# Patient Record
Sex: FEMALE | Race: WHITE | Employment: OTHER | URBAN - METROPOLITAN AREA
[De-identification: names, ages, dates, MRNs, and addresses within clinical notes are randomized per-mention and may not be internally consistent; named-entity substitution may affect disease eponyms.]

---

## 2018-04-17 ENCOUNTER — OFFICE VISIT (OUTPATIENT)
Dept: FAMILY MEDICINE CLINIC | Age: 83
End: 2018-04-17
Payer: MEDICARE

## 2018-04-17 VITALS
WEIGHT: 181 LBS | BODY MASS INDEX: 30.16 KG/M2 | HEART RATE: 89 BPM | DIASTOLIC BLOOD PRESSURE: 60 MMHG | OXYGEN SATURATION: 97 % | RESPIRATION RATE: 20 BRPM | SYSTOLIC BLOOD PRESSURE: 130 MMHG | HEIGHT: 65 IN | TEMPERATURE: 97.5 F

## 2018-04-17 DIAGNOSIS — R05.3 PERSISTENT COUGH: ICD-10-CM

## 2018-04-17 DIAGNOSIS — J44.9 CHRONIC OBSTRUCTIVE PULMONARY DISEASE, UNSPECIFIED COPD TYPE (HCC): ICD-10-CM

## 2018-04-17 DIAGNOSIS — J06.9 VIRAL URI: Primary | ICD-10-CM

## 2018-04-17 PROCEDURE — 99214 OFFICE O/P EST MOD 30 MIN: CPT | Performed by: NURSE PRACTITIONER

## 2018-04-17 PROCEDURE — 94640 AIRWAY INHALATION TREATMENT: CPT | Performed by: NURSE PRACTITIONER

## 2018-04-17 RX ADMIN — Medication 0.5 MG: at 10:21

## 2018-04-17 ASSESSMENT — ENCOUNTER SYMPTOMS
VOICE CHANGE: 0
FACIAL SWELLING: 0
STRIDOR: 0
TROUBLE SWALLOWING: 0
VOMITING: 0
SHORTNESS OF BREATH: 1
CHEST TIGHTNESS: 0
SORE THROAT: 1
COLOR CHANGE: 0
ABDOMINAL PAIN: 0
DIARRHEA: 0
CHOKING: 0
SINUS PRESSURE: 0
CONSTIPATION: 0
WHEEZING: 1
RHINORRHEA: 0
SINUS PAIN: 0
APNEA: 0
COUGH: 1
NAUSEA: 0

## 2018-04-26 ENCOUNTER — OFFICE VISIT (OUTPATIENT)
Dept: FAMILY MEDICINE CLINIC | Age: 83
End: 2018-04-26
Payer: MEDICARE

## 2018-04-26 VITALS
BODY MASS INDEX: 29.16 KG/M2 | TEMPERATURE: 98.5 F | HEIGHT: 65 IN | RESPIRATION RATE: 16 BRPM | HEART RATE: 75 BPM | SYSTOLIC BLOOD PRESSURE: 126 MMHG | WEIGHT: 175 LBS | OXYGEN SATURATION: 98 % | DIASTOLIC BLOOD PRESSURE: 64 MMHG

## 2018-04-26 DIAGNOSIS — J44.9 CHRONIC OBSTRUCTIVE PULMONARY DISEASE, UNSPECIFIED COPD TYPE (HCC): ICD-10-CM

## 2018-04-26 DIAGNOSIS — J06.9 VIRAL URI: ICD-10-CM

## 2018-04-26 DIAGNOSIS — I10 ESSENTIAL HYPERTENSION: ICD-10-CM

## 2018-04-26 DIAGNOSIS — R05.3 PERSISTENT COUGH: ICD-10-CM

## 2018-04-26 DIAGNOSIS — E11.9 TYPE 2 DIABETES MELLITUS WITHOUT COMPLICATION, WITHOUT LONG-TERM CURRENT USE OF INSULIN (HCC): Primary | ICD-10-CM

## 2018-04-26 DIAGNOSIS — E78.2 MIXED HYPERLIPIDEMIA: ICD-10-CM

## 2018-04-26 LAB
CREATININE URINE POCT: 50
HBA1C MFR BLD: 6.1 %
MICROALBUMIN/CREAT 24H UR: 30 MG/G{CREAT}
MICROALBUMIN/CREAT UR-RTO: NORMAL

## 2018-04-26 PROCEDURE — 1090F PRES/ABSN URINE INCON ASSESS: CPT | Performed by: FAMILY MEDICINE

## 2018-04-26 PROCEDURE — 99214 OFFICE O/P EST MOD 30 MIN: CPT | Performed by: FAMILY MEDICINE

## 2018-04-26 PROCEDURE — 83036 HEMOGLOBIN GLYCOSYLATED A1C: CPT | Performed by: FAMILY MEDICINE

## 2018-04-26 PROCEDURE — G8926 SPIRO NO PERF OR DOC: HCPCS | Performed by: FAMILY MEDICINE

## 2018-04-26 PROCEDURE — 3023F SPIROM DOC REV: CPT | Performed by: FAMILY MEDICINE

## 2018-04-26 PROCEDURE — 1036F TOBACCO NON-USER: CPT | Performed by: FAMILY MEDICINE

## 2018-04-26 PROCEDURE — 4040F PNEUMOC VAC/ADMIN/RCVD: CPT | Performed by: FAMILY MEDICINE

## 2018-04-26 PROCEDURE — G8417 CALC BMI ABV UP PARAM F/U: HCPCS | Performed by: FAMILY MEDICINE

## 2018-04-26 PROCEDURE — 82044 UR ALBUMIN SEMIQUANTITATIVE: CPT | Performed by: FAMILY MEDICINE

## 2018-04-26 PROCEDURE — 1123F ACP DISCUSS/DSCN MKR DOCD: CPT | Performed by: FAMILY MEDICINE

## 2018-04-26 PROCEDURE — G8427 DOCREV CUR MEDS BY ELIG CLIN: HCPCS | Performed by: FAMILY MEDICINE

## 2018-04-26 PROCEDURE — G8399 PT W/DXA RESULTS DOCUMENT: HCPCS | Performed by: FAMILY MEDICINE

## 2018-04-26 RX ORDER — HYDROCHLOROTHIAZIDE 25 MG/1
25 TABLET ORAL DAILY
Qty: 90 TABLET | Refills: 1 | Status: SHIPPED | OUTPATIENT
Start: 2018-04-26 | End: 2018-06-14 | Stop reason: SDUPTHER

## 2018-04-26 RX ORDER — ROPINIROLE 0.5 MG/1
0.5 TABLET, FILM COATED ORAL
Qty: 90 TABLET | Refills: 1 | Status: SHIPPED | OUTPATIENT
Start: 2018-04-26 | End: 2018-06-14 | Stop reason: SDUPTHER

## 2018-04-26 RX ORDER — ATORVASTATIN CALCIUM 20 MG/1
20 TABLET, FILM COATED ORAL DAILY
Qty: 90 TABLET | Refills: 1 | Status: SHIPPED | OUTPATIENT
Start: 2018-04-26 | End: 2018-06-14 | Stop reason: SDUPTHER

## 2018-04-26 RX ORDER — GLUCOSAMINE HCL/CHONDROITIN SU 500-400 MG
CAPSULE ORAL
Qty: 100 STRIP | Refills: 5 | Status: SHIPPED | OUTPATIENT
Start: 2018-04-26 | End: 2019-05-20 | Stop reason: SDUPTHER

## 2018-04-26 RX ORDER — LOSARTAN POTASSIUM 50 MG/1
50 TABLET ORAL DAILY
Qty: 90 TABLET | Refills: 1 | Status: SHIPPED | OUTPATIENT
Start: 2018-04-26 | End: 2018-06-14 | Stop reason: SDUPTHER

## 2018-04-26 RX ORDER — LANCETS 30 GAUGE
1 EACH MISCELLANEOUS DAILY
Qty: 100 EACH | Refills: 5 | Status: SHIPPED | OUTPATIENT
Start: 2018-04-26 | End: 2018-10-09 | Stop reason: SDUPTHER

## 2018-04-26 RX ORDER — AMLODIPINE BESYLATE 5 MG/1
5 TABLET ORAL DAILY
Qty: 90 TABLET | Refills: 1 | Status: SHIPPED | OUTPATIENT
Start: 2018-04-26 | End: 2018-06-14 | Stop reason: SDUPTHER

## 2018-06-14 RX ORDER — ROPINIROLE 0.5 MG/1
0.5 TABLET, FILM COATED ORAL
Qty: 90 TABLET | Refills: 1 | Status: SHIPPED | OUTPATIENT
Start: 2018-06-14 | End: 2018-10-09 | Stop reason: SDUPTHER

## 2018-06-14 RX ORDER — AMLODIPINE BESYLATE 5 MG/1
5 TABLET ORAL DAILY
Qty: 90 TABLET | Refills: 1 | Status: SHIPPED | OUTPATIENT
Start: 2018-06-14 | End: 2018-10-09 | Stop reason: SDUPTHER

## 2018-06-14 RX ORDER — HYDROCHLOROTHIAZIDE 25 MG/1
25 TABLET ORAL DAILY
Qty: 90 TABLET | Refills: 1 | Status: SHIPPED | OUTPATIENT
Start: 2018-06-14 | End: 2018-10-09 | Stop reason: SDUPTHER

## 2018-06-14 RX ORDER — LOSARTAN POTASSIUM 50 MG/1
50 TABLET ORAL DAILY
Qty: 90 TABLET | Refills: 1 | Status: SHIPPED | OUTPATIENT
Start: 2018-06-14 | End: 2018-10-09 | Stop reason: SDUPTHER

## 2018-06-14 RX ORDER — ATORVASTATIN CALCIUM 20 MG/1
20 TABLET, FILM COATED ORAL DAILY
Qty: 90 TABLET | Refills: 1 | Status: SHIPPED | OUTPATIENT
Start: 2018-06-14 | End: 2018-10-09 | Stop reason: SDUPTHER

## 2018-06-20 DIAGNOSIS — J44.9 CHRONIC OBSTRUCTIVE PULMONARY DISEASE, UNSPECIFIED COPD TYPE (HCC): ICD-10-CM

## 2018-06-21 RX ORDER — IPRATROPIUM/ALBUTEROL SULFATE 20-100 MCG
MIST INHALER (GRAM) INHALATION
Qty: 4 INHALER | Refills: 1 | Status: SHIPPED | OUTPATIENT
Start: 2018-06-21 | End: 2019-01-04 | Stop reason: SDUPTHER

## 2018-07-23 DIAGNOSIS — I10 ESSENTIAL HYPERTENSION: ICD-10-CM

## 2018-07-23 DIAGNOSIS — E78.2 MIXED HYPERLIPIDEMIA: ICD-10-CM

## 2018-07-23 DIAGNOSIS — J44.9 CHRONIC OBSTRUCTIVE PULMONARY DISEASE, UNSPECIFIED COPD TYPE (HCC): ICD-10-CM

## 2018-07-23 DIAGNOSIS — E11.9 TYPE 2 DIABETES MELLITUS WITHOUT COMPLICATION, WITHOUT LONG-TERM CURRENT USE OF INSULIN (HCC): Primary | ICD-10-CM

## 2018-07-24 ENCOUNTER — HOSPITAL ENCOUNTER (OUTPATIENT)
Age: 83
Discharge: HOME | End: 2018-07-26
Payer: MEDICARE

## 2018-07-24 ENCOUNTER — NURSE ONLY (OUTPATIENT)
Dept: FAMILY MEDICINE CLINIC | Age: 83
End: 2018-07-24
Payer: MEDICARE

## 2018-07-24 DIAGNOSIS — E78.2 MIXED HYPERLIPIDEMIA: ICD-10-CM

## 2018-07-24 DIAGNOSIS — I10 ESSENTIAL HYPERTENSION: ICD-10-CM

## 2018-07-24 DIAGNOSIS — E11.9 TYPE 2 DIABETES MELLITUS WITHOUT COMPLICATION, WITHOUT LONG-TERM CURRENT USE OF INSULIN (HCC): ICD-10-CM

## 2018-07-24 DIAGNOSIS — J44.9 CHRONIC OBSTRUCTIVE PULMONARY DISEASE, UNSPECIFIED COPD TYPE (HCC): ICD-10-CM

## 2018-07-24 LAB
ALBUMIN SERPL-MCNC: 4 G/DL (ref 3.5–5.2)
ALP BLD-CCNC: 71 U/L (ref 35–104)
ALT SERPL-CCNC: 17 U/L (ref 0–32)
ANION GAP SERPL CALCULATED.3IONS-SCNC: 15 MMOL/L (ref 7–16)
AST SERPL-CCNC: 21 U/L (ref 0–31)
BASOPHILS ABSOLUTE: 0.02 E9/L (ref 0–0.2)
BASOPHILS RELATIVE PERCENT: 0.4 % (ref 0–2)
BILIRUB SERPL-MCNC: 0.5 MG/DL (ref 0–1.2)
BUN BLDV-MCNC: 19 MG/DL (ref 8–23)
CALCIUM SERPL-MCNC: 9.6 MG/DL (ref 8.6–10.2)
CHLORIDE BLD-SCNC: 95 MMOL/L (ref 98–107)
CHOLESTEROL, TOTAL: 164 MG/DL (ref 0–199)
CO2: 27 MMOL/L (ref 22–29)
CREAT SERPL-MCNC: 0.6 MG/DL (ref 0.5–1)
EOSINOPHILS ABSOLUTE: 0.37 E9/L (ref 0.05–0.5)
EOSINOPHILS RELATIVE PERCENT: 7.2 % (ref 0–6)
GFR AFRICAN AMERICAN: >60
GFR NON-AFRICAN AMERICAN: >60 ML/MIN/1.73
GLUCOSE BLD-MCNC: 121 MG/DL (ref 74–109)
HBA1C MFR BLD: 6.3 % (ref 4–5.6)
HCT VFR BLD CALC: 35.9 % (ref 34–48)
HDLC SERPL-MCNC: 36 MG/DL
HEMOGLOBIN: 11.8 G/DL (ref 11.5–15.5)
IMMATURE GRANULOCYTES #: 0.01 E9/L
IMMATURE GRANULOCYTES %: 0.2 % (ref 0–5)
LDL CHOLESTEROL CALCULATED: 67 MG/DL (ref 0–99)
LYMPHOCYTES ABSOLUTE: 2.12 E9/L (ref 1.5–4)
LYMPHOCYTES RELATIVE PERCENT: 41.3 % (ref 20–42)
MCH RBC QN AUTO: 31 PG (ref 26–35)
MCHC RBC AUTO-ENTMCNC: 32.9 % (ref 32–34.5)
MCV RBC AUTO: 94.2 FL (ref 80–99.9)
MONOCYTES ABSOLUTE: 0.4 E9/L (ref 0.1–0.95)
MONOCYTES RELATIVE PERCENT: 7.8 % (ref 2–12)
NEUTROPHILS ABSOLUTE: 2.21 E9/L (ref 1.8–7.3)
NEUTROPHILS RELATIVE PERCENT: 43.1 % (ref 43–80)
PDW BLD-RTO: 13.1 FL (ref 11.5–15)
PLATELET # BLD: 183 E9/L (ref 130–450)
PMV BLD AUTO: 10.7 FL (ref 7–12)
POTASSIUM SERPL-SCNC: 3.8 MMOL/L (ref 3.5–5)
RBC # BLD: 3.81 E12/L (ref 3.5–5.5)
SODIUM BLD-SCNC: 137 MMOL/L (ref 132–146)
TOTAL PROTEIN: 6.9 G/DL (ref 6.4–8.3)
TRIGL SERPL-MCNC: 306 MG/DL (ref 0–149)
VLDLC SERPL CALC-MCNC: 61 MG/DL
WBC # BLD: 5.1 E9/L (ref 4.5–11.5)

## 2018-07-24 PROCEDURE — 80053 COMPREHEN METABOLIC PANEL: CPT

## 2018-07-24 PROCEDURE — 80061 LIPID PANEL: CPT

## 2018-07-24 PROCEDURE — 36415 COLL VENOUS BLD VENIPUNCTURE: CPT | Performed by: FAMILY MEDICINE

## 2018-07-24 PROCEDURE — 83036 HEMOGLOBIN GLYCOSYLATED A1C: CPT

## 2018-07-24 PROCEDURE — 85025 COMPLETE CBC W/AUTO DIFF WBC: CPT

## 2018-07-26 ENCOUNTER — OFFICE VISIT (OUTPATIENT)
Dept: FAMILY MEDICINE CLINIC | Age: 83
End: 2018-07-26
Payer: MEDICARE

## 2018-07-26 VITALS
RESPIRATION RATE: 20 BRPM | SYSTOLIC BLOOD PRESSURE: 134 MMHG | DIASTOLIC BLOOD PRESSURE: 64 MMHG | OXYGEN SATURATION: 93 % | WEIGHT: 177 LBS | HEART RATE: 80 BPM | TEMPERATURE: 97.8 F | HEIGHT: 65 IN | BODY MASS INDEX: 29.49 KG/M2

## 2018-07-26 DIAGNOSIS — J44.9 CHRONIC OBSTRUCTIVE PULMONARY DISEASE, UNSPECIFIED COPD TYPE (HCC): ICD-10-CM

## 2018-07-26 DIAGNOSIS — E78.2 MIXED HYPERLIPIDEMIA: ICD-10-CM

## 2018-07-26 DIAGNOSIS — E11.9 TYPE 2 DIABETES MELLITUS WITHOUT COMPLICATION, WITHOUT LONG-TERM CURRENT USE OF INSULIN (HCC): Primary | ICD-10-CM

## 2018-07-26 DIAGNOSIS — I10 ESSENTIAL HYPERTENSION: ICD-10-CM

## 2018-07-26 PROCEDURE — 99213 OFFICE O/P EST LOW 20 MIN: CPT | Performed by: FAMILY MEDICINE

## 2018-07-26 PROCEDURE — 1090F PRES/ABSN URINE INCON ASSESS: CPT | Performed by: FAMILY MEDICINE

## 2018-07-26 PROCEDURE — 3023F SPIROM DOC REV: CPT | Performed by: FAMILY MEDICINE

## 2018-07-26 PROCEDURE — G8926 SPIRO NO PERF OR DOC: HCPCS | Performed by: FAMILY MEDICINE

## 2018-07-26 PROCEDURE — 1101F PT FALLS ASSESS-DOCD LE1/YR: CPT | Performed by: FAMILY MEDICINE

## 2018-07-26 PROCEDURE — G8427 DOCREV CUR MEDS BY ELIG CLIN: HCPCS | Performed by: FAMILY MEDICINE

## 2018-07-26 PROCEDURE — 1123F ACP DISCUSS/DSCN MKR DOCD: CPT | Performed by: FAMILY MEDICINE

## 2018-07-26 PROCEDURE — G8399 PT W/DXA RESULTS DOCUMENT: HCPCS | Performed by: FAMILY MEDICINE

## 2018-07-26 PROCEDURE — 4040F PNEUMOC VAC/ADMIN/RCVD: CPT | Performed by: FAMILY MEDICINE

## 2018-07-26 PROCEDURE — G8417 CALC BMI ABV UP PARAM F/U: HCPCS | Performed by: FAMILY MEDICINE

## 2018-07-26 PROCEDURE — 1036F TOBACCO NON-USER: CPT | Performed by: FAMILY MEDICINE

## 2018-07-26 NOTE — PROGRESS NOTES
OFFICE PROGRESS NOTE      SUBJECTIVE:        Patient ID:   Betsy Clark is a 81 y.o. female who presents for   Chief Complaint   Patient presents with    3 Month Follow-Up    Diabetes     a1c - 6.3 07/24/18    Results     lab results    Other     AWV due 08/25/18           HPI:     FEELS GOOD. WATCHING DIET GOOD. WALKING SOME FOR EXERCISE. TAKING MEDICATIONS REGULARLY. Prior to Admission medications    Medication Sig Start Date End Date Taking? Authorizing Provider   COMBIVENT RESPIMAT  MCG/ACT AERS inhaler INHALE 1 PUFF BY MOUTH EVERY 6 HOURS 6/21/18  Yes Valerie aHyward MD   rOPINIRole (REQUIP) 0.5 MG tablet Take 1 tablet by mouth Daily with supper 6/14/18  Yes Valerie Hayward MD   metFORMIN (GLUCOPHAGE) 500 MG tablet Take 1 tablet by mouth 2 times daily (with meals) 1 qam, 1qpm 6/14/18  Yes Valerie Hayward MD   amLODIPine (NORVASC) 5 MG tablet Take 1 tablet by mouth daily 6/14/18  Yes Valerie Hayward MD   atorvastatin (LIPITOR) 20 MG tablet Take 1 tablet by mouth daily 6/14/18  Yes Valerie Hayward MD   hydrochlorothiazide (HYDRODIURIL) 25 MG tablet Take 1 tablet by mouth daily 6/14/18  Yes Valerie Hayward MD   losartan (COZAAR) 50 MG tablet Take 1 tablet by mouth daily 6/14/18  Yes Valerie Hayward MD   Glucose Blood (BLOOD GLUCOSE TEST STRIPS) STRP Patient is to test 2 times a day. . DX: E11.9 please fill with insurance approved 4/26/18  Yes Valerie Hayward MD   Lancets MISC 1 each by Does not apply route daily 4/26/18  Yes Valerie Hayward MD   naproxen (NAPROSYN) 500 MG tablet Take 1 tablet by mouth 2 times daily 10/25/17  Yes Valerie Hayward MD   glucose blood VI test strips (ASCENSIA AUTODISC VI;ONE TOUCH ULTRA TEST VI) strip 1 each by Does not apply route daily ONCE DAILY AS NEEDED.  DX E11.9 NPI #[id number] 10/25/17  Yes Valerie Hayward MD   Blood Glucose Monitoring Suppl DELTA 1 Units by Does not apply route 2 times daily Please give whatever insurance covers 8/8/17  Yes Earle Simons MD   magnesium (MAGNESIUM-OXIDE) 250 MG TABS tablet Take 250 mg by mouth daily   Yes Historical Provider, MD   Multiple Vitamin (ONE-A-DAY ESSENTIAL PO) Take 1 tablet by mouth. Yes Historical Provider, MD   Calcium Carbonate-Vitamin D (CALCIUM + D) 600-200 MG-UNIT TABS Take 1 tablet by mouth daily. Yes Historical Provider, MD   Misc Natural Products (OSTEO BI-FLEX ADV JOINT SHIELD PO) Take 2 tablets by mouth daily. Yes Historical Provider, MD   timolol (BETIMOL) 0.5 % ophthalmic solution Place 1 drop into both eyes daily. Yes Historical Provider, MD   Bimatoprost (LUMIGAN) 0.01 % SOLN Apply 1 drop to eye nightly. Both eyes   Yes Historical Provider, MD   aspirin 81 MG EC tablet Take 81 mg by mouth daily.      Yes Historical Provider, MD     Social History     Social History    Marital status:      Spouse name: N/A    Number of children: N/A    Years of education: N/A     Social History Main Topics    Smoking status: Former Smoker     Packs/day: 1.00     Years: 9.00     Quit date: 9/18/2000    Smokeless tobacco: Never Used    Alcohol use No    Drug use: No    Sexual activity: No     Other Topics Concern    Not on file     Social History Narrative    No narrative on file       I have reviewed Mira's allergies, medications, problem list, medical, social and family history and have updated as needed in the electronic medical record  Review Of Systems:    Skin: no abnormal pigmentation, rash, scaling, itching, masses, hair or nail changes  Eyes: no blurring, diplopia, or eye pain  Ears/Nose/Throat: no hearing loss, tinnitus, vertigo, nosebleed, nasal congestion, rhinorrhea, sore throat  Respiratory: no cough, pleuritic chest pain, dyspnea, or wheezing  Cardiovascular: no chest pain, angina, dyspnea on exertion, orthopnea, PND, palpitations, or claudication  Gastrointestinal: no nausea, vomiting, heartburn, diarrhea, constipation, bloating,  abdominal pain, or blood per rectum. Appetite is good  Genitourinary: no urinary urgency, frequency, dysuria, nocturia, hesitancy, or incontinence  Musculoskeletal: joint pains off and on. Morning stiffness. Ambulating well  Neurologic: no paralysis, paresis, paresthesia, seizures, tremors, or headaches  Hematologic/Lymphatic/Immunologic: no anemia, abnormal bleeding/bruising, fever, chills, night sweats, swollen glands, or unexplained weight loss  Endocrine: no heat or cold intolerance and no polyphagia, polydipsia, or polyuria        OBJECTIVE:     VS:  Wt Readings from Last 3 Encounters:   07/26/18 177 lb (80.3 kg)   04/26/18 175 lb (79.4 kg)   04/17/18 181 lb (82.1 kg)     Temp Readings from Last 3 Encounters:   07/26/18 97.8 °F (36.6 °C) (Temporal)   04/26/18 98.5 °F (36.9 °C) (Temporal)   04/17/18 97.5 °F (36.4 °C) (Temporal)     BP Readings from Last 3 Encounters:   07/26/18 134/64   04/26/18 126/64   04/17/18 130/60        General appearance: Alert, Awake, Oriented times 3, no distress  Skin: Warm and dry  Head: Normocephalic. No masses, lesions or tenderness noted  Eyes: Conjunctivae appear normal. PERLE  Ears: External ears normal  Nose/Sinuses: Nares normal. Septum midline. Mucosa normal. No drainage  Oropharynx: Oropharynx clear with no exudate seen  Neck: Neck supple. No jugular venous distension, lymphadenopathy or thyromegaly Trachea midline  Lungs: Lungs clear to auscultation bilaterally. No ronchi, crackles or wheezes  Heart: S1 S2  Regular rate and rhythm. No rub, murmur or gallop  Abdomen: Abdomen soft, non-tender. BS normal. No masses, organomegaly  Extremities: Arthritic changes are noted. Movements are limited. Pedal pulses are normal.  Musculoskeletal: Muscular strength appears intact.  No joint effusion, tenderness, swelling or warmth  Neuro:  No focal motor or sensory deficits        ASSESSMENT     Patient Active Problem List    Diagnosis Date Noted    Type 2 diabetes mellitus without complication, without long-term current use of insulin ([facility])      Priority: High     Class: Chronic    Mixed hyperlipidemia      Priority: High     Class: Chronic    Essential hypertension      Priority: High     Class: Chronic    RLS (restless legs syndrome) 06/23/2015     Priority: Medium     Class: Chronic    Osteoarthritis      Priority: Medium    Chronic obstructive pulmonary disease ([facility] 02/09/2016        Diagnosis:     ICD-10-CM ICD-9-CM    1. Type 2 diabetes mellitus without complication, without long-term current use of insulin (HCC) E11.9 250.00    2. Mixed hyperlipidemia E78.2 272.2    3. Essential hypertension I10 401.9    4. Chronic obstructive pulmonary disease, unspecified COPD type ([facility] J44.9 496        PLAN:      LABORATORY RESULTS 7/24/2018 REVIEWED AND DISCUSSED. Patient Instructions   LOW SALT,LOW CARB. AND LOW FAT DIET. CONTINUE CURRENT MEDICATIONS TAKING REGULARLY. REGULAR WALKING ADVISED. NEXT APPOINTMENT IN 2 MONTHS. Return in about 2 months (around 9/26/2018). I have reviewed my findings and recommendations with Diogo Mott.     Electronically signed by Mer Boucher MD on 7/26/18 at 10:56 AM

## 2018-10-09 ENCOUNTER — OFFICE VISIT (OUTPATIENT)
Dept: FAMILY MEDICINE CLINIC | Age: 83
End: 2018-10-09
Payer: MEDICARE

## 2018-10-09 VITALS
OXYGEN SATURATION: 96 % | HEART RATE: 71 BPM | SYSTOLIC BLOOD PRESSURE: 128 MMHG | DIASTOLIC BLOOD PRESSURE: 62 MMHG | WEIGHT: 178.12 LBS | HEIGHT: 65 IN | RESPIRATION RATE: 16 BRPM | TEMPERATURE: 97.4 F | BODY MASS INDEX: 29.67 KG/M2

## 2018-10-09 DIAGNOSIS — E11.9 TYPE 2 DIABETES MELLITUS WITHOUT COMPLICATION, WITHOUT LONG-TERM CURRENT USE OF INSULIN (HCC): Primary | ICD-10-CM

## 2018-10-09 DIAGNOSIS — I10 ESSENTIAL HYPERTENSION: ICD-10-CM

## 2018-10-09 DIAGNOSIS — E78.2 MIXED HYPERLIPIDEMIA: ICD-10-CM

## 2018-10-09 DIAGNOSIS — J21.9 ACUTE BRONCHIOLITIS DUE TO UNSPECIFIED ORGANISM: ICD-10-CM

## 2018-10-09 DIAGNOSIS — J44.9 CHRONIC OBSTRUCTIVE PULMONARY DISEASE, UNSPECIFIED COPD TYPE (HCC): ICD-10-CM

## 2018-10-09 LAB — HBA1C MFR BLD: 6.1 %

## 2018-10-09 PROCEDURE — G8427 DOCREV CUR MEDS BY ELIG CLIN: HCPCS | Performed by: FAMILY MEDICINE

## 2018-10-09 PROCEDURE — 83036 HEMOGLOBIN GLYCOSYLATED A1C: CPT | Performed by: FAMILY MEDICINE

## 2018-10-09 PROCEDURE — 1036F TOBACCO NON-USER: CPT | Performed by: FAMILY MEDICINE

## 2018-10-09 PROCEDURE — 99214 OFFICE O/P EST MOD 30 MIN: CPT | Performed by: FAMILY MEDICINE

## 2018-10-09 PROCEDURE — 1123F ACP DISCUSS/DSCN MKR DOCD: CPT | Performed by: FAMILY MEDICINE

## 2018-10-09 PROCEDURE — 3023F SPIROM DOC REV: CPT | Performed by: FAMILY MEDICINE

## 2018-10-09 PROCEDURE — G8510 SCR DEP NEG, NO PLAN REQD: HCPCS | Performed by: FAMILY MEDICINE

## 2018-10-09 PROCEDURE — G8482 FLU IMMUNIZE ORDER/ADMIN: HCPCS | Performed by: FAMILY MEDICINE

## 2018-10-09 PROCEDURE — G8926 SPIRO NO PERF OR DOC: HCPCS | Performed by: FAMILY MEDICINE

## 2018-10-09 PROCEDURE — G8399 PT W/DXA RESULTS DOCUMENT: HCPCS | Performed by: FAMILY MEDICINE

## 2018-10-09 PROCEDURE — 1101F PT FALLS ASSESS-DOCD LE1/YR: CPT | Performed by: FAMILY MEDICINE

## 2018-10-09 PROCEDURE — 1090F PRES/ABSN URINE INCON ASSESS: CPT | Performed by: FAMILY MEDICINE

## 2018-10-09 PROCEDURE — G8417 CALC BMI ABV UP PARAM F/U: HCPCS | Performed by: FAMILY MEDICINE

## 2018-10-09 PROCEDURE — 4040F PNEUMOC VAC/ADMIN/RCVD: CPT | Performed by: FAMILY MEDICINE

## 2018-10-09 PROCEDURE — G0008 ADMIN INFLUENZA VIRUS VAC: HCPCS | Performed by: FAMILY MEDICINE

## 2018-10-09 PROCEDURE — 90662 IIV NO PRSV INCREASED AG IM: CPT | Performed by: FAMILY MEDICINE

## 2018-10-09 RX ORDER — LOSARTAN POTASSIUM 50 MG/1
50 TABLET ORAL DAILY
Qty: 90 TABLET | Refills: 1 | Status: SHIPPED | OUTPATIENT
Start: 2018-10-09 | End: 2019-01-16 | Stop reason: SDUPTHER

## 2018-10-09 RX ORDER — ROPINIROLE 0.5 MG/1
0.5 TABLET, FILM COATED ORAL
Qty: 90 TABLET | Refills: 1 | Status: SHIPPED | OUTPATIENT
Start: 2018-10-09 | End: 2019-01-16 | Stop reason: SDUPTHER

## 2018-10-09 RX ORDER — HYDROCHLOROTHIAZIDE 25 MG/1
25 TABLET ORAL DAILY
Qty: 90 TABLET | Refills: 1 | Status: SHIPPED | OUTPATIENT
Start: 2018-10-09 | End: 2019-01-16 | Stop reason: SDUPTHER

## 2018-10-09 RX ORDER — LANCETS 30 GAUGE
1 EACH MISCELLANEOUS DAILY
Qty: 100 EACH | Refills: 5 | Status: SHIPPED | OUTPATIENT
Start: 2018-10-09 | End: 2019-11-13 | Stop reason: DRUGHIGH

## 2018-10-09 RX ORDER — AMLODIPINE BESYLATE 5 MG/1
5 TABLET ORAL DAILY
Qty: 90 TABLET | Refills: 1 | Status: SHIPPED | OUTPATIENT
Start: 2018-10-09 | End: 2019-01-16 | Stop reason: SDUPTHER

## 2018-10-09 RX ORDER — ATORVASTATIN CALCIUM 20 MG/1
20 TABLET, FILM COATED ORAL DAILY
Qty: 90 TABLET | Refills: 1 | Status: SHIPPED | OUTPATIENT
Start: 2018-10-09 | End: 2019-01-16 | Stop reason: SDUPTHER

## 2018-10-09 RX ORDER — DOXYCYCLINE HYCLATE 100 MG
100 TABLET ORAL 2 TIMES DAILY
Qty: 20 TABLET | Refills: 0 | Status: SHIPPED | OUTPATIENT
Start: 2018-10-09 | End: 2018-10-19

## 2018-10-09 RX ORDER — NAPROXEN 500 MG/1
500 TABLET ORAL 2 TIMES DAILY
Qty: 20 TABLET | Refills: 0 | Status: SHIPPED | OUTPATIENT
Start: 2018-10-09 | End: 2019-01-16 | Stop reason: SDUPTHER

## 2018-10-09 ASSESSMENT — PATIENT HEALTH QUESTIONNAIRE - PHQ9
SUM OF ALL RESPONSES TO PHQ QUESTIONS 1-9: 0
1. LITTLE INTEREST OR PLEASURE IN DOING THINGS: 0
SUM OF ALL RESPONSES TO PHQ QUESTIONS 1-9: 0
SUM OF ALL RESPONSES TO PHQ9 QUESTIONS 1 & 2: 0
2. FEELING DOWN, DEPRESSED OR HOPELESS: 0

## 2018-10-09 NOTE — PATIENT INSTRUCTIONS
REST  FORCE FLUID ORALLY. TYLENOL AS NEEDED. TAKE DOXYCYCLINE 100 MG. 2 TIMES A DAY. LOW SALT,LOW CARB. AND LOW FAT DIET. CONTINUE CURRENT MEDICATIONS TAKING REGULARLY. REGULAR WALKING ADVISED. NEXT APPOINTMENT IN 3 MONTHS.

## 2018-10-11 NOTE — PROGRESS NOTES
DISCUSSED  AND ADVISED.
limited. Pedal pulses are normal.  Musculoskeletal: Muscular strength appears intact. No joint effusion, tenderness, swelling or warmth  Neuro:  No focal motor or sensory deficits        ASSESSMENT     Patient Active Problem List    Diagnosis Date Noted    Type 2 diabetes mellitus without complication, without long-term current use of insulin ([facility])      Priority: High     Class: Chronic    Mixed hyperlipidemia      Priority: High     Class: Chronic    Essential hypertension      Priority: High     Class: Chronic    RLS (restless legs syndrome) 06/23/2015     Priority: Medium     Class: Chronic    Osteoarthritis      Priority: Medium    Chronic obstructive pulmonary disease ([facility] 02/09/2016        Diagnosis:     ICD-10-CM ICD-9-CM    1. Type 2 diabetes mellitus without complication, without long-term current use of insulin ([facility]) E11.9 250.00 POCT glycosylated hemoglobin (Hb A1C)    CONTROLLED   2. Chronic obstructive pulmonary disease, unspecified COPD type ([facility] J44.9 496     STABLE   3. Mixed hyperlipidemia E78.2 272.2     CONTROLLED   4. Essential hypertension I10 401.9     CONTROLLED   5. Acute bronchiolitis due to unspecified organism J21.9 466.19     ACTIVE. PLAN:           Patient Instructions   REST  FORCE FLUID ORALLY. TYLENOL AS NEEDED. TAKE DOXYCYCLINE 100 MG. 2 TIMES A DAY. LOW SALT,LOW CARB. AND LOW FAT DIET. CONTINUE CURRENT MEDICATIONS TAKING REGULARLY. REGULAR WALKING ADVISED. NEXT APPOINTMENT IN 3 MONTHS. Return in about 3 months (around 1/9/2019). I have reviewed my findings and recommendations with Nanette Linton.     Electronically signed by Vinnie Rojas MD on 10/9/18 at 11:03 AM

## 2019-01-04 DIAGNOSIS — J44.9 CHRONIC OBSTRUCTIVE PULMONARY DISEASE, UNSPECIFIED COPD TYPE (HCC): ICD-10-CM

## 2019-01-16 ENCOUNTER — OFFICE VISIT (OUTPATIENT)
Dept: FAMILY MEDICINE CLINIC | Age: 84
End: 2019-01-16
Payer: MEDICARE

## 2019-01-16 VITALS
HEART RATE: 75 BPM | OXYGEN SATURATION: 98 % | SYSTOLIC BLOOD PRESSURE: 128 MMHG | WEIGHT: 181.12 LBS | DIASTOLIC BLOOD PRESSURE: 64 MMHG | HEIGHT: 65 IN | RESPIRATION RATE: 16 BRPM | TEMPERATURE: 98 F | BODY MASS INDEX: 30.17 KG/M2

## 2019-01-16 DIAGNOSIS — J44.9 CHRONIC OBSTRUCTIVE PULMONARY DISEASE, UNSPECIFIED COPD TYPE (HCC): ICD-10-CM

## 2019-01-16 DIAGNOSIS — I10 ESSENTIAL HYPERTENSION: ICD-10-CM

## 2019-01-16 DIAGNOSIS — E78.2 MIXED HYPERLIPIDEMIA: ICD-10-CM

## 2019-01-16 DIAGNOSIS — E11.9 TYPE 2 DIABETES MELLITUS WITHOUT COMPLICATION, WITHOUT LONG-TERM CURRENT USE OF INSULIN (HCC): Primary | ICD-10-CM

## 2019-01-16 LAB — HBA1C MFR BLD: 6.4 %

## 2019-01-16 PROCEDURE — 4040F PNEUMOC VAC/ADMIN/RCVD: CPT | Performed by: FAMILY MEDICINE

## 2019-01-16 PROCEDURE — G8399 PT W/DXA RESULTS DOCUMENT: HCPCS | Performed by: FAMILY MEDICINE

## 2019-01-16 PROCEDURE — 1101F PT FALLS ASSESS-DOCD LE1/YR: CPT | Performed by: FAMILY MEDICINE

## 2019-01-16 PROCEDURE — 99213 OFFICE O/P EST LOW 20 MIN: CPT | Performed by: FAMILY MEDICINE

## 2019-01-16 PROCEDURE — 1036F TOBACCO NON-USER: CPT | Performed by: FAMILY MEDICINE

## 2019-01-16 PROCEDURE — G8427 DOCREV CUR MEDS BY ELIG CLIN: HCPCS | Performed by: FAMILY MEDICINE

## 2019-01-16 PROCEDURE — 1090F PRES/ABSN URINE INCON ASSESS: CPT | Performed by: FAMILY MEDICINE

## 2019-01-16 PROCEDURE — G8482 FLU IMMUNIZE ORDER/ADMIN: HCPCS | Performed by: FAMILY MEDICINE

## 2019-01-16 PROCEDURE — G8926 SPIRO NO PERF OR DOC: HCPCS | Performed by: FAMILY MEDICINE

## 2019-01-16 PROCEDURE — 83036 HEMOGLOBIN GLYCOSYLATED A1C: CPT | Performed by: FAMILY MEDICINE

## 2019-01-16 PROCEDURE — G8417 CALC BMI ABV UP PARAM F/U: HCPCS | Performed by: FAMILY MEDICINE

## 2019-01-16 PROCEDURE — 1123F ACP DISCUSS/DSCN MKR DOCD: CPT | Performed by: FAMILY MEDICINE

## 2019-01-16 PROCEDURE — 3023F SPIROM DOC REV: CPT | Performed by: FAMILY MEDICINE

## 2019-01-16 RX ORDER — ROPINIROLE 0.5 MG/1
0.5 TABLET, FILM COATED ORAL
Qty: 90 TABLET | Refills: 1 | Status: SHIPPED | OUTPATIENT
Start: 2019-01-16 | End: 2019-02-22

## 2019-01-16 RX ORDER — NAPROXEN 500 MG/1
500 TABLET ORAL 2 TIMES DAILY
Qty: 20 TABLET | Refills: 0 | Status: SHIPPED | OUTPATIENT
Start: 2019-01-16 | End: 2019-02-22 | Stop reason: ALTCHOICE

## 2019-01-16 RX ORDER — LOSARTAN POTASSIUM 50 MG/1
50 TABLET ORAL DAILY
Qty: 90 TABLET | Refills: 1 | Status: SHIPPED | OUTPATIENT
Start: 2019-01-16 | End: 2019-04-17 | Stop reason: SDUPTHER

## 2019-01-16 RX ORDER — AMLODIPINE BESYLATE 5 MG/1
5 TABLET ORAL DAILY
Qty: 90 TABLET | Refills: 1 | Status: SHIPPED | OUTPATIENT
Start: 2019-01-16 | End: 2019-04-17 | Stop reason: SDUPTHER

## 2019-01-16 RX ORDER — ATORVASTATIN CALCIUM 20 MG/1
20 TABLET, FILM COATED ORAL DAILY
Qty: 90 TABLET | Refills: 1 | Status: SHIPPED | OUTPATIENT
Start: 2019-01-16 | End: 2019-04-17 | Stop reason: SDUPTHER

## 2019-01-16 RX ORDER — HYDROCHLOROTHIAZIDE 25 MG/1
25 TABLET ORAL DAILY
Qty: 90 TABLET | Refills: 1 | Status: SHIPPED | OUTPATIENT
Start: 2019-01-16 | End: 2019-04-17 | Stop reason: SDUPTHER

## 2019-01-21 DIAGNOSIS — E78.2 MIXED HYPERLIPIDEMIA: ICD-10-CM

## 2019-01-21 DIAGNOSIS — E11.9 TYPE 2 DIABETES MELLITUS WITHOUT COMPLICATION, WITHOUT LONG-TERM CURRENT USE OF INSULIN (HCC): Primary | ICD-10-CM

## 2019-01-21 DIAGNOSIS — I10 ESSENTIAL HYPERTENSION: ICD-10-CM

## 2019-01-22 ENCOUNTER — HOSPITAL ENCOUNTER (OUTPATIENT)
Age: 84
Discharge: HOME | End: 2019-01-24
Payer: MEDICARE

## 2019-01-22 ENCOUNTER — NURSE ONLY (OUTPATIENT)
Dept: FAMILY MEDICINE CLINIC | Age: 84
End: 2019-01-22
Payer: MEDICARE

## 2019-01-22 DIAGNOSIS — E11.9 TYPE 2 DIABETES MELLITUS WITHOUT COMPLICATION, WITHOUT LONG-TERM CURRENT USE OF INSULIN (HCC): ICD-10-CM

## 2019-01-22 DIAGNOSIS — I10 ESSENTIAL HYPERTENSION: ICD-10-CM

## 2019-01-22 DIAGNOSIS — E78.2 MIXED HYPERLIPIDEMIA: ICD-10-CM

## 2019-01-22 DIAGNOSIS — J44.9 CHRONIC OBSTRUCTIVE PULMONARY DISEASE, UNSPECIFIED COPD TYPE (HCC): ICD-10-CM

## 2019-01-22 LAB
ALBUMIN SERPL-MCNC: 4.2 G/DL (ref 3.5–5.2)
ALP BLD-CCNC: 74 U/L (ref 35–104)
ALT SERPL-CCNC: 14 U/L (ref 0–32)
ANION GAP SERPL CALCULATED.3IONS-SCNC: 14 MMOL/L (ref 7–16)
AST SERPL-CCNC: 21 U/L (ref 0–31)
BASOPHILS ABSOLUTE: 0.02 E9/L (ref 0–0.2)
BASOPHILS RELATIVE PERCENT: 0.3 % (ref 0–2)
BILIRUB SERPL-MCNC: 0.4 MG/DL (ref 0–1.2)
BUN BLDV-MCNC: 18 MG/DL (ref 8–23)
CALCIUM SERPL-MCNC: 9.2 MG/DL (ref 8.6–10.2)
CHLORIDE BLD-SCNC: 97 MMOL/L (ref 98–107)
CHOLESTEROL, TOTAL: 156 MG/DL (ref 0–199)
CO2: 28 MMOL/L (ref 22–29)
CREAT SERPL-MCNC: 0.6 MG/DL (ref 0.5–1)
EOSINOPHILS ABSOLUTE: 0.39 E9/L (ref 0.05–0.5)
EOSINOPHILS RELATIVE PERCENT: 6.1 % (ref 0–6)
GFR AFRICAN AMERICAN: >60
GFR NON-AFRICAN AMERICAN: >60 ML/MIN/1.73
GLUCOSE BLD-MCNC: 110 MG/DL (ref 74–99)
HCT VFR BLD CALC: 36.7 % (ref 34–48)
HDLC SERPL-MCNC: 36 MG/DL
HEMOGLOBIN: 11.7 G/DL (ref 11.5–15.5)
IMMATURE GRANULOCYTES #: 0.01 E9/L
IMMATURE GRANULOCYTES %: 0.2 % (ref 0–5)
LDL CHOLESTEROL CALCULATED: 64 MG/DL (ref 0–99)
LYMPHOCYTES ABSOLUTE: 2.24 E9/L (ref 1.5–4)
LYMPHOCYTES RELATIVE PERCENT: 35 % (ref 20–42)
MCH RBC QN AUTO: 30.5 PG (ref 26–35)
MCHC RBC AUTO-ENTMCNC: 31.9 % (ref 32–34.5)
MCV RBC AUTO: 95.6 FL (ref 80–99.9)
MONOCYTES ABSOLUTE: 0.51 E9/L (ref 0.1–0.95)
MONOCYTES RELATIVE PERCENT: 8 % (ref 2–12)
NEUTROPHILS ABSOLUTE: 3.23 E9/L (ref 1.8–7.3)
NEUTROPHILS RELATIVE PERCENT: 50.4 % (ref 43–80)
PDW BLD-RTO: 12.8 FL (ref 11.5–15)
PLATELET # BLD: 189 E9/L (ref 130–450)
PMV BLD AUTO: 10.5 FL (ref 7–12)
POTASSIUM SERPL-SCNC: 4.4 MMOL/L (ref 3.5–5)
RBC # BLD: 3.84 E12/L (ref 3.5–5.5)
SODIUM BLD-SCNC: 139 MMOL/L (ref 132–146)
TOTAL PROTEIN: 6.7 G/DL (ref 6.4–8.3)
TRIGL SERPL-MCNC: 281 MG/DL (ref 0–149)
VLDLC SERPL CALC-MCNC: 56 MG/DL
WBC # BLD: 6.4 E9/L (ref 4.5–11.5)

## 2019-01-22 PROCEDURE — 80061 LIPID PANEL: CPT

## 2019-01-22 PROCEDURE — 36415 COLL VENOUS BLD VENIPUNCTURE: CPT | Performed by: FAMILY MEDICINE

## 2019-01-22 PROCEDURE — 80053 COMPREHEN METABOLIC PANEL: CPT

## 2019-01-22 PROCEDURE — 85025 COMPLETE CBC W/AUTO DIFF WBC: CPT

## 2019-02-08 ENCOUNTER — OFFICE VISIT (OUTPATIENT)
Dept: FAMILY MEDICINE CLINIC | Age: 84
End: 2019-02-08
Payer: MEDICARE

## 2019-02-08 VITALS
BODY MASS INDEX: 29.66 KG/M2 | TEMPERATURE: 97 F | WEIGHT: 178 LBS | OXYGEN SATURATION: 98 % | RESPIRATION RATE: 16 BRPM | HEIGHT: 65 IN

## 2019-02-08 DIAGNOSIS — Z13.31 STANDARDIZED ADULT DEPRESSION SCREENING TOOL COMPLETED: ICD-10-CM

## 2019-02-08 DIAGNOSIS — E11.9 TYPE 2 DIABETES MELLITUS WITHOUT COMPLICATION, WITHOUT LONG-TERM CURRENT USE OF INSULIN (HCC): ICD-10-CM

## 2019-02-08 DIAGNOSIS — L03.031 CELLULITIS OF GREAT TOE OF RIGHT FOOT: ICD-10-CM

## 2019-02-08 DIAGNOSIS — J44.9 CHRONIC OBSTRUCTIVE PULMONARY DISEASE, UNSPECIFIED COPD TYPE (HCC): Primary | ICD-10-CM

## 2019-02-08 DIAGNOSIS — J44.9 CHRONIC OBSTRUCTIVE PULMONARY DISEASE, UNSPECIFIED COPD TYPE (HCC): ICD-10-CM

## 2019-02-08 PROCEDURE — G8482 FLU IMMUNIZE ORDER/ADMIN: HCPCS | Performed by: FAMILY MEDICINE

## 2019-02-08 PROCEDURE — G8926 SPIRO NO PERF OR DOC: HCPCS | Performed by: FAMILY MEDICINE

## 2019-02-08 PROCEDURE — 3023F SPIROM DOC REV: CPT | Performed by: FAMILY MEDICINE

## 2019-02-08 PROCEDURE — G0444 DEPRESSION SCREEN ANNUAL: HCPCS | Performed by: FAMILY MEDICINE

## 2019-02-08 PROCEDURE — G8399 PT W/DXA RESULTS DOCUMENT: HCPCS | Performed by: FAMILY MEDICINE

## 2019-02-08 PROCEDURE — G8427 DOCREV CUR MEDS BY ELIG CLIN: HCPCS | Performed by: FAMILY MEDICINE

## 2019-02-08 PROCEDURE — 99214 OFFICE O/P EST MOD 30 MIN: CPT | Performed by: FAMILY MEDICINE

## 2019-02-08 PROCEDURE — 1036F TOBACCO NON-USER: CPT | Performed by: FAMILY MEDICINE

## 2019-02-08 PROCEDURE — 1123F ACP DISCUSS/DSCN MKR DOCD: CPT | Performed by: FAMILY MEDICINE

## 2019-02-08 PROCEDURE — 1101F PT FALLS ASSESS-DOCD LE1/YR: CPT | Performed by: FAMILY MEDICINE

## 2019-02-08 PROCEDURE — 1090F PRES/ABSN URINE INCON ASSESS: CPT | Performed by: FAMILY MEDICINE

## 2019-02-08 PROCEDURE — G8417 CALC BMI ABV UP PARAM F/U: HCPCS | Performed by: FAMILY MEDICINE

## 2019-02-08 PROCEDURE — 4040F PNEUMOC VAC/ADMIN/RCVD: CPT | Performed by: FAMILY MEDICINE

## 2019-02-08 RX ORDER — ALBUTEROL SULFATE 90 UG/1
1 AEROSOL, METERED RESPIRATORY (INHALATION) EVERY 4 HOURS PRN
Qty: 1 INHALER | Refills: 3 | Status: SHIPPED | OUTPATIENT
Start: 2019-02-08 | End: 2019-02-08 | Stop reason: ALTCHOICE

## 2019-02-08 RX ORDER — DOXYCYCLINE HYCLATE 100 MG
100 TABLET ORAL 2 TIMES DAILY
Qty: 20 TABLET | Refills: 0 | Status: SHIPPED | OUTPATIENT
Start: 2019-02-08 | End: 2019-02-18

## 2019-02-08 ASSESSMENT — PATIENT HEALTH QUESTIONNAIRE - PHQ9
SUM OF ALL RESPONSES TO PHQ QUESTIONS 1-9: 0
1. LITTLE INTEREST OR PLEASURE IN DOING THINGS: 0
SUM OF ALL RESPONSES TO PHQ QUESTIONS 1-9: 0
2. FEELING DOWN, DEPRESSED OR HOPELESS: 0
SUM OF ALL RESPONSES TO PHQ9 QUESTIONS 1 & 2: 0

## 2019-02-22 ENCOUNTER — OFFICE VISIT (OUTPATIENT)
Dept: FAMILY MEDICINE CLINIC | Age: 84
End: 2019-02-22
Payer: MEDICARE

## 2019-02-22 VITALS
BODY MASS INDEX: 29.94 KG/M2 | HEIGHT: 65 IN | SYSTOLIC BLOOD PRESSURE: 122 MMHG | OXYGEN SATURATION: 96 % | DIASTOLIC BLOOD PRESSURE: 58 MMHG | TEMPERATURE: 97.8 F | RESPIRATION RATE: 19 BRPM | WEIGHT: 179.69 LBS | HEART RATE: 72 BPM

## 2019-02-22 DIAGNOSIS — J44.9 CHRONIC OBSTRUCTIVE PULMONARY DISEASE, UNSPECIFIED COPD TYPE (HCC): ICD-10-CM

## 2019-02-22 DIAGNOSIS — E78.2 MIXED HYPERLIPIDEMIA: ICD-10-CM

## 2019-02-22 DIAGNOSIS — G25.81 RLS (RESTLESS LEGS SYNDROME): ICD-10-CM

## 2019-02-22 DIAGNOSIS — E11.9 TYPE 2 DIABETES MELLITUS WITHOUT COMPLICATION, WITHOUT LONG-TERM CURRENT USE OF INSULIN (HCC): Primary | ICD-10-CM

## 2019-02-22 DIAGNOSIS — L03.031 CELLULITIS OF GREAT TOE OF RIGHT FOOT: ICD-10-CM

## 2019-02-22 PROCEDURE — 1123F ACP DISCUSS/DSCN MKR DOCD: CPT | Performed by: FAMILY MEDICINE

## 2019-02-22 PROCEDURE — 4040F PNEUMOC VAC/ADMIN/RCVD: CPT | Performed by: FAMILY MEDICINE

## 2019-02-22 PROCEDURE — 3023F SPIROM DOC REV: CPT | Performed by: FAMILY MEDICINE

## 2019-02-22 PROCEDURE — G8427 DOCREV CUR MEDS BY ELIG CLIN: HCPCS | Performed by: FAMILY MEDICINE

## 2019-02-22 PROCEDURE — G8417 CALC BMI ABV UP PARAM F/U: HCPCS | Performed by: FAMILY MEDICINE

## 2019-02-22 PROCEDURE — 1090F PRES/ABSN URINE INCON ASSESS: CPT | Performed by: FAMILY MEDICINE

## 2019-02-22 PROCEDURE — G8926 SPIRO NO PERF OR DOC: HCPCS | Performed by: FAMILY MEDICINE

## 2019-02-22 PROCEDURE — 1036F TOBACCO NON-USER: CPT | Performed by: FAMILY MEDICINE

## 2019-02-22 PROCEDURE — 99214 OFFICE O/P EST MOD 30 MIN: CPT | Performed by: FAMILY MEDICINE

## 2019-02-22 PROCEDURE — G8399 PT W/DXA RESULTS DOCUMENT: HCPCS | Performed by: FAMILY MEDICINE

## 2019-02-22 PROCEDURE — 1101F PT FALLS ASSESS-DOCD LE1/YR: CPT | Performed by: FAMILY MEDICINE

## 2019-02-22 PROCEDURE — G8482 FLU IMMUNIZE ORDER/ADMIN: HCPCS | Performed by: FAMILY MEDICINE

## 2019-02-22 RX ORDER — GABAPENTIN 300 MG/1
300 CAPSULE ORAL NIGHTLY
Qty: 90 CAPSULE | Refills: 1 | Status: SHIPPED | OUTPATIENT
Start: 2019-02-22 | End: 2019-03-26

## 2019-03-26 ENCOUNTER — OFFICE VISIT (OUTPATIENT)
Dept: FAMILY MEDICINE CLINIC | Age: 84
End: 2019-03-26
Payer: MEDICARE

## 2019-03-26 VITALS
WEIGHT: 180 LBS | BODY MASS INDEX: 29.99 KG/M2 | SYSTOLIC BLOOD PRESSURE: 130 MMHG | TEMPERATURE: 98.7 F | DIASTOLIC BLOOD PRESSURE: 78 MMHG | RESPIRATION RATE: 16 BRPM | OXYGEN SATURATION: 98 % | HEART RATE: 68 BPM | HEIGHT: 65 IN

## 2019-03-26 DIAGNOSIS — R20.0 NUMBNESS OF RIGHT FOOT: ICD-10-CM

## 2019-03-26 DIAGNOSIS — J44.9 CHRONIC OBSTRUCTIVE PULMONARY DISEASE, UNSPECIFIED COPD TYPE (HCC): ICD-10-CM

## 2019-03-26 DIAGNOSIS — J06.9 VIRAL URI: Primary | ICD-10-CM

## 2019-03-26 PROCEDURE — G8417 CALC BMI ABV UP PARAM F/U: HCPCS | Performed by: FAMILY MEDICINE

## 2019-03-26 PROCEDURE — 4040F PNEUMOC VAC/ADMIN/RCVD: CPT | Performed by: FAMILY MEDICINE

## 2019-03-26 PROCEDURE — G8427 DOCREV CUR MEDS BY ELIG CLIN: HCPCS | Performed by: FAMILY MEDICINE

## 2019-03-26 PROCEDURE — 1123F ACP DISCUSS/DSCN MKR DOCD: CPT | Performed by: FAMILY MEDICINE

## 2019-03-26 PROCEDURE — G8482 FLU IMMUNIZE ORDER/ADMIN: HCPCS | Performed by: FAMILY MEDICINE

## 2019-03-26 PROCEDURE — 99213 OFFICE O/P EST LOW 20 MIN: CPT | Performed by: FAMILY MEDICINE

## 2019-03-26 PROCEDURE — 3023F SPIROM DOC REV: CPT | Performed by: FAMILY MEDICINE

## 2019-03-26 PROCEDURE — G8399 PT W/DXA RESULTS DOCUMENT: HCPCS | Performed by: FAMILY MEDICINE

## 2019-03-26 PROCEDURE — G8926 SPIRO NO PERF OR DOC: HCPCS | Performed by: FAMILY MEDICINE

## 2019-03-26 PROCEDURE — 1036F TOBACCO NON-USER: CPT | Performed by: FAMILY MEDICINE

## 2019-03-26 PROCEDURE — 1090F PRES/ABSN URINE INCON ASSESS: CPT | Performed by: FAMILY MEDICINE

## 2019-04-09 ENCOUNTER — TELEPHONE (OUTPATIENT)
Dept: FAMILY MEDICINE CLINIC | Age: 84
End: 2019-04-09

## 2019-04-17 ENCOUNTER — OFFICE VISIT (OUTPATIENT)
Dept: FAMILY MEDICINE CLINIC | Age: 84
End: 2019-04-17
Payer: MEDICARE

## 2019-04-17 VITALS
WEIGHT: 179 LBS | HEART RATE: 80 BPM | RESPIRATION RATE: 16 BRPM | SYSTOLIC BLOOD PRESSURE: 128 MMHG | BODY MASS INDEX: 29.82 KG/M2 | DIASTOLIC BLOOD PRESSURE: 64 MMHG | TEMPERATURE: 97.8 F | HEIGHT: 65 IN | OXYGEN SATURATION: 98 %

## 2019-04-17 DIAGNOSIS — I10 ESSENTIAL HYPERTENSION: ICD-10-CM

## 2019-04-17 DIAGNOSIS — E78.2 MIXED HYPERLIPIDEMIA: ICD-10-CM

## 2019-04-17 DIAGNOSIS — J44.9 CHRONIC OBSTRUCTIVE PULMONARY DISEASE, UNSPECIFIED COPD TYPE (HCC): ICD-10-CM

## 2019-04-17 DIAGNOSIS — E11.9 TYPE 2 DIABETES MELLITUS WITHOUT COMPLICATION, WITHOUT LONG-TERM CURRENT USE OF INSULIN (HCC): Primary | ICD-10-CM

## 2019-04-17 DIAGNOSIS — E08.42 DIABETIC POLYNEUROPATHY ASSOCIATED WITH DIABETES MELLITUS DUE TO UNDERLYING CONDITION (HCC): ICD-10-CM

## 2019-04-17 LAB — HBA1C MFR BLD: 6.3 %

## 2019-04-17 PROCEDURE — G8399 PT W/DXA RESULTS DOCUMENT: HCPCS | Performed by: FAMILY MEDICINE

## 2019-04-17 PROCEDURE — 1090F PRES/ABSN URINE INCON ASSESS: CPT | Performed by: FAMILY MEDICINE

## 2019-04-17 PROCEDURE — 83036 HEMOGLOBIN GLYCOSYLATED A1C: CPT | Performed by: FAMILY MEDICINE

## 2019-04-17 PROCEDURE — G8427 DOCREV CUR MEDS BY ELIG CLIN: HCPCS | Performed by: FAMILY MEDICINE

## 2019-04-17 PROCEDURE — 3023F SPIROM DOC REV: CPT | Performed by: FAMILY MEDICINE

## 2019-04-17 PROCEDURE — 1123F ACP DISCUSS/DSCN MKR DOCD: CPT | Performed by: FAMILY MEDICINE

## 2019-04-17 PROCEDURE — 99214 OFFICE O/P EST MOD 30 MIN: CPT | Performed by: FAMILY MEDICINE

## 2019-04-17 PROCEDURE — G8417 CALC BMI ABV UP PARAM F/U: HCPCS | Performed by: FAMILY MEDICINE

## 2019-04-17 PROCEDURE — G8926 SPIRO NO PERF OR DOC: HCPCS | Performed by: FAMILY MEDICINE

## 2019-04-17 PROCEDURE — 1036F TOBACCO NON-USER: CPT | Performed by: FAMILY MEDICINE

## 2019-04-17 PROCEDURE — 4040F PNEUMOC VAC/ADMIN/RCVD: CPT | Performed by: FAMILY MEDICINE

## 2019-04-17 RX ORDER — HYDROCHLOROTHIAZIDE 25 MG/1
25 TABLET ORAL DAILY
Qty: 90 TABLET | Refills: 1 | Status: SHIPPED | OUTPATIENT
Start: 2019-04-17 | End: 2019-08-14 | Stop reason: SDUPTHER

## 2019-04-17 RX ORDER — ATORVASTATIN CALCIUM 20 MG/1
20 TABLET, FILM COATED ORAL DAILY
Qty: 90 TABLET | Refills: 1 | Status: SHIPPED | OUTPATIENT
Start: 2019-04-17 | End: 2019-08-14 | Stop reason: SDUPTHER

## 2019-04-17 RX ORDER — LOSARTAN POTASSIUM 50 MG/1
50 TABLET ORAL DAILY
Qty: 90 TABLET | Refills: 1 | Status: SHIPPED | OUTPATIENT
Start: 2019-04-17 | End: 2019-08-14 | Stop reason: SDUPTHER

## 2019-04-17 RX ORDER — PREGABALIN 25 MG/1
25 CAPSULE ORAL 2 TIMES DAILY
Qty: 60 CAPSULE | Refills: 2 | Status: SHIPPED | OUTPATIENT
Start: 2019-04-17 | End: 2019-08-05 | Stop reason: SDUPTHER

## 2019-04-17 RX ORDER — AMLODIPINE BESYLATE 5 MG/1
5 TABLET ORAL DAILY
Qty: 90 TABLET | Refills: 1 | Status: SHIPPED | OUTPATIENT
Start: 2019-04-17 | End: 2019-08-14 | Stop reason: SDUPTHER

## 2019-04-17 NOTE — PATIENT INSTRUCTIONS
LOW SALT,LOW CARB. AND LOW FAT DIET. CONTINUE CURRENT MEDICATIONS TAKING REGULARLY. TAKE LYRICA 25 MG. 2 TIMES A DAY FOR LEG PAINS. REGULAR WALKING ADVISED. NEXT APPOINTMENT IN 1 MONTH.

## 2019-04-17 NOTE — PROGRESS NOTES
OFFICE PROGRESS NOTE      SUBJECTIVE:        Patient ID:   Colby Dorado is a 81 y.o. female who presents for   Chief Complaint   Patient presents with    3 Month Follow-Up    Diabetes           HPI:     FEELS GOOD. LEG PAINS PERSIST. WAS UNABLE TO TOLERATE GABAPENTIN. WOULD LIKE TO TRY  LYRICA  FOR RELIEF. WATCHING DIET GOOD. NO  EXERCISE. TAKING MEDICATIONS REGULARLY. Prior to Admission medications    Medication Sig Start Date End Date Taking? Authorizing Provider   amLODIPine (NORVASC) 5 MG tablet Take 1 tablet by mouth daily 4/17/19  Yes Jeffrey Sepulveda MD   atorvastatin (LIPITOR) 20 MG tablet Take 1 tablet by mouth daily 4/17/19  Yes Jeffrey Sepulveda MD   hydrochlorothiazide (HYDRODIURIL) 25 MG tablet Take 1 tablet by mouth daily 4/17/19  Yes Jeffrey Sepulveda MD   losartan (COZAAR) 50 MG tablet Take 1 tablet by mouth daily 4/17/19  Yes Jeffrey Sepulveda MD   metFORMIN (GLUCOPHAGE) 500 MG tablet Take 1 tablet by mouth 2 times daily (with meals) 1 qam, 1qpm 4/17/19  Yes Jeffrey Sepulveda MD   albuterol-ipratropium (COMBIVENT RESPIMAT)  MCG/ACT AERS inhaler Inhale 1 puff into the lungs every 4 hours as needed for Wheezing 4/17/19  Yes Jeffrey Sepulveda MD   pregabalin (LYRICA) 25 MG capsule Take 1 capsule by mouth 2 times daily for 90 days. 4/17/19 7/16/19 Yes Jeffrey Sepulveda MD   Lancets MISC 1 each by Does not apply route daily 10/9/18  Yes Jeffrey Sepulveda MD   Glucose Blood (BLOOD GLUCOSE TEST STRIPS) STRP Patient is to test 2 times a day. . DX: E11.9 please fill with insurance approved 4/26/18  Yes Jeffrey Sepulveda MD   glucose blood VI test strips (ASCENSIA AUTODISC VI;ONE TOUCH ULTRA TEST VI) strip 1 each by Does not apply route daily ONCE DAILY AS NEEDED.  DX E11.9 NPI #[id number] 10/25/17  Yes Jeffrey Sepulveda MD   Blood Glucose Monitoring Suppl DELTA 1 Units by Does not apply route 2 times daily Please give whatever insurance covers 8/8/17  Yes Jeffrey Sepulveda MD appear normal. PERLE  Ears: External ears normal  Nose/Sinuses: Nares normal. Septum midline. Mucosa normal. No drainage  Oropharynx: Oropharynx clear with no exudate seen  Neck: Neck supple. No jugular venous distension, lymphadenopathy or thyromegaly Trachea midline  Lungs: Lungs clear to auscultation bilaterally. No ronchi, crackles or wheezes  Heart: S1 S2  Regular rate and rhythm. No rub, murmur or gallop  Abdomen: Abdomen soft, non-tender. BS normal. No masses, organomegaly  Extremities: Arthritic changes are noted. Movements are limited. Pedal pulses are normal.  Musculoskeletal: Muscular strength appears intact. No joint effusion, tenderness, swelling or warmth  Neuro:  No focal motor or sensory deficits        ASSESSMENT     Patient Active Problem List    Diagnosis Date Noted    Type 2 diabetes mellitus without complication, without long-term current use of insulin ([facility])      Priority: High     Class: Chronic    Mixed hyperlipidemia      Priority: High     Class: Chronic    Essential hypertension      Priority: High     Class: Chronic    RLS (restless legs syndrome) 06/23/2015     Priority: Medium     Class: Chronic    Osteoarthritis      Priority: Medium    Diabetic polyneuropathy associated with diabetes mellitus due to underlying condition ([facility] 04/17/2019    Chronic obstructive pulmonary disease ([facility] 02/09/2016        Diagnosis:     ICD-10-CM    1. Type 2 diabetes mellitus without complication, without long-term current use of insulin ([facility]) E11.9 POCT glycosylated hemoglobin (Hb A1C)    CONTROLLED   2. Mixed hyperlipidemia E78.2     CONTROLLED   3. Essential hypertension I10     CONTROLLED   4. Diabetic polyneuropathy associated with diabetes mellitus due to underlying condition ([facility]) E08.42 pregabalin (LYRICA) 25 MG capsule    PROGRESSIVE    5. Chronic obstructive pulmonary disease, unspecified COPD type ([facility] J44.9     STABLE. PLAN:           Patient Instructions   LOW SALT,LOW CARB. AND LOW FAT DIET. CONTINUE CURRENT MEDICATIONS TAKING REGULARLY. TAKE LYRICA 25 MG. 2 TIMES A DAY FOR LEG PAINS. REGULAR WALKING ADVISED. NEXT APPOINTMENT IN 1 MONTH. Return in about 1 month (around 5/17/2019). I have reviewed my findings and recommendations with Etta Philip.     Electronically signed by Regino Veronica MD on 4/17/19 at 10:28 AM

## 2019-06-25 ENCOUNTER — HOSPITAL ENCOUNTER (EMERGENCY)
Age: 84
Discharge: HOME | End: 2019-06-25
Attending: EMERGENCY MEDICINE
Payer: MEDICARE

## 2019-06-25 VITALS
TEMPERATURE: 97.5 F | DIASTOLIC BLOOD PRESSURE: 56 MMHG | OXYGEN SATURATION: 95 % | RESPIRATION RATE: 18 BRPM | HEIGHT: 65 IN | WEIGHT: 180 LBS | SYSTOLIC BLOOD PRESSURE: 122 MMHG | HEART RATE: 100 BPM | BODY MASS INDEX: 29.99 KG/M2

## 2019-06-25 DIAGNOSIS — N39.0 URINARY TRACT INFECTION WITHOUT HEMATURIA, SITE UNSPECIFIED: Primary | ICD-10-CM

## 2019-06-25 LAB
BACTERIA: ABNORMAL /HPF
BILIRUBIN URINE: NEGATIVE
BLOOD, URINE: ABNORMAL
CLARITY: ABNORMAL
COLOR: YELLOW
EPITHELIAL CELLS, UA: ABNORMAL /HPF
GLUCOSE URINE: NEGATIVE MG/DL
KETONES, URINE: NEGATIVE MG/DL
LEUKOCYTE ESTERASE, URINE: ABNORMAL
NITRITE, URINE: NEGATIVE
PH UA: 6.5 (ref 5–9)
PROTEIN UA: 100 MG/DL
RBC UA: ABNORMAL /HPF (ref 0–2)
SPECIFIC GRAVITY UA: 1.01 (ref 1–1.03)
UROBILINOGEN, URINE: 0.2 E.U./DL
WBC UA: >20 /HPF (ref 0–5)

## 2019-06-25 PROCEDURE — 99283 EMERGENCY DEPT VISIT LOW MDM: CPT

## 2019-06-25 PROCEDURE — 87088 URINE BACTERIA CULTURE: CPT

## 2019-06-25 PROCEDURE — G0382 LEV 3 HOSP TYPE B ED VISIT: HCPCS

## 2019-06-25 PROCEDURE — 87186 SC STD MICRODIL/AGAR DIL: CPT

## 2019-06-25 PROCEDURE — 81001 URINALYSIS AUTO W/SCOPE: CPT

## 2019-06-25 RX ORDER — CEFDINIR 300 MG/1
300 CAPSULE ORAL 2 TIMES DAILY
Qty: 20 CAPSULE | Refills: 0 | Status: SHIPPED | OUTPATIENT
Start: 2019-06-25 | End: 2019-07-05

## 2019-06-25 NOTE — ED PROVIDER NOTES
HPI:  19, Time: 11:39 AM         Camila Barakat is a 81 y.o. female presenting to the ED for dysuria, beginning 2 days ago. The complaint has been intermittent, mild in severity, and worsened by nothing. No abdominal pain no flank pain. No fever no chills no nausea no vomiting. There is been frequency urgency and dysuria. No hematuria. No vaginal bleeding no vaginal discharge    ROS:   Pertinent positives and negatives are stated within HPI, all other systems reviewed and are negative.  --------------------------------------------- PAST HISTORY ---------------------------------------------  Past Medical History:  has a past medical history of COPD (chronic obstructive pulmonary disease) ([facility] Hyperlipidemia, Hypertension, Osteoarthritis, and Type II or unspecified type diabetes mellitus without mention of complication, not stated as uncontrolled. Past Surgical History:  has a past surgical history that includes Appendectomy (); tumor removal (); Colonoscopy (); and  section ( ,). Social History:  reports that she quit smoking about 18 years ago. She has a 9.00 pack-year smoking history. She has never used smokeless tobacco. She reports that she does not drink alcohol or use drugs. Family History: family history includes Diabetes in her mother; Heart Disease in her father and mother; High Blood Pressure in her father and mother; Stroke in her mother. The patients home medications have been reviewed.     Allergies: Pcn [penicillins] and Sulfa antibiotics    ---------------------------------------------------PHYSICAL EXAM--------------------------------------     Constitutional/General: Alert and oriented x3, well appearing, non toxic in NAD  Head: Normocephalic and atraumatic  Eyes: PERRL, EOMI  Mouth: Oropharynx clear, handling secretions, no trismus  Neck: Supple, full ROM, non tender to palpation in the midline, no stridor, no crepitus, no meningeal signs  Pulmonary: Lungs clear to auscultation bilaterally, no wheezes, rales, or rhonchi. Not in respiratory distress  Cardiovascular:  Regular rate. Regular rhythm. No murmurs, gallops, or rubs. 2+ distal pulses  Chest: no chest wall tenderness  Abdomen: Soft. Non tender. Non distended. +BS. No rebound, guarding, or rigidity. No pulsatile masses appreciated. Musculoskeletal: Moves all extremities x 4. Warm and well perfused, no clubbing, cyanosis, or edema. Capillary refill <3 seconds  Skin: warm and dry. No rashes. Neurologic: GCS 15, CN 2-12 grossly intact, no focal deficits, symmetric strength 5/5 in the upper and lower extremities bilaterally  Psych: Normal Affect    -------------------------------------------------- RESULTS -------------------------------------------------  I have personally reviewed all laboratory and imaging results for this patient. Results are listed below. LABS:  Results for orders placed or performed during the hospital encounter of 06/25/19   Urinalysis with Microscopic   Result Value Ref Range    Color, UA Yellow Straw/Yellow    Clarity, UA CLOUDY (A) Clear    Glucose, Ur Negative Negative mg/dL    Bilirubin Urine Negative Negative    Ketones, Urine Negative Negative mg/dL    Specific Gravity, UA 1.010 1.005 - 1.030    Blood, Urine MODERATE (A) Negative    pH, UA 6.5 5.0 - 9.0    Protein,  (A) Negative mg/dL    Urobilinogen, Urine 0.2 <2.0 E.U./dL    Nitrite, Urine Negative Negative    Leukocyte Esterase, Urine LARGE (A) Negative       RADIOLOGY:  Interpreted by Radiologist.  No orders to display         ------------------------- NURSING NOTES AND VITALS REVIEWED ---------------------------   The nursing notes within the ED encounter and vital signs as below have been reviewed by myself.   BP (!) 122/56   Pulse 100   Temp 97.5 °F (36.4 °C) (Oral)   Resp 18   Ht 5' 5\" (1.651 m)   Wt 180 lb (81.6 kg)   LMP  (LMP Unknown)   SpO2 95%   BMI 29.95 kg/m²   Oxygen Saturation Interpretation: Normal    The patients available past medical records and past encounters were reviewed. ------------------------------ ED COURSE/MEDICAL DECISION MAKING----------------------  Medications - No data to display          Medical Decision Making:    Was placed on Omnicef with outpatient follow-up in 48 hours the urine was sent for culture she was advised that should she develop any worsening symptoms to return to the emergency room immediately      This patient's ED course included: a personal history and physicial eaxmination    This patient has remained hemodynamically stable during their ED course. Counseling: The emergency provider has spoken with the patient and discussed todays results, in addition to providing specific details for the plan of care and counseling regarding the diagnosis and prognosis. Questions are answered at this time and they are agreeable with the plan.       --------------------------------- IMPRESSION AND DISPOSITION ---------------------------------    IMPRESSION  1. Urinary tract infection without hematuria, site unspecified        DISPOSITION  Disposition: Discharge to home  Patient condition is good        NOTE: This report was transcribed using voice recognition software.  Every effort was made to ensure accuracy; however, inadvertent computerized transcription errors may be present          Jonatan Prescott MD  06/25/19 [id number]

## 2019-06-27 LAB
ORGANISM: ABNORMAL
URINE CULTURE, ROUTINE: ABNORMAL
URINE CULTURE, ROUTINE: ABNORMAL

## 2019-07-25 ENCOUNTER — APPOINTMENT (OUTPATIENT)
Dept: GENERAL RADIOLOGY | Age: 84
End: 2019-07-25
Payer: MEDICARE

## 2019-07-25 ENCOUNTER — HOSPITAL ENCOUNTER (EMERGENCY)
Age: 84
Discharge: HOME | End: 2019-07-25
Attending: EMERGENCY MEDICINE
Payer: MEDICARE

## 2019-07-25 VITALS
HEART RATE: 85 BPM | SYSTOLIC BLOOD PRESSURE: 140 MMHG | TEMPERATURE: 97.6 F | WEIGHT: 180 LBS | BODY MASS INDEX: 29.95 KG/M2 | RESPIRATION RATE: 20 BRPM | OXYGEN SATURATION: 96 % | DIASTOLIC BLOOD PRESSURE: 68 MMHG

## 2019-07-25 DIAGNOSIS — J42 CHRONIC BRONCHITIS, UNSPECIFIED CHRONIC BRONCHITIS TYPE (HCC): Primary | ICD-10-CM

## 2019-07-25 PROCEDURE — G0382 LEV 3 HOSP TYPE B ED VISIT: HCPCS

## 2019-07-25 PROCEDURE — 71046 X-RAY EXAM CHEST 2 VIEWS: CPT

## 2019-07-25 RX ORDER — DOXYCYCLINE 100 MG/1
100 CAPSULE ORAL 2 TIMES DAILY
Qty: 20 CAPSULE | Refills: 0 | Status: SHIPPED | OUTPATIENT
Start: 2019-07-25 | End: 2019-11-13 | Stop reason: SDUPTHER

## 2019-07-25 RX ORDER — BROMPHENIRAMINE MALEATE, PSEUDOEPHEDRINE HYDROCHLORIDE, AND DEXTROMETHORPHAN HYDROBROMIDE 2; 30; 10 MG/5ML; MG/5ML; MG/5ML
5 SYRUP ORAL 4 TIMES DAILY PRN
Qty: 120 ML | Refills: 0 | Status: SHIPPED | OUTPATIENT
Start: 2019-07-25 | End: 2019-11-13

## 2019-07-25 ASSESSMENT — ENCOUNTER SYMPTOMS
VOMITING: 0
ABDOMINAL DISTENTION: 0
EYE REDNESS: 0
NAUSEA: 0
DIARRHEA: 0
BACK PAIN: 0
SORE THROAT: 0
EYE PAIN: 0
SHORTNESS OF BREATH: 0
SINUS PRESSURE: 0
WHEEZING: 0
COUGH: 1
EYE DISCHARGE: 0

## 2019-07-25 NOTE — ED PROVIDER NOTES
The history is provided by the patient. URI   Presenting symptoms: congestion and cough    Presenting symptoms: no ear pain, no fever and no sore throat    Severity:  Moderate  Onset quality:  Gradual  Duration:  4 months  Chronicity:  Recurrent  Associated symptoms: no arthralgias, no headaches and no wheezing        Review of Systems   Constitutional: Negative for chills and fever. HENT: Positive for congestion. Negative for ear pain, sinus pressure and sore throat. Eyes: Negative for pain, discharge and redness. Respiratory: Positive for cough. Negative for shortness of breath and wheezing. Cardiovascular: Negative for chest pain. Gastrointestinal: Negative for abdominal distention, diarrhea, nausea and vomiting. Genitourinary: Negative for dysuria and frequency. Musculoskeletal: Negative for arthralgias and back pain. Skin: Negative for rash and wound. Neurological: Negative for weakness and headaches. Hematological: Negative for adenopathy. All other systems reviewed and are negative. Physical Exam   Constitutional: She is oriented to person, place, and time. She appears well-developed and well-nourished. HENT:   Head: Normocephalic and atraumatic. Right Ear: Tympanic membrane normal.   Left Ear: Tympanic membrane normal.   Nose: Mucosal edema present. Eyes: Pupils are equal, round, and reactive to light. Neck: Normal range of motion. Neck supple. Cardiovascular: Normal rate, regular rhythm and normal heart sounds. No murmur heard. Pulmonary/Chest: Effort normal and breath sounds normal. No respiratory distress. She has no wheezes. She has no rales. Abdominal: Soft. Bowel sounds are normal. There is no tenderness. There is no rebound and no guarding. Musculoskeletal: She exhibits no edema. Neurological: She is alert and oriented to person, place, and time. No cranial nerve deficit. Coordination normal.   Skin: Skin is warm and dry.    Nursing note and vitals whatever insurance covers    CALCIUM CARBONATE-VITAMIN D (CALCIUM + D) 600-200 MG-UNIT TABS    Take 1 tablet by mouth daily. GLUCOSE BLOOD VI TEST STRIPS (ASCENSIA AUTODISC VI;ONE TOUCH ULTRA TEST VI) STRIP    1 each by Does not apply route daily ONCE DAILY AS NEEDED. DX E11.9 NPI #[id number]    HYDROCHLOROTHIAZIDE (HYDRODIURIL) 25 MG TABLET    Take 1 tablet by mouth daily    LANCETS MISC    1 each by Does not apply route daily    LOSARTAN (COZAAR) 50 MG TABLET    Take 1 tablet by mouth daily    MAGNESIUM (MAGNESIUM-OXIDE) 250 MG TABS TABLET    Take 250 mg by mouth daily    METFORMIN (GLUCOPHAGE) 500 MG TABLET    Take 1 tablet by mouth 2 times daily (with meals) 1 qam, 1qpm    MISC NATURAL PRODUCTS (OSTEO BI-FLEX ADV JOINT SHIELD PO)    Take 2 tablets by mouth daily. MULTIPLE VITAMIN (ONE-A-DAY ESSENTIAL PO)    Take 1 tablet by mouth. ONE TOUCH ULTRA TEST STRIP    USE 1 STRIP TO CHECK GLUCOSE TWICE DAILY    PREGABALIN (LYRICA) 25 MG CAPSULE    Take 1 capsule by mouth 2 times daily for 90 days. TIMOLOL (BETIMOL) 0.5 % OPHTHALMIC SOLUTION    Place 1 drop into both eyes daily. Modified Medications    No medications on file   Discontinued Medications    BIMATOPROST (LUMIGAN) 0.01 % SOLN    Apply 1 drop to eye nightly. Both eyes         Diagnosis:  1. Chronic bronchitis, unspecified chronic bronchitis type ([facility]        Disposition:  Patient's disposition: Discharge to home  Patient's condition is stable.               Ilan Kamara MD  07/25/19 1659

## 2019-08-05 DIAGNOSIS — E08.42 DIABETIC POLYNEUROPATHY ASSOCIATED WITH DIABETES MELLITUS DUE TO UNDERLYING CONDITION (HCC): ICD-10-CM

## 2019-08-05 RX ORDER — PREGABALIN 25 MG/1
25 CAPSULE ORAL 2 TIMES DAILY
Qty: 60 CAPSULE | Refills: 2 | Status: SHIPPED | OUTPATIENT
Start: 2019-08-05 | End: 2019-11-13 | Stop reason: SDUPTHER

## 2019-08-14 ENCOUNTER — OFFICE VISIT (OUTPATIENT)
Dept: FAMILY MEDICINE CLINIC | Age: 84
End: 2019-08-14
Payer: MEDICARE

## 2019-08-14 VITALS
TEMPERATURE: 97.5 F | SYSTOLIC BLOOD PRESSURE: 124 MMHG | HEIGHT: 65 IN | RESPIRATION RATE: 20 BRPM | WEIGHT: 178.2 LBS | DIASTOLIC BLOOD PRESSURE: 58 MMHG | BODY MASS INDEX: 29.69 KG/M2 | OXYGEN SATURATION: 96 % | HEART RATE: 87 BPM

## 2019-08-14 DIAGNOSIS — E78.2 MIXED HYPERLIPIDEMIA: Primary | ICD-10-CM

## 2019-08-14 DIAGNOSIS — J44.9 CHRONIC OBSTRUCTIVE PULMONARY DISEASE, UNSPECIFIED COPD TYPE (HCC): ICD-10-CM

## 2019-08-14 DIAGNOSIS — I10 ESSENTIAL HYPERTENSION: ICD-10-CM

## 2019-08-14 DIAGNOSIS — I51.7 CARDIOMEGALY: ICD-10-CM

## 2019-08-14 DIAGNOSIS — E11.9 TYPE 2 DIABETES MELLITUS WITHOUT COMPLICATION, WITHOUT LONG-TERM CURRENT USE OF INSULIN (HCC): ICD-10-CM

## 2019-08-14 PROCEDURE — 3023F SPIROM DOC REV: CPT | Performed by: FAMILY MEDICINE

## 2019-08-14 PROCEDURE — G8926 SPIRO NO PERF OR DOC: HCPCS | Performed by: FAMILY MEDICINE

## 2019-08-14 PROCEDURE — G8427 DOCREV CUR MEDS BY ELIG CLIN: HCPCS | Performed by: FAMILY MEDICINE

## 2019-08-14 PROCEDURE — 99214 OFFICE O/P EST MOD 30 MIN: CPT | Performed by: FAMILY MEDICINE

## 2019-08-14 PROCEDURE — G8417 CALC BMI ABV UP PARAM F/U: HCPCS | Performed by: FAMILY MEDICINE

## 2019-08-14 PROCEDURE — 83036 HEMOGLOBIN GLYCOSYLATED A1C: CPT | Performed by: FAMILY MEDICINE

## 2019-08-14 PROCEDURE — 4040F PNEUMOC VAC/ADMIN/RCVD: CPT | Performed by: FAMILY MEDICINE

## 2019-08-14 PROCEDURE — G8399 PT W/DXA RESULTS DOCUMENT: HCPCS | Performed by: FAMILY MEDICINE

## 2019-08-14 PROCEDURE — 1036F TOBACCO NON-USER: CPT | Performed by: FAMILY MEDICINE

## 2019-08-14 PROCEDURE — 1123F ACP DISCUSS/DSCN MKR DOCD: CPT | Performed by: FAMILY MEDICINE

## 2019-08-14 PROCEDURE — 1090F PRES/ABSN URINE INCON ASSESS: CPT | Performed by: FAMILY MEDICINE

## 2019-08-14 RX ORDER — TIMOLOL MALEATE 5 MG/ML
SOLUTION/ DROPS OPHTHALMIC
Refills: 3 | COMMUNITY
Start: 2019-07-30

## 2019-08-14 RX ORDER — ATORVASTATIN CALCIUM 20 MG/1
20 TABLET, FILM COATED ORAL DAILY
Qty: 90 TABLET | Refills: 1 | Status: SHIPPED | OUTPATIENT
Start: 2019-08-14 | End: 2019-11-13 | Stop reason: SDUPTHER

## 2019-08-14 RX ORDER — LATANOPROST 50 UG/ML
SOLUTION/ DROPS OPHTHALMIC
Refills: 3 | COMMUNITY
Start: 2019-07-30

## 2019-08-14 RX ORDER — HYDROCHLOROTHIAZIDE 25 MG/1
25 TABLET ORAL DAILY
Qty: 90 TABLET | Refills: 1 | Status: SHIPPED | OUTPATIENT
Start: 2019-08-14 | End: 2019-11-13 | Stop reason: SDUPTHER

## 2019-08-14 RX ORDER — BRIMONIDINE TARTRATE 2 MG/ML
SOLUTION/ DROPS OPHTHALMIC
Refills: 3 | COMMUNITY
Start: 2019-07-30

## 2019-08-14 RX ORDER — LOSARTAN POTASSIUM 50 MG/1
50 TABLET ORAL DAILY
Qty: 90 TABLET | Refills: 1 | Status: SHIPPED | OUTPATIENT
Start: 2019-08-14 | End: 2019-11-13 | Stop reason: SDUPTHER

## 2019-08-14 RX ORDER — AMLODIPINE BESYLATE 5 MG/1
5 TABLET ORAL DAILY
Qty: 90 TABLET | Refills: 1 | Status: SHIPPED | OUTPATIENT
Start: 2019-08-14 | End: 2019-11-13 | Stop reason: SDUPTHER

## 2019-11-05 ENCOUNTER — HOSPITAL ENCOUNTER (OUTPATIENT)
Age: 84
Discharge: HOME | End: 2019-11-05
Payer: MEDICARE

## 2019-11-05 DIAGNOSIS — E78.2 MIXED HYPERLIPIDEMIA: ICD-10-CM

## 2019-11-05 LAB
ALBUMIN SERPL-MCNC: 4.3 G/DL (ref 3.5–5.2)
ALP BLD-CCNC: 77 U/L (ref 35–104)
ALT SERPL-CCNC: 14 U/L (ref 0–32)
ANION GAP SERPL CALCULATED.3IONS-SCNC: 13 MMOL/L (ref 7–16)
AST SERPL-CCNC: 20 U/L (ref 0–31)
BILIRUB SERPL-MCNC: 0.6 MG/DL (ref 0–1.2)
BUN BLDV-MCNC: 15 MG/DL (ref 8–23)
CALCIUM SERPL-MCNC: 9.9 MG/DL (ref 8.6–10.2)
CHLORIDE BLD-SCNC: 97 MMOL/L (ref 98–107)
CHOLESTEROL, TOTAL: 164 MG/DL (ref 0–199)
CO2: 29 MMOL/L (ref 22–29)
CREAT SERPL-MCNC: 0.7 MG/DL (ref 0.5–1)
GFR AFRICAN AMERICAN: >60
GFR NON-AFRICAN AMERICAN: >60 ML/MIN/1.73
GLUCOSE BLD-MCNC: 110 MG/DL (ref 74–99)
HDLC SERPL-MCNC: 40 MG/DL
LDL CHOLESTEROL CALCULATED: 83 MG/DL (ref 0–99)
POTASSIUM SERPL-SCNC: 4 MMOL/L (ref 3.5–5)
SODIUM BLD-SCNC: 139 MMOL/L (ref 132–146)
TOTAL PROTEIN: 7 G/DL (ref 6.4–8.3)
TRIGL SERPL-MCNC: 204 MG/DL (ref 0–149)
VLDLC SERPL CALC-MCNC: 41 MG/DL

## 2019-11-05 PROCEDURE — 80053 COMPREHEN METABOLIC PANEL: CPT

## 2019-11-05 PROCEDURE — 36415 COLL VENOUS BLD VENIPUNCTURE: CPT

## 2019-11-05 PROCEDURE — 80061 LIPID PANEL: CPT

## 2019-11-13 ENCOUNTER — OFFICE VISIT (OUTPATIENT)
Dept: FAMILY MEDICINE CLINIC | Age: 84
End: 2019-11-13
Payer: MEDICARE

## 2019-11-13 VITALS
BODY MASS INDEX: 28.99 KG/M2 | HEIGHT: 65 IN | DIASTOLIC BLOOD PRESSURE: 60 MMHG | WEIGHT: 174 LBS | HEART RATE: 73 BPM | SYSTOLIC BLOOD PRESSURE: 136 MMHG | OXYGEN SATURATION: 95 %

## 2019-11-13 DIAGNOSIS — J44.9 CHRONIC OBSTRUCTIVE PULMONARY DISEASE, UNSPECIFIED COPD TYPE (HCC): ICD-10-CM

## 2019-11-13 DIAGNOSIS — Z23 NEED FOR INFLUENZA VACCINATION: ICD-10-CM

## 2019-11-13 DIAGNOSIS — E11.9 TYPE 2 DIABETES MELLITUS WITHOUT COMPLICATION, WITHOUT LONG-TERM CURRENT USE OF INSULIN (HCC): Primary | ICD-10-CM

## 2019-11-13 DIAGNOSIS — E08.42 DIABETIC POLYNEUROPATHY ASSOCIATED WITH DIABETES MELLITUS DUE TO UNDERLYING CONDITION (HCC): ICD-10-CM

## 2019-11-13 DIAGNOSIS — E11.9 TYPE 2 DIABETES MELLITUS WITHOUT COMPLICATION, WITHOUT LONG-TERM CURRENT USE OF INSULIN (HCC): ICD-10-CM

## 2019-11-13 DIAGNOSIS — E78.2 MIXED HYPERLIPIDEMIA: ICD-10-CM

## 2019-11-13 DIAGNOSIS — I10 ESSENTIAL HYPERTENSION: ICD-10-CM

## 2019-11-13 LAB — HBA1C MFR BLD: 5.9 %

## 2019-11-13 PROCEDURE — G8926 SPIRO NO PERF OR DOC: HCPCS | Performed by: FAMILY MEDICINE

## 2019-11-13 PROCEDURE — 1036F TOBACCO NON-USER: CPT | Performed by: FAMILY MEDICINE

## 2019-11-13 PROCEDURE — 90653 IIV ADJUVANT VACCINE IM: CPT | Performed by: FAMILY MEDICINE

## 2019-11-13 PROCEDURE — 1123F ACP DISCUSS/DSCN MKR DOCD: CPT | Performed by: FAMILY MEDICINE

## 2019-11-13 PROCEDURE — 4040F PNEUMOC VAC/ADMIN/RCVD: CPT | Performed by: FAMILY MEDICINE

## 2019-11-13 PROCEDURE — G8417 CALC BMI ABV UP PARAM F/U: HCPCS | Performed by: FAMILY MEDICINE

## 2019-11-13 PROCEDURE — 1090F PRES/ABSN URINE INCON ASSESS: CPT | Performed by: FAMILY MEDICINE

## 2019-11-13 PROCEDURE — G8427 DOCREV CUR MEDS BY ELIG CLIN: HCPCS | Performed by: FAMILY MEDICINE

## 2019-11-13 PROCEDURE — 3023F SPIROM DOC REV: CPT | Performed by: FAMILY MEDICINE

## 2019-11-13 PROCEDURE — 83036 HEMOGLOBIN GLYCOSYLATED A1C: CPT | Performed by: FAMILY MEDICINE

## 2019-11-13 PROCEDURE — G8482 FLU IMMUNIZE ORDER/ADMIN: HCPCS | Performed by: FAMILY MEDICINE

## 2019-11-13 PROCEDURE — 99213 OFFICE O/P EST LOW 20 MIN: CPT | Performed by: FAMILY MEDICINE

## 2019-11-13 PROCEDURE — G8399 PT W/DXA RESULTS DOCUMENT: HCPCS | Performed by: FAMILY MEDICINE

## 2019-11-13 PROCEDURE — G0008 ADMIN INFLUENZA VIRUS VAC: HCPCS | Performed by: FAMILY MEDICINE

## 2019-11-13 RX ORDER — LANCETS 30 GAUGE
1 EACH MISCELLANEOUS 2 TIMES DAILY
Qty: 100 EACH | Refills: 5 | Status: SHIPPED | OUTPATIENT
Start: 2019-11-13

## 2019-11-13 RX ORDER — DOXYCYCLINE 100 MG/1
100 CAPSULE ORAL 2 TIMES DAILY
Qty: 20 CAPSULE | Refills: 0 | Status: SHIPPED | OUTPATIENT
Start: 2019-11-13 | End: 2020-01-14 | Stop reason: SDUPTHER

## 2019-11-13 RX ORDER — ATORVASTATIN CALCIUM 20 MG/1
20 TABLET, FILM COATED ORAL DAILY
Qty: 90 TABLET | Refills: 1 | Status: SHIPPED | OUTPATIENT
Start: 2019-11-13 | End: 2020-01-14 | Stop reason: SDUPTHER

## 2019-11-13 RX ORDER — HYDROCHLOROTHIAZIDE 25 MG/1
25 TABLET ORAL DAILY
Qty: 90 TABLET | Refills: 1 | Status: SHIPPED | OUTPATIENT
Start: 2019-11-13 | End: 2020-01-14 | Stop reason: SDUPTHER

## 2019-11-13 RX ORDER — AMLODIPINE BESYLATE 5 MG/1
5 TABLET ORAL DAILY
Qty: 90 TABLET | Refills: 1 | Status: SHIPPED | OUTPATIENT
Start: 2019-11-13 | End: 2020-01-14 | Stop reason: SDUPTHER

## 2019-11-13 RX ORDER — LOSARTAN POTASSIUM 50 MG/1
50 TABLET ORAL DAILY
Qty: 90 TABLET | Refills: 1 | Status: SHIPPED | OUTPATIENT
Start: 2019-11-13 | End: 2020-01-14 | Stop reason: SDUPTHER

## 2019-11-13 RX ORDER — PREGABALIN 25 MG/1
25 CAPSULE ORAL 2 TIMES DAILY
Qty: 60 CAPSULE | Refills: 2 | Status: SHIPPED | OUTPATIENT
Start: 2019-11-13 | End: 2020-01-14 | Stop reason: SDUPTHER

## 2020-01-14 ENCOUNTER — OFFICE VISIT (OUTPATIENT)
Dept: FAMILY MEDICINE CLINIC | Age: 85
End: 2020-01-14
Payer: MEDICARE

## 2020-01-14 VITALS
SYSTOLIC BLOOD PRESSURE: 108 MMHG | OXYGEN SATURATION: 93 % | DIASTOLIC BLOOD PRESSURE: 54 MMHG | HEART RATE: 68 BPM | HEIGHT: 65 IN | WEIGHT: 171.7 LBS | BODY MASS INDEX: 28.61 KG/M2

## 2020-01-14 PROCEDURE — 99214 OFFICE O/P EST MOD 30 MIN: CPT | Performed by: FAMILY MEDICINE

## 2020-01-14 PROCEDURE — G8427 DOCREV CUR MEDS BY ELIG CLIN: HCPCS | Performed by: FAMILY MEDICINE

## 2020-01-14 PROCEDURE — G8417 CALC BMI ABV UP PARAM F/U: HCPCS | Performed by: FAMILY MEDICINE

## 2020-01-14 PROCEDURE — 1090F PRES/ABSN URINE INCON ASSESS: CPT | Performed by: FAMILY MEDICINE

## 2020-01-14 PROCEDURE — 4040F PNEUMOC VAC/ADMIN/RCVD: CPT | Performed by: FAMILY MEDICINE

## 2020-01-14 PROCEDURE — 1123F ACP DISCUSS/DSCN MKR DOCD: CPT | Performed by: FAMILY MEDICINE

## 2020-01-14 PROCEDURE — G8926 SPIRO NO PERF OR DOC: HCPCS | Performed by: FAMILY MEDICINE

## 2020-01-14 PROCEDURE — 1036F TOBACCO NON-USER: CPT | Performed by: FAMILY MEDICINE

## 2020-01-14 PROCEDURE — 3023F SPIROM DOC REV: CPT | Performed by: FAMILY MEDICINE

## 2020-01-14 PROCEDURE — G8399 PT W/DXA RESULTS DOCUMENT: HCPCS | Performed by: FAMILY MEDICINE

## 2020-01-14 PROCEDURE — G8482 FLU IMMUNIZE ORDER/ADMIN: HCPCS | Performed by: FAMILY MEDICINE

## 2020-01-14 RX ORDER — LOSARTAN POTASSIUM 50 MG/1
50 TABLET ORAL DAILY
Qty: 90 TABLET | Refills: 1 | Status: SHIPPED
Start: 2020-01-14 | End: 2020-06-17 | Stop reason: SDUPTHER

## 2020-01-14 RX ORDER — AMLODIPINE BESYLATE 5 MG/1
5 TABLET ORAL DAILY
Qty: 90 TABLET | Refills: 1 | Status: SHIPPED
Start: 2020-01-14 | End: 2020-06-08 | Stop reason: SDUPTHER

## 2020-01-14 RX ORDER — HYDROCHLOROTHIAZIDE 25 MG/1
25 TABLET ORAL DAILY
Qty: 90 TABLET | Refills: 1 | Status: SHIPPED
Start: 2020-01-14 | End: 2020-06-17 | Stop reason: SDUPTHER

## 2020-01-14 RX ORDER — ATORVASTATIN CALCIUM 20 MG/1
20 TABLET, FILM COATED ORAL DAILY
Qty: 90 TABLET | Refills: 1 | Status: SHIPPED
Start: 2020-01-14 | End: 2020-06-17 | Stop reason: SDUPTHER

## 2020-01-14 RX ORDER — DOXYCYCLINE 100 MG/1
100 CAPSULE ORAL 2 TIMES DAILY
Qty: 20 CAPSULE | Refills: 0 | Status: SHIPPED
Start: 2020-01-14 | End: 2021-02-25

## 2020-01-14 RX ORDER — PREGABALIN 25 MG/1
25 CAPSULE ORAL 2 TIMES DAILY
Qty: 120 CAPSULE | Refills: 0 | Status: SHIPPED
Start: 2020-01-14 | End: 2020-05-01 | Stop reason: SDUPTHER

## 2020-01-14 ASSESSMENT — PATIENT HEALTH QUESTIONNAIRE - PHQ9
SUM OF ALL RESPONSES TO PHQ QUESTIONS 1-9: 0
2. FEELING DOWN, DEPRESSED OR HOPELESS: 0
SUM OF ALL RESPONSES TO PHQ9 QUESTIONS 1 & 2: 0
1. LITTLE INTEREST OR PLEASURE IN DOING THINGS: 0
SUM OF ALL RESPONSES TO PHQ QUESTIONS 1-9: 0

## 2020-01-14 NOTE — PROGRESS NOTES
OFFICE PROGRESS NOTE      SUBJECTIVE:        Patient ID:   Robin Camarena is a 81 y.o. female who presents for   Chief Complaint   Patient presents with    Hyperlipidemia     2 month follow up     Diabetes     check up     Cough      from COPD           HPI:     FEELS GOOD. COUGHING AT TIMES BUT INHALERS HELP WITH CONTROL. NO CHEST PAIN OR SHORTNESS OF BREATH.  WATCHING DIET GOOD. WALKING SOME FOR EXERCISE. TAKING MEDICATIONS REGULARLY. Prior to Admission medications    Medication Sig Start Date End Date Taking? Authorizing Provider   atorvastatin (LIPITOR) 20 MG tablet Take 1 tablet by mouth daily 1/14/20  Yes Chepe Irvin MD   amLODIPine (NORVASC) 5 MG tablet Take 1 tablet by mouth daily 1/14/20  Yes Chepe Irvin MD   doxycycline monohydrate (MONODOX) 100 MG capsule Take 1 capsule by mouth 2 times daily 1/14/20  Yes Chepe Irvin MD   hydrochlorothiazide (HYDRODIURIL) 25 MG tablet Take 1 tablet by mouth daily 1/14/20  Yes Chepe Irvin MD   losartan (COZAAR) 50 MG tablet Take 1 tablet by mouth daily 1/14/20  Yes Chepe Irvin MD   pregabalin (LYRICA) 25 MG capsule Take 1 capsule by mouth 2 times daily for 60 days.  1/14/20 3/14/20 Yes Chepe Irvin MD   ciclopirox (PENLAC) 8 % solution APPLY ONE APPLICATION TOPICALLY TO AFFECTED AREA DAILY AT BEDTIME 9/16/19  Yes Historical Provider, MD   blood glucose test strips (ONE TOUCH ULTRA TEST) strip USE 1 STRIP TO CHECK GLUCOSE TWICE DAILY 11/13/19  Yes Chepe Irvin MD   albuterol-ipratropium (COMBIVENT RESPIMAT)  MCG/ACT AERS inhaler Inhale 1 puff into the lungs every 4 hours as needed for Wheezing 11/13/19  Yes Chepe Irvin MD   metFORMIN (GLUCOPHAGE) 500 MG tablet Take 1 tablet by mouth 2 times daily (with meals) 1 qam, 1qpm 11/13/19  Yes Chepe Irvin MD   blood glucose test strips (ASCENSIA AUTODISC VI;ONE TOUCH ULTRA TEST VI) strip 1 each by Does not apply route 2 times daily ONCE DAILY AS NEEDED. DX E11.9 NPI #[id number] 11/13/19  Yes Conrado Morales MD   Lancets MISC 1 each by Does not apply route 2 times daily 11/13/19  Yes Conrado Morales MD   timolol (TIMOPTIC) 0.5 % ophthalmic solution INSTILL 1 DROP INTO EACH EYE TWICE DAILY 7/30/19  Yes Historical Provider, MD   latanoprost (XALATAN) 0.005 % ophthalmic solution INSTILL 1 DROP INTO EACH EYE ONCE DAILY AT BEDTIME 7/30/19  Yes Historical Provider, MD   brimonidine (ALPHAGAN) 0.2 % ophthalmic solution INSTILL 1 DROP INTO LEFT EYE TWICE DAILY 7/30/19  Yes Historical Provider, MD   Blood Glucose Monitoring Suppl DELTA 1 Units by Does not apply route 2 times daily Please give whatever insurance covers 8/8/17  Yes Conrado Morales MD   magnesium (MAGNESIUM-OXIDE) 250 MG TABS tablet Take 250 mg by mouth daily   Yes Historical Provider, MD   Multiple Vitamin (ONE-A-DAY ESSENTIAL PO) Take 1 tablet by mouth. Yes Historical Provider, MD   Calcium Carbonate-Vitamin D (CALCIUM + D) 600-200 MG-UNIT TABS Take 1 tablet by mouth daily. Yes Historical Provider, MD   Misc Natural Products (OSTEO BI-FLEX ADV JOINT SHIELD PO) Take 2 tablets by mouth daily. Yes Historical Provider, MD   timolol (BETIMOL) 0.5 % ophthalmic solution Place 1 drop into both eyes daily. Yes Historical Provider, MD   aspirin 81 MG EC tablet Take 81 mg by mouth daily.      Yes Historical Provider, MD     Social History     Socioeconomic History    Marital status:      Spouse name: None    Number of children: None    Years of education: None    Highest education level: None   Occupational History    None   Social Needs    Financial resource strain: None    Food insecurity:     Worry: None     Inability: None    Transportation needs:     Medical: None     Non-medical: None   Tobacco Use    Smoking status: Former Smoker     Packs/day: 1.00     Years: 9.00     Pack years: 9.00     Last attempt to quit: 2000     Years since quittin.3    Smokeless tobacco: Never Used   Substance and Sexual Activity    Alcohol use: No     Alcohol/week: 0.0 standard drinks    Drug use: No    Sexual activity: Never     Partners: Male   Lifestyle    Physical activity:     Days per week: None     Minutes per session: None    Stress: None   Relationships    Social connections:     Talks on phone: None     Gets together: None     Attends Denominational service: None     Active member of club or organization: None     Attends meetings of clubs or organizations: None     Relationship status: None    Intimate partner violence:     Fear of current or ex partner: None     Emotionally abused: None     Physically abused: None     Forced sexual activity: None   Other Topics Concern    None   Social History Narrative    None       I have reviewed Mira's allergies, medications, problem list, medical, social and family history and have updated as needed in the electronic medical record  Review Of Systems:    Skin: no abnormal pigmentation, rash, scaling, itching, masses, hair or nail changes  Eyes: no blurring, diplopia, or eye pain  Ears/Nose/Throat: no hearing loss, tinnitus, vertigo, nosebleed, nasal congestion, rhinorrhea, sore throat  Respiratory: no cough, pleuritic chest pain, dyspnea, or wheezing  Cardiovascular: no chest pain, angina, dyspnea on exertion, orthopnea, PND, palpitations, or claudication  Gastrointestinal: no nausea, vomiting, heartburn, diarrhea, constipation, bloating,  abdominal pain, or blood per rectum. Appetite is good  Genitourinary: no urinary urgency, frequency, dysuria, nocturia, hesitancy, or incontinence  Musculoskeletal: joint pains off and on. Morning stiffness.  Ambulating well  Neurologic: no paralysis, paresis, paresthesia, seizures, tremors, or headaches  Hematologic/Lymphatic/Immunologic: no anemia, Chronic    RLS (restless legs syndrome) 06/23/2015     Priority: Medium     Class: Chronic    Osteoarthritis      Priority: Medium    Diabetic polyneuropathy associated with diabetes mellitus due to underlying condition ([facility] 04/17/2019    Chronic obstructive pulmonary disease ([facility] 02/09/2016        Diagnosis:     ICD-10-CM    1. Type 2 diabetes mellitus without complication, without long-term current use of insulin ([facility]) E11.9     CONTROLLED   2. Diabetic polyneuropathy associated with diabetes mellitus due to underlying condition ([facility]) E08.42 pregabalin (LYRICA) 25 MG capsule    PROGRESSIVE    3. Mixed hyperlipidemia E78.2 Comprehensive Metabolic Panel     Lipid Panel    FAIR CONTROL   4. Essential hypertension I10     LOW- STABLE   5. Chronic obstructive pulmonary disease, unspecified COPD type ([facility] J44.9     CONTROLLED       PLAN:           Patient Instructions   LOW SALT,LOW CARB. AND LOW FAT DIET. CONTINUE CURRENT MEDICATIONS TAKING REGULARLY. REGULAR WALKING ADVISED. FASTING FOR LAB WORK PRIOR TO NEXT VISIT. NEXT APPOINTMENT IN 3 MONTHS. Return in about 3 months (around 4/14/2020) for FOLLOW UP VISIT. I have reviewed my findings and recommendations with Rock Ashraf.     Electronically signed by Lamberto Gonzalez MD on 1/14/20 at 9:39 AM

## 2020-05-01 RX ORDER — PREGABALIN 25 MG/1
25 CAPSULE ORAL 2 TIMES DAILY
Qty: 120 CAPSULE | Refills: 0 | Status: SHIPPED
Start: 2020-05-01 | End: 2020-06-17 | Stop reason: SDUPTHER

## 2020-06-08 RX ORDER — AMLODIPINE BESYLATE 5 MG/1
5 TABLET ORAL DAILY
Qty: 90 TABLET | Refills: 1 | Status: SHIPPED
Start: 2020-06-08 | End: 2020-06-17 | Stop reason: SDUPTHER

## 2020-06-10 ENCOUNTER — HOSPITAL ENCOUNTER (OUTPATIENT)
Age: 85
Discharge: HOME | End: 2020-06-12
Payer: MEDICARE

## 2020-06-10 LAB
ALBUMIN SERPL-MCNC: 4.3 G/DL (ref 3.5–5.2)
ALP BLD-CCNC: 81 U/L (ref 35–104)
ALT SERPL-CCNC: 14 U/L (ref 0–32)
ANION GAP SERPL CALCULATED.3IONS-SCNC: 14 MMOL/L (ref 7–16)
AST SERPL-CCNC: 21 U/L (ref 0–31)
BILIRUB SERPL-MCNC: 0.6 MG/DL (ref 0–1.2)
BUN BLDV-MCNC: 20 MG/DL (ref 8–23)
CALCIUM SERPL-MCNC: 9.9 MG/DL (ref 8.6–10.2)
CHLORIDE BLD-SCNC: 94 MMOL/L (ref 98–107)
CHOLESTEROL, TOTAL: 151 MG/DL (ref 0–199)
CO2: 27 MMOL/L (ref 22–29)
CREAT SERPL-MCNC: 0.7 MG/DL (ref 0.5–1)
GFR AFRICAN AMERICAN: >60
GFR NON-AFRICAN AMERICAN: >60 ML/MIN/1.73
GLUCOSE BLD-MCNC: 120 MG/DL (ref 74–99)
HDLC SERPL-MCNC: 45 MG/DL
LDL CHOLESTEROL CALCULATED: 76 MG/DL (ref 0–99)
POTASSIUM SERPL-SCNC: 4.2 MMOL/L (ref 3.5–5)
SODIUM BLD-SCNC: 135 MMOL/L (ref 132–146)
TOTAL PROTEIN: 7.1 G/DL (ref 6.4–8.3)
TRIGL SERPL-MCNC: 152 MG/DL (ref 0–149)
VLDLC SERPL CALC-MCNC: 30 MG/DL

## 2020-06-10 PROCEDURE — 80061 LIPID PANEL: CPT

## 2020-06-10 PROCEDURE — 36415 COLL VENOUS BLD VENIPUNCTURE: CPT

## 2020-06-10 PROCEDURE — 80053 COMPREHEN METABOLIC PANEL: CPT

## 2020-06-17 ENCOUNTER — OFFICE VISIT (OUTPATIENT)
Dept: FAMILY MEDICINE CLINIC | Age: 85
End: 2020-06-17
Payer: MEDICARE

## 2020-06-17 VITALS
DIASTOLIC BLOOD PRESSURE: 60 MMHG | TEMPERATURE: 97.8 F | HEIGHT: 65 IN | SYSTOLIC BLOOD PRESSURE: 129 MMHG | RESPIRATION RATE: 18 BRPM | HEART RATE: 75 BPM | BODY MASS INDEX: 28.32 KG/M2 | WEIGHT: 170 LBS | OXYGEN SATURATION: 99 %

## 2020-06-17 LAB — HBA1C MFR BLD: 6.1 %

## 2020-06-17 PROCEDURE — 4040F PNEUMOC VAC/ADMIN/RCVD: CPT | Performed by: FAMILY MEDICINE

## 2020-06-17 PROCEDURE — 1123F ACP DISCUSS/DSCN MKR DOCD: CPT | Performed by: FAMILY MEDICINE

## 2020-06-17 PROCEDURE — G8427 DOCREV CUR MEDS BY ELIG CLIN: HCPCS | Performed by: FAMILY MEDICINE

## 2020-06-17 PROCEDURE — G8399 PT W/DXA RESULTS DOCUMENT: HCPCS | Performed by: FAMILY MEDICINE

## 2020-06-17 PROCEDURE — 3023F SPIROM DOC REV: CPT | Performed by: FAMILY MEDICINE

## 2020-06-17 PROCEDURE — G8926 SPIRO NO PERF OR DOC: HCPCS | Performed by: FAMILY MEDICINE

## 2020-06-17 PROCEDURE — 99213 OFFICE O/P EST LOW 20 MIN: CPT | Performed by: FAMILY MEDICINE

## 2020-06-17 PROCEDURE — G8417 CALC BMI ABV UP PARAM F/U: HCPCS | Performed by: FAMILY MEDICINE

## 2020-06-17 PROCEDURE — 1036F TOBACCO NON-USER: CPT | Performed by: FAMILY MEDICINE

## 2020-06-17 PROCEDURE — 83036 HEMOGLOBIN GLYCOSYLATED A1C: CPT | Performed by: FAMILY MEDICINE

## 2020-06-17 PROCEDURE — 1090F PRES/ABSN URINE INCON ASSESS: CPT | Performed by: FAMILY MEDICINE

## 2020-06-17 RX ORDER — PREGABALIN 25 MG/1
25 CAPSULE ORAL 2 TIMES DAILY
Qty: 120 CAPSULE | Refills: 0 | Status: SHIPPED
Start: 2020-06-17 | End: 2020-09-21 | Stop reason: SDUPTHER

## 2020-06-17 RX ORDER — AMLODIPINE BESYLATE 5 MG/1
5 TABLET ORAL DAILY
Qty: 90 TABLET | Refills: 1 | Status: SHIPPED
Start: 2020-06-17 | End: 2020-09-21 | Stop reason: SDUPTHER

## 2020-06-17 RX ORDER — HYDROCHLOROTHIAZIDE 25 MG/1
25 TABLET ORAL DAILY
Qty: 90 TABLET | Refills: 1 | Status: SHIPPED
Start: 2020-06-17 | End: 2020-06-17 | Stop reason: SINTOL

## 2020-06-17 RX ORDER — ATORVASTATIN CALCIUM 20 MG/1
20 TABLET, FILM COATED ORAL DAILY
Qty: 90 TABLET | Refills: 1 | Status: SHIPPED
Start: 2020-06-17 | End: 2020-09-21 | Stop reason: SDUPTHER

## 2020-06-17 RX ORDER — LOSARTAN POTASSIUM 50 MG/1
50 TABLET ORAL DAILY
Qty: 90 TABLET | Refills: 1 | Status: SHIPPED
Start: 2020-06-17 | End: 2020-09-21 | Stop reason: SDUPTHER

## 2020-06-17 NOTE — PROGRESS NOTES
OFFICE PROGRESS NOTE      SUBJECTIVE:        Patient ID:   Precious Portillo is a 81 y.o. female who presents for   Chief Complaint   Patient presents with    Diabetes           HPI:     FEELS GOOD. OCCASIONAL EPISODES OF DIZZINESS. WATCHING DIET GOOD. WALKING SOME IN HOUSE FOR EXERCISE. TAKING MEDICATIONS REGULARLY. Prior to Admission medications    Medication Sig Start Date End Date Taking? Authorizing Provider   amLODIPine (NORVASC) 5 MG tablet Take 1 tablet by mouth daily 6/17/20  Yes Mariaa Rodgers MD   atorvastatin (LIPITOR) 20 MG tablet Take 1 tablet by mouth daily 6/17/20  Yes Mariaa Rodgers MD   blood glucose test strips (ASCENSIA AUTODISC VI;ONE TOUCH ULTRA TEST VI) strip 1 each by Does not apply route 2 times daily ONCE DAILY AS NEEDED. DX E11.9 NPI #[id number] 6/17/20  Yes Mariaa Rodgers MD   losartan (COZAAR) 50 MG tablet Take 1 tablet by mouth daily 6/17/20  Yes Mariaa Rodgers MD   metFORMIN (GLUCOPHAGE) 500 MG tablet Take 1 tablet by mouth 2 times daily (with meals) 1 qam, 1qpm 6/17/20  Yes Mariaa Rodgers MD   pregabalin (LYRICA) 25 MG capsule Take 1 capsule by mouth 2 times daily for 60 days.  6/17/20 8/16/20 Yes Mariaa Rodgers MD   albuterol-ipratropium (COMBIVENT RESPIMAT)  MCG/ACT AERS inhaler Inhale 1 puff into the lungs every 4 hours as needed for Wheezing 5/1/20  Yes Mariaa Rodgers MD   doxycycline monohydrate (MONODOX) 100 MG capsule Take 1 capsule by mouth 2 times daily 1/14/20  Yes Mariaa Rodgers MD   ciclopirox (PENLAC) 8 % solution APPLY ONE APPLICATION TOPICALLY TO AFFECTED AREA DAILY AT BEDTIME 9/16/19  Yes Historical Provider, MD   blood glucose test strips (ONE TOUCH ULTRA TEST) strip USE 1 STRIP TO CHECK GLUCOSE TWICE DAILY 11/13/19  Yes Mariaa Rodgers MD   Lancets MISC 1 each by Does not apply route 2 times daily 11/13/19  Yes Mariaa Rodgers MD   timolol (TIMOPTIC) 0.5 % ophthalmic solution INSTILL 1 DROP INTO EACH EYE TWICE

## 2020-07-10 ENCOUNTER — TELEPHONE (OUTPATIENT)
Dept: FAMILY MEDICINE CLINIC | Age: 85
End: 2020-07-10

## 2020-09-21 ENCOUNTER — OFFICE VISIT (OUTPATIENT)
Dept: FAMILY MEDICINE CLINIC | Age: 85
End: 2020-09-21
Payer: MEDICARE

## 2020-09-21 VITALS
BODY MASS INDEX: 29.16 KG/M2 | HEIGHT: 65 IN | TEMPERATURE: 97.6 F | HEART RATE: 83 BPM | WEIGHT: 175 LBS | DIASTOLIC BLOOD PRESSURE: 68 MMHG | RESPIRATION RATE: 18 BRPM | OXYGEN SATURATION: 92 % | SYSTOLIC BLOOD PRESSURE: 124 MMHG

## 2020-09-21 LAB — HBA1C MFR BLD: 6.2 %

## 2020-09-21 PROCEDURE — 1123F ACP DISCUSS/DSCN MKR DOCD: CPT | Performed by: FAMILY MEDICINE

## 2020-09-21 PROCEDURE — 83036 HEMOGLOBIN GLYCOSYLATED A1C: CPT | Performed by: FAMILY MEDICINE

## 2020-09-21 PROCEDURE — 4040F PNEUMOC VAC/ADMIN/RCVD: CPT | Performed by: FAMILY MEDICINE

## 2020-09-21 PROCEDURE — G0439 PPPS, SUBSEQ VISIT: HCPCS | Performed by: FAMILY MEDICINE

## 2020-09-21 RX ORDER — PREGABALIN 25 MG/1
25 CAPSULE ORAL 2 TIMES DAILY
Qty: 120 CAPSULE | Refills: 0 | Status: SHIPPED
Start: 2020-09-21 | End: 2020-12-07 | Stop reason: SDUPTHER

## 2020-09-21 RX ORDER — AMLODIPINE BESYLATE 5 MG/1
5 TABLET ORAL DAILY
Qty: 90 TABLET | Refills: 1 | Status: SHIPPED
Start: 2020-09-21 | End: 2021-02-25 | Stop reason: SDUPTHER

## 2020-09-21 RX ORDER — HYDROCHLOROTHIAZIDE 25 MG/1
TABLET ORAL
COMMUNITY
Start: 2020-07-06 | End: 2021-02-25 | Stop reason: SDUPTHER

## 2020-09-21 RX ORDER — DOXYCYCLINE HYCLATE 100 MG
100 TABLET ORAL 2 TIMES DAILY
Qty: 20 TABLET | Refills: 0 | Status: SHIPPED | OUTPATIENT
Start: 2020-09-21 | End: 2020-10-01

## 2020-09-21 RX ORDER — ATORVASTATIN CALCIUM 20 MG/1
20 TABLET, FILM COATED ORAL DAILY
Qty: 90 TABLET | Refills: 1 | Status: SHIPPED
Start: 2020-09-21 | End: 2021-02-25 | Stop reason: SDUPTHER

## 2020-09-21 RX ORDER — LOSARTAN POTASSIUM 50 MG/1
50 TABLET ORAL DAILY
Qty: 90 TABLET | Refills: 1 | Status: SHIPPED
Start: 2020-09-21 | End: 2021-02-25 | Stop reason: SDUPTHER

## 2020-09-21 ASSESSMENT — PATIENT HEALTH QUESTIONNAIRE - PHQ9
1. LITTLE INTEREST OR PLEASURE IN DOING THINGS: 0
SUM OF ALL RESPONSES TO PHQ QUESTIONS 1-9: 0
SUM OF ALL RESPONSES TO PHQ QUESTIONS 1-9: 0
SUM OF ALL RESPONSES TO PHQ9 QUESTIONS 1 & 2: 0
2. FEELING DOWN, DEPRESSED OR HOPELESS: 0

## 2020-09-21 ASSESSMENT — LIFESTYLE VARIABLES
HOW OFTEN DO YOU HAVE A DRINK CONTAINING ALCOHOL: 0
AUDIT TOTAL SCORE: INCOMPLETE
AUDIT-C TOTAL SCORE: INCOMPLETE
HOW OFTEN DO YOU HAVE A DRINK CONTAINING ALCOHOL: NEVER

## 2020-09-21 NOTE — PROGRESS NOTES
Medicare Annual Wellness Visit  Name: Staci Woodall Date: 2020   MRN: <[id number]> Sex: Female   Age: 81 y.o. Ethnicity: Non-/Non    : 1935 Race: Quirino Friend is here for Medicare AWV and Health Maintenance (due for Dtap and shingle shot)    Screenings for behavioral, psychosocial and functional/safety risks, and cognitive dysfunction are all negative except as indicated below. These results, as well as other patient data from the [address] [facility] form, are documented in Flowsheets linked to this Encounter. Allergies   Allergen Reactions    Pcn [Penicillins] Hives    Sulfa Antibiotics Hives         Prior to Visit Medications    Medication Sig Taking? Authorizing Provider   pregabalin (LYRICA) 25 MG capsule Take 1 capsule by mouth 2 times daily for 60 days. Yes Bang Boss MD   amLODIPine (NORVASC) 5 MG tablet Take 1 tablet by mouth daily Yes Bang Boss MD   atorvastatin (LIPITOR) 20 MG tablet Take 1 tablet by mouth daily Yes Bang Boss MD   blood glucose test strips (ASCENSIA AUTODISC VI;ONE TOUCH ULTRA TEST VI) strip 1 each by Does not apply route 2 times daily ONCE DAILY AS NEEDED.  DX E11.9 NPI #[id number] Yes Bang Boss MD   losartan (COZAAR) 50 MG tablet Take 1 tablet by mouth daily Yes Bang Boss MD   metFORMIN (GLUCOPHAGE) 500 MG tablet Take 1 tablet by mouth 2 times daily (with meals) 1 qam, 1qpm Yes Bang Boss MD   doxycycline hyclate (VIBRA-TABS) 100 MG tablet Take 1 tablet by mouth 2 times daily for 10 days Yes Bang Boss MD   albuterol-ipratropium (COMBIVENT RESPIMAT)  MCG/ACT AERS inhaler Inhale 1 puff into the lungs every 4 hours as needed for Wheezing Yes Bang Boss MD   doxycycline monohydrate (MONODOX) 100 MG capsule Take 1 capsule by mouth 2 times daily Yes Bang Boss MD   ciclopirox (PENLAC) 8 % solution APPLY ONE APPLICATION TOPICALLY TO AFFECTED AREA DAILY AT BEDTIME Yes Historical Provider, MD Relation Age of Onset    Heart Disease Mother     High Blood Pressure Mother     Stroke Mother     Diabetes Mother     Heart Disease Father     High Blood Pressure Father        CareTeam (Including outside providers/suppliers regularly involved in providing care):   Patient Care Team:  Quirino Morejon MD as PCP - Ray Pearson MD as PCP - [facility] Empaneled Provider    Wt Readings from Last 3 Encounters:   09/21/20 175 lb (79.4 kg)   06/17/20 170 lb (77.1 kg)   01/14/20 171 lb 11.2 oz (77.9 kg)     Vitals:    09/21/20 0915   BP: 124/68   Pulse: 83   Resp: 18   Temp: 97.6 °F (36.4 °C)   SpO2: 92%   Weight: 175 lb (79.4 kg)   Height: 5' 5\" (1.651 m)     Body mass index is 29.12 kg/m². Based upon direct observation of the patient, evaluation of cognition reveals recent and remote memory intact.     General Appearance: alert and oriented to person, place and time, well developed and well- nourished, in no acute distress  Skin: warm and dry, no rash or erythema  Head: normocephalic and atraumatic  Eyes: pupils equal, round, and reactive to light, extraocular eye movements intact, conjunctivae normal  ENT: tympanic membrane, external ear and ear canal normal bilaterally, nose without deformity, nasal mucosa and turbinates normal without polyps  Neck: supple and non-tender without mass, no thyromegaly or thyroid nodules, no cervical lymphadenopathy  Pulmonary/Chest: clear to auscultation bilaterally- no wheezes, rales or rhonchi, normal air movement, no respiratory distress  Cardiovascular: normal rate, regular rhythm, normal S1 and S2, no murmurs, rubs, clicks, or gallops, distal pulses intact, no carotid bruits  Abdomen: soft, non-tender, non-distended, normal bowel sounds, no masses or organomegaly  Extremities: no cyanosis, clubbing or edema  Musculoskeletal: normal range of motion, no joint swelling, deformity or tenderness  Neurologic: reflexes normal and symmetric, no cranial nerve deficit, gait, coordination and speech normal    Patient's complete Health Risk Assessment and screening values have been reviewed and are found in Flowsheets. The following problems were reviewed today and where indicated follow up appointments were made and/or referrals ordered. Positive Risk Factor Screenings with Interventions:     General Health:  General  In general, how would you say your health is?: Good  In the past 7 days, have you experienced any of the following? New or Increased Pain, New or Increased Fatigue, Loneliness, Social Isolation, Stress or Anger?: None of These  Do you get the social and emotional support that you need?: Yes  Do you have a Living Will?: (!) No  General Health Risk Interventions:  · No Living Will: provided the state-specific advance directive document to the patient    Health Habits/Nutrition:  Health Habits/Nutrition  Do you exercise for at least 20 minutes 2-3 times per week?: Yes  Have you lost any weight without trying in the past 3 months?: (!) Yes  Do you eat fewer than 2 meals per day?: No  Have you seen a dentist within the past year?: (!) No  Body mass index is 29.12 kg/m². Health Habits/Nutrition Interventions:  · Nutritional issues:  educational materials for healthy, well-balanced diet provided  · Dental exam overdue:  PATINT HAS BOTH DENTURES, --------------------------------------------------------------------------------    ADL:  ADLs  In the past 7 days, did you need help from others to perform any of the following everyday activities? Eating, dressing, grooming, bathing, toileting, or walking/balance?: None  In the past 7 days, did you need help from others to take care of any of the following?  Laundry, housekeeping, banking/finances, shopping, telephone use, food preparation, transportation, or taking medications?: (!) Transportation  ADL Interventions:  · Patient declines any further evaluation/treatment for this issue    Personalized Preventive Plan   Current Health Maintenance Status  Immunization History   Administered Date(s) Administered    DTaP 03/26/2006    Influenza 10/28/2013    Influenza Virus Vaccine 09/24/2012, 09/23/2014    Influenza, High Dose (Fluzone 65 yrs and older) 09/24/2012, 11/21/2016, 10/25/2017, 10/09/2018    Influenza, Triv, inactivated, subunit, adjuvanted, IM (Fluad 65 yrs and older) 11/13/2019    Pneumococcal Conjugate 13-valent ([id number]) 02/09/2016    Pneumococcal Polysaccharide ([id number]) 05/16/2011        Health Maintenance   Topic Date Due    Shingles Vaccine (1 of 2) 01/10/1986    DTaP/Tdap/Td vaccine (2 - Tdap) 03/26/2016    Annual Wellness Visit (AWV)  08/16/2019    Flu vaccine (1) 09/01/2020    Lipid screen  06/10/2021    Potassium monitoring  06/10/2021    Creatinine monitoring  06/10/2021    DEXA (modify frequency per FRAX score)  Completed    Pneumococcal 65+ years Vaccine  Completed    Hepatitis A vaccine  Aged Out    Hib vaccine  Aged Out    Meningococcal (ACWY) vaccine  Aged Out     Recommendations for [employer] Due: see orders and patient instructions/AVS.  . Recommended screening schedule for the next 5-10 years is provided to the patient in written form: see Patient Instructions/AVS.    Isabel Zuniga was seen today for medicare awv and health maintenance. Diagnoses and all orders for this visit:    Routine general medical examination at a health care facility    Diabetic polyneuropathy associated with diabetes mellitus due to underlying condition ([facility]  Comments:  PROGRESSIVE   Orders:  -     pregabalin (LYRICA) 25 MG capsule; Take 1 capsule by mouth 2 times daily for 60 days. Type 2 diabetes mellitus without complication, without long-term current use of insulin ([facility])  Comments:  CONTROLLED  Orders:  -     blood glucose test strips (ASCENSIA AUTODISC VI;ONE TOUCH ULTRA TEST VI) strip; 1 each by Does not apply route 2 times daily ONCE DAILY AS NEEDED.  DX E11.9 NPI #[id number]    Chronic obstructive

## 2020-10-12 ENCOUNTER — TELEPHONE (OUTPATIENT)
Dept: FAMILY MEDICINE CLINIC | Age: 85
End: 2020-10-12

## 2020-10-12 NOTE — TELEPHONE ENCOUNTER
Please let patient know Dr Zeina Segundo will be out of the office Oct 29,30 and Nov 2,3. Please reschudule appointment thank you.

## 2020-11-23 ENCOUNTER — OFFICE VISIT (OUTPATIENT)
Dept: FAMILY MEDICINE CLINIC | Age: 85
End: 2020-11-23
Payer: MEDICARE

## 2020-11-23 VITALS
RESPIRATION RATE: 16 BRPM | HEART RATE: 94 BPM | WEIGHT: 177.8 LBS | OXYGEN SATURATION: 95 % | SYSTOLIC BLOOD PRESSURE: 110 MMHG | BODY MASS INDEX: 29.62 KG/M2 | DIASTOLIC BLOOD PRESSURE: 64 MMHG | TEMPERATURE: 97.3 F | HEIGHT: 65 IN

## 2020-11-23 PROCEDURE — G8484 FLU IMMUNIZE NO ADMIN: HCPCS | Performed by: FAMILY MEDICINE

## 2020-11-23 PROCEDURE — 3023F SPIROM DOC REV: CPT | Performed by: FAMILY MEDICINE

## 2020-11-23 PROCEDURE — G8926 SPIRO NO PERF OR DOC: HCPCS | Performed by: FAMILY MEDICINE

## 2020-11-23 PROCEDURE — 1123F ACP DISCUSS/DSCN MKR DOCD: CPT | Performed by: FAMILY MEDICINE

## 2020-11-23 PROCEDURE — G8399 PT W/DXA RESULTS DOCUMENT: HCPCS | Performed by: FAMILY MEDICINE

## 2020-11-23 PROCEDURE — G8417 CALC BMI ABV UP PARAM F/U: HCPCS | Performed by: FAMILY MEDICINE

## 2020-11-23 PROCEDURE — G8427 DOCREV CUR MEDS BY ELIG CLIN: HCPCS | Performed by: FAMILY MEDICINE

## 2020-11-23 PROCEDURE — 1090F PRES/ABSN URINE INCON ASSESS: CPT | Performed by: FAMILY MEDICINE

## 2020-11-23 PROCEDURE — 4040F PNEUMOC VAC/ADMIN/RCVD: CPT | Performed by: FAMILY MEDICINE

## 2020-11-23 PROCEDURE — 1036F TOBACCO NON-USER: CPT | Performed by: FAMILY MEDICINE

## 2020-11-23 NOTE — PROGRESS NOTES
latanoprost (XALATAN) 0.005 % ophthalmic solution INSTILL 1 DROP INTO EACH EYE ONCE DAILY AT BEDTIME 19  Yes Historical Provider, MD   brimonidine (ALPHAGAN) 0.2 % ophthalmic solution INSTILL 1 DROP INTO LEFT EYE TWICE DAILY 19  Yes Historical Provider, MD   Blood Glucose Monitoring Suppl DELTA 1 Units by Does not apply route 2 times daily Please give whatever insurance covers 17  Yes Sabrina Edwards MD   magnesium (MAGNESIUM-OXIDE) 250 MG TABS tablet Take 250 mg by mouth daily   Yes Historical Provider, MD   Multiple Vitamin (ONE-A-DAY ESSENTIAL PO) Take 1 tablet by mouth. Yes Historical Provider, MD   Calcium Carbonate-Vitamin D (CALCIUM + D) 600-200 MG-UNIT TABS Take 1 tablet by mouth daily. Yes Historical Provider, MD   Misc Natural Products (OSTEO BI-FLEX ADV JOINT SHIELD PO) Take 2 tablets by mouth daily. Yes Historical Provider, MD   timolol (BETIMOL) 0.5 % ophthalmic solution Place 1 drop into both eyes daily. Yes Historical Provider, MD   aspirin 81 MG EC tablet Take 81 mg by mouth daily. Yes Historical Provider, MD   pregabalin (LYRICA) 25 MG capsule Take 1 capsule by mouth 2 times daily for 60 days.  20  Sabrina Edwards MD     Social History     Socioeconomic History    Marital status:      Spouse name: None    Number of children: None    Years of education: None    Highest education level: None   Occupational History    None   Social Needs    Financial resource strain: None    Food insecurity     Worry: None     Inability: None    Transportation needs     Medical: None     Non-medical: None   Tobacco Use    Smoking status: Former Smoker     Packs/day: 1.00     Years: 9.00     Pack years: 9.00     Last attempt to quit: 2000     Years since quittin.1    Smokeless tobacco: Never Used   Substance and Sexual Activity    Alcohol use: No     Alcohol/week: 0.0 standard drinks    Drug use: No    Sexual activity: Never     Partners: Male Lifestyle    Physical activity     Days per week: None     Minutes per session: None    Stress: None   Relationships    Social connections     Talks on phone: None     Gets together: None     Attends Confucianist service: None     Active member of club or organization: None     Attends meetings of clubs or organizations: None     Relationship status: None    Intimate partner violence     Fear of current or ex partner: None     Emotionally abused: None     Physically abused: None     Forced sexual activity: None   Other Topics Concern    None   Social History Narrative    None       I have reviewed Mira's allergies, medications, problem list, medical, social and family history and have updated as needed in the electronic medical record  Review Of Systems:    Skin: no abnormal pigmentation, rash, scaling, itching, masses, hair or nail changes  Eyes: no blurring, diplopia, or eye pain  Ears/Nose/Throat: no hearing loss, tinnitus, vertigo, nosebleed, nasal congestion, rhinorrhea, sore throat  Respiratory: no cough, pleuritic chest pain, dyspnea, or wheezing  Cardiovascular: no chest pain, angina, dyspnea on exertion, orthopnea, PND, palpitations, or claudication  Gastrointestinal: no nausea, vomiting, heartburn, diarrhea, constipation, bloating,  abdominal pain, or blood per rectum. Appetite is good  Genitourinary: no urinary urgency, frequency, dysuria, nocturia, hesitancy, or incontinence  Musculoskeletal: joint pains off and on. Morning stiffness.  Ambulating well  Neurologic: no paralysis, paresis, paresthesia, seizures, tremors, or headaches  Hematologic/Lymphatic/Immunologic: no anemia, abnormal bleeding/bruising, fever, chills, night sweats, swollen glands, or unexplained weight loss  Endocrine: no heat or cold intolerance and no polyphagia, polydipsia, or polyuria        OBJECTIVE:     VS:  Wt Readings from Last 3 Encounters:   11/23/20 177 lb 12.8 oz (80.6 kg)   09/21/20 175 lb (79.4 kg)   06/17/20 170 lb (77.1 kg)     Temp Readings from Last 3 Encounters:   11/23/20 97.3 °F (36.3 °C) (Temporal)   09/21/20 97.6 °F (36.4 °C)   06/17/20 97.8 °F (36.6 °C)     BP Readings from Last 3 Encounters:   11/23/20 110/64   09/21/20 124/68   06/17/20 129/60        General appearance: Alert, Awake, Oriented times 3, no distress  Skin: Warm and dry  Head: Normocephalic. No masses, lesions or tenderness noted  Eyes: Conjunctivae appear normal. PERLE  Ears: External ears normal  Nose/Sinuses: Nares normal. Septum midline. Mucosa normal. No drainage  Oropharynx: Oropharynx clear with no exudate seen  Neck: Neck supple. No jugular venous distension, lymphadenopathy or thyromegaly Trachea midline  Chest:  Normal. Movements are Normal and Equal.  Lungs: Lungs clear to auscultation bilaterally. No ronchi, crackles or wheezes  Heart: S1 S2  Regular rate and rhythm. No rub, murmur or gallop  Abdomen: Abdomen soft, non-tender. BS normal. No masses, organomegaly. Back: Grossly Normal and Equal. DTR are Normal. SLR is Normal.  Extremities: Arthritic changes are noted. Movements are limited. Pedal pulses are normal.  Musculoskeletal: Muscular strength appears intact. No joint effusion, tenderness, swelling or warmth  Neuro:  No focal motor or sensory deficits        ASSESSMENT     Patient Active Problem List    Diagnosis Date Noted    Type 2 diabetes mellitus without complication, without long-term current use of insulin ([facility])      Priority: High     Class: Chronic    Mixed hyperlipidemia      Priority: High     Class: Chronic    Essential hypertension      Priority: High     Class: Chronic    RLS (restless legs syndrome) 06/23/2015     Priority: Medium     Class: Chronic    Osteoarthritis      Priority: Medium    Diabetic polyneuropathy associated with diabetes mellitus due to underlying condition ([facility] 04/17/2019    Chronic obstructive pulmonary disease ([facility] 02/09/2016        Diagnosis:     ICD-10-CM    1.  Type 2 diabetes mellitus without complication, without long-term current use of insulin ([facility])  E11.9    2. Mixed hyperlipidemia  E78.2 Comprehensive Metabolic Panel     Lipid Panel   3. Essential hypertension  I10    4. Chronic obstructive pulmonary disease, unspecified COPD type ([facility]  J44.9        PLAN:           Patient Instructions   LOW SALT,LOW CARB. AND LOW FAT DIET. CONTINUE CURRENT MEDICATIONS TAKING REGULARLY. REGULAR WALKING ADVISED. ADVISED WEIGHT REDUCTION. FASTING FOR LAB WORK PRIOR TO NEXT VISIT. NEXT APPOINTMENT IN 3 MONTHS. Return in about 3 months (around 2/23/2021) for FOLLOW UP VISIT. I have reviewed my findings and recommendations with Charbel Hammond.     Electronically signed by Nirav Davenport MD on 11/23/20 at 2:29 PM EST

## 2020-12-07 RX ORDER — PREGABALIN 25 MG/1
25 CAPSULE ORAL 2 TIMES DAILY
Qty: 120 CAPSULE | Refills: 0 | Status: SHIPPED
Start: 2020-12-07 | End: 2021-02-25 | Stop reason: SDUPTHER

## 2021-01-20 ENCOUNTER — IMMUNIZATION (OUTPATIENT)
Dept: PRIMARY CARE CLINIC | Age: 86
End: 2021-01-20
Payer: MEDICARE

## 2021-01-20 PROCEDURE — 91301 COVID-19, MODERNA VACCINE 100MCG/0.5ML DOSE: CPT | Performed by: NURSE PRACTITIONER

## 2021-01-20 PROCEDURE — 0011A COVID-19, MODERNA VACCINE 100MCG/0.5ML DOSE: CPT | Performed by: NURSE PRACTITIONER

## 2021-02-17 ENCOUNTER — IMMUNIZATION (OUTPATIENT)
Dept: PRIMARY CARE CLINIC | Age: 86
End: 2021-02-17
Payer: MEDICARE

## 2021-02-17 DIAGNOSIS — E78.2 MIXED HYPERLIPIDEMIA: ICD-10-CM

## 2021-02-17 LAB
ALBUMIN SERPL-MCNC: 4.2 G/DL (ref 3.5–5.2)
ALP BLD-CCNC: 96 U/L (ref 35–104)
ALT SERPL-CCNC: 16 U/L (ref 0–32)
ANION GAP SERPL CALCULATED.3IONS-SCNC: 10 MMOL/L (ref 7–16)
AST SERPL-CCNC: 25 U/L (ref 0–31)
BILIRUB SERPL-MCNC: 0.6 MG/DL (ref 0–1.2)
BUN BLDV-MCNC: 15 MG/DL (ref 8–23)
CALCIUM SERPL-MCNC: 10 MG/DL (ref 8.6–10.2)
CHLORIDE BLD-SCNC: 97 MMOL/L (ref 98–107)
CHOLESTEROL, TOTAL: 123 MG/DL (ref 0–199)
CO2: 29 MMOL/L (ref 22–29)
CREAT SERPL-MCNC: 0.6 MG/DL (ref 0.5–1)
GFR AFRICAN AMERICAN: >60
GFR NON-AFRICAN AMERICAN: >60 ML/MIN/1.73
GLUCOSE BLD-MCNC: 128 MG/DL (ref 74–99)
HDLC SERPL-MCNC: 36 MG/DL
LDL CHOLESTEROL CALCULATED: 57 MG/DL (ref 0–99)
POTASSIUM SERPL-SCNC: 4 MMOL/L (ref 3.5–5)
SODIUM BLD-SCNC: 136 MMOL/L (ref 132–146)
TOTAL PROTEIN: 7.4 G/DL (ref 6.4–8.3)
TRIGL SERPL-MCNC: 151 MG/DL (ref 0–149)
VLDLC SERPL CALC-MCNC: 30 MG/DL

## 2021-02-17 PROCEDURE — 0012A COVID-19, MODERNA VACCINE 100MCG/0.5ML DOSE: CPT | Performed by: NURSE PRACTITIONER

## 2021-02-17 PROCEDURE — 91301 COVID-19, MODERNA VACCINE 100MCG/0.5ML DOSE: CPT | Performed by: NURSE PRACTITIONER

## 2021-02-25 ENCOUNTER — OFFICE VISIT (OUTPATIENT)
Dept: FAMILY MEDICINE CLINIC | Age: 86
End: 2021-02-25
Payer: MEDICARE

## 2021-02-25 VITALS
WEIGHT: 170.4 LBS | SYSTOLIC BLOOD PRESSURE: 130 MMHG | HEIGHT: 65 IN | DIASTOLIC BLOOD PRESSURE: 70 MMHG | BODY MASS INDEX: 28.39 KG/M2 | RESPIRATION RATE: 16 BRPM | HEART RATE: 79 BPM | TEMPERATURE: 97 F | OXYGEN SATURATION: 96 %

## 2021-02-25 DIAGNOSIS — E08.42 DIABETIC POLYNEUROPATHY ASSOCIATED WITH DIABETES MELLITUS DUE TO UNDERLYING CONDITION (HCC): ICD-10-CM

## 2021-02-25 DIAGNOSIS — I10 ESSENTIAL HYPERTENSION: ICD-10-CM

## 2021-02-25 DIAGNOSIS — J44.9 CHRONIC OBSTRUCTIVE PULMONARY DISEASE, UNSPECIFIED COPD TYPE (HCC): ICD-10-CM

## 2021-02-25 DIAGNOSIS — E78.2 MIXED HYPERLIPIDEMIA: ICD-10-CM

## 2021-02-25 DIAGNOSIS — E11.9 TYPE 2 DIABETES MELLITUS WITHOUT COMPLICATION, WITHOUT LONG-TERM CURRENT USE OF INSULIN (HCC): Primary | ICD-10-CM

## 2021-02-25 LAB — HBA1C MFR BLD: 6.1 %

## 2021-02-25 PROCEDURE — G8417 CALC BMI ABV UP PARAM F/U: HCPCS | Performed by: FAMILY MEDICINE

## 2021-02-25 PROCEDURE — 1090F PRES/ABSN URINE INCON ASSESS: CPT | Performed by: FAMILY MEDICINE

## 2021-02-25 PROCEDURE — 1123F ACP DISCUSS/DSCN MKR DOCD: CPT | Performed by: FAMILY MEDICINE

## 2021-02-25 PROCEDURE — G8926 SPIRO NO PERF OR DOC: HCPCS | Performed by: FAMILY MEDICINE

## 2021-02-25 PROCEDURE — 99213 OFFICE O/P EST LOW 20 MIN: CPT | Performed by: FAMILY MEDICINE

## 2021-02-25 PROCEDURE — G8484 FLU IMMUNIZE NO ADMIN: HCPCS | Performed by: FAMILY MEDICINE

## 2021-02-25 PROCEDURE — 3023F SPIROM DOC REV: CPT | Performed by: FAMILY MEDICINE

## 2021-02-25 PROCEDURE — 4040F PNEUMOC VAC/ADMIN/RCVD: CPT | Performed by: FAMILY MEDICINE

## 2021-02-25 PROCEDURE — G8399 PT W/DXA RESULTS DOCUMENT: HCPCS | Performed by: FAMILY MEDICINE

## 2021-02-25 PROCEDURE — G8427 DOCREV CUR MEDS BY ELIG CLIN: HCPCS | Performed by: FAMILY MEDICINE

## 2021-02-25 PROCEDURE — 1036F TOBACCO NON-USER: CPT | Performed by: FAMILY MEDICINE

## 2021-02-25 PROCEDURE — 83036 HEMOGLOBIN GLYCOSYLATED A1C: CPT | Performed by: FAMILY MEDICINE

## 2021-02-25 RX ORDER — PREGABALIN 25 MG/1
25 CAPSULE ORAL 2 TIMES DAILY
Qty: 120 CAPSULE | Refills: 0 | Status: SHIPPED
Start: 2021-02-25 | End: 2021-06-09 | Stop reason: SDUPTHER

## 2021-02-25 RX ORDER — HYDROCHLOROTHIAZIDE 25 MG/1
25 TABLET ORAL DAILY
Qty: 90 TABLET | Refills: 1 | Status: SHIPPED
Start: 2021-02-25 | End: 2021-06-09 | Stop reason: SDUPTHER

## 2021-02-25 RX ORDER — ATORVASTATIN CALCIUM 20 MG/1
20 TABLET, FILM COATED ORAL DAILY
Qty: 90 TABLET | Refills: 1 | Status: SHIPPED
Start: 2021-02-25 | End: 2021-06-09 | Stop reason: SDUPTHER

## 2021-02-25 RX ORDER — ACETAMINOPHEN 160 MG
TABLET,DISINTEGRATING ORAL
COMMUNITY

## 2021-02-25 RX ORDER — LANOLIN ALCOHOL/MO/W.PET/CERES
1000 CREAM (GRAM) TOPICAL DAILY
COMMUNITY

## 2021-02-25 RX ORDER — AMLODIPINE BESYLATE 5 MG/1
5 TABLET ORAL DAILY
Qty: 90 TABLET | Refills: 1 | Status: SHIPPED
Start: 2021-02-25 | End: 2021-06-09 | Stop reason: SDUPTHER

## 2021-02-25 RX ORDER — ASCORBIC ACID 250 MG
250 TABLET ORAL DAILY
COMMUNITY

## 2021-02-25 RX ORDER — LOSARTAN POTASSIUM 50 MG/1
50 TABLET ORAL DAILY
Qty: 90 TABLET | Refills: 1 | Status: SHIPPED
Start: 2021-02-25 | End: 2021-06-09 | Stop reason: SDUPTHER

## 2021-02-25 SDOH — ECONOMIC STABILITY: TRANSPORTATION INSECURITY
IN THE PAST 12 MONTHS, HAS LACK OF TRANSPORTATION KEPT YOU FROM MEETINGS, WORK, OR FROM GETTING THINGS NEEDED FOR DAILY LIVING?: NO

## 2021-02-25 SDOH — ECONOMIC STABILITY: INCOME INSECURITY: HOW HARD IS IT FOR YOU TO PAY FOR THE VERY BASICS LIKE FOOD, HOUSING, MEDICAL CARE, AND HEATING?: NOT HARD AT ALL

## 2021-02-25 SDOH — ECONOMIC STABILITY: FOOD INSECURITY: WITHIN THE PAST 12 MONTHS, THE FOOD YOU BOUGHT JUST DIDN'T LAST AND YOU DIDN'T HAVE MONEY TO GET MORE.: NEVER TRUE

## 2021-02-25 SDOH — ECONOMIC STABILITY: FOOD INSECURITY: WITHIN THE PAST 12 MONTHS, YOU WORRIED THAT YOUR FOOD WOULD RUN OUT BEFORE YOU GOT MONEY TO BUY MORE.: NEVER TRUE

## 2021-02-25 ASSESSMENT — PATIENT HEALTH QUESTIONNAIRE - PHQ9
1. LITTLE INTEREST OR PLEASURE IN DOING THINGS: 0
SUM OF ALL RESPONSES TO PHQ9 QUESTIONS 1 & 2: 0

## 2021-02-25 NOTE — PATIENT INSTRUCTIONS
LOW SALT,LOW CARB. AND LOW FAT DIET. CONTINUE CURRENT MEDICATIONS TAKING REGULARLY. REGULAR WALKING ADVISED. NEXT APPOINTMENT IN 3 MONTHS.

## 2021-02-25 NOTE — PROGRESS NOTES
OFFICE PROGRESS NOTE      SUBJECTIVE:        Patient ID:   Klever Pierson is a 81 y.o. female who presents for   Chief Complaint   Patient presents with    Discuss Labs     due to check up a1c 6.1    Health Maintenance     due for dtap and shingles            HPI:     FEELS GOOD. WATCHING DIET GOOD. WALKING SOME IN HOUSE  FOR EXERCISE. TAKING MEDICATIONS REGULARLY. NASAL CONGESTION FOR 1 MONTH. WILL TRY ZYRTEC 10 MG. NIGHTLY AS RECOMMENDED. Prior to Admission medications    Medication Sig Start Date End Date Taking? Authorizing Provider   Cholecalciferol (VITAMIN D3) 50 MCG (2000 UT) CAPS Take by mouth   Yes Historical Provider, MD   ascorbic acid (V-R VITAMIN C) 250 MG tablet Take 250 mg by mouth daily   Yes Historical Provider, MD   vitamin B-12 (CYANOCOBALAMIN) 1000 MCG tablet Take 1,000 mcg by mouth daily   Yes Historical Provider, MD   amLODIPine (NORVASC) 5 MG tablet Take 1 tablet by mouth daily 2/25/21  Yes Anne Nesbitt MD   atorvastatin (LIPITOR) 20 MG tablet Take 1 tablet by mouth daily 2/25/21  Yes Anne Nesbitt MD   losartan (COZAAR) 50 MG tablet Take 1 tablet by mouth daily 2/25/21  Yes Anne Nesbitt MD   metFORMIN (GLUCOPHAGE) 500 MG tablet Take 1 tablet by mouth 2 times daily (with meals) 1 qam, 1qpm 2/25/21  Yes Anne Nesbitt MD   blood glucose test strips (ONE TOUCH ULTRA TEST) strip USE 1 STRIP TO CHECK GLUCOSE TWICE DAILY 2/25/21  Yes Anne Nesbitt MD   hydroCHLOROthiazide (HYDRODIURIL) 25 MG tablet Take 1 tablet by mouth daily 2/25/21  Yes Anne Nesbitt MD   pregabalin (LYRICA) 25 MG capsule Take 1 capsule by mouth 2 times daily for 60 days.  2/25/21 4/26/21 Yes Anne Nesbitt MD   albuterol-ipratropium (COMBIVENT RESPIMAT)  MCG/ACT AERS inhaler Inhale 1 puff into the lungs every 4 hours as needed for Wheezing 12/7/20  Yes Anne Nesbitt MD blood glucose test strips (ASCENSIA AUTODISC VI;ONE TOUCH ULTRA TEST VI) strip 1 each by Does not apply route 2 times daily ONCE DAILY AS NEEDED. DX E11.9 NPI #[id number] 9/21/20  Yes Peewee Arguelles MD   Lancets MISC 1 each by Does not apply route 2 times daily 11/13/19  Yes Peewee Arguelles MD   timolol (TIMOPTIC) 0.5 % ophthalmic solution INSTILL 1 DROP INTO EACH EYE TWICE DAILY 7/30/19  Yes Historical Provider, MD   latanoprost (XALATAN) 0.005 % ophthalmic solution INSTILL 1 DROP INTO EACH EYE ONCE DAILY AT BEDTIME 7/30/19  Yes Historical Provider, MD   brimonidine (ALPHAGAN) 0.2 % ophthalmic solution INSTILL 1 DROP INTO LEFT EYE TWICE DAILY 7/30/19  Yes Historical Provider, MD   Blood Glucose Monitoring Suppl DELTA 1 Units by Does not apply route 2 times daily Please give whatever insurance covers 8/8/17  Yes Peewee Arguelles MD   magnesium (MAGNESIUM-OXIDE) 250 MG TABS tablet Take 250 mg by mouth daily   Yes Historical Provider, MD   Multiple Vitamin (ONE-A-DAY ESSENTIAL PO) Take 1 tablet by mouth. Yes Historical Provider, MD   Calcium Carbonate-Vitamin D (CALCIUM + D) 600-200 MG-UNIT TABS Take 1 tablet by mouth daily. Yes Historical Provider, MD   Misc Natural Products (OSTEO BI-FLEX ADV JOINT SHIELD PO) Take 2 tablets by mouth daily. Yes Historical Provider, MD   timolol (BETIMOL) 0.5 % ophthalmic solution Place 1 drop into both eyes daily. Yes Historical Provider, MD   aspirin 81 MG EC tablet Take 81 mg by mouth daily.      Yes Historical Provider, MD   ciclopirox (PENLAC) 8 % solution APPLY ONE APPLICATION TOPICALLY TO AFFECTED AREA DAILY AT BEDTIME 9/16/19   Historical Provider, MD     Social History     Socioeconomic History    Marital status:      Spouse name: None    Number of children: None    Years of education: None    Highest education level: None   Occupational History    None   Social Needs    Financial resource strain: Not hard at all   [place] insecurity Worry: Never true     Inability: Never true    Transportation needs     Medical: No     Non-medical: No   Tobacco Use    Smoking status: Former Smoker     Packs/day: 1.00     Years: 9.00     Pack years: 9.00     Quit date: 2000     Years since quittin.4    Smokeless tobacco: Never Used   Substance and Sexual Activity    Alcohol use: No     Alcohol/week: 0.0 standard drinks    Drug use: No    Sexual activity: Never     Partners: Male   Lifestyle    Physical activity     Days per week: None     Minutes per session: None    Stress: None   Relationships    Social connections     Talks on phone: None     Gets together: None     Attends Latter day service: None     Active member of club or organization: None     Attends meetings of clubs or organizations: None     Relationship status: None    Intimate partner violence     Fear of current or ex partner: None     Emotionally abused: None     Physically abused: None     Forced sexual activity: None   Other Topics Concern    None   Social History Narrative    None       I have reviewed Mira's allergies, medications, problem list, medical, social and family history and have updated as needed in the electronic medical record  Review Of Systems:    Skin: no abnormal pigmentation, rash, scaling, itching, masses, hair or nail changes  Eyes: no blurring, diplopia, or eye pain  Ears/Nose/Throat: no hearing loss, tinnitus, vertigo, nosebleed, nasal congestion, rhinorrhea, sore throat  Respiratory: no cough, pleuritic chest pain, dyspnea, or wheezing  Cardiovascular: no chest pain, angina, dyspnea on exertion, orthopnea, PND, palpitations, or claudication  Gastrointestinal: no nausea, vomiting, heartburn, diarrhea, constipation, bloating,  abdominal pain, or blood per rectum. Appetite is good  Genitourinary: no urinary urgency, frequency, dysuria, nocturia, hesitancy, or incontinence  Musculoskeletal: joint pains off and on. Morning stiffness.  Ambulating well Neurologic: no paralysis, paresis, paresthesia, seizures, tremors, or headaches  Hematologic/Lymphatic/Immunologic: no anemia, abnormal bleeding/bruising, fever, chills, night sweats, swollen glands, or unexplained weight loss  Endocrine: no heat or cold intolerance and no polyphagia, polydipsia, or polyuria        OBJECTIVE:     VS:  Wt Readings from Last 3 Encounters:   02/25/21 170 lb 6.4 oz (77.3 kg)   11/23/20 177 lb 12.8 oz (80.6 kg)   09/21/20 175 lb (79.4 kg)     Temp Readings from Last 3 Encounters:   02/25/21 97 °F (36.1 °C)   11/23/20 97.3 °F (36.3 °C) (Temporal)   09/21/20 97.6 °F (36.4 °C)     BP Readings from Last 3 Encounters:   02/25/21 130/70   11/23/20 110/64   09/21/20 124/68        General appearance: Alert, Awake, Oriented times 3, no distress  Skin: Warm and dry  Head: Normocephalic. No masses, lesions or tenderness noted  Eyes: Conjunctivae appear normal. PERLE  Ears: External ears normal  Nose/Sinuses: Nares normal. Septum midline. Mucosa normal. No drainage  Oropharynx: Oropharynx clear with no exudate seen  Neck: Neck supple. No jugular venous distension, lymphadenopathy or thyromegaly Trachea midline  Chest:  Normal. Movements are Normal and Equal.  Lungs: Lungs clear to auscultation bilaterally. No ronchi, crackles or wheezes  Heart: S1 S2  Regular rate and rhythm. No rub, murmur or gallop  Abdomen: Abdomen soft, non-tender. BS normal. No masses, organomegaly. Back: Grossly Normal and Equal. DTR are Normal. SLR is Normal.  Extremities: Arthritic changes are noted. Movements are limited. Pedal pulses are normal.  Musculoskeletal: Muscular strength appears intact.  No joint effusion, tenderness, swelling or warmth  Neuro:  No focal motor or sensory deficits        ASSESSMENT     Patient Active Problem List    Diagnosis Date Noted    Type 2 diabetes mellitus without complication, without long-term current use of insulin (HCC)      Priority: High     Class: Chronic  Mixed hyperlipidemia      Priority: High     Class: Chronic    Essential hypertension      Priority: High     Class: Chronic    RLS (restless legs syndrome) 06/23/2015     Priority: Medium     Class: Chronic    Osteoarthritis      Priority: Medium    Diabetic polyneuropathy associated with diabetes mellitus due to underlying condition ([facility] 04/17/2019    Chronic obstructive pulmonary disease ([facility] 02/09/2016        Diagnosis:     ICD-10-CM    1. Type 2 diabetes mellitus without complication, without long-term current use of insulin ([facility])  E11.9 POCT glycosylated hemoglobin (Hb A1C)   2. Diabetic polyneuropathy associated with diabetes mellitus due to underlying condition ([facility])  E08.42 pregabalin (LYRICA) 25 MG capsule    PROGRESSIVE    3. Mixed hyperlipidemia  E78.2    4. Essential hypertension  I10    5. Chronic obstructive pulmonary disease, unspecified COPD type ([facility]  J44.9        PLAN:           Patient Instructions   LOW SALT,LOW CARB. AND LOW FAT DIET. CONTINUE CURRENT MEDICATIONS TAKING REGULARLY. REGULAR WALKING ADVISED. NEXT APPOINTMENT IN 3 MONTHS. Return in about 3 months (around 5/25/2021) for FOLLOW UP VISIT. I have reviewed my findings and recommendations with Landon [place].     Electronically signed by Gt Michel MD on 2/25/21 at 9:01 AM EST

## 2021-03-15 ENCOUNTER — TELEPHONE (OUTPATIENT)
Dept: FAMILY MEDICINE CLINIC | Age: 86
End: 2021-03-15

## 2021-03-16 DIAGNOSIS — E11.9 TYPE 2 DIABETES MELLITUS WITHOUT COMPLICATION, WITHOUT LONG-TERM CURRENT USE OF INSULIN (HCC): ICD-10-CM

## 2021-06-09 ENCOUNTER — OFFICE VISIT (OUTPATIENT)
Dept: FAMILY MEDICINE CLINIC | Age: 86
End: 2021-06-09
Payer: MEDICARE

## 2021-06-09 VITALS
SYSTOLIC BLOOD PRESSURE: 122 MMHG | HEIGHT: 65 IN | BODY MASS INDEX: 28.41 KG/M2 | TEMPERATURE: 97.8 F | HEART RATE: 73 BPM | DIASTOLIC BLOOD PRESSURE: 62 MMHG | WEIGHT: 170.5 LBS | RESPIRATION RATE: 16 BRPM | OXYGEN SATURATION: 96 %

## 2021-06-09 DIAGNOSIS — E08.42 DIABETIC POLYNEUROPATHY ASSOCIATED WITH DIABETES MELLITUS DUE TO UNDERLYING CONDITION (HCC): ICD-10-CM

## 2021-06-09 DIAGNOSIS — R60.0 BILATERAL LEG EDEMA: ICD-10-CM

## 2021-06-09 DIAGNOSIS — Z23 NEED FOR TDAP VACCINATION: ICD-10-CM

## 2021-06-09 DIAGNOSIS — E78.2 MIXED HYPERLIPIDEMIA: ICD-10-CM

## 2021-06-09 DIAGNOSIS — E11.9 TYPE 2 DIABETES MELLITUS WITHOUT COMPLICATION, WITHOUT LONG-TERM CURRENT USE OF INSULIN (HCC): Primary | ICD-10-CM

## 2021-06-09 DIAGNOSIS — I10 ESSENTIAL HYPERTENSION: ICD-10-CM

## 2021-06-09 DIAGNOSIS — J44.9 CHRONIC OBSTRUCTIVE PULMONARY DISEASE, UNSPECIFIED COPD TYPE (HCC): ICD-10-CM

## 2021-06-09 LAB — HBA1C MFR BLD: 6.3 %

## 2021-06-09 PROCEDURE — G8399 PT W/DXA RESULTS DOCUMENT: HCPCS | Performed by: FAMILY MEDICINE

## 2021-06-09 PROCEDURE — 99214 OFFICE O/P EST MOD 30 MIN: CPT | Performed by: FAMILY MEDICINE

## 2021-06-09 PROCEDURE — 1123F ACP DISCUSS/DSCN MKR DOCD: CPT | Performed by: FAMILY MEDICINE

## 2021-06-09 PROCEDURE — G8427 DOCREV CUR MEDS BY ELIG CLIN: HCPCS | Performed by: FAMILY MEDICINE

## 2021-06-09 PROCEDURE — G8926 SPIRO NO PERF OR DOC: HCPCS | Performed by: FAMILY MEDICINE

## 2021-06-09 PROCEDURE — 3023F SPIROM DOC REV: CPT | Performed by: FAMILY MEDICINE

## 2021-06-09 PROCEDURE — 1036F TOBACCO NON-USER: CPT | Performed by: FAMILY MEDICINE

## 2021-06-09 PROCEDURE — 4040F PNEUMOC VAC/ADMIN/RCVD: CPT | Performed by: FAMILY MEDICINE

## 2021-06-09 PROCEDURE — 83036 HEMOGLOBIN GLYCOSYLATED A1C: CPT | Performed by: FAMILY MEDICINE

## 2021-06-09 PROCEDURE — 1090F PRES/ABSN URINE INCON ASSESS: CPT | Performed by: FAMILY MEDICINE

## 2021-06-09 PROCEDURE — G8417 CALC BMI ABV UP PARAM F/U: HCPCS | Performed by: FAMILY MEDICINE

## 2021-06-09 RX ORDER — AMLODIPINE BESYLATE 5 MG/1
5 TABLET ORAL DAILY
Qty: 90 TABLET | Refills: 1 | Status: SHIPPED
Start: 2021-06-09 | End: 2021-06-09 | Stop reason: SINTOL

## 2021-06-09 RX ORDER — METOPROLOL SUCCINATE 50 MG/1
50 TABLET, EXTENDED RELEASE ORAL DAILY
Qty: 90 TABLET | Refills: 1 | Status: SHIPPED
Start: 2021-06-09 | End: 2021-10-12 | Stop reason: SDUPTHER

## 2021-06-09 RX ORDER — HYDROCHLOROTHIAZIDE 25 MG/1
25 TABLET ORAL DAILY
Qty: 90 TABLET | Refills: 1 | Status: SHIPPED
Start: 2021-06-09 | End: 2021-10-12 | Stop reason: SDUPTHER

## 2021-06-09 RX ORDER — ATORVASTATIN CALCIUM 20 MG/1
20 TABLET, FILM COATED ORAL DAILY
Qty: 90 TABLET | Refills: 1 | Status: SHIPPED
Start: 2021-06-09 | End: 2021-10-12 | Stop reason: SDUPTHER

## 2021-06-09 RX ORDER — PREGABALIN 25 MG/1
25 CAPSULE ORAL 2 TIMES DAILY
Qty: 120 CAPSULE | Refills: 0 | Status: SHIPPED
Start: 2021-06-09 | End: 2021-10-12 | Stop reason: SDUPTHER

## 2021-06-09 RX ORDER — LOSARTAN POTASSIUM 50 MG/1
50 TABLET ORAL DAILY
Qty: 90 TABLET | Refills: 1 | Status: SHIPPED
Start: 2021-06-09 | End: 2021-10-12 | Stop reason: SDUPTHER

## 2021-06-09 SDOH — ECONOMIC STABILITY: HOUSING INSECURITY
IN THE LAST 12 MONTHS, WAS THERE A TIME WHEN YOU DID NOT HAVE A STEADY PLACE TO SLEEP OR SLEPT IN A SHELTER (INCLUDING NOW)?: NO

## 2021-06-09 SDOH — ECONOMIC STABILITY: HOUSING INSECURITY: IN THE LAST 12 MONTHS, HOW MANY PLACES HAVE YOU LIVED?: 1

## 2021-06-09 SDOH — ECONOMIC STABILITY: INCOME INSECURITY: IN THE LAST 12 MONTHS, WAS THERE A TIME WHEN YOU WERE NOT ABLE TO PAY THE MORTGAGE OR RENT ON TIME?: NO

## 2021-06-09 ASSESSMENT — LIFESTYLE VARIABLES
HOW MANY STANDARD DRINKS CONTAINING ALCOHOL DO YOU HAVE ON A TYPICAL DAY: 1 OR 2
HOW OFTEN DO YOU HAVE A DRINK CONTAINING ALCOHOL: NEVER

## 2021-06-09 NOTE — PATIENT INSTRUCTIONS
LOW SALT,LOW CARB. AND LOW FAT DIET. CONTINUE CURRENT MEDICATIONS TAKING REGULARLY. STOP[ TAKING AMLODIPINE. TAKE METOPROLOL 50 MG. 1 TAB. DAILY. REGULAR WALKING ADVISED. ADVISED WEIGHT REDUCTION. NEXT APPOINTMENT IN 1 MONTH.

## 2021-06-09 NOTE — PROGRESS NOTES
OFFICE PROGRESS NOTE      SUBJECTIVE:        Patient ID:   Tanna Beckham is a 81 y.o. female who presents for   Chief Complaint   Patient presents with    Diabetes     3 month check  up   [address] [facility]     due for shingles and dtap    Foot Swelling           HPI:     FEET SWELLING FOR 1 MONTH. NO CHEST PAIN OR SHORTNESS OF BREATH. USING AEROSOL INHALER WITH GOOD RELIEF. WATCHING DIET GOOD. WALKING SOME IN HOUSE FOR EXERCISE. TAKING MEDICATIONS REGULARLY. Prior to Admission medications    Medication Sig Start Date End Date Taking? Authorizing Provider   atorvastatin (LIPITOR) 20 MG tablet Take 1 tablet by mouth daily 6/9/21  Yes Miguel Jenkins MD   losartan (COZAAR) 50 MG tablet Take 1 tablet by mouth daily 6/9/21  Yes Miguel Jenkins MD   metFORMIN (GLUCOPHAGE) 500 MG tablet Take 1 tablet by mouth 2 times daily (with meals) 1 qam, 1qpm 6/9/21  Yes Miguel Jenkins MD   hydroCHLOROthiazide (HYDRODIURIL) 25 MG tablet Take 1 tablet by mouth daily 6/9/21  Yes Miguel Jenkins MD   pregabalin (LYRICA) 25 MG capsule Take 1 capsule by mouth 2 times daily for 60 days.  6/9/21 8/8/21 Yes Miguel Jenkins MD   albuterol-ipratropium (COMBIVENT RESPIMAT)  MCG/ACT AERS inhaler Inhale 1 puff into the lungs every 4 hours as needed for Wheezing 6/9/21  Yes Miguel Jenkins MD   metoprolol succinate (TOPROL XL) 50 MG extended release tablet Take 1 tablet by mouth daily 6/9/21  Yes Miguel Jenkins MD   Tdap (ADACEL) 5-2-15.5 LF-MCG/0.5 injection Inject 0.5 mLs into the muscle once for 1 dose 6/9/21 6/9/21 Yes Miguel Jenkins MD   blood glucose test strips (ASCENSIA AUTODISC VI;ONE TOUCH ULTRA TEST VI) strip 1 each by Does not apply route 2 times daily 3/16/21  Yes Miguel Jenkins MD   Cholecalciferol (VITAMIN D3) 50 MCG (2000 UT) CAPS Take by mouth   Yes Historical Provider, MD   ascorbic acid (V-R VITAMIN C) 250 MG tablet Take 250 mg by mouth daily   Yes Historical Provider, MD   vitamin B-12 (CYANOCOBALAMIN) 1000 MCG tablet Take 1,000 mcg by mouth daily   Yes Historical Provider, MD   Lancets MISC 1 each by Does not apply route 2 times daily 19  Yes Yareli Lopez MD   timolol (TIMOPTIC) 0.5 % ophthalmic solution INSTILL 1 DROP INTO EACH EYE TWICE DAILY 19  Yes Historical Provider, MD   latanoprost (XALATAN) 0.005 % ophthalmic solution INSTILL 1 DROP INTO EACH EYE ONCE DAILY AT BEDTIME 19  Yes Historical Provider, MD   brimonidine (ALPHAGAN) 0.2 % ophthalmic solution INSTILL 1 DROP INTO LEFT EYE TWICE DAILY 19  Yes Historical Provider, MD   Blood Glucose Monitoring Suppl DELTA 1 Units by Does not apply route 2 times daily Please give whatever insurance covers 17  Yes Yareli Lopez MD   magnesium (MAGNESIUM-OXIDE) 250 MG TABS tablet Take 250 mg by mouth daily   Yes Historical Provider, MD   Multiple Vitamin (ONE-A-DAY ESSENTIAL PO) Take 1 tablet by mouth. Yes Historical Provider, MD   Calcium Carbonate-Vitamin D (CALCIUM + D) 600-200 MG-UNIT TABS Take 1 tablet by mouth daily. Yes Historical Provider, MD   [facility] Products (OSTEO BI-FLEX ADV JOINT SHIELD PO) Take 2 tablets by mouth daily. Yes Historical Provider, MD   timolol (BETIMOL) 0.5 % ophthalmic solution Place 1 drop into both eyes daily. Yes Historical Provider, MD   aspirin 81 MG EC tablet Take 81 mg by mouth daily.      Yes Historical Provider, MD     Social History     Socioeconomic History    Marital status:      Spouse name: None    Number of children: None    Years of education: None    Highest education level: None   Occupational History    None   Tobacco Use    Smoking status: Former Smoker     Packs/day: 1.00     Years: 9.00     Pack years: 9.00     Quit date: 2000     Years since quittin.7    Smokeless tobacco: Never Used   Vaping Use    Vaping Use: Never used   Substance and Sexual Activity    Alcohol use: No     Alcohol/week: 0.0 standard drinks    Drug use: No    Sexual activity: Never     Partners: Male   Other Topics Concern    None   Social History Narrative    None     Social Determinants of Health     Financial Resource Strain: Low Risk     Difficulty of Paying Living Expenses: Not hard at all   Food Insecurity: No Food Insecurity    Worried About Running Out of Food in the Last Year: Never true    [place] of Food in the Last Year: Never true   Transportation Needs: No Transportation Needs    Lack of Transportation (Medical): No    Lack of Transportation (Non-Medical): No   Physical Activity:     Days of Exercise per Week:     Minutes of Exercise per Session:    Stress:     Feeling of Stress :    Social Connections:     Frequency of Communication with Friends and Family:     Frequency of Social Gatherings with Friends and Family:     Attends Latter-day Services:     Active Member of Clubs or Organizations:     Attends Club or Organization Meetings:     Marital Status:    Intimate Partner Violence:     Fear of Current or Ex-Partner:     Emotionally Abused:     Physically Abused:     Sexually Abused:        I have reviewed Mira's allergies, medications, problem list, medical, social and family history and have updated as needed in the electronic medical record  Review Of Systems:    Skin: no abnormal pigmentation, rash, scaling, itching, masses, hair or nail changes  Eyes: no blurring, diplopia, or eye pain  Ears/Nose/Throat: no hearing loss, tinnitus, vertigo, nosebleed, nasal congestion, rhinorrhea, sore throat  Respiratory: no cough, pleuritic chest pain, dyspnea, or wheezing  Cardiovascular: no chest pain, angina, dyspnea on exertion, orthopnea, PND, palpitations, or claudication  Gastrointestinal: no nausea, vomiting, heartburn, diarrhea, constipation, bloating,  abdominal pain, or blood per rectum. Appetite is good  Genitourinary: no urinary urgency, frequency, dysuria, nocturia, hesitancy, or incontinence Musculoskeletal: joint pains off and on. Morning stiffness. Ambulating well  Neurologic: no paralysis, paresis, paresthesia, seizures, tremors, or headaches  Hematologic/Lymphatic/Immunologic: no anemia, abnormal bleeding/bruising, fever, chills, night sweats, swollen glands, or unexplained weight loss  Endocrine: no heat or cold intolerance and no polyphagia, polydipsia, or polyuria        OBJECTIVE:     VS:  Wt Readings from Last 3 Encounters:   06/09/21 170 lb 8 oz (77.3 kg)   02/25/21 170 lb 6.4 oz (77.3 kg)   11/23/20 177 lb 12.8 oz (80.6 kg)     Temp Readings from Last 3 Encounters:   06/09/21 97.8 °F (36.6 °C)   02/25/21 97 °F (36.1 °C)   11/23/20 97.3 °F (36.3 °C) (Temporal)     BP Readings from Last 3 Encounters:   06/09/21 122/62   02/25/21 130/70   11/23/20 110/64        General appearance: Alert, Awake, Oriented times 3, no distress  Skin: Warm and dry  Head: Normocephalic. No masses, lesions or tenderness noted  Eyes: Conjunctivae appear normal. PERLE  Ears: External ears normal  Nose/Sinuses: Nares normal. Septum midline. Mucosa normal. No drainage  Oropharynx: Oropharynx clear with no exudate seen  Neck: Neck supple. No jugular venous distension, lymphadenopathy or thyromegaly Trachea midline  Chest:  Normal. Movements are Normal and Equal.  Lungs: Lungs clear to auscultation bilaterally. No ronchi, crackles or wheezes  Heart: S1 S2  Regular rate and rhythm. No rub, murmur or gallop  Abdomen: Abdomen soft, non-tender. BS normal. No masses, organomegaly. Back: Grossly Normal and Equal. DTR are Normal. SLR is Normal.  Extremities: Arthritic changes are noted. Movements are limited. Pedal pulses are normal. BILATERAL FEET EDEMA. Musculoskeletal: Muscular strength appears intact.  No joint effusion, tenderness, swelling or warmth  Neuro:  No focal motor or sensory deficits        ASSESSMENT     Patient Active Problem List    Diagnosis Date Noted    Type 2 diabetes mellitus without complication, without

## 2021-07-09 ENCOUNTER — OFFICE VISIT (OUTPATIENT)
Dept: FAMILY MEDICINE CLINIC | Age: 86
End: 2021-07-09
Payer: MEDICARE

## 2021-07-09 VITALS
HEIGHT: 66 IN | BODY MASS INDEX: 27.56 KG/M2 | TEMPERATURE: 96.9 F | OXYGEN SATURATION: 96 % | SYSTOLIC BLOOD PRESSURE: 120 MMHG | DIASTOLIC BLOOD PRESSURE: 71 MMHG | WEIGHT: 171.5 LBS | HEART RATE: 65 BPM

## 2021-07-09 DIAGNOSIS — J44.9 CHRONIC OBSTRUCTIVE PULMONARY DISEASE, UNSPECIFIED COPD TYPE (HCC): ICD-10-CM

## 2021-07-09 DIAGNOSIS — I10 ESSENTIAL HYPERTENSION: ICD-10-CM

## 2021-07-09 DIAGNOSIS — G25.81 RLS (RESTLESS LEGS SYNDROME): ICD-10-CM

## 2021-07-09 DIAGNOSIS — E11.9 TYPE 2 DIABETES MELLITUS WITHOUT COMPLICATION, WITHOUT LONG-TERM CURRENT USE OF INSULIN (HCC): Primary | ICD-10-CM

## 2021-07-09 DIAGNOSIS — E78.2 MIXED HYPERLIPIDEMIA: ICD-10-CM

## 2021-07-09 PROCEDURE — 1090F PRES/ABSN URINE INCON ASSESS: CPT | Performed by: FAMILY MEDICINE

## 2021-07-09 PROCEDURE — 1036F TOBACCO NON-USER: CPT | Performed by: FAMILY MEDICINE

## 2021-07-09 PROCEDURE — G8417 CALC BMI ABV UP PARAM F/U: HCPCS | Performed by: FAMILY MEDICINE

## 2021-07-09 PROCEDURE — 3023F SPIROM DOC REV: CPT | Performed by: FAMILY MEDICINE

## 2021-07-09 PROCEDURE — 99212 OFFICE O/P EST SF 10 MIN: CPT | Performed by: FAMILY MEDICINE

## 2021-07-09 PROCEDURE — 4040F PNEUMOC VAC/ADMIN/RCVD: CPT | Performed by: FAMILY MEDICINE

## 2021-07-09 PROCEDURE — 1123F ACP DISCUSS/DSCN MKR DOCD: CPT | Performed by: FAMILY MEDICINE

## 2021-07-09 PROCEDURE — G8926 SPIRO NO PERF OR DOC: HCPCS | Performed by: FAMILY MEDICINE

## 2021-07-09 PROCEDURE — G8399 PT W/DXA RESULTS DOCUMENT: HCPCS | Performed by: FAMILY MEDICINE

## 2021-07-09 PROCEDURE — G8427 DOCREV CUR MEDS BY ELIG CLIN: HCPCS | Performed by: FAMILY MEDICINE

## 2021-07-09 NOTE — PATIENT INSTRUCTIONS
LOW SALT,LOW CARB. AND LOW FAT DIET. CONTINUE CURRENT MEDICATIONS TAKING REGULARLY. REGULAR WALKING ADVISED. FASTING FOR LAB WORK PRIOR TO NEXT VISIT. NEXT APPOINTMENT IN 3 MONTHS.

## 2021-07-09 NOTE — PROGRESS NOTES
OFFICE PROGRESS NOTE      SUBJECTIVE:        Patient ID:   Jon Canada is a 81 y.o. female who presents for   Chief Complaint   Patient presents with    Diabetes    Discuss Labs           HPI:     FEELS GOOD. WATCHING DIET GOOD. WALKING IN HOUSE FOR EXERCISE. TAKING MEDICATIONS REGULARLY. Prior to Admission medications    Medication Sig Start Date End Date Taking? Authorizing Provider   atorvastatin (LIPITOR) 20 MG tablet Take 1 tablet by mouth daily 6/9/21  Yes Jean Carlos Perez MD   losartan (COZAAR) 50 MG tablet Take 1 tablet by mouth daily 6/9/21  Yes Jean Carlos Perez MD   metFORMIN (GLUCOPHAGE) 500 MG tablet Take 1 tablet by mouth 2 times daily (with meals) 1 qam, 1qpm 6/9/21  Yes Jean Carlos Perez MD   hydroCHLOROthiazide (HYDRODIURIL) 25 MG tablet Take 1 tablet by mouth daily 6/9/21  Yes Jean Carlos Perez MD   pregabalin (LYRICA) 25 MG capsule Take 1 capsule by mouth 2 times daily for 60 days.  6/9/21 8/8/21 Yes Jean Carlos Perez MD   albuterol-ipratropium (COMBIVENT RESPIMAT)  MCG/ACT AERS inhaler Inhale 1 puff into the lungs every 4 hours as needed for Wheezing 6/9/21  Yes Jean Carlos Perez MD   metoprolol succinate (TOPROL XL) 50 MG extended release tablet Take 1 tablet by mouth daily 6/9/21  Yes Jean Carlos Perez MD   blood glucose test strips (ASCENSIA AUTODISC VI;ONE TOUCH ULTRA TEST VI) strip 1 each by Does not apply route 2 times daily 3/16/21  Yes Jean Carlos Perez MD   Cholecalciferol (VITAMIN D3) 50 MCG (2000 UT) CAPS Take by mouth   Yes Historical Provider, MD   ascorbic acid (V-R VITAMIN C) 250 MG tablet Take 250 mg by mouth daily   Yes Historical Provider, MD   vitamin B-12 (CYANOCOBALAMIN) 1000 MCG tablet Take 1,000 mcg by mouth daily   Yes Historical Provider, MD   Lancets MISC 1 each by Does not apply route 2 times daily 11/13/19  Yes Jean Carlos Perez MD   timolol (TIMOPTIC) 0.5 % ophthalmic solution INSTILL 1 DROP INTO EACH EYE TWICE DAILY 7/30/19 Yes Historical Provider, MD   latanoprost (XALATAN) 0.005 % ophthalmic solution INSTILL 1 DROP INTO EACH EYE ONCE DAILY AT BEDTIME 19  Yes Historical Provider, MD   brimonidine (ALPHAGAN) 0.2 % ophthalmic solution INSTILL 1 DROP INTO LEFT EYE TWICE DAILY 19  Yes Historical Provider, MD   Blood Glucose Monitoring Suppl DELTA 1 Units by Does not apply route 2 times daily Please give whatever insurance covers 17  Yes Alfredo Rider MD   magnesium (MAGNESIUM-OXIDE) 250 MG TABS tablet Take 250 mg by mouth daily   Yes Historical Provider, MD   Multiple Vitamin (ONE-A-DAY ESSENTIAL PO) Take 1 tablet by mouth. Yes Historical Provider, MD   Calcium Carbonate-Vitamin D (CALCIUM + D) 600-200 MG-UNIT TABS Take 1 tablet by mouth daily. Yes Historical Provider, MD   Misc Natural Products (OSTEO BI-FLEX ADV JOINT SHIELD PO) Take 2 tablets by mouth daily. Yes Historical Provider, MD   timolol (BETIMOL) 0.5 % ophthalmic solution Place 1 drop into both eyes daily. Yes Historical Provider, MD   aspirin 81 MG EC tablet Take 81 mg by mouth daily.      Yes Historical Provider, MD     Social History     Socioeconomic History    Marital status:      Spouse name: None    Number of children: None    Years of education: None    Highest education level: None   Occupational History    None   Tobacco Use    Smoking status: Former Smoker     Packs/day: 1.00     Years: 9.00     Pack years: 9.00     Quit date: 2000     Years since quittin.8    Smokeless tobacco: Never Used   Vaping Use    Vaping Use: Never used   Substance and Sexual Activity    Alcohol use: No     Alcohol/week: 0.0 standard drinks    Drug use: No    Sexual activity: Never     Partners: Male   Other Topics Concern    None   Social History Narrative    None     Social Determinants of Health     Financial Resource Strain: Low Risk     Difficulty of Paying Living Expenses: Not hard at all   Food Insecurity: No Food Insecurity  Worried About Running Out of Food in the Last Year: Never true    Hayes of Food in the Last Year: Never true   Transportation Needs: No Transportation Needs    Lack of Transportation (Medical): No    Lack of Transportation (Non-Medical): No   Physical Activity:     Days of Exercise per Week:     Minutes of Exercise per Session:    Stress:     Feeling of Stress :    Social Connections:     Frequency of Communication with Friends and Family:     Frequency of Social Gatherings with Friends and Family:     Attends Restoration[employer] Services:     Active Member of Clubs or Organizations:     Attends Club or Organization Meetings:     Marital Status:    Intimate Partner Violence:     Fear of Current or Ex-Partner:     Emotionally Abused:     Physically Abused:     Sexually Abused:        I have reviewed Mira's allergies, medications, problem list, medical, social and family history and have updated as needed in the electronic medical record  Review Of Systems:    Skin: no abnormal pigmentation, rash, scaling, itching, masses, hair or nail changes  Eyes: no blurring, diplopia, or eye pain  Ears/Nose/Throat: no hearing loss, tinnitus, vertigo, nosebleed, nasal congestion, rhinorrhea, sore throat  Respiratory: no cough, pleuritic chest pain, dyspnea, or wheezing  Cardiovascular: no chest pain, angina, dyspnea on exertion, orthopnea, PND, palpitations, or claudication  Gastrointestinal: no nausea, vomiting, heartburn, diarrhea, constipation, bloating,  abdominal pain, or blood per rectum. Appetite is good  Genitourinary: no urinary urgency, frequency, dysuria, nocturia, hesitancy, or incontinence  Musculoskeletal: joint pains off and on. Morning stiffness.  Ambulating well  Neurologic: no paralysis, paresis, paresthesia, seizures, tremors, or headaches  Hematologic/Lymphatic/Immunologic: no anemia, abnormal bleeding/bruising, fever, chills, night sweats, swollen glands, or unexplained weight loss  Endocrine: no heat or cold intolerance and no polyphagia, polydipsia, or polyuria        OBJECTIVE:     VS:  Wt Readings from Last 3 Encounters:   07/09/21 171 lb 8 oz (77.8 kg)   06/09/21 170 lb 8 oz (77.3 kg)   02/25/21 170 lb 6.4 oz (77.3 kg)     Temp Readings from Last 3 Encounters:   07/09/21 96.9 °F (36.1 °C) (Temporal)   06/09/21 97.8 °F (36.6 °C)   02/25/21 97 °F (36.1 °C)     BP Readings from Last 3 Encounters:   07/09/21 120/71   06/09/21 122/62   02/25/21 130/70        General appearance: Alert, Awake, Oriented times 3, no distress  Skin: Warm and dry  Head: Normocephalic. No masses, lesions or tenderness noted  Eyes: Conjunctivae appear normal. PERLE  Ears: External ears normal  Nose/Sinuses: Nares normal. Septum midline. Mucosa normal. No drainage  Oropharynx: Oropharynx clear with no exudate seen  Neck: Neck supple. No jugular venous distension, lymphadenopathy or thyromegaly Trachea midline  Chest:  Normal. Movements are Normal and Equal.  Lungs: Lungs clear to auscultation bilaterally. No ronchi, crackles or wheezes  Heart: S1 S2  Regular rate and rhythm. No rub, murmur or gallop  Abdomen: Abdomen soft, non-tender. BS normal. No masses, organomegaly. Back: Grossly Normal and Equal. DTR are Normal. SLR is Normal.  Extremities: Arthritic changes are noted. Movements are limited. Pedal pulses are normal.  Musculoskeletal: Muscular strength appears intact.  No joint effusion, tenderness, swelling or warmth  Neuro:  No focal motor or sensory deficits        ASSESSMENT     Patient Active Problem List    Diagnosis Date Noted    Type 2 diabetes mellitus without complication, without long-term current use of insulin (HCC)     Mixed hyperlipidemia     Essential hypertension     RLS (restless legs syndrome) 06/23/2015    Osteoarthritis     Diabetic polyneuropathy associated with diabetes mellitus due to underlying condition ([facility] 04/17/2019    [facility] 02/09/2016        Diagnosis:

## 2021-10-12 ENCOUNTER — OFFICE VISIT (OUTPATIENT)
Dept: FAMILY MEDICINE CLINIC | Age: 86
End: 2021-10-12
Payer: MEDICARE

## 2021-10-12 VITALS
DIASTOLIC BLOOD PRESSURE: 66 MMHG | TEMPERATURE: 97.3 F | HEIGHT: 66 IN | BODY MASS INDEX: 25.55 KG/M2 | WEIGHT: 159 LBS | OXYGEN SATURATION: 93 % | SYSTOLIC BLOOD PRESSURE: 124 MMHG | HEART RATE: 70 BPM | RESPIRATION RATE: 16 BRPM

## 2021-10-12 DIAGNOSIS — Z23 FLU VACCINE NEED: ICD-10-CM

## 2021-10-12 DIAGNOSIS — J44.9 CHRONIC OBSTRUCTIVE PULMONARY DISEASE, UNSPECIFIED COPD TYPE (HCC): ICD-10-CM

## 2021-10-12 DIAGNOSIS — I10 ESSENTIAL HYPERTENSION: ICD-10-CM

## 2021-10-12 DIAGNOSIS — E11.9 TYPE 2 DIABETES MELLITUS WITHOUT COMPLICATION, WITHOUT LONG-TERM CURRENT USE OF INSULIN (HCC): Primary | ICD-10-CM

## 2021-10-12 DIAGNOSIS — E78.2 MIXED HYPERLIPIDEMIA: ICD-10-CM

## 2021-10-12 DIAGNOSIS — E08.42 DIABETIC POLYNEUROPATHY ASSOCIATED WITH DIABETES MELLITUS DUE TO UNDERLYING CONDITION (HCC): ICD-10-CM

## 2021-10-12 DIAGNOSIS — G25.81 RLS (RESTLESS LEGS SYNDROME): ICD-10-CM

## 2021-10-12 LAB — HBA1C MFR BLD: 6 %

## 2021-10-12 PROCEDURE — 4040F PNEUMOC VAC/ADMIN/RCVD: CPT | Performed by: FAMILY MEDICINE

## 2021-10-12 PROCEDURE — 1036F TOBACCO NON-USER: CPT | Performed by: FAMILY MEDICINE

## 2021-10-12 PROCEDURE — G8484 FLU IMMUNIZE NO ADMIN: HCPCS | Performed by: FAMILY MEDICINE

## 2021-10-12 PROCEDURE — G8427 DOCREV CUR MEDS BY ELIG CLIN: HCPCS | Performed by: FAMILY MEDICINE

## 2021-10-12 PROCEDURE — 83036 HEMOGLOBIN GLYCOSYLATED A1C: CPT | Performed by: FAMILY MEDICINE

## 2021-10-12 PROCEDURE — 90694 VACC AIIV4 NO PRSRV 0.5ML IM: CPT | Performed by: FAMILY MEDICINE

## 2021-10-12 PROCEDURE — G8399 PT W/DXA RESULTS DOCUMENT: HCPCS | Performed by: FAMILY MEDICINE

## 2021-10-12 PROCEDURE — G8417 CALC BMI ABV UP PARAM F/U: HCPCS | Performed by: FAMILY MEDICINE

## 2021-10-12 PROCEDURE — 1123F ACP DISCUSS/DSCN MKR DOCD: CPT | Performed by: FAMILY MEDICINE

## 2021-10-12 PROCEDURE — G0008 ADMIN INFLUENZA VIRUS VAC: HCPCS | Performed by: FAMILY MEDICINE

## 2021-10-12 PROCEDURE — G8926 SPIRO NO PERF OR DOC: HCPCS | Performed by: FAMILY MEDICINE

## 2021-10-12 PROCEDURE — 1090F PRES/ABSN URINE INCON ASSESS: CPT | Performed by: FAMILY MEDICINE

## 2021-10-12 PROCEDURE — 3023F SPIROM DOC REV: CPT | Performed by: FAMILY MEDICINE

## 2021-10-12 PROCEDURE — 99214 OFFICE O/P EST MOD 30 MIN: CPT | Performed by: FAMILY MEDICINE

## 2021-10-12 RX ORDER — ATORVASTATIN CALCIUM 20 MG/1
20 TABLET, FILM COATED ORAL DAILY
Qty: 90 TABLET | Refills: 1 | Status: SHIPPED
Start: 2021-10-12 | End: 2022-01-18 | Stop reason: SDUPTHER

## 2021-10-12 RX ORDER — METOPROLOL SUCCINATE 50 MG/1
50 TABLET, EXTENDED RELEASE ORAL DAILY
Qty: 90 TABLET | Refills: 1 | Status: SHIPPED
Start: 2021-10-12 | End: 2022-01-18 | Stop reason: SDUPTHER

## 2021-10-12 RX ORDER — PREGABALIN 25 MG/1
25 CAPSULE ORAL 2 TIMES DAILY
Qty: 120 CAPSULE | Refills: 0 | Status: SHIPPED
Start: 2021-10-12 | End: 2022-01-18 | Stop reason: SDUPTHER

## 2021-10-12 RX ORDER — AMLODIPINE BESYLATE 5 MG/1
TABLET ORAL
COMMUNITY
Start: 2021-09-03 | End: 2022-06-13 | Stop reason: SDUPTHER

## 2021-10-12 RX ORDER — LOSARTAN POTASSIUM 50 MG/1
50 TABLET ORAL DAILY
Qty: 90 TABLET | Refills: 1 | Status: SHIPPED
Start: 2021-10-12 | End: 2022-01-18 | Stop reason: SDUPTHER

## 2021-10-12 RX ORDER — HYDROCHLOROTHIAZIDE 25 MG/1
25 TABLET ORAL DAILY
Qty: 90 TABLET | Refills: 1 | Status: SHIPPED
Start: 2021-10-12 | End: 2022-01-18 | Stop reason: SDUPTHER

## 2021-10-12 NOTE — PATIENT INSTRUCTIONS
LOW SALT FOR BLOOD PRESSURE CONTROL. LOW CARBOHYDRATE FOR BLOOD SUGAR AND WEIGHT CONTROL. LOW FAT DIET FOR CHOLESTEROL CONTROL. DRINK ENOUGH FLUIDS FOR BETTER KIDNEY FUNCTION. TAKE COZAAR 50 MG. TOPROL 50 MG AND HYDRODIURIL 25 MG. 1 DAILY FOR BLOOD PRESSURE CONTROL. TAKE METFORMIN 500 MG. 2 TIMES A DAY  FOR BLOOD SUGAR CONTROL. TAKE LIPITOR 20 MG. NIGHTLY   FOR CHOLESTEROL CONTROL. Nancy Ruslan INHALE COMBIVENT INHALER 1 PUFF 2 TIMES A DAY FOR BREATHING CONTROL  REGULAR WALKING ADVISED. FASTING FOR LAB WORK PRIOR TO NEXT VISIT. INJECTION FLU VACCINE GIVEN TODAY. CALL FOR  ANY ADVERSE REACTION. NEXT APPOINTMENT IN 3 MONTHS FOR ANNUAL PHYSICAL EXAMINATION .

## 2021-10-12 NOTE — PROGRESS NOTES
OFFICE PROGRESS NOTE      SUBJECTIVE:        Patient ID:   Simeon Brittle is a 81 y.o. female who presents for   Chief Complaint   Patient presents with    Diabetes     3 month check up    Health Maintenance     NEXT VISIT AWV and due for flu     Medication Problem     amlopdine 5 mg question? HPI:     FEELS GOOD. WATCHING DIET GOOD. WALKING IN HOUSE FOR EXERCISE. TAKING MEDICATIONS REGULARLY. WILL CONTINUE AMLODIPINE 5 MG. DAILY AS RECOMMENDED. OK FOR FLU VACCINE. Prior to Admission medications    Medication Sig Start Date End Date Taking? Authorizing Provider   amLODIPine (NORVASC) 5 MG tablet  9/3/21  Yes Historical Provider, MD   hydroCHLOROthiazide (HYDRODIURIL) 25 MG tablet Take 1 tablet by mouth daily 10/12/21  Yes Rhina Estrada MD   losartan (COZAAR) 50 MG tablet Take 1 tablet by mouth daily 10/12/21  Yes Rhina Estrada MD   atorvastatin (LIPITOR) 20 MG tablet Take 1 tablet by mouth daily 10/12/21  Yes Rhina Estrada MD   metoprolol succinate (TOPROL XL) 50 MG extended release tablet Take 1 tablet by mouth daily 10/12/21  Yes Rhina Estrada MD   albuterol-ipratropium (COMBIVENT RESPIMAT)  MCG/ACT AERS inhaler Inhale 1 puff into the lungs 2 times daily 10/12/21  Yes Rhina Estrada MD   blood glucose test strips (ASCENSIA AUTODISC VI;ONE TOUCH ULTRA TEST VI) strip 1 each by Does not apply route 2 times daily 10/12/21  Yes Rhina Estrada MD   pregabalin (LYRICA) 25 MG capsule Take 1 capsule by mouth 2 times daily for 60 days.  10/12/21 12/11/21 Yes Rhina Estrada MD   metFORMIN (GLUCOPHAGE) 500 MG tablet TAKE 1 TABLET BY MOUTH IN THE MORNING AND 1 IN THE EVENING WITH MEALS 9/8/21   Rhina Estrada MD   Cholecalciferol (VITAMIN D3) 50 MCG (2000 UT) CAPS Take by mouth    Historical Provider, MD   ascorbic acid (V-R VITAMIN C) 250 MG tablet Take 250 mg by mouth daily    Historical Provider, MD   vitamin B-12 (CYANOCOBALAMIN) 1000 MCG tablet Take 1,000 mcg by mouth daily    Historical Provider, MD   Lancets MISC 1 each by Does not apply route 2 times daily 19   Peewee Vail MD   timolol (TIMOPTIC) 0.5 % ophthalmic solution INSTILL 1 DROP INTO EACH EYE TWICE DAILY 19   Marilynn Provider, MD   latanoprost (XALATAN) 0.005 % ophthalmic solution INSTILL 1 DROP INTO EACH EYE ONCE DAILY AT BEDTIME 19   Marilynn Provider, MD   brimonidine (ALPHAGAN) 0.2 % ophthalmic solution INSTILL 1 DROP INTO LEFT EYE TWICE DAILY 19   Historical Provider, MD   Blood Glucose Monitoring Suppl DELTA 1 Units by Does not apply route 2 times daily Please give whatever insurance covers 17   Peewee Vail MD   magnesium (MAGNESIUM-OXIDE) 250 MG TABS tablet Take 250 mg by mouth daily    Historical Provider, MD   Multiple Vitamin (ONE-A-DAY ESSENTIAL PO) Take 1 tablet by mouth. Historical Provider, MD   Calcium Carbonate-Vitamin D (CALCIUM + D) 600-200 MG-UNIT TABS Take 1 tablet by mouth daily. Historical Provider, MD   [facility] Products (OSTEO BI-FLEX ADV JOINT SHIELD PO) Take 2 tablets by mouth daily. Historical Provider, MD   timolol (BETIMOL) 0.5 % ophthalmic solution Place 1 drop into both eyes daily. Historical Provider, MD   aspirin 81 MG EC tablet Take 81 mg by mouth daily.       Historical Provider, MD     Social History     Socioeconomic History    Marital status:      Spouse name: None    Number of children: None    Years of education: None    Highest education level: None   Occupational History    None   Tobacco Use    Smoking status: Former Smoker     Packs/day: 1.00     Years: 9.00     Pack years: 9.00     Quit date: 2000     Years since quittin.0    Smokeless tobacco: Never Used   Vaping Use    Vaping Use: Never used   Substance and Sexual Activity    Alcohol use: No     Alcohol/week: 0.0 standard drinks    Drug use: No    Sexual activity: Never     Partners: Male   Other Topics Concern    None   Social History Narrative    None     Social Determinants of Health     Financial Resource Strain: Low Risk     Difficulty of Paying Living Expenses: Not hard at all   Food Insecurity: No Food Insecurity    Worried About Running Out of Food in the Last Year: Never true    Hayes of Food in the Last Year: Never true   Transportation Needs: No Transportation Needs    Lack of Transportation (Medical): No    Lack of Transportation (Non-Medical): No   Physical Activity:     Days of Exercise per Week:     Minutes of Exercise per Session:    Stress:     Feeling of Stress :    Social Connections:     Frequency of Communication with Friends and Family:     Frequency of Social Gatherings with Friends and Family:     Attends Catholic Services:     Active Member of Clubs or Organizations:     Attends Club or Organization Meetings:     Marital Status:    Intimate Partner Violence:     Fear of Current or Ex-Partner:     Emotionally Abused:     Physically Abused:     Sexually Abused:        I have reviewed Mira's allergies, medications, problem list, medical, social and family history and have updated as needed in the electronic medical record  Review Of Systems:    Skin: no abnormal pigmentation, rash, scaling, itching, masses, hair or nail changes  Eyes: no blurring, diplopia, or eye pain  Ears/Nose/Throat: no hearing loss, tinnitus, vertigo, nosebleed, nasal congestion, rhinorrhea, sore throat  Respiratory: no cough, pleuritic chest pain, dyspnea, or wheezing  Cardiovascular: no chest pain, angina, dyspnea on exertion, orthopnea, PND, palpitations, or claudication  Gastrointestinal: no nausea, vomiting, heartburn, diarrhea, constipation, bloating,  abdominal pain, or blood per rectum. Appetite is good  Genitourinary: no urinary urgency, frequency, dysuria, nocturia, hesitancy, or incontinence  Musculoskeletal: joint pains off and on. Morning stiffness.  Ambulating well  Neurologic: no paralysis, paresis, paresthesia, seizures, tremors, or headaches  Hematologic/Lymphatic/Immunologic: no anemia, abnormal bleeding/bruising, fever, chills, night sweats, swollen glands, or unexplained weight loss  Endocrine: no heat or cold intolerance and no polyphagia, polydipsia, or polyuria        OBJECTIVE:     VS:  Wt Readings from Last 3 Encounters:   10/12/21 159 lb (72.1 kg)   07/09/21 171 lb 8 oz (77.8 kg)   06/09/21 170 lb 8 oz (77.3 kg)     Temp Readings from Last 3 Encounters:   10/12/21 97.3 °F (36.3 °C)   07/09/21 96.9 °F (36.1 °C) (Temporal)   06/09/21 97.8 °F (36.6 °C)     BP Readings from Last 3 Encounters:   10/12/21 124/66   07/09/21 120/71   06/09/21 122/62        General appearance: Alert, Awake, Oriented times 3, no distress  Skin: Warm and dry  Head: Normocephalic. No masses, lesions or tenderness noted  Eyes: Conjunctivae appear normal. PERLE  Ears: External ears normal  Nose/Sinuses: Nares normal. Septum midline. Mucosa normal. No drainage  Oropharynx: Oropharynx clear with no exudate seen  Neck: Neck supple. No jugular venous distension, lymphadenopathy or thyromegaly Trachea midline  Chest:  Normal. Movements are Normal and Equal.  Lungs: Lungs clear to auscultation bilaterally. No ronchi, crackles or wheezes  Heart: S1 S2  Regular rate and rhythm. No rub, murmur or gallop  Abdomen: Abdomen soft, non-tender. BS normal. No masses, organomegaly. Back: Grossly Normal and Equal. DTR are Normal. SLR is Normal.  Extremities: Arthritic changes are noted. Movements are limited. Pedal pulses are normal.  Musculoskeletal: Muscular strength appears intact.  No joint effusion, tenderness, swelling or warmth  Neuro:  No focal motor or sensory deficits        ASSESSMENT     Patient Active Problem List    Diagnosis Date Noted    Type 2 diabetes mellitus without complication, without long-term current use of insulin (HCC)     Mixed hyperlipidemia     Essential hypertension     RLS (restless legs syndrome) 06/23/2015    Osteoarthritis     Diabetic polyneuropathy associated with diabetes mellitus due to underlying condition ([facility] 04/17/2019    Chronic obstructive pulmonary disease ([facility] 02/09/2016        Diagnosis:     ICD-10-CM    1. Type 2 diabetes mellitus without complication, without long-term current use of insulin ([facility])  E11.9 POCT glycosylated hemoglobin (Hb A1C)     CBC Auto Differential    CONTROLLED   2. Diabetic polyneuropathy associated with diabetes mellitus due to underlying condition ([facility])  E08.42 pregabalin (LYRICA) 25 MG capsule    PROGRESSIVE    3. Mixed hyperlipidemia  E78.2 Comprehensive Metabolic Panel     Lipid Panel    ? CONTROL. 4. Essential hypertension  I10 CBC Auto Differential    CONTROLLED   5. Chronic obstructive pulmonary disease, unspecified COPD type ([facility])  J44.9     STABLE   6. RLS (restless legs syndrome)  G25.81     CONTRTOLLED   7. Flu vaccine need  Z23 INFLUENZA, QUADV, ADJUVANTED, 65 YRS =, IM, PF, PREFILL SYR, 0.5ML (FLUAD)    GIVEN. PLAN:     A1C OF 6.0 TODAY DISCUSSED AND ADVISED. Patient Instructions   LOW SALT FOR BLOOD PRESSURE CONTROL. LOW CARBOHYDRATE FOR BLOOD SUGAR AND WEIGHT CONTROL. LOW FAT DIET FOR CHOLESTEROL CONTROL. DRINK ENOUGH FLUIDS FOR BETTER KIDNEY FUNCTION. TAKE COZAAR 50 MG. TOPROL 50 MG AND HYDRODIURIL 25 MG. 1 DAILY FOR BLOOD PRESSURE CONTROL. TAKE METFORMIN 500 MG. 2 TIMES A DAY  FOR BLOOD SUGAR CONTROL. TAKE LIPITOR 20 MG. NIGHTLY   FOR CHOLESTEROL CONTROL. Digna Still INHALE COMBIVENT INHALER 1 PUFF 2 TIMES A DAY FOR BREATHING CONTROL  REGULAR WALKING ADVISED. FASTING FOR LAB WORK PRIOR TO NEXT VISIT. INJECTION FLU VACCINE GIVEN TODAY. CALL FOR  ANY ADVERSE REACTION. NEXT APPOINTMENT IN 3 MONTHS FOR ANNUAL PHYSICAL EXAMINATION . Return in about 3 months (around 1/12/2022) for [employer]. .         I have reviewed my findings and recommendations with Richard Sampson.     Electronically signed by Marylou Beavers MD on 10/12/21 at 9:47 AM EDT

## 2021-12-06 ENCOUNTER — IMMUNIZATION (OUTPATIENT)
Dept: PRIMARY CARE CLINIC | Age: 86
End: 2021-12-06
Payer: MEDICARE

## 2021-12-06 PROCEDURE — 0064A COVID-19, MODERNA BOOSTER VACCINE 0.25ML DOSE: CPT | Performed by: INTERNAL MEDICINE

## 2021-12-06 PROCEDURE — 91306 COVID-19, MODERNA BOOSTER VACCINE 0.25ML DOSE: CPT | Performed by: INTERNAL MEDICINE

## 2021-12-15 RX ORDER — METOPROLOL SUCCINATE 50 MG/1
TABLET, EXTENDED RELEASE ORAL
Qty: 90 TABLET | Refills: 0 | OUTPATIENT
Start: 2021-12-15

## 2022-01-03 ENCOUNTER — TELEPHONE (OUTPATIENT)
Dept: FAMILY MEDICINE CLINIC | Age: 87
End: 2022-01-03

## 2022-01-03 NOTE — TELEPHONE ENCOUNTER
----- Message from Ayala Nayak MA sent at 1/3/2022  8:49 AM EST -----  Subject: Refill Request    QUESTIONS  Name of Medication? metoprolol succinate (TOPROL XL) 50 MG extended   release tablet  Patient-reported dosage and instructions? 48 MG,QD   How many days do you have left? 0  [facility] [id number]  Pharmacy phone number (if available)? [phone]  ---------------------------------------------------------------------------  --------------,  Name of Medication? hydroCHLOROthiazide (HYDRODIURIL) 25 MG tablet  Patient-reported dosage and instructions? 25 MG, QD  How many days do you have left? 0  Preferred Pharmacy? [facility] [id number]  Pharmacy phone number (if available)? [phone]  ---------------------------------------------------------------------------  --------------  CALL BACK INFO  What is the best way for the office to contact you? Do not leave any   message, patient will call back for answer  Preferred Call Back Phone Number?  [phone]

## 2022-01-04 DIAGNOSIS — I10 ESSENTIAL HYPERTENSION: ICD-10-CM

## 2022-01-04 DIAGNOSIS — E78.2 MIXED HYPERLIPIDEMIA: ICD-10-CM

## 2022-01-04 DIAGNOSIS — E11.9 TYPE 2 DIABETES MELLITUS WITHOUT COMPLICATION, WITHOUT LONG-TERM CURRENT USE OF INSULIN (HCC): ICD-10-CM

## 2022-01-04 LAB
ALBUMIN SERPL-MCNC: 4.3 G/DL (ref 3.5–5.2)
ALP BLD-CCNC: 88 U/L (ref 35–104)
ALT SERPL-CCNC: 17 U/L (ref 0–32)
ANION GAP SERPL CALCULATED.3IONS-SCNC: 12 MMOL/L (ref 7–16)
AST SERPL-CCNC: 25 U/L (ref 0–31)
BASOPHILS ABSOLUTE: 0.03 E9/L (ref 0–0.2)
BASOPHILS RELATIVE PERCENT: 0.5 % (ref 0–2)
BILIRUB SERPL-MCNC: 0.6 MG/DL (ref 0–1.2)
BUN BLDV-MCNC: 20 MG/DL (ref 6–23)
CALCIUM SERPL-MCNC: 10 MG/DL (ref 8.6–10.2)
CHLORIDE BLD-SCNC: 99 MMOL/L (ref 98–107)
CHOLESTEROL, TOTAL: 159 MG/DL (ref 0–199)
CO2: 27 MMOL/L (ref 22–29)
CREAT SERPL-MCNC: 0.7 MG/DL (ref 0.5–1)
EOSINOPHILS ABSOLUTE: 0.4 E9/L (ref 0.05–0.5)
EOSINOPHILS RELATIVE PERCENT: 7 % (ref 0–6)
GFR AFRICAN AMERICAN: >60
GFR NON-AFRICAN AMERICAN: >60 ML/MIN/1.73
GLUCOSE BLD-MCNC: 106 MG/DL (ref 74–99)
HCT VFR BLD CALC: 39.2 % (ref 34–48)
HDLC SERPL-MCNC: 40 MG/DL
HEMOGLOBIN: 12.6 G/DL (ref 11.5–15.5)
IMMATURE GRANULOCYTES #: 0.01 E9/L
IMMATURE GRANULOCYTES %: 0.2 % (ref 0–5)
LDL CHOLESTEROL CALCULATED: 83 MG/DL (ref 0–99)
LYMPHOCYTES ABSOLUTE: 1.66 E9/L (ref 1.5–4)
LYMPHOCYTES RELATIVE PERCENT: 29.2 % (ref 20–42)
MCH RBC QN AUTO: 30 PG (ref 26–35)
MCHC RBC AUTO-ENTMCNC: 32.1 % (ref 32–34.5)
MCV RBC AUTO: 93.3 FL (ref 80–99.9)
MONOCYTES ABSOLUTE: 0.52 E9/L (ref 0.1–0.95)
MONOCYTES RELATIVE PERCENT: 9.2 % (ref 2–12)
NEUTROPHILS ABSOLUTE: 3.06 E9/L (ref 1.8–7.3)
NEUTROPHILS RELATIVE PERCENT: 53.9 % (ref 43–80)
PDW BLD-RTO: 13.2 FL (ref 11.5–15)
PLATELET # BLD: 200 E9/L (ref 130–450)
PMV BLD AUTO: 10.9 FL (ref 7–12)
POTASSIUM SERPL-SCNC: 4.2 MMOL/L (ref 3.5–5)
RBC # BLD: 4.2 E12/L (ref 3.5–5.5)
SODIUM BLD-SCNC: 138 MMOL/L (ref 132–146)
TOTAL PROTEIN: 7 G/DL (ref 6.4–8.3)
TRIGL SERPL-MCNC: 178 MG/DL (ref 0–149)
VLDLC SERPL CALC-MCNC: 36 MG/DL
WBC # BLD: 5.7 E9/L (ref 4.5–11.5)

## 2022-01-18 ENCOUNTER — OFFICE VISIT (OUTPATIENT)
Dept: FAMILY MEDICINE CLINIC | Age: 87
End: 2022-01-18
Payer: MEDICARE

## 2022-01-18 VITALS
OXYGEN SATURATION: 95 % | BODY MASS INDEX: 25.81 KG/M2 | SYSTOLIC BLOOD PRESSURE: 132 MMHG | DIASTOLIC BLOOD PRESSURE: 70 MMHG | HEART RATE: 85 BPM | WEIGHT: 160.6 LBS | RESPIRATION RATE: 16 BRPM | HEIGHT: 66 IN | TEMPERATURE: 98 F

## 2022-01-18 DIAGNOSIS — G25.81 RLS (RESTLESS LEGS SYNDROME): ICD-10-CM

## 2022-01-18 DIAGNOSIS — Z00.00 ROUTINE GENERAL MEDICAL EXAMINATION AT A HEALTH CARE FACILITY: Primary | ICD-10-CM

## 2022-01-18 DIAGNOSIS — E78.2 MIXED HYPERLIPIDEMIA: ICD-10-CM

## 2022-01-18 DIAGNOSIS — E08.42 DIABETIC POLYNEUROPATHY ASSOCIATED WITH DIABETES MELLITUS DUE TO UNDERLYING CONDITION (HCC): ICD-10-CM

## 2022-01-18 DIAGNOSIS — I10 ESSENTIAL HYPERTENSION: ICD-10-CM

## 2022-01-18 DIAGNOSIS — E11.9 TYPE 2 DIABETES MELLITUS WITHOUT COMPLICATION, WITHOUT LONG-TERM CURRENT USE OF INSULIN (HCC): ICD-10-CM

## 2022-01-18 DIAGNOSIS — J44.9 CHRONIC OBSTRUCTIVE PULMONARY DISEASE, UNSPECIFIED COPD TYPE (HCC): ICD-10-CM

## 2022-01-18 LAB — HBA1C MFR BLD: 5.8 %

## 2022-01-18 PROCEDURE — G8484 FLU IMMUNIZE NO ADMIN: HCPCS | Performed by: FAMILY MEDICINE

## 2022-01-18 PROCEDURE — 83036 HEMOGLOBIN GLYCOSYLATED A1C: CPT | Performed by: FAMILY MEDICINE

## 2022-01-18 PROCEDURE — 1123F ACP DISCUSS/DSCN MKR DOCD: CPT | Performed by: FAMILY MEDICINE

## 2022-01-18 PROCEDURE — 4040F PNEUMOC VAC/ADMIN/RCVD: CPT | Performed by: FAMILY MEDICINE

## 2022-01-18 PROCEDURE — G0439 PPPS, SUBSEQ VISIT: HCPCS | Performed by: FAMILY MEDICINE

## 2022-01-18 RX ORDER — LOSARTAN POTASSIUM 50 MG/1
50 TABLET ORAL DAILY
Qty: 90 TABLET | Refills: 0 | Status: SHIPPED
Start: 2022-01-18 | End: 2022-04-28 | Stop reason: SDUPTHER

## 2022-01-18 RX ORDER — HYDROCHLOROTHIAZIDE 25 MG/1
25 TABLET ORAL DAILY
Qty: 90 TABLET | Refills: 0 | Status: SHIPPED
Start: 2022-01-18 | End: 2022-04-28 | Stop reason: SDUPTHER

## 2022-01-18 RX ORDER — PREGABALIN 25 MG/1
25 CAPSULE ORAL 2 TIMES DAILY
Qty: 120 CAPSULE | Refills: 0 | Status: SHIPPED
Start: 2022-01-18 | End: 2022-07-26 | Stop reason: SDUPTHER

## 2022-01-18 RX ORDER — ATORVASTATIN CALCIUM 20 MG/1
20 TABLET, FILM COATED ORAL DAILY
Qty: 90 TABLET | Refills: 0 | Status: SHIPPED
Start: 2022-01-18 | End: 2022-04-28 | Stop reason: SDUPTHER

## 2022-01-18 RX ORDER — METOPROLOL SUCCINATE 50 MG/1
50 TABLET, EXTENDED RELEASE ORAL DAILY
Qty: 90 TABLET | Refills: 0 | Status: SHIPPED
Start: 2022-01-18 | End: 2022-04-28 | Stop reason: SDUPTHER

## 2022-01-18 ASSESSMENT — PATIENT HEALTH QUESTIONNAIRE - PHQ9
SUM OF ALL RESPONSES TO PHQ QUESTIONS 1-9: 0
SUM OF ALL RESPONSES TO PHQ QUESTIONS 1-9: 0
SUM OF ALL RESPONSES TO PHQ9 QUESTIONS 1 & 2: 0
SUM OF ALL RESPONSES TO PHQ QUESTIONS 1-9: 0
1. LITTLE INTEREST OR PLEASURE IN DOING THINGS: 0
2. FEELING DOWN, DEPRESSED OR HOPELESS: 0
SUM OF ALL RESPONSES TO PHQ QUESTIONS 1-9: 0

## 2022-01-18 ASSESSMENT — LIFESTYLE VARIABLES
AUDIT-C TOTAL SCORE: INCOMPLETE
AUDIT TOTAL SCORE: INCOMPLETE
HOW OFTEN DO YOU HAVE A DRINK CONTAINING ALCOHOL: 0
HOW OFTEN DO YOU HAVE A DRINK CONTAINING ALCOHOL: NEVER

## 2022-01-18 NOTE — PROGRESS NOTES
Medicare Annual Wellness Visit  Name:  Billing Date: 2022   MRN: <[id number]> Sex: Female   Age: 81 y.o. Ethnicity: Non- / Non    : 1935 Race: White (non-)      Taco Murphy is here for Medicare AWV (labs were done ) and Health Maintenance (a1c today and due for dtap and shingles  )    Screenings for behavioral, psychosocial and functional/safety risks, and cognitive dysfunction are all negative except as indicated below. These results, as well as other patient data from the [address] form, are documented in Flowsheets linked to this Encounter. Allergies   Allergen Reactions    Pcn [Penicillins] Hives    Sulfa Antibiotics Hives         Prior to Visit Medications    Medication Sig Taking? Authorizing Provider   metoprolol succinate (TOPROL XL) 50 MG extended release tablet Take 1 tablet by mouth daily Yes Mo Jones MD   metFORMIN (GLUCOPHAGE) 500 MG tablet Take 1 tablet by mouth 2 times daily (with meals) Yes oM Joens MD   blood glucose test strips (ASCENSIA AUTODISC VI;ONE TOUCH ULTRA TEST VI) strip 1 each by Does not apply route 2 times daily Yes Mo Jones MD   pregabalin (LYRICA) 25 MG capsule Take 1 capsule by mouth 2 times daily for 60 days.  Yes Mo Jones MD   losartan (COZAAR) 50 MG tablet Take 1 tablet by mouth daily Yes Mo Jones MD   hydroCHLOROthiazide (HYDRODIURIL) 25 MG tablet Take 1 tablet by mouth daily Yes Mo Jones MD   atorvastatin (LIPITOR) 20 MG tablet Take 1 tablet by mouth daily Yes Mo oJnes MD   amLODIPine (NORVASC) 5 MG tablet   Historical Provider, MD   albuterol-ipratropium (COMBIVENT RESPIMAT)  MCG/ACT AERS inhaler Inhale 1 puff into the lungs 2 times daily  Mo Jones MD   Cholecalciferol (VITAMIN D3) 50 MCG (2000) CAPS Take by mouth  Historical Provider, MD   ascorbic acid (V-R VITAMIN C) 250 MG tablet Take 250 mg by mouth daily  Historical Provider, MD   vitamin B-12 (CYANOCOBALAMIN) 1000 MCG tablet Take 1,000 mcg by mouth daily  Historical Provider, MD   Lancets MISC 1 each by Does not apply route 2 times daily  Quirino Ferrell MD   timolol (TIMOPTIC) 0.5 % ophthalmic solution INSTILL 1 DROP INTO EACH EYE TWICE DAILY  Historical Provider, MD   latanoprost (XALATAN) 0.005 % ophthalmic solution INSTILL 1 DROP INTO EACH EYE ONCE DAILY AT BEDTIME  Historical Provider, MD   brimonidine (ALPHAGAN) 0.2 % ophthalmic solution INSTILL 1 DROP INTO LEFT EYE TWICE DAILY  Historical Provider, MD   Blood Glucose Monitoring Suppl DELTA 1 Units by Does not apply route 2 times daily Please give whatever insurance covers  Quirino Ferrell MD   magnesium (MAGNESIUM-OXIDE) 250 MG TABS tablet Take 250 mg by mouth daily  Historical Provider, MD   Multiple Vitamin (ONE-A-DAY ESSENTIAL PO) Take 1 tablet by mouth. Historical Provider, MD   Calcium Carbonate-Vitamin D (CALCIUM + D) 600-200 MG-UNIT TABS Take 1 tablet by mouth daily. Historical Provider, MD   [facility] Natural Products (OSTEO BI-FLEX ADV JOINT SHIELD PO) Take 2 tablets by mouth daily. Historical Provider, MD   timolol (BETIMOL) 0.5 % ophthalmic solution Place 1 drop into both eyes daily. Historical Provider, MD   aspirin 81 MG EC tablet Take 81 mg by mouth daily.     Historical Provider, MD         Past Medical History:   Diagnosis Date    COPD (chronic obstructive pulmonary disease) ([facility]     Hyperlipidemia     Hypertension     Osteoarthritis     Type II or unspecified type diabetes mellitus without mention of complication, not stated as uncontrolled        Past Surgical History:   Procedure Laterality Date    APPENDECTOMY  5     SECTION   ,    COLONOSCOPY  2003   [address] 28    under lt arm         Family History   Problem Relation Age of Onset    Heart Disease Mother     High Blood Pressure Mother     Stroke Mother     Diabetes Mother     Heart Disease Father     High Blood Pressure Father CareTeam (Including outside providers/suppliers regularly involved in providing care):   Patient Care Team:  Jaison Reyes MD as PCP - Marge Macario MD as PCP - [facility]    Wt Readings from Last 3 Encounters:   01/18/22 160 lb 9.6 oz (72.8 kg)   10/12/21 159 lb (72.1 kg)   07/09/21 171 lb 8 oz (77.8 kg)     Vitals:    01/18/22 0922   BP: 132/70   Pulse: 85   Resp: 16   Temp: 98 °F (36.7 °C)   SpO2: 95%   Weight: 160 lb 9.6 oz (72.8 kg)   Height: 5' 6\" (1.676 m)     Body mass index is 25.92 kg/m². Based upon direct observation of the patient, evaluation of cognition reveals recent and remote memory intact. General Appearance: alert and oriented to person, place and time, well developed and well- nourished, in no acute distress  Skin: warm and dry, no rash or erythema  Head: normocephalic and atraumatic  Eyes: pupils equal, round, and reactive to light, extraocular eye movements intact, conjunctivae normal  ENT: tympanic membrane, external ear and ear canal normal bilaterally, nose without deformity, nasal mucosa and turbinates normal without polyps  Neck: supple and non-tender without mass, no thyromegaly or thyroid nodules, no cervical lymphadenopathy  Pulmonary/Chest: clear to auscultation bilaterally- no wheezes, rales or rhonchi, normal air movement, no respiratory distress  Cardiovascular: normal rate, regular rhythm, normal S1 and S2, no murmurs, rubs, clicks, or gallops, distal pulses intact, no carotid bruits  Abdomen: soft, non-tender, non-distended, normal bowel sounds, no masses or organomegaly  Extremities: no cyanosis, clubbing or edema  Musculoskeletal: normal range of motion, no joint swelling, deformity or tenderness  Neurologic: reflexes normal and symmetric, no cranial nerve deficit, gait, coordination and speech normal    Patient's complete Health Risk Assessment and screening values have been reviewed and are found in Flowsheets.  The following problems were reviewed today and where indicated follow up appointments were made and/or referrals ordered. Positive Risk Factor Screenings with Interventions:              General Health and ACP:  General  In general, how would you say your health is?: Good  In the past 7 days, have you experienced any of the following? New or Increased Pain, New or Increased Fatigue, Loneliness, Social Isolation, Stress or Anger?: None of These  Do you get the social and emotional support that you need?: Yes  Do you have a Living Will?: (!) No  Advance Directives     Power of [address] Will ACP-Advance Directive ACP-Power of     Not on File Not on File Not on File Not on File      General Health Risk Interventions:  · No Living Will: Advance Care Planning addressed with patient today    Health Habits/Nutrition:  Health Habits/Nutrition  Do you exercise for at least 20 minutes 2-3 times per week?: (!) No  Have you lost any weight without trying in the past 3 months?: No  Do you eat only one meal per day?: No  Have you seen the dentist within the past year?: N/A - wear dentures  Body mass index: (!) 25.92  Health Habits/Nutrition Interventions:  · Inadequate physical activity:  DOES HOUSE WORK, WALKS SOME IN HOUSE. Hearing/Vision:  No exam data present  Hearing/Vision  Do you or your family notice any trouble with your hearing that hasn't been managed with hearing aids?: No  Do you have difficulty driving, watching TV, or doing any of your daily activities because of your eyesight?: (!) Yes  Have you had an eye exam within the past year?: Yes  Hearing/Vision Interventions:  · Vision concerns:  patient encouraged to make appointment with his/her eye specialist     ADL:  ADLs  In the past 7 days, did you need help from others to perform any of the following everyday activities?  Eating, dressing, grooming, bathing, toileting, or walking/balance?: None  In the past 7 days, did you need help from others to take care of any of the medical examination at a health care facility    Type 2 diabetes mellitus without complication, without long-term current use of insulin ([facility])  -     POCT glycosylated hemoglobin (Hb A1C)  -     blood glucose test strips (ASCENSIA AUTODISC VI;ONE TOUCH ULTRA TEST VI) strip; 1 each by Does not apply route 2 times daily    Diabetic polyneuropathy associated with diabetes mellitus due to underlying condition ([facility]  Comments:  PROGRESSIVE   Orders:  -     pregabalin (LYRICA) 25 MG capsule; Take 1 capsule by mouth 2 times daily for 60 days. Mixed hyperlipidemia    Chronic obstructive pulmonary disease, unspecified COPD type ([facility]    Essential hypertension    RLS (restless legs syndrome)    Other orders  -     metoprolol succinate (TOPROL XL) 50 MG extended release tablet; Take 1 tablet by mouth daily  -     metFORMIN (GLUCOPHAGE) 500 MG tablet; Take 1 tablet by mouth 2 times daily (with meals)  -     losartan (COZAAR) 50 MG tablet; Take 1 tablet by mouth daily  -     hydroCHLOROthiazide (HYDRODIURIL) 25 MG tablet; Take 1 tablet by mouth daily  -     atorvastatin (LIPITOR) 20 MG tablet;  Take 1 tablet by mouth daily

## 2022-01-18 NOTE — PATIENT INSTRUCTIONS
LOW SALT FOR BLOOD PRESSURE CONTROL. LOW CARBOHYDRATE FOR BLOOD SUGAR AND WEIGHT CONTROL. LOW FAT DIET FOR CHOLESTEROL CONTROL. DRINK ENOUGH FLUIDS FOR BETTER KIDNEY FUNCTION. TAKE COZAAR 50 MG. TOPROL 50 MG AND HYDRODIURIL 25 MG. 1 DAILY FOR BLOOD PRESSURE CONTROL. TAKE METFORMIN 500 MG. 2 TIMES A DAY  FOR BLOOD SUGAR CONTROL. TAKE LIPITOR 20 MG. NIGHTLY   FOR CHOLESTEROL CONTROL. [place] INHALE COMBIVENT INHALER 1 PUFF 2 TIMES A DAY FOR BREATHING CONTROL  REGULAR WALKING ADVISED. FASTING FOR LAB WORK PRIOR TO NEXT VISIT. ADVISED TO SEE OPHTHALMOLOGIST. NEXT APPOINTMENT IN 3 MONTHS.    Personalized Preventive Plan for Angelika Motley - 1/18/2022  Medicare offers a range of preventive health benefits. Some of the tests and screenings are paid in full while other may be subject to a deductible, co-insurance, and/or copay. Some of these benefits include a comprehensive review of your medical history including lifestyle, illnesses that may run in your family, and various assessments and screenings as appropriate. After reviewing your medical record and screening and assessments performed today your provider may have ordered immunizations, labs, imaging, and/or referrals for you. A list of these orders (if applicable) as well as your Preventive Care list are included within your After Visit Summary for your review. Other Preventive Recommendations:    · A preventive eye exam performed by an eye specialist is recommended every 1-2 years to screen for glaucoma; cataracts, macular degeneration, and other eye disorders. · A preventive dental visit is recommended every 6 months. · Try to get at least 150 minutes of exercise per week or 10,000 steps per day on a pedometer . · Order or download the FREE \"Exercise & Physical Activity: Your Everyday Guide\" from [employer]. Call 5-370.921.7450 or search [employer] online.   · You need 5297-5640 mg of calcium and 3629-4375 IU of vitamin D per day. It is possible to meet your calcium requirement with diet alone, but a vitamin D supplement is usually necessary to meet this goal.  · When exposed to the sun, use a sunscreen that protects against both UVA and UVB radiation with an SPF of 30 or greater. Reapply every 2 to 3 hours or after sweating, drying off with a towel, or swimming. · Always wear a seat belt when traveling in a car. Always wear a helmet when riding a bicycle or motorcycle.

## 2022-02-24 ENCOUNTER — TELEPHONE (OUTPATIENT)
Dept: FAMILY MEDICINE CLINIC | Age: 87
End: 2022-02-24

## 2022-02-24 DIAGNOSIS — H40.50X0 GLAUCOMA SECONDARY TO OTHER EYE DISORDER, UNSPECIFIED GLAUCOMA STAGE, UNSPECIFIED LATERALITY: Primary | ICD-10-CM

## 2022-02-24 NOTE — TELEPHONE ENCOUNTER
----- Message from Ade Sheela sent at 2/24/2022 12:21 PM EST -----  Subject: Referral Request    QUESTIONS   Reason for referral request? Pt's previous Ophthalmologist retired and   that pt would like to be referred to an new specialist   Has the physician seen you for this condition before? Yes  Select a date? 2021-03-01  Select the Provider the patient wants to be referred to, if known (PCP or   Specialist)? Seema Cardenas   Preferred Specialist (if applicable)? Do you already have an appointment scheduled? No  Additional Information for Provider? Pt has glaucoma  ---------------------------------------------------------------------------  --------------  CALL BACK INFO  What is the best way for the office to contact you? OK to leave message on   voicemail  Preferred Call Back Phone Number?  [phone]

## 2022-04-28 ENCOUNTER — OFFICE VISIT (OUTPATIENT)
Dept: FAMILY MEDICINE CLINIC | Age: 87
End: 2022-04-28
Payer: MEDICARE

## 2022-04-28 VITALS
HEART RATE: 92 BPM | RESPIRATION RATE: 16 BRPM | WEIGHT: 164 LBS | TEMPERATURE: 97.4 F | DIASTOLIC BLOOD PRESSURE: 70 MMHG | SYSTOLIC BLOOD PRESSURE: 110 MMHG | BODY MASS INDEX: 26.47 KG/M2 | OXYGEN SATURATION: 96 %

## 2022-04-28 DIAGNOSIS — E78.2 MIXED HYPERLIPIDEMIA: ICD-10-CM

## 2022-04-28 DIAGNOSIS — J44.9 CHRONIC OBSTRUCTIVE PULMONARY DISEASE, UNSPECIFIED COPD TYPE (HCC): ICD-10-CM

## 2022-04-28 DIAGNOSIS — I10 ESSENTIAL HYPERTENSION: ICD-10-CM

## 2022-04-28 DIAGNOSIS — E11.9 TYPE 2 DIABETES MELLITUS WITHOUT COMPLICATION, WITHOUT LONG-TERM CURRENT USE OF INSULIN (HCC): Primary | ICD-10-CM

## 2022-04-28 DIAGNOSIS — E08.42 DIABETIC POLYNEUROPATHY ASSOCIATED WITH DIABETES MELLITUS DUE TO UNDERLYING CONDITION (HCC): ICD-10-CM

## 2022-04-28 PROCEDURE — 1123F ACP DISCUSS/DSCN MKR DOCD: CPT | Performed by: FAMILY MEDICINE

## 2022-04-28 PROCEDURE — 4040F PNEUMOC VAC/ADMIN/RCVD: CPT | Performed by: FAMILY MEDICINE

## 2022-04-28 PROCEDURE — 1090F PRES/ABSN URINE INCON ASSESS: CPT | Performed by: FAMILY MEDICINE

## 2022-04-28 PROCEDURE — G8417 CALC BMI ABV UP PARAM F/U: HCPCS | Performed by: FAMILY MEDICINE

## 2022-04-28 PROCEDURE — 3023F SPIROM DOC REV: CPT | Performed by: FAMILY MEDICINE

## 2022-04-28 PROCEDURE — 1036F TOBACCO NON-USER: CPT | Performed by: FAMILY MEDICINE

## 2022-04-28 PROCEDURE — 99213 OFFICE O/P EST LOW 20 MIN: CPT | Performed by: FAMILY MEDICINE

## 2022-04-28 PROCEDURE — 3044F HG A1C LEVEL LT 7.0%: CPT | Performed by: FAMILY MEDICINE

## 2022-04-28 PROCEDURE — G8427 DOCREV CUR MEDS BY ELIG CLIN: HCPCS | Performed by: FAMILY MEDICINE

## 2022-04-28 RX ORDER — LOSARTAN POTASSIUM 50 MG/1
50 TABLET ORAL DAILY
Qty: 90 TABLET | Refills: 1 | Status: SHIPPED
Start: 2022-04-28 | End: 2022-07-26 | Stop reason: SDUPTHER

## 2022-04-28 RX ORDER — ATORVASTATIN CALCIUM 20 MG/1
20 TABLET, FILM COATED ORAL DAILY
Qty: 90 TABLET | Refills: 1 | Status: SHIPPED
Start: 2022-04-28 | End: 2022-07-26 | Stop reason: SDUPTHER

## 2022-04-28 RX ORDER — METOPROLOL SUCCINATE 50 MG/1
50 TABLET, EXTENDED RELEASE ORAL DAILY
Qty: 90 TABLET | Refills: 1 | Status: SHIPPED
Start: 2022-04-28 | End: 2022-07-26 | Stop reason: SDUPTHER

## 2022-04-28 RX ORDER — HYDROCHLOROTHIAZIDE 25 MG/1
25 TABLET ORAL DAILY
Qty: 90 TABLET | Refills: 1 | Status: SHIPPED
Start: 2022-04-28 | End: 2022-07-26 | Stop reason: ALTCHOICE

## 2022-04-28 SDOH — ECONOMIC STABILITY: FOOD INSECURITY: WITHIN THE PAST 12 MONTHS, THE FOOD YOU BOUGHT JUST DIDN'T LAST AND YOU DIDN'T HAVE MONEY TO GET MORE.: NEVER TRUE

## 2022-04-28 SDOH — ECONOMIC STABILITY: FOOD INSECURITY: WITHIN THE PAST 12 MONTHS, YOU WORRIED THAT YOUR FOOD WOULD RUN OUT BEFORE YOU GOT MONEY TO BUY MORE.: NEVER TRUE

## 2022-04-28 ASSESSMENT — SOCIAL DETERMINANTS OF HEALTH (SDOH): HOW HARD IS IT FOR YOU TO PAY FOR THE VERY BASICS LIKE FOOD, HOUSING, MEDICAL CARE, AND HEATING?: NOT HARD AT ALL

## 2022-04-28 NOTE — PROGRESS NOTES
OFFICE PROGRESS NOTE      SUBJECTIVE:        Patient ID:   Allyson Michel is a 81 y.o. female who presents for   Chief Complaint   Patient presents with    Sinus Problem    Hypertension    COPD           HPI:     RECHECK COPD, BP, CHOLESTEROL AND DIABETES. ALSO HAS SINUS CONGESTION OFF AND ON. WILL TRY TAKING CLARITIN 10 MG. DAILY AS NEEDED FOR RELIEF. MEDICATION REFILL. WATCHING DIET GOOD. WALKING SOME IN HOUSE FOR EXERCISE. TAKING MEDICATIONS REGULARLY. Prior to Admission medications    Medication Sig Start Date End Date Taking?  Authorizing Provider   atorvastatin (LIPITOR) 20 MG tablet Take 1 tablet by mouth daily 4/28/22  Yes Nita Potts MD   hydroCHLOROthiazide (HYDRODIURIL) 25 MG tablet Take 1 tablet by mouth daily 4/28/22  Yes Nita Potts MD   metoprolol succinate (TOPROL XL) 50 MG extended release tablet Take 1 tablet by mouth daily 4/28/22  Yes Nita Potts MD   metFORMIN (GLUCOPHAGE) 500 MG tablet Take 1 tablet by mouth 2 times daily (with meals) 4/28/22  Yes Nita Potts MD   losartan (COZAAR) 50 MG tablet Take 1 tablet by mouth daily 4/28/22  Yes Nita Potts MD   albuterol-ipratropium (COMBIVENT RESPIMAT)  MCG/ACT AERS inhaler Inhale 1 puff into the lungs 2 times daily 4/28/22  Yes Nita Potts MD   blood glucose test strips (ASCENSIA AUTODISC VI;ONE TOUCH ULTRA TEST VI) strip 1 each by Does not apply route 2 times daily 1/18/22  Yes Nita Potts MD   amLODIPine (NORVASC) 5 MG tablet  9/3/21  Yes Historical Provider, MD   Cholecalciferol (VITAMIN D3) 50 MCG (2000 UT) CAPS Take by mouth   Yes Historical Provider, MD   ascorbic acid (V-R VITAMIN C) 250 MG tablet Take 250 mg by mouth daily   Yes Historical Provider, MD   vitamin B-12 (CYANOCOBALAMIN) 1000 MCG tablet Take 1,000 mcg by mouth daily   Yes Historical Provider, MD   Lancets MISC 1 each by Does not apply route 2 times daily 11/13/19  Yes Nita Potts MD timolol (TIMOPTIC) 0.5 % ophthalmic solution INSTILL 1 DROP INTO EACH EYE TWICE DAILY 19  Yes Historical Provider, MD   latanoprost (XALATAN) 0.005 % ophthalmic solution INSTILL 1 DROP INTO EACH EYE ONCE DAILY AT BEDTIME 19  Yes Historical Provider, MD   brimonidine (ALPHAGAN) 0.2 % ophthalmic solution INSTILL 1 DROP INTO LEFT EYE TWICE DAILY 19  Yes Historical Provider, MD   Blood Glucose Monitoring Suppl DELTA 1 Units by Does not apply route 2 times daily Please give whatever insurance covers 17  Yes Cherylene Hausen, MD   magnesium (MAGNESIUM-OXIDE) 250 MG TABS tablet Take 250 mg by mouth daily   Yes Historical Provider, MD   Multiple Vitamin (ONE-A-DAY ESSENTIAL PO) Take 1 tablet by mouth. Yes Historical Provider, MD   Calcium Carbonate-Vitamin D (CALCIUM + D) 600-200 MG-UNIT TABS Take 1 tablet by mouth daily. Yes Historical Provider, MD   Misc Natural Products (OSTEO BI-FLEX ADV JOINT SHIELD PO) Take 2 tablets by mouth daily. Yes Historical Provider, MD   timolol (BETIMOL) 0.5 % ophthalmic solution Place 1 drop into both eyes daily. Yes Historical Provider, MD   aspirin 81 MG EC tablet Take 81 mg by mouth daily. Yes Historical Provider, MD   pregabalin (LYRICA) 25 MG capsule Take 1 capsule by mouth 2 times daily for 60 days.  1/18/22 3/19/22  Cherylene Hausen, MD     Social History     Socioeconomic History    Marital status:      Spouse name: Not on file    Number of children: Not on file    Years of education: Not on file    Highest education level: Not on file   Occupational History    Not on file   Tobacco Use    Smoking status: Former Smoker     Packs/day: 1.00     Years: 9.00     Pack years: 9.00     Quit date: 2000     Years since quittin.6    Smokeless tobacco: Never Used   Vaping Use    Vaping Use: Never used   Substance and Sexual Activity    Alcohol use: No     Alcohol/week: 0.0 standard drinks    Drug use: No    Sexual activity: Never palpitations, or claudication  Gastrointestinal: no nausea, vomiting, heartburn, diarrhea, constipation, bloating,  abdominal pain, or blood per rectum. Appetite is good  Genitourinary: no urinary urgency, frequency, dysuria, nocturia, hesitancy, or incontinence  Musculoskeletal: joint pains off and on. Morning stiffness. Ambulating well  Neurologic: no paralysis, paresis, paresthesia, seizures, tremors, or headaches  Hematologic/Lymphatic/Immunologic: no anemia, abnormal bleeding/bruising, fever, chills, night sweats, swollen glands, or unexplained weight loss  Endocrine: no heat or cold intolerance and no polyphagia, polydipsia, or polyuria        OBJECTIVE:     VS:  Wt Readings from Last 3 Encounters:   04/28/22 164 lb (74.4 kg)   01/18/22 160 lb 9.6 oz (72.8 kg)   10/12/21 159 lb (72.1 kg)     Temp Readings from Last 3 Encounters:   04/28/22 97.4 °F (36.3 °C)   01/18/22 98 °F (36.7 °C)   10/12/21 97.3 °F (36.3 °C)     BP Readings from Last 3 Encounters:   04/28/22 110/70   01/18/22 132/70   10/12/21 124/66        General appearance: Alert, Awake, Oriented times 3, no distress  Skin: Warm and dry  Head: Normocephalic. No masses, lesions or tenderness noted  Eyes: Conjunctivae appear normal. PERLE  Ears: External ears normal  Nose/Sinuses: Nares normal. Septum midline. Mucosa normal. No drainage  Oropharynx: Oropharynx clear with no exudate seen  Neck: Neck supple. No jugular venous distension, lymphadenopathy or thyromegaly Trachea midline  Chest:  Normal. Movements are Normal and Equal.  Lungs: Lungs clear to auscultation bilaterally. No ronchi, crackles or wheezes  Heart: S1 S2  Regular rate and rhythm. No rub, murmur or gallop  Abdomen: Abdomen soft, non-tender. BS normal. No masses, organomegaly. Back: Grossly Normal and Equal. DTR are Normal. SLR is Normal.  Extremities: Arthritic changes are noted. Movements are limited. Pedal pulses are normal.  Musculoskeletal: Muscular strength appears intact.  No joint effusion, tenderness, swelling or warmth  Neuro:  No focal motor or sensory deficits        ASSESSMENT     Patient Active Problem List    Diagnosis Date Noted    Type 2 diabetes mellitus without complication, without long-term current use of insulin ([facility])     Mixed hyperlipidemia     Essential hypertension     RLS (restless legs syndrome) 06/23/2015    Osteoarthritis     Diabetic polyneuropathy associated with diabetes mellitus due to underlying condition ([facility] 04/17/2019    Chronic obstructive pulmonary disease ([facility] 02/09/2016        Diagnosis:     ICD-10-CM    1. Type 2 diabetes mellitus without complication, without long-term current use of insulin ([facility])  E11.9     CONTROLLED   2. Mixed hyperlipidemia  E78.2     CONTROLLED   3. Diabetic polyneuropathy associated with diabetes mellitus due to underlying condition ([facility]  E08.42     STABLE   4. Chronic obstructive pulmonary disease, unspecified COPD type ([facility])  J44.9     STABLE   5. Essential hypertension  I10     CONTROLLED       PLAN:           Patient Instructions   LOW SALT FOR BLOOD PRESSURE CONTROL. LOW CARBOHYDRATE FOR BLOOD SUGAR AND WEIGHT CONTROL. LOW FAT DIET FOR CHOLESTEROL CONTROL. DRINK ENOUGH FLUIDS FOR BETTER KIDNEY FUNCTION. TAKE COZAAR 50 MG. TOPROL 50 MG AND HYDRODIURIL 25 MG. 1 DAILY FOR BLOOD PRESSURE CONTROL. TAKE METFORMIN 500 MG. 2 TIMES A DAY  FOR BLOOD SUGAR CONTROL. TAKE LIPITOR 20 MG. NIGHTLY   FOR CHOLESTEROL CONTROL. Tania [place] INHALE COMBIVENT INHALER 1 PUFF 2 TIMES A DAY FOR BREATHING CONTROL  REGULAR WALKING ADVISED. NEXT APPOINTMENT IN 3 MONTHS. Return in about 3 months (around 7/28/2022) for FOLLOW UP VISIT. I have reviewed my findings and recommendations with Palma Mcgee.     Electronically signed by Bebeto Barrientos MD on 4/28/22 at 4:35 PM EDT

## 2022-04-28 NOTE — PATIENT INSTRUCTIONS
LOW SALT FOR BLOOD PRESSURE CONTROL. LOW CARBOHYDRATE FOR BLOOD SUGAR AND WEIGHT CONTROL. LOW FAT DIET FOR CHOLESTEROL CONTROL. DRINK ENOUGH FLUIDS FOR BETTER KIDNEY FUNCTION. TAKE COZAAR 50 MG. TOPROL 50 MG AND HYDRODIURIL 25 MG. 1 DAILY FOR BLOOD PRESSURE CONTROL. TAKE METFORMIN 500 MG. 2 TIMES A DAY  FOR BLOOD SUGAR CONTROL. TAKE LIPITOR 20 MG. NIGHTLY   FOR CHOLESTEROL CONTROL. Cari Moser INHALE COMBIVENT INHALER 1 PUFF 2 TIMES A DAY FOR BREATHING CONTROL  REGULAR WALKING ADVISED. NEXT APPOINTMENT IN 3 MONTHS.

## 2022-06-13 RX ORDER — AMLODIPINE BESYLATE 5 MG/1
5 TABLET ORAL DAILY
Qty: 90 TABLET | Refills: 1 | Status: SHIPPED
Start: 2022-06-13 | End: 2022-07-26 | Stop reason: SDUPTHER

## 2022-07-26 ENCOUNTER — OFFICE VISIT (OUTPATIENT)
Dept: FAMILY MEDICINE CLINIC | Age: 87
End: 2022-07-26
Payer: MEDICARE

## 2022-07-26 VITALS
SYSTOLIC BLOOD PRESSURE: 118 MMHG | OXYGEN SATURATION: 95 % | DIASTOLIC BLOOD PRESSURE: 74 MMHG | TEMPERATURE: 97 F | BODY MASS INDEX: 25.99 KG/M2 | WEIGHT: 161 LBS | HEART RATE: 72 BPM

## 2022-07-26 DIAGNOSIS — J44.9 CHRONIC OBSTRUCTIVE PULMONARY DISEASE, UNSPECIFIED COPD TYPE (HCC): ICD-10-CM

## 2022-07-26 DIAGNOSIS — E78.2 MIXED HYPERLIPIDEMIA: ICD-10-CM

## 2022-07-26 DIAGNOSIS — E11.9 TYPE 2 DIABETES MELLITUS WITHOUT COMPLICATION, WITHOUT LONG-TERM CURRENT USE OF INSULIN (HCC): ICD-10-CM

## 2022-07-26 DIAGNOSIS — I83.893 VARICOSE VEINS OF BOTH LOWER EXTREMITIES WITH COMPLICATIONS: ICD-10-CM

## 2022-07-26 DIAGNOSIS — I10 ESSENTIAL HYPERTENSION: ICD-10-CM

## 2022-07-26 DIAGNOSIS — E08.42 DIABETIC POLYNEUROPATHY ASSOCIATED WITH DIABETES MELLITUS DUE TO UNDERLYING CONDITION (HCC): Primary | ICD-10-CM

## 2022-07-26 PROCEDURE — 3044F HG A1C LEVEL LT 7.0%: CPT | Performed by: FAMILY MEDICINE

## 2022-07-26 PROCEDURE — 3023F SPIROM DOC REV: CPT | Performed by: FAMILY MEDICINE

## 2022-07-26 PROCEDURE — G8417 CALC BMI ABV UP PARAM F/U: HCPCS | Performed by: FAMILY MEDICINE

## 2022-07-26 PROCEDURE — 1090F PRES/ABSN URINE INCON ASSESS: CPT | Performed by: FAMILY MEDICINE

## 2022-07-26 PROCEDURE — 1123F ACP DISCUSS/DSCN MKR DOCD: CPT | Performed by: FAMILY MEDICINE

## 2022-07-26 PROCEDURE — 1036F TOBACCO NON-USER: CPT | Performed by: FAMILY MEDICINE

## 2022-07-26 PROCEDURE — 99214 OFFICE O/P EST MOD 30 MIN: CPT | Performed by: FAMILY MEDICINE

## 2022-07-26 PROCEDURE — G8427 DOCREV CUR MEDS BY ELIG CLIN: HCPCS | Performed by: FAMILY MEDICINE

## 2022-07-26 RX ORDER — PREGABALIN 25 MG/1
25 CAPSULE ORAL 2 TIMES DAILY
Qty: 120 CAPSULE | Refills: 0 | Status: SHIPPED | OUTPATIENT
Start: 2022-07-26 | End: 2022-09-24

## 2022-07-26 RX ORDER — TRIAMTERENE AND HYDROCHLOROTHIAZIDE 37.5; 25 MG/1; MG/1
1 CAPSULE ORAL DAILY
Qty: 90 CAPSULE | Refills: 1 | Status: SHIPPED | OUTPATIENT
Start: 2022-07-26

## 2022-07-26 RX ORDER — ATORVASTATIN CALCIUM 20 MG/1
20 TABLET, FILM COATED ORAL DAILY
Qty: 90 TABLET | Refills: 1 | Status: SHIPPED | OUTPATIENT
Start: 2022-07-26

## 2022-07-26 RX ORDER — AMLODIPINE BESYLATE 5 MG/1
5 TABLET ORAL DAILY
Qty: 90 TABLET | Refills: 1 | Status: SHIPPED | OUTPATIENT
Start: 2022-07-26

## 2022-07-26 RX ORDER — METOPROLOL SUCCINATE 50 MG/1
50 TABLET, EXTENDED RELEASE ORAL DAILY
Qty: 90 TABLET | Refills: 1 | Status: SHIPPED | OUTPATIENT
Start: 2022-07-26

## 2022-07-26 RX ORDER — HYDROCHLOROTHIAZIDE 25 MG/1
25 TABLET ORAL DAILY
Qty: 90 TABLET | Refills: 1 | Status: CANCELLED | OUTPATIENT
Start: 2022-07-26

## 2022-07-26 RX ORDER — LOSARTAN POTASSIUM 50 MG/1
50 TABLET ORAL DAILY
Qty: 90 TABLET | Refills: 1 | Status: SHIPPED | OUTPATIENT
Start: 2022-07-26

## 2022-07-26 NOTE — PATIENT INSTRUCTIONS
LOW SALT FOR BLOOD PRESSURE CONTROL. LOW CARBOHYDRATE FOR BLOOD SUGAR AND WEIGHT CONTROL. LOW FAT DIET FOR CHOLESTEROL CONTROL. DRINK ENOUGH FLUIDS FOR BETTER KIDNEY FUNCTION. TAKE COZAAR 50 MG. TOPROL 50 MG AND DYAZIDE 37.5-25 MG. DAILY FOR BLOOD PRESSURE AND LEG EDEMA CONTROL. TAKE METFORMIN 500 MG. 2 TIMES A DAY  FOR BLOOD SUGAR CONTROL. TAKE LIPITOR 20 MG. NIGHTLY   FOR CHOLESTEROL CONTROL. Reji [place] INHALE COMBIVENT INHALER 1 PUFF 2 TIMES A DAY FOR BREATHING CONTROL  REGULAR WALKING ADVISED. NEXT APPOINTMENT IN 3 MONTHS.

## 2022-07-26 NOTE — PROGRESS NOTES
OFFICE PROGRESS NOTE      SUBJECTIVE:        Patient ID:   Seamus Han is a 81 y.o. female who presents for   Chief Complaint   Patient presents with    Leg Swelling           HPI:     RECHECK BP, CHOLESTEROL AND DIABETES. LEGS HAVE STARTED SWELLING LATELY. WAS ON DYAZIDE BEFORE AND WOULD LIKE TO TRY AGAIN. MEDICATION REFILL. FEELS GOOD. WATCHING DIET GOOD. WALKING SOME IN HOUSE FOR EXERCISE. TAKING MEDICATIONS REGULARLY. Prior to Admission medications    Medication Sig Start Date End Date Taking? Authorizing Provider   amLODIPine (NORVASC) 5 MG tablet Take 1 tablet by mouth in the morning. 7/26/22  Yes Art Murillo MD   atorvastatin (LIPITOR) 20 MG tablet Take 1 tablet by mouth in the morning. 7/26/22  Yes Art Murillo MD   losartan (COZAAR) 50 MG tablet Take 1 tablet by mouth in the morning. 7/26/22  Yes Art Murillo MD   metFORMIN (GLUCOPHAGE) 500 MG tablet Take 1 tablet by mouth in the morning and 1 tablet in the evening. Take with meals. 7/26/22  Yes Art Murillo MD   metoprolol succinate (TOPROL XL) 50 MG extended release tablet Take 1 tablet by mouth in the morning. 7/26/22  Yes Art Murillo MD   pregabalin (LYRICA) 25 MG capsule Take 1 capsule by mouth in the morning and 1 capsule before bedtime. Do all this for 60 days. 7/26/22 9/24/22 Yes Art Murillo MD   triamterene-hydroCHLOROthiazide (DYAZIDE) 37.5-25 MG per capsule Take 1 capsule by mouth in the morning.  7/26/22  Yes Art Murilol MD   albuterol-ipratropium (COMBIVENT RESPIMAT)  MCG/ACT AERS inhaler Inhale 1 puff into the lungs 2 times daily 4/28/22  Yes Art Murillo MD   blood glucose test strips (ASCENSIA AUTODISC VI;ONE TOUCH ULTRA TEST VI) strip 1 each by Does not apply route 2 times daily 1/18/22  Yes Art Murillo MD   Cholecalciferol (VITAMIN D3) 50 MCG (2000 UT) CAPS Take by mouth   Yes Historical Provider, MD   ascorbic acid (VITAMIN C) 250 MG tablet Take 250 Drug use: No    Sexual activity: Never     Partners: Male     Social Determinants of Health     Financial Resource Strain: Low Risk     Difficulty of Paying Living Expenses: Not hard at all   Food Insecurity: No Food Insecurity    Worried About Running Out of Food in the Last Year: Never true    [address] in the Last Year: Never true       I have reviewed Mira's allergies, medications, problem list, medical, social and family history and have updated as needed in the electronic medical record  Review Of Systems:    Skin: no abnormal pigmentation, rash, scaling, itching, masses, hair or nail changes  Eyes: no blurring, diplopia, or eye pain  Ears/Nose/Throat: no hearing loss, tinnitus, vertigo, nosebleed, nasal congestion, rhinorrhea, sore throat  Respiratory: no cough, pleuritic chest pain, dyspnea, or wheezing  Cardiovascular: no chest pain, angina, dyspnea on exertion, orthopnea, PND, palpitations, or claudication  Gastrointestinal: no nausea, vomiting, heartburn, diarrhea, constipation, bloating,  abdominal pain, or blood per rectum. Appetite is good  Genitourinary: no urinary urgency, frequency, dysuria, nocturia, hesitancy, or incontinence  Musculoskeletal: joint pains off and on. Morning stiffness.  Ambulating well  Neurologic: no paralysis, paresis, paresthesia, seizures, tremors, or headaches  Hematologic/Lymphatic/Immunologic: no anemia, abnormal bleeding/bruising, fever, chills, night sweats, swollen glands, or unexplained weight loss  Endocrine: no heat or cold intolerance and no polyphagia, polydipsia, or polyuria        OBJECTIVE:     VS:  Wt Readings from Last 3 Encounters:   07/26/22 161 lb (73 kg)   04/28/22 164 lb (74.4 kg)   01/18/22 160 lb 9.6 oz (72.8 kg)     Temp Readings from Last 3 Encounters:   07/26/22 97 °F (36.1 °C)   04/28/22 97.4 °F (36.3 °C)   01/18/22 98 °F (36.7 °C)     BP Readings from Last 3 Encounters:   07/26/22 118/74   04/28/22 110/70   01/18/22 132/70        General appearance: Alert, Awake, Oriented times 3, no distress  Skin: Warm and dry  Head: Normocephalic. No masses, lesions or tenderness noted  Eyes: Conjunctivae appear normal. PERLE  Ears: External ears normal  Nose/Sinuses: Nares normal. Septum midline. Mucosa normal. No drainage  Oropharynx: Oropharynx clear with no exudate seen  Neck: Neck supple. No jugular venous distension, lymphadenopathy or thyromegaly Trachea midline  Chest:  Normal. Movements are Normal and Equal.  Lungs: Lungs clear to auscultation bilaterally. No ronchi, crackles or wheezes  Heart: S1 S2  Regular rate and rhythm. No rub, murmur or gallop  Abdomen: Abdomen soft, non-tender. BS normal. No masses, organomegaly. Back: Grossly Normal and Equal. DTR are Normal. SLR is Normal.  Extremities: Arthritic changes are noted. Movements are limited. Pedal pulses are normal.VARICOSE VEINS BOTH LOWER EXTREMITIES WITH MINIMAL LOWER LEG EDEMA. Musculoskeletal: Muscular strength appears intact. No joint effusion, tenderness, swelling or warmth  Neuro:  No focal motor or sensory deficits        ASSESSMENT     Patient Active Problem List    Diagnosis Date Noted    Type 2 diabetes mellitus without complication, without long-term current use of insulin ([facility] [address]     Mixed hyperlipidemia     Essential hypertension     RLS (restless legs syndrome) 06/23/2015    Osteoarthritis     Diabetic polyneuropathy associated with diabetes mellitus due to underlying condition ([facility] 04/17/2019    Chronic obstructive pulmonary disease ([facility] 02/09/2016        Diagnosis:     ICD-10-CM    1. Diabetic polyneuropathy associated with diabetes mellitus due to underlying condition ([facility])  E08.42 pregabalin (LYRICA) 25 MG capsule    PROGRESSIVE       2. Chronic obstructive pulmonary disease, unspecified COPD type ([facility]  J44.9     STABLE      3. Mixed hyperlipidemia  E78.2     CONTROLLED      4. Essential hypertension  I10     CONTROLLED      5.  Type 2 diabetes mellitus without complication, without long-term current use of insulin ([facility])  E11.9     CONTROLLED      6. Varicose veins of both lower extremities with complications  G77.632     EDEMA. PLAN:     LABORATORY RESULTS 1/4/2022 REVIEWED AND DISCUSSED. Patient Instructions   LOW SALT FOR BLOOD PRESSURE CONTROL. LOW CARBOHYDRATE FOR BLOOD SUGAR AND WEIGHT CONTROL. LOW FAT DIET FOR CHOLESTEROL CONTROL. DRINK ENOUGH FLUIDS FOR BETTER KIDNEY FUNCTION. TAKE COZAAR 50 MG. TOPROL 50 MG AND DYAZIDE 37.5-25 MG. DAILY FOR BLOOD PRESSURE AND LEG EDEMA CONTROL. TAKE METFORMIN 500 MG. 2 TIMES A DAY  FOR BLOOD SUGAR CONTROL. TAKE LIPITOR 20 MG. NIGHTLY   FOR CHOLESTEROL CONTROL. Rhenda Codie INHALE COMBIVENT INHALER 1 PUFF 2 TIMES A DAY FOR BREATHING CONTROL  REGULAR WALKING ADVISED. NEXT APPOINTMENT IN 3 MONTHS. Return in about 3 months (around 10/26/2022) for FOLLOW UP VISIT. I have reviewed my findings and recommendations with Juliet Grace.     Electronically signed by Armond Cates MD on 7/26/22 at 9:44 AM EDT

## 2022-11-08 ENCOUNTER — OFFICE VISIT (OUTPATIENT)
Dept: FAMILY MEDICINE CLINIC | Age: 87
End: 2022-11-08
Payer: MEDICARE

## 2022-11-08 VITALS
OXYGEN SATURATION: 97 % | BODY MASS INDEX: 25.99 KG/M2 | DIASTOLIC BLOOD PRESSURE: 70 MMHG | HEART RATE: 121 BPM | WEIGHT: 161 LBS | SYSTOLIC BLOOD PRESSURE: 118 MMHG

## 2022-11-08 DIAGNOSIS — I10 ESSENTIAL HYPERTENSION: ICD-10-CM

## 2022-11-08 DIAGNOSIS — M25.511 ACUTE PAIN OF RIGHT SHOULDER: ICD-10-CM

## 2022-11-08 DIAGNOSIS — E11.9 TYPE 2 DIABETES MELLITUS WITHOUT COMPLICATION, WITHOUT LONG-TERM CURRENT USE OF INSULIN (HCC): Primary | ICD-10-CM

## 2022-11-08 DIAGNOSIS — E08.42 DIABETIC POLYNEUROPATHY ASSOCIATED WITH DIABETES MELLITUS DUE TO UNDERLYING CONDITION (HCC): ICD-10-CM

## 2022-11-08 DIAGNOSIS — J44.9 CHRONIC OBSTRUCTIVE PULMONARY DISEASE, UNSPECIFIED COPD TYPE (HCC): ICD-10-CM

## 2022-11-08 DIAGNOSIS — Z23 FLU VACCINE NEED: ICD-10-CM

## 2022-11-08 DIAGNOSIS — E78.2 MIXED HYPERLIPIDEMIA: ICD-10-CM

## 2022-11-08 PROCEDURE — G8417 CALC BMI ABV UP PARAM F/U: HCPCS | Performed by: FAMILY MEDICINE

## 2022-11-08 PROCEDURE — 99214 OFFICE O/P EST MOD 30 MIN: CPT | Performed by: FAMILY MEDICINE

## 2022-11-08 PROCEDURE — G8484 FLU IMMUNIZE NO ADMIN: HCPCS | Performed by: FAMILY MEDICINE

## 2022-11-08 PROCEDURE — 3023F SPIROM DOC REV: CPT | Performed by: FAMILY MEDICINE

## 2022-11-08 PROCEDURE — G8427 DOCREV CUR MEDS BY ELIG CLIN: HCPCS | Performed by: FAMILY MEDICINE

## 2022-11-08 PROCEDURE — 1090F PRES/ABSN URINE INCON ASSESS: CPT | Performed by: FAMILY MEDICINE

## 2022-11-08 PROCEDURE — 3044F HG A1C LEVEL LT 7.0%: CPT | Performed by: FAMILY MEDICINE

## 2022-11-08 PROCEDURE — 1123F ACP DISCUSS/DSCN MKR DOCD: CPT | Performed by: FAMILY MEDICINE

## 2022-11-08 PROCEDURE — 90694 VACC AIIV4 NO PRSRV 0.5ML IM: CPT | Performed by: FAMILY MEDICINE

## 2022-11-08 PROCEDURE — G0008 ADMIN INFLUENZA VIRUS VAC: HCPCS | Performed by: FAMILY MEDICINE

## 2022-11-08 PROCEDURE — 1036F TOBACCO NON-USER: CPT | Performed by: FAMILY MEDICINE

## 2022-11-08 RX ORDER — AMLODIPINE BESYLATE 5 MG/1
5 TABLET ORAL DAILY
Qty: 90 TABLET | Refills: 1 | Status: ON HOLD
Start: 2022-11-08 | End: 2022-11-22 | Stop reason: HOSPADM

## 2022-11-08 RX ORDER — LOSARTAN POTASSIUM 50 MG/1
50 TABLET ORAL DAILY
Qty: 90 TABLET | Refills: 1 | Status: ON HOLD
Start: 2022-11-08 | End: 2022-11-22 | Stop reason: HOSPADM

## 2022-11-08 RX ORDER — METOPROLOL SUCCINATE 50 MG/1
50 TABLET, EXTENDED RELEASE ORAL DAILY
Qty: 90 TABLET | Refills: 1 | Status: ON HOLD
Start: 2022-11-08 | End: 2022-11-22 | Stop reason: HOSPADM

## 2022-11-08 RX ORDER — PREGABALIN 25 MG/1
25 CAPSULE ORAL 2 TIMES DAILY
Qty: 120 CAPSULE | Refills: 0 | Status: SHIPPED | OUTPATIENT
Start: 2022-11-08 | End: 2023-01-07

## 2022-11-08 RX ORDER — TRIAMTERENE AND HYDROCHLOROTHIAZIDE 37.5; 25 MG/1; MG/1
1 CAPSULE ORAL DAILY
Qty: 90 CAPSULE | Refills: 1 | Status: ON HOLD
Start: 2022-11-08 | End: 2022-11-22 | Stop reason: HOSPADM

## 2022-11-08 RX ORDER — ATORVASTATIN CALCIUM 20 MG/1
20 TABLET, FILM COATED ORAL DAILY
Qty: 90 TABLET | Refills: 1 | Status: ON HOLD
Start: 2022-11-08 | End: 2022-11-22 | Stop reason: HOSPADM

## 2022-11-08 NOTE — PROGRESS NOTES
OFFICE PROGRESS NOTE      SUBJECTIVE:        Patient ID:   Gloria Lobo is a 81 y.o. female who presents for   Chief Complaint   Patient presents with    Shoulder Pain     Right. Would like xray           HPI:     RECHECK COPD, BP, CHOLESTEROL AND DIABETES. HAS PAIN IN THE RIGHT SHOULDER GETTING WORSE. BREATHING WELL CONTROLLED WITH THE INHALER. REQUEST XR DONE. MEDICATION REFILL. FEELS GOOD. WATCHING DIET GOOD. WALKING FOR EXERCISE. TAKING MEDICATIONS REGULARLY. Prior to Admission medications    Medication Sig Start Date End Date Taking? Authorizing Provider   albuterol-ipratropium (COMBIVENT RESPIMAT)  MCG/ACT AERS inhaler Inhale 1 puff into the lungs 2 times daily 11/8/22  Yes Christie Renee MD   amLODIPine (NORVASC) 5 MG tablet Take 1 tablet by mouth daily 11/8/22  Yes Christie Renee MD   atorvastatin (LIPITOR) 20 MG tablet Take 1 tablet by mouth daily 11/8/22  Yes Christie Renee MD   losartan (COZAAR) 50 MG tablet Take 1 tablet by mouth daily 11/8/22  Yes Christie Renee MD   metFORMIN (GLUCOPHAGE) 500 MG tablet Take 1 tablet by mouth 2 times daily (with meals) 11/8/22  Yes Christie Renee MD   metoprolol succinate (TOPROL XL) 50 MG extended release tablet Take 1 tablet by mouth daily 11/8/22  Yes Christie Renee MD   pregabalin (LYRICA) 25 MG capsule Take 1 capsule by mouth 2 times daily for 60 days.  11/8/22 1/7/23 Yes Christie Renee MD   triamterene-hydroCHLOROthiazide (DYAZIDE) 37.5-25 MG per capsule Take 1 capsule by mouth daily 11/8/22  Yes Christie Renee MD   blood glucose test strips (ASCENSIA AUTODISC VI;ONE TOUCH ULTRA TEST VI) strip 1 each by Does not apply route 2 times daily 1/18/22  Yes Christie Renee MD   Cholecalciferol (VITAMIN D3) 50 MCG (2000 UT) CAPS Take by mouth   Yes Historical Provider, MD   ascorbic acid (VITAMIN C) 250 MG tablet Take 250 mg by mouth daily   Yes Historical Provider, MD   vitamin B-12 (CYANOCOBALAMIN) 1000 MCG tablet Take 1,000 mcg by mouth daily   Yes Historical Provider, MD   Lancets MISC 1 each by Does not apply route 2 times daily 19  Yes Sejal Lopez MD   timolol (TIMOPTIC) 0.5 % ophthalmic solution INSTILL 1 DROP INTO EACH EYE TWICE DAILY 19  Yes Historical Provider, MD   latanoprost (XALATAN) 0.005 % ophthalmic solution INSTILL 1 DROP INTO EACH EYE ONCE DAILY AT BEDTIME 19  Yes Historical Provider, MD   brimonidine (ALPHAGAN) 0.2 % ophthalmic solution INSTILL 1 DROP INTO LEFT EYE TWICE DAILY 19  Yes Historical Provider, MD   Blood Glucose Monitoring Suppl DELTA 1 Units by Does not apply route 2 times daily Please give whatever insurance covers 17  Yes Sejal Lopez MD   Multiple Vitamin (ONE-A-DAY ESSENTIAL PO) Take 1 tablet by mouth. Yes Historical Provider, MD   calcium carbonate-vitamin D 600-200 MG-UNIT TABS Take 1 tablet by mouth daily. Yes Historical Provider, MD   [facility] Products (OSTEO BI-FLEX ADV JOINT SHIELD PO) Take 2 tablets by mouth daily. Yes Historical Provider, MD   timolol (BETIMOL) 0.5 % ophthalmic solution Place 1 drop into both eyes daily. Yes Historical Provider, MD   aspirin 81 MG EC tablet Take 81 mg by mouth daily.      Yes Historical Provider, MD   magnesium (MAGNESIUM-OXIDE) 250 MG TABS tablet Take 250 mg by mouth daily    Historical Provider, MD     Social History     Socioeconomic History    Marital status:    Tobacco Use    Smoking status: Former     Packs/day: 1.00     Years: 9.00     Pack years: 9.00     Types: Cigarettes     Quit date: 2000     Years since quittin.1    Smokeless tobacco: Never   Vaping Use    Vaping Use: Never used   Substance and Sexual Activity    Alcohol use: No     Alcohol/week: 0.0 standard drinks    Drug use: No    Sexual activity: Never Partners: Male     Social Determinants of Health     Financial Resource Strain: Low Risk     Difficulty of Paying Living Expenses: Not hard at all   Food Insecurity: No Food Insecurity    Worried About Running Out of Food in the Last Year: Never true    [address] in the Last Year: Never true       I have reviewed Mira's allergies, medications, problem list, medical, social and family history and have updated as needed in the electronic medical record  Review Of Systems:    Skin: no abnormal pigmentation, rash, scaling, itching, masses, hair or nail changes  Eyes: no blurring, diplopia, or eye pain  Ears/Nose/Throat: no hearing loss, tinnitus, vertigo, nosebleed, nasal congestion, rhinorrhea, sore throat  Respiratory: no cough, pleuritic chest pain, dyspnea, or wheezing  Cardiovascular: no chest pain, angina, dyspnea on exertion, orthopnea, PND, palpitations, or claudication  Gastrointestinal: no nausea, vomiting, heartburn, diarrhea, constipation, bloating,  abdominal pain, or blood per rectum. Appetite is good  Genitourinary: no urinary urgency, frequency, dysuria, nocturia, hesitancy, or incontinence  Musculoskeletal: joint pains off and on. Morning stiffness.  Ambulating well  Neurologic: no paralysis, paresis, paresthesia, seizures, tremors, or headaches  Hematologic/Lymphatic/Immunologic: no anemia, abnormal bleeding/bruising, fever, chills, night sweats, swollen glands, or unexplained weight loss  Endocrine: no heat or cold intolerance and no polyphagia, polydipsia, or polyuria        OBJECTIVE:     VS:  Wt Readings from Last 3 Encounters:   11/08/22 161 lb (73 kg)   07/26/22 161 lb (73 kg)   04/28/22 164 lb (74.4 kg)     Temp Readings from Last 3 Encounters:   07/26/22 97 °F (36.1 °C)   04/28/22 97.4 °F (36.3 °C)   01/18/22 98 °F (36.7 °C)     BP Readings from Last 3 Encounters:   11/08/22 118/70   07/26/22 118/74   04/28/22 110/70        General appearance: Alert, Awake, Oriented times 3, no distress  Skin: Warm and dry  Head: Normocephalic. No masses, lesions or tenderness noted  Eyes: Conjunctivae appear normal. PERLE  Ears: External ears normal  Nose/Sinuses: Nares normal. Septum midline. Mucosa normal. No drainage  Oropharynx: Oropharynx clear with no exudate seen  Neck: Neck supple. No jugular venous distension, lymphadenopathy or thyromegaly Trachea midline  Chest:  Normal. Movements are Normal and Equal.  Lungs: Lungs clear to auscultation bilaterally. No ronchi, crackles or wheezes  Heart: S1 S2  Regular rate and rhythm. No rub, murmur or gallop  Abdomen: Abdomen soft, non-tender. BS normal. No masses, organomegaly. Back: Grossly Normal and Equal. DTR are Normal. SLR is Normal.  Extremities: Arthritic changes are noted. Movements are limited. RIGHT SHOULDER IS GROSSLY NORMAL. MOVEMENTS ARE  LIMITED AND PAINFUL. Pedal pulses are normal.  Musculoskeletal: Muscular strength appears intact. No joint effusion, tenderness, swelling or warmth  Neuro:  No focal motor or sensory deficits        ASSESSMENT     Patient Active Problem List    Diagnosis Date Noted    Type 2 diabetes mellitus without complication, without long-term current use of insulin ([facility] [address]     Mixed hyperlipidemia     Essential hypertension     RLS (restless legs syndrome) 06/23/2015    Osteoarthritis     Diabetic polyneuropathy associated with diabetes mellitus due to underlying condition ([facility] [address] 04/17/2019    Chronic obstructive pulmonary disease ([facility] [address] 02/09/2016        Diagnosis:     ICD-10-CM    1. Type 2 diabetes mellitus without complication, without long-term current use of insulin ([facility])  E11.9     CONTROLLED      2. Diabetic polyneuropathy associated with diabetes mellitus due to underlying condition ([facility])  E08.42 pregabalin (LYRICA) 25 MG capsule    PROGRESSIVE       3. Mixed hyperlipidemia  E78.2 Comprehensive Metabolic Panel     Lipid Panel     FAIR CONTROL      4.  Chronic obstructive pulmonary disease, unspecified COPD type ([facility]  J44.9 CBC with Auto Differential    STABLE      5. Essential hypertension  I10 CBC with Auto Differential    CONTROLLED      6. Acute pain of right shoulder  M25.511 XR SHOULDER RIGHT (MIN 2 VIEWS)      7. Flu vaccine need  Z23 Influenza, FLUAD, (age 72 y+), IM, Preservative Free, 0.5 mL          PLAN:           Patient Instructions   LOW SALT FOR BLOOD PRESSURE CONTROL. LOW CARBOHYDRATE FOR BLOOD SUGAR AND WEIGHT CONTROL. LOW FAT DIET FOR CHOLESTEROL CONTROL. DRINK ENOUGH FLUIDS FOR BETTER KIDNEY FUNCTION. TAKE COZAAR 50 MG. TOPROL 50 MG AND DYAZIDE 37.5-25 MG. DAILY FOR BLOOD PRESSURE AND LEG EDEMA CONTROL. TAKE METFORMIN 500 MG. 2 TIMES A DAY  FOR BLOOD SUGAR CONTROL. TAKE LIPITOR 20 MG. NIGHTLY   FOR CHOLESTEROL CONTROL. Valri Dykes INHALE COMBIVENT INHALER 1 PUFF 2 TIMES A DAY FOR BREATHING CONTROL  REGULAR WALKING ADVISED. XR RIGHT SHOULDER AS RECOMMENDED. FASTING FOR LAB WORK PRIOR TO NEXT VISIT. INJECTION FLU VACCINE GIVEN TODAY. CALL FOR  ANY ADVERSE REACTION. NEXT APPOINTMENT IN 3 MONTHS. Return in about 3 months (around 2/8/2023) for FOLLOW UP VISIT. I have reviewed my findings and recommendations with Marilee Gusman.     Electronically signed by Tea Miguel MD on 11/8/22 at 10:28 AM EST none per patient

## 2022-11-08 NOTE — PATIENT INSTRUCTIONS
LOW SALT FOR BLOOD PRESSURE CONTROL. LOW CARBOHYDRATE FOR BLOOD SUGAR AND WEIGHT CONTROL. LOW FAT DIET FOR CHOLESTEROL CONTROL. DRINK ENOUGH FLUIDS FOR BETTER KIDNEY FUNCTION. TAKE COZAAR 50 MG. TOPROL 50 MG AND DYAZIDE 37.5-25 MG. DAILY FOR BLOOD PRESSURE AND LEG EDEMA CONTROL. TAKE METFORMIN 500 MG. 2 TIMES A DAY  FOR BLOOD SUGAR CONTROL. TAKE LIPITOR 20 MG. NIGHTLY   FOR CHOLESTEROL CONTROL. Donita Balling INHALE COMBIVENT INHALER 1 PUFF 2 TIMES A DAY FOR BREATHING CONTROL  REGULAR WALKING ADVISED. XR RIGHT SHOULDER AS RECOMMENDED. FASTING FOR LAB WORK PRIOR TO NEXT VISIT. INJECTION FLU VACCINE GIVEN TODAY. CALL FOR  ANY ADVERSE REACTION. NEXT APPOINTMENT IN 3 MONTHS.

## 2022-11-12 ENCOUNTER — HOSPITAL ENCOUNTER (EMERGENCY)
Age: 87
Discharge: ANOTHER HOSPITAL | DRG: 291 | End: 2022-11-12
Payer: MEDICARE

## 2022-11-12 ENCOUNTER — HOSPITAL ENCOUNTER (EMERGENCY)
Age: 87
Discharge: HOME | DRG: 291 | End: 2022-11-12
Attending: EMERGENCY MEDICINE
Payer: MEDICARE

## 2022-11-12 VITALS
HEART RATE: 86 BPM | DIASTOLIC BLOOD PRESSURE: 53 MMHG | OXYGEN SATURATION: 100 % | SYSTOLIC BLOOD PRESSURE: 121 MMHG | WEIGHT: 151 LBS | TEMPERATURE: 98.6 F | BODY MASS INDEX: 24.37 KG/M2 | RESPIRATION RATE: 20 BRPM

## 2022-11-12 VITALS
RESPIRATION RATE: 22 BRPM | HEART RATE: 97 BPM | DIASTOLIC BLOOD PRESSURE: 81 MMHG | OXYGEN SATURATION: 92 % | TEMPERATURE: 98.6 F | SYSTOLIC BLOOD PRESSURE: 134 MMHG

## 2022-11-12 DIAGNOSIS — R33.9 URINARY RETENTION: Primary | ICD-10-CM

## 2022-11-12 DIAGNOSIS — R33.8 ACUTE URINARY RETENTION: Primary | ICD-10-CM

## 2022-11-12 LAB
ANION GAP SERPL CALCULATED.3IONS-SCNC: 10 MMOL/L (ref 7–16)
BACTERIA: NORMAL /HPF
BASOPHILS ABSOLUTE: 0.01 E9/L (ref 0–0.2)
BASOPHILS RELATIVE PERCENT: 0.2 % (ref 0–2)
BILIRUBIN URINE: NEGATIVE
BILIRUBIN URINE: NEGATIVE
BLOOD, URINE: NEGATIVE
BLOOD, URINE: NEGATIVE
BUN BLDV-MCNC: 28 MG/DL (ref 6–23)
CALCIUM SERPL-MCNC: 9.8 MG/DL (ref 8.6–10.2)
CHLORIDE BLD-SCNC: 103 MMOL/L (ref 98–107)
CLARITY: CLEAR
CLARITY: CLEAR
CO2: 24 MMOL/L (ref 22–29)
COLOR: NORMAL
COLOR: YELLOW
CREAT SERPL-MCNC: 1 MG/DL (ref 0.5–1)
EOSINOPHILS ABSOLUTE: 0.06 E9/L (ref 0.05–0.5)
EOSINOPHILS RELATIVE PERCENT: 1.2 % (ref 0–6)
EPITHELIAL CELLS, UA: NORMAL /HPF
GFR SERPL CREATININE-BSD FRML MDRD: 55 ML/MIN/1.73
GLUCOSE BLD-MCNC: 122 MG/DL (ref 74–99)
GLUCOSE URINE: NEGATIVE MG/DL
GLUCOSE URINE: NEGATIVE MG/DL
HCT VFR BLD CALC: 33.7 % (ref 34–48)
HEMOGLOBIN: 10.7 G/DL (ref 11.5–15.5)
IMMATURE GRANULOCYTES #: 0.02 E9/L
IMMATURE GRANULOCYTES %: 0.4 % (ref 0–5)
KETONES, URINE: NEGATIVE MG/DL
KETONES, URINE: NEGATIVE MG/DL
LEUKOCYTE ESTERASE, URINE: NEGATIVE
LEUKOCYTE ESTERASE, URINE: NEGATIVE
LYMPHOCYTES ABSOLUTE: 1.31 E9/L (ref 1.5–4)
LYMPHOCYTES RELATIVE PERCENT: 26.1 % (ref 20–42)
MCH RBC QN AUTO: 31.1 PG (ref 26–35)
MCHC RBC AUTO-ENTMCNC: 31.8 % (ref 32–34.5)
MCV RBC AUTO: 98 FL (ref 80–99.9)
MONOCYTES ABSOLUTE: 0.44 E9/L (ref 0.1–0.95)
MONOCYTES RELATIVE PERCENT: 8.8 % (ref 2–12)
NEUTROPHILS ABSOLUTE: 3.18 E9/L (ref 1.8–7.3)
NEUTROPHILS RELATIVE PERCENT: 63.3 % (ref 43–80)
NITRITE, URINE: NEGATIVE
NITRITE, URINE: NEGATIVE
PDW BLD-RTO: 13.6 FL (ref 11.5–15)
PH UA: 5 (ref 5–9)
PH UA: 6 (ref 5–9)
PLATELET # BLD: 191 E9/L (ref 130–450)
PMV BLD AUTO: 10.9 FL (ref 7–12)
POTASSIUM SERPL-SCNC: 4.5 MMOL/L (ref 3.5–5)
PROTEIN UA: NEGATIVE MG/DL
PROTEIN UA: NEGATIVE MG/DL
RBC # BLD: 3.44 E12/L (ref 3.5–5.5)
RBC UA: NORMAL /HPF (ref 0–2)
SODIUM BLD-SCNC: 137 MMOL/L (ref 132–146)
SPECIFIC GRAVITY UA: 1.01 (ref 1–1.03)
SPECIFIC GRAVITY UA: <=1.005 (ref 1–1.03)
UROBILINOGEN, URINE: 0.2 E.U./DL
UROBILINOGEN, URINE: 0.2 E.U./DL
WBC # BLD: 5 E9/L (ref 4.5–11.5)
WBC UA: NORMAL /HPF (ref 0–5)

## 2022-11-12 PROCEDURE — 80048 BASIC METABOLIC PNL TOTAL CA: CPT

## 2022-11-12 PROCEDURE — 36415 COLL VENOUS BLD VENIPUNCTURE: CPT

## 2022-11-12 PROCEDURE — 81001 URINALYSIS AUTO W/SCOPE: CPT

## 2022-11-12 PROCEDURE — 85025 COMPLETE CBC W/AUTO DIFF WBC: CPT

## 2022-11-12 PROCEDURE — 99211 OFF/OP EST MAY X REQ PHY/QHP: CPT

## 2022-11-12 PROCEDURE — 81003 URINALYSIS AUTO W/O SCOPE: CPT

## 2022-11-12 PROCEDURE — 87088 URINE BACTERIA CULTURE: CPT

## 2022-11-12 PROCEDURE — 2580000003 HC RX 258

## 2022-11-12 RX ORDER — 0.9 % SODIUM CHLORIDE 0.9 %
1000 INTRAVENOUS SOLUTION INTRAVENOUS ONCE
Status: COMPLETED | OUTPATIENT
Start: 2022-11-12 | End: 2022-11-12

## 2022-11-12 RX ADMIN — SODIUM CHLORIDE 1000 ML: 9 INJECTION, SOLUTION INTRAVENOUS at 17:42

## 2022-11-12 ASSESSMENT — ENCOUNTER SYMPTOMS
CONSTIPATION: 0
WHEEZING: 0
BLOOD IN STOOL: 0
ABDOMINAL PAIN: 0
VOMITING: 0
SHORTNESS OF BREATH: 0
COUGH: 0
DIARRHEA: 0
BACK PAIN: 0
NAUSEA: 0
CHEST TIGHTNESS: 0

## 2022-11-12 ASSESSMENT — PAIN - FUNCTIONAL ASSESSMENT: PAIN_FUNCTIONAL_ASSESSMENT: NONE - DENIES PAIN

## 2022-11-12 ASSESSMENT — LIFESTYLE VARIABLES: HOW MANY STANDARD DRINKS CONTAINING ALCOHOL DO YOU HAVE ON A TYPICAL DAY: PATIENT DOES NOT DRINK

## 2022-11-12 NOTE — ED PROVIDER NOTES
[address]      Pt Name: Osmar Ernst  MRN: [id number]  [place] 1935  Date of evaluation: 11/12/2022      CHIEF COMPLAINT       Chief Complaint   Patient presents with    Urinary Retention     Pt. Reports feeling as if she has to pee, but unable to pee. Denies any burning. HPI  Osmar Ernst is a 81 y.o. female  with PMHx of COPD, hypertension, hyperlipidemia, diabetes presents with urine output and urinary retention. Patient states symptoms have been ongoing since this morning, states that she feels the urge to urinate and produce more than a few drops. Patient is also endorsing some abdominal pressure in the suprapubic region, but denies any significant pain at this time. Raine Parisjuliana Describes symptoms moderate in severity with no alleviating or exacerbating factors. Denies any fever, chills, n/v, headache, dizziness, vision changes, neck tenderness or stiffness, weakness, cp, palpitations, leg swelling/tenderness, sob, cough, hematuria, diarrhea, constipation. Except as noted above the remainder of the review of systems was reviewed and negative. Review of Systems   Constitutional:  Negative for activity change, appetite change, chills, fatigue and fever. HENT:  Negative for congestion, nosebleeds and tinnitus. Eyes:  Negative for visual disturbance. Respiratory:  Negative for cough, chest tightness, shortness of breath and wheezing. Cardiovascular:  Negative for chest pain, palpitations and leg swelling. Gastrointestinal:  Negative for abdominal pain, blood in stool, constipation, diarrhea, nausea and vomiting. Endocrine: Negative for polydipsia and polyphagia. Genitourinary:  Positive for difficulty urinating and pelvic pain. Negative for dysuria, flank pain, hematuria, vaginal bleeding, vaginal discharge and vaginal pain.    Musculoskeletal:  Negative for back pain, gait problem, joint swelling and myalgias. Skin:  Negative for rash. Allergic/Immunologic: Negative for immunocompromised state. Neurological:  Negative for dizziness, syncope, weakness, numbness and headaches. Hematological:  Negative for adenopathy. Psychiatric/Behavioral:  Negative for behavioral problems, confusion and hallucinations. Physical Exam  Vitals and nursing note reviewed. Exam conducted with a chaperone present. Constitutional:       General: She is not in acute distress. Appearance: Normal appearance. She is normal weight. She is not ill-appearing, toxic-appearing or diaphoretic. HENT:      Head: Normocephalic and atraumatic. Right Ear: External ear normal.      Left Ear: External ear normal.      Nose: Nose normal. No congestion or rhinorrhea. Mouth/Throat:      Mouth: Mucous membranes are moist.      Pharynx: Oropharynx is clear. No oropharyngeal exudate or posterior oropharyngeal erythema. Eyes:      Conjunctiva/sclera: Conjunctivae normal.      Pupils: Pupils are equal, round, and reactive to light. Cardiovascular:      Rate and Rhythm: Normal rate and regular rhythm. Pulses: Normal pulses. Heart sounds: Normal heart sounds. Pulmonary:      Effort: Pulmonary effort is normal.      Breath sounds: Normal breath sounds. Abdominal:      General: Abdomen is flat. Bowel sounds are normal. There is no distension. Palpations: Abdomen is soft. There is no mass. Tenderness: There is abdominal tenderness. There is no right CVA tenderness, left CVA tenderness or guarding. Hernia: No hernia is present. Musculoskeletal:      Cervical back: Normal range of motion. Skin:     General: Skin is warm and dry. Capillary Refill: Capillary refill takes less than 2 seconds. Neurological:      General: No focal deficit present. Mental Status: She is alert and oriented to person, place, and time. Mental status is at baseline.    Psychiatric:         Mood and Affect: Mood normal.         Behavior: Behavior normal.         Thought Content: Thought content normal.        Procedures     MDM         81 y.o. female  with PMHx of COPD, hypertension, hyperlipidemia, diabetes presents with urine output and urinary retention. While in the ED patient was initially hypotensive but otherwise hemodynamically stable, afebrile, nontoxic-appearing, in no respiratory distress. Physical exam remarkable for slight abdominal tenderness in the suprapubic region, abdomen is soft, nondistended with positive bowel sounds. Working diagnoses includes constipation, UTI, urinary retention. Labs unremarkable, with normal kidney function, no leukocytosis, no evidence of UTI on urinalysis. Patient was given 500 cc of fluids with significant improvement to her blood pressure. She ambulated remaining with no extra oxygen requirement. Vail was placed patient had 600 cc removed prior to discharge. She was advised to follow-up with urologist and a referral was given. Patient understands to return to the ED in case of worsening symptoms. ED Course as of 11/12/22 1955   Sat Nov 12, 2022   1655   ATTENDING PROVIDER ATTESTATION:     I have personally performed and/or participated in the history, exam, medical decision making, and procedures and agree with all pertinent clinical information unless otherwise noted. I have also reviewed and agree with the past medical, family and social history unless otherwise noted. I have discussed this patient in detail with the resident and provided the instruction and education regarding the evidence-based evaluation and treatment of urinary retention. Any EKG that may have been performed has been personally reviewed by me and I agree with the documentation as noted by the resident. History: Patient reports that she has been having difficulty urinating. When she goes to urinate only a small amount comes out. She denies any discomfort.   Denies any fevers or chills. No prior history of urinary tension. Denies any constipation or diarrhea. Denies any abdominal pain or distention. Symptoms have been persistent since last night. My findings: Collins Toro is a 81 y.o. female whom is in no distress. Physical exam reveals patient sitting in chair comfortably. Heart regular rate and rhythm. Lungs clear to auscultation. Abdomen is soft nontender. Mild suprapubic distention noted. Mild tenderness. No rebound or guarding. My plan: Symptomatic and supportive care. Appropriate labs and imaging. Electronically signed by Yessica Gaviria DO on 22 at 4:55 PM EST      [MS]      ED Course User Index  [MS] Yessica Gaviria DO       --------------------------------------------- PAST HISTORY ---------------------------------------------  Past Medical History:  has a past medical history of COPD (chronic obstructive pulmonary disease) ([facility] [address] Hyperlipidemia, Hypertension, Osteoarthritis, and Type II or unspecified type diabetes mellitus without mention of complication, not stated as uncontrolled. Past Surgical History:  has a past surgical history that includes Appendectomy (); tumor removal (); Colonoscopy (); and  section ( ,). Social History:  reports that she quit smoking about 22 years ago. She has a 9.00 pack-year smoking history. She has never used smokeless tobacco. She reports that she does not drink alcohol and does not use drugs. Family History: family history includes Diabetes in her mother; Heart Disease in her father and mother; High Blood Pressure in her father and mother; Stroke in her mother. The patients home medications have been reviewed.     Allergies: Pcn [penicillins] and Sulfa antibiotics    -------------------------------------------------- RESULTS -------------------------------------------------  Labs:  Results for orders placed or performed during the hospital encounter of 22 Urinalysis with Microscopic   Result Value Ref Range    Color, UA Straw Straw/Yellow    Clarity, UA Clear Clear    Glucose, Ur Negative Negative mg/dL    Bilirubin Urine Negative Negative    Ketones, Urine Negative Negative mg/dL    Specific Gravity, UA <=1.005 1.005 - 1.030    Blood, Urine Negative Negative    pH, UA 6.0 5.0 - 9.0    Protein, UA Negative Negative mg/dL    Urobilinogen, Urine 0.2 <2.0 E.U./dL    Nitrite, Urine Negative Negative    Leukocyte Esterase, Urine Negative Negative    WBC, UA NONE 0 - 5 /HPF    RBC, UA NONE 0 - 2 /HPF    Epithelial Cells, UA RARE /HPF    Bacteria, UA NONE SEEN None Seen /HPF   BMP   Result Value Ref Range    Sodium 137 132 - 146 mmol/L    Potassium 4.5 3.5 - 5.0 mmol/L    Chloride 103 98 - 107 mmol/L    CO2 24 22 - 29 mmol/L    Anion Gap 10 7 - 16 mmol/L    Glucose 122 (H) 74 - 99 mg/dL    BUN 28 (H) 6 - 23 mg/dL    Creatinine 1.0 0.5 - 1.0 mg/dL    Est, Glom Filt Rate 55 >=60 mL/min/1.73    Calcium 9.8 8.6 - 10.2 mg/dL   CBC with Auto Differential   Result Value Ref Range    WBC 5.0 4.5 - 11.5 E9/L    RBC 3.44 (L) 3.50 - 5.50 E12/L    Hemoglobin 10.7 (L) 11.5 - 15.5 g/dL    Hematocrit 33.7 (L) 34.0 - 48.0 %    MCV 98.0 80.0 - 99.9 fL    MCH 31.1 26.0 - 35.0 pg    MCHC 31.8 (L) 32.0 - 34.5 %    RDW 13.6 11.5 - 15.0 fL    Platelets 767 063 - 387 E9/L    MPV 10.9 7.0 - 12.0 fL    Neutrophils % 63.3 43.0 - 80.0 %    Immature Granulocytes % 0.4 0.0 - 5.0 %    Lymphocytes % 26.1 20.0 - 42.0 %    Monocytes % 8.8 2.0 - 12.0 %    Eosinophils % 1.2 0.0 - 6.0 %    Basophils % 0.2 0.0 - 2.0 %    Neutrophils Absolute 3.18 1.80 - 7.30 E9/L    Immature Granulocytes # 0.02 E9/L    Lymphocytes Absolute 1.31 (L) 1.50 - 4.00 E9/L    Monocytes Absolute 0.44 0.10 - 0.95 E9/L    Eosinophils Absolute 0.06 0.05 - 0.50 E9/L    Basophils Absolute 0.01 0.00 - 0.20 E9/L       Radiology:  No orders to display       ------------------------- NURSING NOTES AND VITALS REVIEWED ---------------------------  Date / Time Roomed:  11/12/2022  3:19 PM  ED Bed Assignment:  SALAS/SALAS    The nursing notes within the ED encounter and vital signs as below have been reviewed. /81   Pulse 97   Temp 98.6 °F (37 °C) (Infrared)   Resp 22   LMP  (LMP Unknown)   SpO2 92%   Oxygen Saturation Interpretation: Normal      ------------------------------------------ PROGRESS NOTES ------------------------------------------  7:55 PM EST  I have spoken with the patient and discussed todays results, in addition to providing specific details for the plan of care and counseling regarding the diagnosis and prognosis. Their questions are answered at this time and they are agreeable with the plan. I discussed at length with them reasons for immediate return here for re evaluation. They will followup with their  urologist and primary care physician by calling their office on Monday.      --------------------------------- ADDITIONAL PROVIDER NOTES ---------------------------------  At this time the patient is without objective evidence of an acute process requiring hospitalization or inpatient management. They have remained hemodynamically stable throughout their entire ED visit and are stable for discharge with outpatient follow-up. The plan has been discussed in detail and they are aware of the specific conditions for emergent return, as well as the importance of follow-up. Discharge Medication List as of 11/12/2022  6:44 PM          Diagnosis:  1. Acute urinary retention        Disposition:  Patient's disposition: Discharge to home  Patient's condition is stable.          Le Rob MD  Resident  11/12/22 4798

## 2022-11-12 NOTE — ED NOTES
Discussed with the patient and all questioned fully answered. She will call me if any problems arise. Pt discharged with sapp cath, 600 ml out before discharge.      Maria Saez RN  11/12/22 9541

## 2022-11-12 NOTE — ED PROVIDER NOTES
HPI:  22, Time: 12:19 PM MIHAI Vicente is a 81 y.o. female presenting to the ED for urinary urgency, decreased urine output, beginning this morning. The complaint has been persistent, moderate in severity, and worsened by nothing. Patient denies any back pain, abdominal pain, fever. She is tolerating oral intake without any difficulty. No dysuria or hematuria. No recent travel or sick contacts. Patient denies all other symptoms at this time. Review of Systems:   A complete review of systems was performed and pertinent positives and negatives are stated within HPI, all other systems reviewed and are negative.          --------------------------------------------- PAST HISTORY ---------------------------------------------  Past Medical History:  has a past medical history of COPD (chronic obstructive pulmonary disease) ([facility] [address] Hyperlipidemia, Hypertension, Osteoarthritis, and Type II or unspecified type diabetes mellitus without mention of complication, not stated as uncontrolled. Past Surgical History:  has a past surgical history that includes Appendectomy (); tumor removal (); Colonoscopy (); and  section ( ,). Social History:  reports that she quit smoking about 22 years ago. She has a 9.00 pack-year smoking history. She has never used smokeless tobacco. She reports that she does not drink alcohol and does not use drugs. Family History: family history includes Diabetes in her mother; Heart Disease in her father and mother; High Blood Pressure in her father and mother; Stroke in her mother. The patients home medications have been reviewed.     Allergies: Pcn [penicillins] and Sulfa antibiotics    -------------------------------------------------- RESULTS -------------------------------------------------  All laboratory and radiology results have been personally reviewed by myself   LABS:  Results for orders placed or performed during the hospital encounter of 11/12/22   Urinalysis   Result Value Ref Range    Color, UA Yellow Straw/Yellow    Clarity, UA Clear Clear    Glucose, Ur Negative Negative mg/dL    Bilirubin Urine Negative Negative    Ketones, Urine Negative Negative mg/dL    Specific Gravity, UA 1.010 1.005 - 1.030    Blood, Urine Negative Negative    pH, UA 5.0 5.0 - 9.0    Protein, UA Negative Negative mg/dL    Urobilinogen, Urine 0.2 <2.0 E.U./dL    Nitrite, Urine Negative Negative    Leukocyte Esterase, Urine Negative Negative       RADIOLOGY:  Interpreted by Radiologist.  No orders to display       ------------------------- NURSING NOTES AND VITALS REVIEWED ---------------------------   The nursing notes within the ED encounter and vital signs as below have been reviewed. BP (!) 121/53   Pulse 86   Temp 98.6 °F (37 °C)   Resp 20   Wt 151 lb (68.5 kg)   LMP  (LMP Unknown)   SpO2 100%   BMI 24.37 kg/m²   Oxygen Saturation Interpretation: Normal      ---------------------------------------------------PHYSICAL EXAM--------------------------------------      Constitutional/General: Alert and oriented x3, well appearing, non toxic in NAD  Head: Normocephalic and atraumatic  Eyes: PERRL, EOMI  Mouth: Oropharynx clear, handling secretions, no trismus  Neck: Supple, full ROM,   Pulmonary: Lungs clear to auscultation bilaterally, no wheezes, rales, or rhonchi. Not in respiratory distress  Cardiovascular:  Regular rate and rhythm, no murmurs, gallops, or rubs. 2+ distal pulses  Abdomen: Soft, suprapubic tenderness to palpation, non distended, no CVA tenderness bilaterally  Extremities: Moves all extremities x 4.  Warm and well perfused  Skin: warm and dry without rash  Neurologic: GCS 15,  Psych: Normal Affect      ------------------------------ ED COURSE/MEDICAL DECISION MAKING----------------------  Medications - No data to display      ED COURSE:  ED Course as of 11/12/22 1348   Sat Nov 12, 2022   1251 Patient has been unable to completely empty her bladder since last night. Starting to get suprapubic pressure and pain. Urine sample was obtained however this was a very small sample and does not have evidence of urinary tract infection. Recommended ED evaluation for further management and likely Vail catheter placement. [MS]      ED Course User Index  [MS] Ken Radford       Medical Decision Making:    See ED course above. Counseling: The emergency provider has spoken with the family member patient and daughter and discussed todays results, in addition to providing specific details for the plan of care and counseling regarding the diagnosis and prognosis. Questions are answered at this time and they are agreeable with the plan.      --------------------------------- IMPRESSION AND DISPOSITION ---------------------------------    IMPRESSION  1. Urinary retention        DISPOSITION  Disposition: Discharge to home  Patient condition is stable      NOTE: This report was transcribed using voice recognition software.  Every effort was made to ensure accuracy; however, inadvertent computerized transcription errors may be present        Ken Radford  11/12/22 3497

## 2022-11-14 LAB
URINE CULTURE, ROUTINE: NORMAL
URINE CULTURE, ROUTINE: NORMAL

## 2022-11-15 ENCOUNTER — APPOINTMENT (OUTPATIENT)
Dept: GENERAL RADIOLOGY | Age: 87
DRG: 291 | End: 2022-11-15
Payer: MEDICARE

## 2022-11-15 ENCOUNTER — APPOINTMENT (OUTPATIENT)
Dept: INTERVENTIONAL RADIOLOGY/VASCULAR | Age: 87
DRG: 291 | End: 2022-11-15
Payer: MEDICARE

## 2022-11-15 ENCOUNTER — HOSPITAL ENCOUNTER (INPATIENT)
Age: 87
LOS: 8 days | Discharge: ANOTHER HOSPITAL | DRG: 291 | End: 2022-11-23
Attending: EMERGENCY MEDICINE | Admitting: INTERNAL MEDICINE
Payer: MEDICARE

## 2022-11-15 ENCOUNTER — APPOINTMENT (OUTPATIENT)
Dept: CT IMAGING | Age: 87
DRG: 291 | End: 2022-11-15
Payer: MEDICARE

## 2022-11-15 DIAGNOSIS — J44.1 COPD EXACERBATION (HCC): ICD-10-CM

## 2022-11-15 DIAGNOSIS — R33.9 URINARY RETENTION: ICD-10-CM

## 2022-11-15 DIAGNOSIS — J95.811 POSTPROCEDURAL PNEUMOTHORAX: ICD-10-CM

## 2022-11-15 DIAGNOSIS — I50.9 ACUTE ON CHRONIC CONGESTIVE HEART FAILURE, UNSPECIFIED HEART FAILURE TYPE (HCC): ICD-10-CM

## 2022-11-15 DIAGNOSIS — J90 BILATERAL PLEURAL EFFUSION: Primary | ICD-10-CM

## 2022-11-15 DIAGNOSIS — I51.7 CARDIOMEGALY: ICD-10-CM

## 2022-11-15 LAB
ANION GAP SERPL CALCULATED.3IONS-SCNC: 14 MMOL/L (ref 7–16)
BUN BLDV-MCNC: 26 MG/DL (ref 6–23)
CALCIUM SERPL-MCNC: 9.3 MG/DL (ref 8.6–10.2)
CHLORIDE BLD-SCNC: 105 MMOL/L (ref 98–107)
CO2: 20 MMOL/L (ref 22–29)
CREAT SERPL-MCNC: 1 MG/DL (ref 0.5–1)
D DIMER: 854 NG/ML DDU
GFR SERPL CREATININE-BSD FRML MDRD: 55 ML/MIN/1.73
GLUCOSE BLD-MCNC: 124 MG/DL (ref 74–99)
HCT VFR BLD CALC: 32.1 % (ref 34–48)
HEMOGLOBIN: 10.2 G/DL (ref 11.5–15.5)
INFLUENZA A BY PCR: NOT DETECTED
INFLUENZA B BY PCR: NOT DETECTED
LACTATE DEHYDROGENASE: 222 U/L (ref 135–214)
LV EF: 20 %
LVEF MODALITY: NORMAL
MCH RBC QN AUTO: 31 PG (ref 26–35)
MCHC RBC AUTO-ENTMCNC: 31.8 % (ref 32–34.5)
MCV RBC AUTO: 97.6 FL (ref 80–99.9)
METER GLUCOSE: 133 MG/DL (ref 74–99)
METER GLUCOSE: 133 MG/DL (ref 74–99)
PDW BLD-RTO: 14 FL (ref 11.5–15)
PLATELET # BLD: 183 E9/L (ref 130–450)
PMV BLD AUTO: 11.3 FL (ref 7–12)
POTASSIUM SERPL-SCNC: 3.9 MMOL/L (ref 3.5–5)
PRO-BNP: ABNORMAL PG/ML (ref 0–450)
RBC # BLD: 3.29 E12/L (ref 3.5–5.5)
SARS-COV-2, NAAT: NOT DETECTED
SODIUM BLD-SCNC: 139 MMOL/L (ref 132–146)
TROPONIN, HIGH SENSITIVITY: 44 NG/L (ref 0–9)
TROPONIN, HIGH SENSITIVITY: 47 NG/L (ref 0–9)
WBC # BLD: 5.1 E9/L (ref 4.5–11.5)

## 2022-11-15 PROCEDURE — 6370000000 HC RX 637 (ALT 250 FOR IP): Performed by: INTERNAL MEDICINE

## 2022-11-15 PROCEDURE — 99223 1ST HOSP IP/OBS HIGH 75: CPT | Performed by: INTERNAL MEDICINE

## 2022-11-15 PROCEDURE — 1200000000 HC SEMI PRIVATE

## 2022-11-15 PROCEDURE — 99285 EMERGENCY DEPT VISIT HI MDM: CPT

## 2022-11-15 PROCEDURE — 85027 COMPLETE CBC AUTOMATED: CPT

## 2022-11-15 PROCEDURE — 6360000004 HC RX CONTRAST MEDICATION: Performed by: RADIOLOGY

## 2022-11-15 PROCEDURE — 94640 AIRWAY INHALATION TREATMENT: CPT

## 2022-11-15 PROCEDURE — 2060000000 HC ICU INTERMEDIATE R&B

## 2022-11-15 PROCEDURE — 93306 TTE W/DOPPLER COMPLETE: CPT

## 2022-11-15 PROCEDURE — 36415 COLL VENOUS BLD VENIPUNCTURE: CPT

## 2022-11-15 PROCEDURE — 85378 FIBRIN DEGRADE SEMIQUANT: CPT

## 2022-11-15 PROCEDURE — 71275 CT ANGIOGRAPHY CHEST: CPT

## 2022-11-15 PROCEDURE — 89051 BODY FLUID CELL COUNT: CPT

## 2022-11-15 PROCEDURE — 87635 SARS-COV-2 COVID-19 AMP PRB: CPT

## 2022-11-15 PROCEDURE — 0W993ZZ DRAINAGE OF RIGHT PLEURAL CAVITY, PERCUTANEOUS APPROACH: ICD-10-PCS | Performed by: INTERNAL MEDICINE

## 2022-11-15 PROCEDURE — 93005 ELECTROCARDIOGRAM TRACING: CPT | Performed by: EMERGENCY MEDICINE

## 2022-11-15 PROCEDURE — 83880 ASSAY OF NATRIURETIC PEPTIDE: CPT

## 2022-11-15 PROCEDURE — 84484 ASSAY OF TROPONIN QUANT: CPT

## 2022-11-15 PROCEDURE — 80048 BASIC METABOLIC PNL TOTAL CA: CPT

## 2022-11-15 PROCEDURE — 2580000003 HC RX 258: Performed by: INTERNAL MEDICINE

## 2022-11-15 PROCEDURE — 87502 INFLUENZA DNA AMP PROBE: CPT

## 2022-11-15 PROCEDURE — 94664 DEMO&/EVAL PT USE INHALER: CPT

## 2022-11-15 PROCEDURE — 6370000000 HC RX 637 (ALT 250 FOR IP): Performed by: EMERGENCY MEDICINE

## 2022-11-15 PROCEDURE — 83615 LACTATE (LD) (LDH) ENZYME: CPT

## 2022-11-15 PROCEDURE — 82962 GLUCOSE BLOOD TEST: CPT

## 2022-11-15 PROCEDURE — 71046 X-RAY EXAM CHEST 2 VIEWS: CPT

## 2022-11-15 PROCEDURE — 6360000002 HC RX W HCPCS: Performed by: EMERGENCY MEDICINE

## 2022-11-15 PROCEDURE — 96374 THER/PROPH/DIAG INJ IV PUSH: CPT

## 2022-11-15 RX ORDER — METOPROLOL SUCCINATE 50 MG/1
50 TABLET, EXTENDED RELEASE ORAL DAILY
Status: DISCONTINUED | OUTPATIENT
Start: 2022-11-15 | End: 2022-11-20

## 2022-11-15 RX ORDER — TRIAMTERENE AND HYDROCHLOROTHIAZIDE 37.5; 25 MG/1; MG/1
1 CAPSULE ORAL DAILY
Status: DISCONTINUED | OUTPATIENT
Start: 2022-11-16 | End: 2022-11-15 | Stop reason: CLARIF

## 2022-11-15 RX ORDER — ACETAMINOPHEN 325 MG/1
650 TABLET ORAL EVERY 6 HOURS PRN
Status: DISCONTINUED | OUTPATIENT
Start: 2022-11-15 | End: 2022-11-23 | Stop reason: HOSPADM

## 2022-11-15 RX ORDER — IPRATROPIUM BROMIDE AND ALBUTEROL SULFATE 2.5; .5 MG/3ML; MG/3ML
1 SOLUTION RESPIRATORY (INHALATION) 2 TIMES DAILY
Status: DISCONTINUED | OUTPATIENT
Start: 2022-11-15 | End: 2022-11-23 | Stop reason: HOSPADM

## 2022-11-15 RX ORDER — SODIUM CHLORIDE 0.9 % (FLUSH) 0.9 %
10 SYRINGE (ML) INJECTION PRN
Status: DISCONTINUED | OUTPATIENT
Start: 2022-11-15 | End: 2022-11-23 | Stop reason: HOSPADM

## 2022-11-15 RX ORDER — SODIUM CHLORIDE 0.9 % (FLUSH) 0.9 %
5-40 SYRINGE (ML) INJECTION PRN
Status: DISCONTINUED | OUTPATIENT
Start: 2022-11-15 | End: 2022-11-23 | Stop reason: HOSPADM

## 2022-11-15 RX ORDER — ASPIRIN 81 MG/1
81 TABLET ORAL DAILY
Status: DISCONTINUED | OUTPATIENT
Start: 2022-11-15 | End: 2022-11-23 | Stop reason: HOSPADM

## 2022-11-15 RX ORDER — PREGABALIN 25 MG/1
25 CAPSULE ORAL 2 TIMES DAILY
Status: DISCONTINUED | OUTPATIENT
Start: 2022-11-15 | End: 2022-11-23 | Stop reason: HOSPADM

## 2022-11-15 RX ORDER — ACETAMINOPHEN 650 MG/1
650 SUPPOSITORY RECTAL EVERY 6 HOURS PRN
Status: DISCONTINUED | OUTPATIENT
Start: 2022-11-15 | End: 2022-11-23 | Stop reason: HOSPADM

## 2022-11-15 RX ORDER — FUROSEMIDE 10 MG/ML
40 INJECTION INTRAMUSCULAR; INTRAVENOUS ONCE
Status: COMPLETED | OUTPATIENT
Start: 2022-11-15 | End: 2022-11-15

## 2022-11-15 RX ORDER — IPRATROPIUM BROMIDE AND ALBUTEROL SULFATE 2.5; .5 MG/3ML; MG/3ML
3 SOLUTION RESPIRATORY (INHALATION) ONCE
Status: COMPLETED | OUTPATIENT
Start: 2022-11-15 | End: 2022-11-15

## 2022-11-15 RX ORDER — LOSARTAN POTASSIUM 50 MG/1
50 TABLET ORAL DAILY
Status: DISCONTINUED | OUTPATIENT
Start: 2022-11-16 | End: 2022-11-20

## 2022-11-15 RX ORDER — DEXTROSE MONOHYDRATE 100 MG/ML
INJECTION, SOLUTION INTRAVENOUS CONTINUOUS PRN
Status: DISCONTINUED | OUTPATIENT
Start: 2022-11-15 | End: 2022-11-23 | Stop reason: HOSPADM

## 2022-11-15 RX ORDER — INSULIN LISPRO 100 [IU]/ML
0-4 INJECTION, SOLUTION INTRAVENOUS; SUBCUTANEOUS NIGHTLY
Status: DISCONTINUED | OUTPATIENT
Start: 2022-11-15 | End: 2022-11-23 | Stop reason: HOSPADM

## 2022-11-15 RX ORDER — AMLODIPINE BESYLATE 5 MG/1
5 TABLET ORAL DAILY
Status: DISCONTINUED | OUTPATIENT
Start: 2022-11-16 | End: 2022-11-16

## 2022-11-15 RX ORDER — ONDANSETRON 4 MG/1
4 TABLET, ORALLY DISINTEGRATING ORAL EVERY 8 HOURS PRN
Status: DISCONTINUED | OUTPATIENT
Start: 2022-11-15 | End: 2022-11-23 | Stop reason: HOSPADM

## 2022-11-15 RX ORDER — ONDANSETRON 2 MG/ML
4 INJECTION INTRAMUSCULAR; INTRAVENOUS EVERY 6 HOURS PRN
Status: DISCONTINUED | OUTPATIENT
Start: 2022-11-15 | End: 2022-11-23 | Stop reason: HOSPADM

## 2022-11-15 RX ORDER — SODIUM CHLORIDE 9 MG/ML
INJECTION, SOLUTION INTRAVENOUS PRN
Status: DISCONTINUED | OUTPATIENT
Start: 2022-11-15 | End: 2022-11-23 | Stop reason: HOSPADM

## 2022-11-15 RX ORDER — POLYETHYLENE GLYCOL 3350 17 G/17G
17 POWDER, FOR SOLUTION ORAL DAILY PRN
Status: DISCONTINUED | OUTPATIENT
Start: 2022-11-15 | End: 2022-11-23 | Stop reason: HOSPADM

## 2022-11-15 RX ORDER — ENOXAPARIN SODIUM 100 MG/ML
40 INJECTION SUBCUTANEOUS DAILY
Status: DISCONTINUED | OUTPATIENT
Start: 2022-11-15 | End: 2022-11-23 | Stop reason: HOSPADM

## 2022-11-15 RX ORDER — TRIAMTERENE AND HYDROCHLOROTHIAZIDE 37.5; 25 MG/1; MG/1
1 TABLET ORAL DAILY
Status: DISCONTINUED | OUTPATIENT
Start: 2022-11-16 | End: 2022-11-16

## 2022-11-15 RX ORDER — INSULIN LISPRO 100 [IU]/ML
0-4 INJECTION, SOLUTION INTRAVENOUS; SUBCUTANEOUS
Status: DISCONTINUED | OUTPATIENT
Start: 2022-11-15 | End: 2022-11-23 | Stop reason: HOSPADM

## 2022-11-15 RX ORDER — ATORVASTATIN CALCIUM 20 MG/1
20 TABLET, FILM COATED ORAL DAILY
Status: DISCONTINUED | OUTPATIENT
Start: 2022-11-15 | End: 2022-11-18

## 2022-11-15 RX ORDER — SODIUM CHLORIDE 0.9 % (FLUSH) 0.9 %
5-40 SYRINGE (ML) INJECTION EVERY 12 HOURS SCHEDULED
Status: DISCONTINUED | OUTPATIENT
Start: 2022-11-15 | End: 2022-11-23 | Stop reason: HOSPADM

## 2022-11-15 RX ADMIN — FUROSEMIDE 40 MG: 10 INJECTION INTRAMUSCULAR; INTRAVENOUS at 14:24

## 2022-11-15 RX ADMIN — PREGABALIN 25 MG: 25 CAPSULE ORAL at 20:19

## 2022-11-15 RX ADMIN — IPRATROPIUM BROMIDE AND ALBUTEROL SULFATE 3 AMPULE: .5; 2.5 SOLUTION RESPIRATORY (INHALATION) at 10:29

## 2022-11-15 RX ADMIN — SODIUM CHLORIDE, PRESERVATIVE FREE 10 ML: 5 INJECTION INTRAVENOUS at 20:19

## 2022-11-15 RX ADMIN — ACETAMINOPHEN 650 MG: 325 TABLET ORAL at 20:19

## 2022-11-15 RX ADMIN — IPRATROPIUM BROMIDE AND ALBUTEROL SULFATE 1 AMPULE: .5; 2.5 SOLUTION RESPIRATORY (INHALATION) at 16:46

## 2022-11-15 RX ADMIN — IOPAMIDOL 75 ML: 755 INJECTION, SOLUTION INTRAVENOUS at 11:49

## 2022-11-15 RX ADMIN — ATORVASTATIN CALCIUM 20 MG: 20 TABLET, FILM COATED ORAL at 18:14

## 2022-11-15 ASSESSMENT — PAIN SCALES - GENERAL
PAINLEVEL_OUTOF10: 0
PAINLEVEL_OUTOF10: 2
PAINLEVEL_OUTOF10: 0

## 2022-11-15 ASSESSMENT — PAIN DESCRIPTION - DESCRIPTORS: DESCRIPTORS: ACHING;DULL

## 2022-11-15 ASSESSMENT — ENCOUNTER SYMPTOMS
EYE DISCHARGE: 0
SPUTUM PRODUCTION: 0
SHORTNESS OF BREATH: 1
SINUS PRESSURE: 0
NAUSEA: 0
VOMITING: 0
DIARRHEA: 0
ABDOMINAL PAIN: 0
BACK PAIN: 0
EYE REDNESS: 0
WHEEZING: 0
SORE THROAT: 0
EYE PAIN: 0
ABDOMINAL DISTENTION: 0
COUGH: 0

## 2022-11-15 ASSESSMENT — PAIN DESCRIPTION - LOCATION: LOCATION: SHOULDER

## 2022-11-15 ASSESSMENT — PAIN DESCRIPTION - ORIENTATION: ORIENTATION: LEFT

## 2022-11-15 NOTE — ED NOTES
Dr. Sharmin Teresa aware of patient's blood pressure. Transfer order placed.       Laz Posey RN  11/15/22 2931

## 2022-11-15 NOTE — ACP (ADVANCE CARE PLANNING)
Advance Care Planning     Advance Care Planning Activator (Inpatient)  Conversation Note      Date of ACP Conversation: 11/15/2022     Conversation Conducted with: Patient with Decision Making Capacity    ACP Activator: Landa Thomas, [facility] Decision Maker:     Current Designated Health Care Decision Maker:     Primary Decision Maker: Geraldo Ormond - Spouse - [phone]    Secondary Decision Maker: Salena Corrales Child - [phone]    Secondary Decision Maker: Marta Marcos - Child - [phone]  Click here to complete Healthcare Decision Makers including section of the Healthcare Decision Maker Relationship (ie \"Primary\")  Today we documented Decision Maker(s) consistent with Legal Next of Kin hierarchy. Care Preferences    Ventilation: \"If you were in your present state of health and suddenly became very ill and were unable to breathe on your own, what would your preference be about the use of a ventilator (breathing machine) if it were available to you? \"      Would the patient desire the use of ventilator (breathing machine)?:  Undecided. SW informed pt that until she decides, she will be default full code. \"If your health worsens and it becomes clear that your chance of recovery is unlikely, what would your preference be about the use of a ventilator (breathing machine) if it were available to you? \"     Would the patient desire the use of ventilator (breathing machine)?: Undecided. INGRID informed pt that until she decides, she will be default full code. Resuscitation  \"CPR works best to restart the heart when there is a sudden event, like a heart attack, in someone who is otherwise healthy. Unfortunately, CPR does not typically restart the heart for people who have serious health conditions or who are very sick. \"    \"In the event your heart stopped as a result of an underlying serious health condition, would you want attempts to be made to restart your heart (answer \"yes\" for attempt to resuscitate) or would you prefer a natural death (answer \"no\" for do not attempt to resuscitate)? \"    Undecided. SW informed pt that until she decides, she will be default full code.        [] Yes   [x] No   Educated Patient / Decision Maker regarding differences between Advance Directives and portable DNR orders.     Length of ACP Conversation in minutes: 11     Conversation Outcomes:  [x] ACP discussion completed  [] Existing advance directive reviewed with patient; no changes to patient's previously recorded wishes  [] New Advance Directive completed  [] Portable Do Not Rescitate prepared for Provider review and signature  [] POLST/POST/MOLST/MOST prepared for Provider review and signature      Follow-up plan:    [] Schedule follow-up conversation to continue planning  [x] Referred individual to Provider for additional questions/concerns   [] Advised patient/agent/surrogate to review completed ACP document and update if needed with changes in condition, patient preferences or care setting    [x] This note routed to one or more involved healthcare providers

## 2022-11-15 NOTE — H&P
[facility] History and Physical      CHIEF COMPLAINT:  shortness of breath    History of Present Illness: This is a 81year old female with past medical history of copd, former smoker, HTN, HLD, DM-II, and urinary retention presents with shortness of breath. Patient states that she called her daughter as she could not breath. EMS were called. Patient does have copd and was former smoker although denies wheezing, coughing, or increased sputum production. She does not see pulmonology nor cardiology. Patient states that she normally has some swelling of her B/L LE ankles. She has never had heart failure workup done. She has never had a thoracentesis in the past. Patient does not use oxygen at home. She normally is not on any diuretics. Patient did present to the ER on 11/12 for urinary retention. At that time she was discharged with sapp and outpatient urology follow up. I believe her appointment with urology was today which she missed. In the ER she received lasix 40 mg IVP times 1 and duoneb breathing treatment. Informant(s) for H&P: Patient, EMR    REVIEW OF SYSTEMS:  A comprehensive review of systems was negative except for: what is in the HPI      PMH:  Past Medical History:   Diagnosis Date    COPD (chronic obstructive pulmonary disease) ([facility] [address]     Hyperlipidemia     Hypertension     Osteoarthritis     Type II or unspecified type diabetes mellitus without mention of complication, not stated as uncontrolled        Surgical History:  Past Surgical History:   Procedure Laterality Date    [address] [facility] [place]  2003    [address]    under lt arm       Medications Prior to Admission:    Prior to Admission medications    Medication Sig Start Date End Date Taking?  Authorizing Provider   albuterol-ipratropium (COMBIVENT RESPIMAT)  MCG/ACT AERS inhaler Inhale 1 puff into the lungs 2 times daily 11/8/22   Ova Rang Yudith David MD   amLODIPine (NORVASC) 5 MG tablet Take 1 tablet by mouth daily 11/8/22   Aaron Renee MD   atorvastatin (LIPITOR) 20 MG tablet Take 1 tablet by mouth daily 11/8/22   Aaron Renee MD   losartan (COZAAR) 50 MG tablet Take 1 tablet by mouth daily 11/8/22   Aaron Renee MD   metFORMIN (GLUCOPHAGE) 500 MG tablet Take 1 tablet by mouth 2 times daily (with meals) 11/8/22   Aaron Renee MD   metoprolol succinate (TOPROL XL) 50 MG extended release tablet Take 1 tablet by mouth daily 11/8/22   Aaron Renee MD   pregabalin (LYRICA) 25 MG capsule Take 1 capsule by mouth 2 times daily for 60 days.  11/8/22 1/7/23  Aaron Renee MD   triamterene-hydroCHLOROthiazide (DYAZIDE) 37.5-25 MG per capsule Take 1 capsule by mouth daily 11/8/22   Aaron Renee MD   blood glucose test strips (ASCENSIA AUTODISC VI;ONE TOUCH ULTRA TEST VI) strip 1 each by Does not apply route 2 times daily 1/18/22   Aaron Renee MD   Cholecalciferol (VITAMIN D3) 50 MCG (2000 UT) CAPS Take by mouth    Historical Provider, MD   ascorbic acid (VITAMIN C) 250 MG tablet Take 250 mg by mouth daily    Historical Provider, MD   vitamin B-12 (CYANOCOBALAMIN) 1000 MCG tablet Take 1,000 mcg by mouth daily    Historical Provider, MD   Lancets MISC 1 each by Does not apply route 2 times daily 11/13/19   Aaron Renee MD   timolol (TIMOPTIC) 0.5 % ophthalmic solution INSTILL 1 DROP INTO EACH EYE TWICE DAILY 7/30/19   Historical Provider, MD   latanoprost (XALATAN) 0.005 % ophthalmic solution INSTILL 1 DROP INTO EACH EYE ONCE DAILY AT BEDTIME 7/30/19   Historical Provider, MD   brimonidine (ALPHAGAN) 0.2 % ophthalmic solution INSTILL 1 DROP INTO LEFT EYE TWICE DAILY 7/30/19   Historical Provider, MD   Blood Glucose Monitoring Suppl DELTA 1 Units by Does not apply route 2 times daily Please give whatever insurance covers 8/8/17 Edi Perrin MD   magnesium (MAGNESIUM-OXIDE) 250 MG TABS tablet Take 250 mg by mouth daily    Historical Provider, MD   Multiple Vitamin (ONE-A-DAY ESSENTIAL PO) Take 1 tablet by mouth. Historical Provider, MD   calcium carbonate-vitamin D 600-200 MG-UNIT TABS Take 1 tablet by mouth daily. Historical Provider, MD   [facility] Natural Products (OSTEO BI-FLEX ADV JOINT SHIELD PO) Take 2 tablets by mouth daily. Historical Provider, MD   timolol (BETIMOL) 0.5 % ophthalmic solution Place 1 drop into both eyes daily. Historical Provider, MD   aspirin 81 MG EC tablet Take 81 mg by mouth daily. Historical Provider, MD       Allergies:    Pcn [penicillins] and Sulfa antibiotics    Social History:    reports that she quit smoking about 22 years ago. She has a 9.00 pack-year smoking history. She has never used smokeless tobacco. She reports that she does not drink alcohol and does not use drugs. Family History:   family history includes Diabetes in her mother; Heart Disease in her father and mother; High Blood Pressure in her father and mother; Stroke in her mother.        PHYSICAL EXAM:  Vitals:  BP (!) 81/52   Pulse 86   Temp 98.3 °F (36.8 °C)   Resp 14   LMP  (LMP Unknown)   SpO2 90%     General Appearance: alert and oriented to person, place and time and in no acute distress  Skin: warm and dry  Head: normocephalic and atraumatic  Eyes: pupils equal, round, and reactive to light, extraocular eye movements intact, conjunctivae normal  Neck: neck supple and non tender without mass   Pulmonary/Chest: clear to auscultation bilaterally- no wheezes, rales or rhonchi, normal air movement, no respiratory distress  Cardiovascular: normal rate, normal S1 and S2 and no carotid bruits  Abdomen: soft, non-tender, non-distended, normal bowel sounds, no masses or organomegaly  Extremities: no cyanosis, no clubbing and B/L LE 1 + pitting edema  Neurologic: no cranial nerve deficit and speech normal        LABS:  Recent Labs     11/12/22  1540 11/15/22  1021    139   K 4.5 3.9    105   CO2 24 20*   BUN 28* 26*   CREATININE 1.0 1.0   GLUCOSE 122* 124*   CALCIUM 9.8 9.3       Recent Labs     11/12/22  1540 11/15/22  1021   WBC 5.0 5.1   RBC 3.44* 3.29*   HGB 10.7* 10.2*   HCT 33.7* 32.1*   MCV 98.0 97.6   MCH 31.1 31.0   MCHC 31.8* 31.8*   RDW 13.6 14.0    183   MPV 10.9 11.3       No results for input(s): POCGLU in the last 72 hours. Radiology:   CTA PULMONARY W CONTRAST   Final Result   1. There is no pulmonary embolus. 2. Moderate size right pleural effusion with compression atelectasis of the   right lower lobe   3. Small left pleural effusion   4. Emphysematous changes. 5. Cardiomegaly with findings of mild vascular congestion         XR CHEST (2 VW)   Final Result   1. Cardiomegaly with findings of mild vascular congestion   2. Small right pleural effusion and trace left pleural effusion             ASSESSMENT:      Active Problems:    * No active hospital problems. *  Resolved Problems:    * No resolved hospital problems. *      PLAN:    Hypotension - Patient does have hypertension and is on cozaar, dyazide, and toprol. Holding parameters have been placed. Will hold off on IVF at this time due to pulmonary vascular congestion. Patient states that her blood pressure at home is usually around 118/60. May need to adjust blood pressure medications upon discharge. Acute hypoxic respiratory failure 2/2 right pleural effusion - Currently on 4 L NC and does not use oxygen at home. Moderate right pleural effusion - Plan for diagnostic and therapeutic thoracentesis. Thoracentesis panel ordered. Will follow up with labs, cytology, and culture. Will also order echo. Urinary retention with indwelling sapp - Maintain sapp and urology consultation is appreciated  Normocytic anemia - Will check iron studies. Transfuse if hemoglobin is less than 7.    DM-II - At home patient is on metformin. Will hold and start insulin SS  HLD - Continue lipitor  COPD - Stable on combivent. Does not see pulmonology as outpatient  Former smoker  DVT prophylaxis - Lovenox    PT/OT    NOTE: This report was transcribed using voice recognition software. Every effort was made to ensure accuracy; however, inadvertent computerized transcription errors may be present.   Electronically signed by Gurwinder Toscano DO on 11/15/2022 at 2:29 PM

## 2022-11-15 NOTE — ED NOTES
Pt ambulated on RA. HR 94, O2 90%. Dr Donald Mccallum aware.       Vee Calix, RN  11/15/22 José Luis Rucker, RN  11/15/22 2678

## 2022-11-15 NOTE — ED NOTES
Pt found to be sleeping in bed, O2 at 87%. Placed on 4L NC, now 91%.       Corinne Billingsley, RN  11/15/22 2421

## 2022-11-15 NOTE — CARE COORDINATION
SS Note: Covid test negative. Pt presented for SOB. Pt ambulated on RA -O2 90%. Pt dx w/Hypotension. SW met w/pt in ED 01 for transition of care planning. Spoke from door wearing mask/PPE and explained role. Pt's Dtr, Kristi Kaur @ bedside and pt gives permission to speak openly. Pt is  & lives w/spouse. They have 6 children and they help if needed- Neither pt or spouse drive & [employer]lyn typically transports. Pt has new sapp catheter and was to see urologist today. PTA, pt is independent in IADL's/ADL's and has insurance. [facility]CP [facility]. Pt has following DME:  cane, glucometer & she uses inhaler. Denies hx of [facility] or [facility] & No hx of 02. SW completed ACP w/pt. Pt plans on returning home. No needs identified.   Electronically signed by REUBEN Pfeiffer on 11/15/2022 at 3:52 PM

## 2022-11-15 NOTE — ED PROVIDER NOTES
Patient presents today with several days of shortness of breath. History of COPD, on home oxygen. The history is provided by the patient. Shortness of Breath  Severity:  Mild  Onset quality:  Gradual  Duration:  3 days  Timing:  Constant  Progression:  Worsening  Chronicity:  New  Relieved by:  None tried  Worsened by:  Nothing  Ineffective treatments:  None tried  Associated symptoms: no abdominal pain, no chest pain, no cough, no fever, no headaches, no rash, no sore throat, no sputum production, no vomiting and no wheezing       Review of Systems   Constitutional:  Negative for chills and fever. HENT:  Negative for sinus pressure and sore throat. Eyes:  Negative for pain, discharge and redness. Respiratory:  Positive for shortness of breath. Negative for cough, sputum production and wheezing. Cardiovascular:  Negative for chest pain. Gastrointestinal:  Negative for abdominal distention, abdominal pain, diarrhea, nausea and vomiting. Genitourinary:  Negative for dysuria and frequency. Musculoskeletal:  Negative for arthralgias and back pain. Skin:  Negative for rash and wound. Neurological:  Negative for weakness and headaches. Hematological:  Negative for adenopathy. All other systems reviewed and are negative. Physical Exam  Vitals and nursing note reviewed. Constitutional:       Appearance: She is well-developed. HENT:      Head: Normocephalic and atraumatic. Eyes:      Pupils: Pupils are equal, round, and reactive to light. Cardiovascular:      Rate and Rhythm: Normal rate and regular rhythm. Heart sounds: Normal heart sounds. No murmur heard. Pulmonary:      Effort: Pulmonary effort is normal. No respiratory distress. Breath sounds: Decreased breath sounds present. No wheezing or rales. Abdominal:      General: Bowel sounds are normal.      Palpations: Abdomen is soft. Tenderness: There is no abdominal tenderness. There is no guarding or rebound. Musculoskeletal:      Cervical back: Normal range of motion and neck supple. Right lower leg: No tenderness. No edema. Left lower leg: No tenderness. No edema. Skin:     General: Skin is warm and dry. Neurological:      Mental Status: She is alert and oriented to person, place, and time. Cranial Nerves: No cranial nerve deficit. Coordination: Coordination normal.        Procedures     MDM       EKG: This EKG is signed and interpreted by me. Rate: 84  Rhythm: Sinus  Interpretation: no acute changes, non-specific EKG, and old anterior infarct  Comparison: no previous EKG    2:19 PM EST  Discussed with Dr. Taty Allen for the hospitalist service. They will admit.         --------------------------------------------- PAST HISTORY ---------------------------------------------  Past Medical History:  has a past medical history of COPD (chronic obstructive pulmonary disease) ([facility] [address] Hyperlipidemia, Hypertension, Osteoarthritis, and Type II or unspecified type diabetes mellitus without mention of complication, not stated as uncontrolled. Past Surgical History:  has a past surgical history that includes Appendectomy (); tumor removal (); Colonoscopy (); and  section ( ,). Social History:  reports that she quit smoking about 22 years ago. She has a 9.00 pack-year smoking history. She has never used smokeless tobacco. She reports that she does not drink alcohol and does not use drugs. Family History: family history includes Diabetes in her mother; Heart Disease in her father and mother; High Blood Pressure in her father and mother; Stroke in her mother. The patients home medications have been reviewed.     Allergies: Pcn [penicillins] and Sulfa antibiotics    -------------------------------------------------- RESULTS -------------------------------------------------    LABS:  Results for orders placed or performed during the hospital encounter of 11/15/22 COVID-19, Rapid    Specimen: Nasopharyngeal Swab   Result Value Ref Range    SARS-CoV-2, NAAT Not Detected Not Detected   Rapid influenza A/B antigens    Specimen: Nasopharyngeal   Result Value Ref Range    Influenza A by PCR Not Detected Not Detected    Influenza B by PCR Not Detected Not Detected   Basic metabolic panel   Result Value Ref Range    Sodium 139 132 - 146 mmol/L    Potassium 3.9 3.5 - 5.0 mmol/L    Chloride 105 98 - 107 mmol/L    CO2 20 (L) 22 - 29 mmol/L    Anion Gap 14 7 - 16 mmol/L    Glucose 124 (H) 74 - 99 mg/dL    BUN 26 (H) 6 - 23 mg/dL    Creatinine 1.0 0.5 - 1.0 mg/dL    Est, Glom Filt Rate 55 >=60 mL/min/1.73    Calcium 9.3 8.6 - 10.2 mg/dL   CBC   Result Value Ref Range    WBC 5.1 4.5 - 11.5 E9/L    RBC 3.29 (L) 3.50 - 5.50 E12/L    Hemoglobin 10.2 (L) 11.5 - 15.5 g/dL    Hematocrit 32.1 (L) 34.0 - 48.0 %    MCV 97.6 80.0 - 99.9 fL    MCH 31.0 26.0 - 35.0 pg    MCHC 31.8 (L) 32.0 - 34.5 %    RDW 14.0 11.5 - 15.0 fL    Platelets 952 499 - 529 E9/L    MPV 11.3 7.0 - 12.0 fL   Troponin   Result Value Ref Range    Troponin, High Sensitivity 47 (H) 0 - 9 ng/L   D-Dimer, Quantitative   Result Value Ref Range    D-Dimer, Quant 854 ng/mL DDU   EKG 12 Lead   Result Value Ref Range    Ventricular Rate 84 BPM    Atrial Rate 84 BPM    P-R Interval 152 ms    QRS Duration 100 ms    Q-T Interval 400 ms    QTc Calculation (Bazett) 472 ms    P Axis 54 degrees    R Axis -18 degrees    T Axis 112 degrees       RADIOLOGY:  CTA PULMONARY W CONTRAST   Final Result   1. There is no pulmonary embolus. 2. Moderate size right pleural effusion with compression atelectasis of the   right lower lobe   3. Small left pleural effusion   4. Emphysematous changes. 5. Cardiomegaly with findings of mild vascular congestion         XR CHEST (2 VW)   Final Result   1. Cardiomegaly with findings of mild vascular congestion   2.  Small right pleural effusion and trace left pleural effusion ------------------------- NURSING NOTES AND VITALS REVIEWED ---------------------------  Date / Time Roomed:  11/15/2022  9:23 AM  ED Bed Assignment:  01/01    The nursing notes within the ED encounter and vital signs as below have been reviewed. Patient Vitals for the past 24 hrs:   BP Temp Pulse Resp SpO2   11/15/22 1121 90/61 -- 86 16 93 %   11/15/22 1031 -- -- 81 -- 99 %   11/15/22 1030 (!) 93/52 -- 81 -- 99 %   11/15/22 1029 -- -- 82 -- 99 %   11/15/22 0935 -- 98.3 °F (36.8 °C) -- 16 92 %   11/15/22 0934 99/61 -- 84 -- 91 %       Oxygen Saturation Interpretation: Normal    ------------------------------------------ PROGRESS NOTES ------------------------------------------  Re-evaluation(s):  Time: 9677  Patients symptoms show no change  Repeat physical examination is not changed    Counseling:  I have spoken with the patient and discussed todays results, in addition to providing specific details for the plan of care and counseling regarding the diagnosis and prognosis. Their questions are answered at this time and they are agreeable with the plan of admission.    --------------------------------- ADDITIONAL PROVIDER NOTES ---------------------------------  Consultations:  Time: 5904. Spoke with Dr. Walter Gilmore. Discussed case. They will admit the patient. This patient's ED course included: a personal history and physicial examination, multiple bedside re-evaluations, IV medications, cardiac monitoring, and continuous pulse oximetry    This patient has remained hemodynamically stable during their ED course. Diagnosis:  1. Bilateral pleural effusion    2. COPD exacerbation ([address]    3. Urinary retention    4. Cardiomegaly    5. Acute on chronic congestive heart failure, unspecified heart failure type ([facility] [address]        Disposition:  Patient's disposition: Admit to telemetry  Patient's condition is stable.            Jaylen Barrier, DO  11/15/22 1423

## 2022-11-16 ENCOUNTER — APPOINTMENT (OUTPATIENT)
Dept: GENERAL RADIOLOGY | Age: 87
DRG: 291 | End: 2022-11-16
Payer: MEDICARE

## 2022-11-16 ENCOUNTER — APPOINTMENT (OUTPATIENT)
Dept: INTERVENTIONAL RADIOLOGY/VASCULAR | Age: 87
DRG: 291 | End: 2022-11-16
Payer: MEDICARE

## 2022-11-16 LAB
ALBUMIN SERPL-MCNC: 3.8 G/DL (ref 3.5–5.2)
ALP BLD-CCNC: 96 U/L (ref 35–104)
ALT SERPL-CCNC: 27 U/L (ref 0–32)
ANION GAP SERPL CALCULATED.3IONS-SCNC: 11 MMOL/L (ref 7–16)
APPEARANCE FLUID: CLEAR
AST SERPL-CCNC: 39 U/L (ref 0–31)
BILIRUB SERPL-MCNC: 0.8 MG/DL (ref 0–1.2)
BUN BLDV-MCNC: 23 MG/DL (ref 6–23)
CALCIUM SERPL-MCNC: 9.8 MG/DL (ref 8.6–10.2)
CELL COUNT FLUID TYPE: NORMAL
CHLORIDE BLD-SCNC: 102 MMOL/L (ref 98–107)
CO2: 24 MMOL/L (ref 22–29)
COLOR FLUID: YELLOW
COMMENT: ABNORMAL
CREAT SERPL-MCNC: 1 MG/DL (ref 0.5–1)
CRITICAL: ABNORMAL
CRITICAL: NORMAL
DATE ANALYZED: ABNORMAL
DATE ANALYZED: NORMAL
DATE OF COLLECTION: ABNORMAL
DATE OF COLLECTION: NORMAL
EKG ATRIAL RATE: 84 BPM
EKG P AXIS: 54 DEGREES
EKG P-R INTERVAL: 152 MS
EKG Q-T INTERVAL: 400 MS
EKG QRS DURATION: 100 MS
EKG QTC CALCULATION (BAZETT): 472 MS
EKG R AXIS: -18 DEGREES
EKG T AXIS: 112 DEGREES
EKG VENTRICULAR RATE: 84 BPM
FLUID TYPE: NORMAL
GFR SERPL CREATININE-BSD FRML MDRD: 55 ML/MIN/1.73
GLUCOSE BLD-MCNC: 117 MG/DL (ref 74–99)
HCT VFR BLD CALC: 35.3 % (ref 34–48)
HEMOGLOBIN: 11.2 G/DL (ref 11.5–15.5)
INR BLD: 1.1
LAB: ABNORMAL
LAB: NORMAL
LD, FLUID: 65 U/L
Lab: ABNORMAL
Lab: NORMAL
Lab: NORMAL
MCH RBC QN AUTO: 30.9 PG (ref 26–35)
MCHC RBC AUTO-ENTMCNC: 31.7 % (ref 32–34.5)
MCV RBC AUTO: 97.5 FL (ref 80–99.9)
METER GLUCOSE: 123 MG/DL (ref 74–99)
METER GLUCOSE: 125 MG/DL (ref 74–99)
METER GLUCOSE: 141 MG/DL (ref 74–99)
METER GLUCOSE: 156 MG/DL (ref 74–99)
MODE: ABNORMAL
MONOCYTE, FLUID: 100 %
NEUTROPHIL, FLUID: 0 %
NUCLEATED CELLS FLUID: 102 /UL
OPERATOR ID: 8214
OPERATOR ID: 8214
PATIENT TEMP: 37 C
PDW BLD-RTO: 14 FL (ref 11.5–15)
PH BLOOD GAS: 7.52 (ref 7.35–7.45)
PH FLUID: 7.52
PLATELET # BLD: 188 E9/L (ref 130–450)
PMV BLD AUTO: 10.7 FL (ref 7–12)
POTASSIUM SERPL-SCNC: 4.4 MMOL/L (ref 3.5–5)
PROTEIN FLUID: 1.8 G/DL
PROTHROMBIN TIME: 12.9 SEC (ref 9.3–12.4)
RBC # BLD: 3.62 E12/L (ref 3.5–5.5)
RBC FLUID: <2000 /UL
SODIUM BLD-SCNC: 137 MMOL/L (ref 132–146)
SOURCE, BLOOD GAS: ABNORMAL
SOURCE, BLOOD GAS: NORMAL
TIME ANALYZED: 1312
TIME ANALYZED: 1312
TOTAL PROTEIN: 6.4 G/DL (ref 6.4–8.3)
WBC # BLD: 5.5 E9/L (ref 4.5–11.5)

## 2022-11-16 PROCEDURE — 2700000000 HC OXYGEN THERAPY PER DAY

## 2022-11-16 PROCEDURE — 99223 1ST HOSP IP/OBS HIGH 75: CPT | Performed by: INTERNAL MEDICINE

## 2022-11-16 PROCEDURE — 84157 ASSAY OF PROTEIN OTHER: CPT

## 2022-11-16 PROCEDURE — 71045 X-RAY EXAM CHEST 1 VIEW: CPT

## 2022-11-16 PROCEDURE — 87070 CULTURE OTHR SPECIMN AEROBIC: CPT

## 2022-11-16 PROCEDURE — C1729 CATH, DRAINAGE: HCPCS

## 2022-11-16 PROCEDURE — 83986 ASSAY PH BODY FLUID NOS: CPT

## 2022-11-16 PROCEDURE — 36415 COLL VENOUS BLD VENIPUNCTURE: CPT

## 2022-11-16 PROCEDURE — 2580000003 HC RX 258: Performed by: INTERNAL MEDICINE

## 2022-11-16 PROCEDURE — 6370000000 HC RX 637 (ALT 250 FOR IP): Performed by: INTERNAL MEDICINE

## 2022-11-16 PROCEDURE — 82962 GLUCOSE BLOOD TEST: CPT

## 2022-11-16 PROCEDURE — 82800 BLOOD PH: CPT

## 2022-11-16 PROCEDURE — 83615 LACTATE (LD) (LDH) ENZYME: CPT

## 2022-11-16 PROCEDURE — 94640 AIRWAY INHALATION TREATMENT: CPT

## 2022-11-16 PROCEDURE — 32555 ASPIRATE PLEURA W/ IMAGING: CPT

## 2022-11-16 PROCEDURE — 97161 PT EVAL LOW COMPLEX 20 MIN: CPT

## 2022-11-16 PROCEDURE — 97535 SELF CARE MNGMENT TRAINING: CPT

## 2022-11-16 PROCEDURE — 87205 SMEAR GRAM STAIN: CPT

## 2022-11-16 PROCEDURE — 88305 TISSUE EXAM BY PATHOLOGIST: CPT

## 2022-11-16 PROCEDURE — 97110 THERAPEUTIC EXERCISES: CPT

## 2022-11-16 PROCEDURE — 97165 OT EVAL LOW COMPLEX 30 MIN: CPT

## 2022-11-16 PROCEDURE — 2060000000 HC ICU INTERMEDIATE R&B

## 2022-11-16 PROCEDURE — 93010 ELECTROCARDIOGRAM REPORT: CPT | Performed by: INTERNAL MEDICINE

## 2022-11-16 PROCEDURE — 99233 SBSQ HOSP IP/OBS HIGH 50: CPT | Performed by: INTERNAL MEDICINE

## 2022-11-16 PROCEDURE — 80053 COMPREHEN METABOLIC PANEL: CPT

## 2022-11-16 PROCEDURE — 6360000002 HC RX W HCPCS: Performed by: INTERNAL MEDICINE

## 2022-11-16 PROCEDURE — 88112 CYTOPATH CELL ENHANCE TECH: CPT

## 2022-11-16 PROCEDURE — 85610 PROTHROMBIN TIME: CPT

## 2022-11-16 PROCEDURE — 85027 COMPLETE CBC AUTOMATED: CPT

## 2022-11-16 PROCEDURE — 1200000000 HC SEMI PRIVATE

## 2022-11-16 RX ORDER — TIMOLOL MALEATE 5 MG/ML
1 SOLUTION/ DROPS OPHTHALMIC 2 TIMES DAILY
Status: DISCONTINUED | OUTPATIENT
Start: 2022-11-16 | End: 2022-11-23 | Stop reason: HOSPADM

## 2022-11-16 RX ORDER — LATANOPROST 50 UG/ML
1 SOLUTION/ DROPS OPHTHALMIC NIGHTLY
Status: DISCONTINUED | OUTPATIENT
Start: 2022-11-16 | End: 2022-11-23 | Stop reason: HOSPADM

## 2022-11-16 RX ORDER — FUROSEMIDE 10 MG/ML
20 INJECTION INTRAMUSCULAR; INTRAVENOUS ONCE
Status: COMPLETED | OUTPATIENT
Start: 2022-11-16 | End: 2022-11-16

## 2022-11-16 RX ORDER — BRIMONIDINE TARTRATE 2 MG/ML
1 SOLUTION/ DROPS OPHTHALMIC 2 TIMES DAILY
Status: DISCONTINUED | OUTPATIENT
Start: 2022-11-16 | End: 2022-11-23 | Stop reason: HOSPADM

## 2022-11-16 RX ORDER — SPIRONOLACTONE 25 MG/1
12.5 TABLET ORAL DAILY
Status: DISCONTINUED | OUTPATIENT
Start: 2022-11-17 | End: 2022-11-22

## 2022-11-16 RX ADMIN — LATANOPROST 1 DROP: 50 SOLUTION OPHTHALMIC at 21:00

## 2022-11-16 RX ADMIN — SODIUM CHLORIDE, PRESERVATIVE FREE 10 ML: 5 INJECTION INTRAVENOUS at 10:19

## 2022-11-16 RX ADMIN — FUROSEMIDE 20 MG: 10 INJECTION, SOLUTION INTRAVENOUS at 14:22

## 2022-11-16 RX ADMIN — ATORVASTATIN CALCIUM 20 MG: 20 TABLET, FILM COATED ORAL at 10:18

## 2022-11-16 RX ADMIN — TIMOLOL MALEATE 1 DROP: 5 SOLUTION OPHTHALMIC at 21:00

## 2022-11-16 RX ADMIN — METOPROLOL SUCCINATE 50 MG: 50 TABLET, EXTENDED RELEASE ORAL at 10:18

## 2022-11-16 RX ADMIN — BRIMONIDINE TARTRATE 1 DROP: 2 SOLUTION OPHTHALMIC at 21:00

## 2022-11-16 RX ADMIN — IPRATROPIUM BROMIDE AND ALBUTEROL SULFATE 1 AMPULE: .5; 2.5 SOLUTION RESPIRATORY (INHALATION) at 06:09

## 2022-11-16 RX ADMIN — PREGABALIN 25 MG: 25 CAPSULE ORAL at 10:17

## 2022-11-16 RX ADMIN — SODIUM CHLORIDE, PRESERVATIVE FREE 10 ML: 5 INJECTION INTRAVENOUS at 21:00

## 2022-11-16 RX ADMIN — PREGABALIN 25 MG: 25 CAPSULE ORAL at 21:00

## 2022-11-16 ASSESSMENT — PAIN SCALES - GENERAL
PAINLEVEL_OUTOF10: 0

## 2022-11-16 NOTE — PROGRESS NOTES
Physical Therapy Initial Evaluation/Plan of Care    Room #:  9070/6647-28  Patient Name: Edilberto Cervantes  YOB: 1935  MRN: [id number]    Date of Service: 11/16/2022     Tentative placement recommendation: Home Health Physical Therapy   Equipment recommendation: [address]       Evaluating Physical Therapist: Moses Campos [facility] #[id number]      Specific Provider Orders/Date/Referring Provider :     11/15/22 1500    PT eval and treat  Start:  11/15/22 1500,   End:  11/15/22 1500,   ONE TIME,   Standing Count:  1 Occurrences,   R         Moira Astorga Born, DO     Admitting Diagnosis:   Cardiomegaly [I51.7]  Urinary retention [R33.9]  COPD exacerbation ([address] [J44.1]  Bilateral pleural effusion [J90]  Pleural effusion, right [J90]  Acute on chronic congestive heart failure, unspecified heart failure type ([address] [I50.9]      Surgery: none  Visit Diagnoses         Codes    Bilateral pleural effusion    -  Primary J90    COPD exacerbation ([address]     J44.1    Urinary retention     R33.9    Cardiomegaly     I51.7    Acute on chronic congestive heart failure, unspecified heart failure type ([address]     I50.9    Postprocedural pneumothorax     J95.811            Patient Active Problem List   Diagnosis    Type 2 diabetes mellitus without complication, without long-term current use of insulin (HCC)    Mixed hyperlipidemia    Essential hypertension    Osteoarthritis    RLS (restless legs syndrome)    Chronic obstructive pulmonary disease (HCC)    Diabetic polyneuropathy associated with diabetes mellitus due to underlying condition (HCC)    Bilateral pleural effusion        ASSESSMENT of Current Deficits Patient exhibits decreased strength, balance, and endurance impairing functional mobility, transfers, gait distance, and tolerance to activity Low blood pressure in the past and currently this hospital stay. Patient able to ambulate on 4L O2 and wheeled walker. Patient presented with shortness of breath, desat to 89%.   The patient will benefit from continued skilled therapy to increase strength and improve balance for safe functional mobility, to decrease risk of falls, and to meet goals at discharge. PHYSICAL THERAPY  PLAN OF CARE       Physical therapy plan of care is established based on physician order,  patient diagnosis and clinical assessment    Current Treatment Recommendations:    -Bed Mobility: Lower extremity exercises   -Sitting Balance: Incorporate reaching activities to activate trunk muscles   -Standing Balance: Perform strengthening exercises in standing to promote motor control with or without upper extremity support   -Transfers: Provide instruction on proper hand and foot position for adequate transfer of weight onto lower extremities and use of gait device if needed and Cues for hand placement, technique and safety. Provide stabilization to prevent fall   -Gait: Gait training and Standing activities to improve: base of support, weight shift, weight bearing    -Endurance: Utilize Supervised activities to increase level of endurance to allow for safe functional mobility including transfers and gait   -Stairs: Stair training with instruction on proper technique and hand placement on rail    PT long term treatment goals are located in below grid    Patient and or family understand(s) diagnosis, prognosis, and plan of care. Frequency of treatments: Patient will be seen  daily.          Prior Level of Function: Patient ambulated with cane   Rehab Potential: good  for baseline    Past medical history:   Past Medical History:   Diagnosis Date    COPD (chronic obstructive pulmonary disease) ([facility]     Hyperlipidemia     Hypertension     Osteoarthritis     Type II or unspecified type diabetes mellitus without mention of complication, not stated as uncontrolled      Past Surgical History:   Procedure Laterality Date    [facility]  2003    [address] under lt arm       SUBJECTIVE:    Precautions: Up as tolerated, falls and O2 ,     Social history: Patient lives with spouse in a two story home resides first  with 3 steps  to enter with Rail  Tub shower     Equipment owned: Cliff, [address], Bedside commode, and Shower chair,      Sabi   How much difficulty turning over in bed?: A Little  How much difficulty sitting down on / standing up from a chair with arms?: A Little  How much difficulty moving from lying on back to sitting on side of bed?: A Little  How much help from another person moving to and from a bed to a chair?: A Little  How much help from another person needed to walk in hospital room?: A Little  How much help from another person for climbing 3-5 steps with a railing?: A Lot  AM-PAC Inpatient Mobility Raw Score : 17  AM-PAC Inpatient T-Scale Score : 42.13  Mobility Inpatient CMS 0-100% Score: 50.57  Mobility Inpatient CMS G-Code Modifier : CK    Nursing cleared patient for PT evaluation. The admitting diagnosis and active problem list as listed above have been reviewed prior to the initiation of this evaluation. OBJECTIVE;   Initial Evaluation  Date: 11/16/2022 Treatment Date:     Short Term/ Long Term   Goals   Was pt agreeable to Eval/treatment? Yes  To be met in 4 days   Pain level   0/10       Bed Mobility    Rolling: Not assessed patient in chair    Supine to sit: Not assessed patient in chair    Sit to supine: Not assessed patient in chair    Scooting: Not assessed patient in chair    Rolling: Independent    Supine to sit:  Independent    Sit to supine: Independent    Scooting: Independent     Transfers Sit to stand: Minimal assist of 1 cues for hand placement   Sit to stand: Independent    Ambulation     75, 25 feet using  wheeled walker with Minimal assist of 1   for walker control, balance, upright, and O2 line management  and cues for upright posture and safety    150 feet using wheeled walker with Modified Independent    Stair negotiation: ascended and descended   Not assessed     3 steps with hr and supervision    ROM Within functional limits    Increase range of motion 10% of affected joints    Strength BUE:  refer to OT eval  RLE:  4-/5  LLE:  4-/5  Increase strength in affected mm groups by 1/3 grade   Balance Sitting EOB:  not assessed   Dynamic Standing:  fair wheeled walker    Sitting EOB:  good   Dynamic Standing: good      Patient is Alert & Oriented x person, place, time, and situation and follows directions    Sensation:  Patient  denies numbness/tingling   Edema:  no   Endurance: fair      Vitals:  4 liters nasal cannula   Blood Pressure at rest  Blood Pressure during session    Heart Rate at rest  Heart Rate during session    SPO2 at rest 97%  SPO2 during session 89-95%     Patient education  Patient educated on role of Physical Therapy, risks of immobility, safety and plan of care,  importance of mobility while in hospital , purse lip breathing, ankle pumps, quad set and glut set for edema control, blood clot prevention, importance and purpose of adaptive device and adjusted to proper height for the patient. , and safety      Patient response to education:   Pt verbalized understanding Pt demonstrated skill Pt requires further education in this area   Yes Partial Yes      Treatment:  Patient practiced and was instructed/facilitated in the following treatment: Patient in chair. Pt stood and ambulated out into hallway with 4L O2 and wheeled walker. Pt assisted up in chair, performed exercises. Transport present. Pt stood and ambulated to cart in hallway. Assisted onto transport cart. Therapeutic Exercises:  ankle pumps, straight leg raise, and long arc quad 2 x 15 reps. At end of session, patient in chair with alarm call light and phone within reach,  all lines and tubes intact, nursing notified.       Patient would benefit from continued skilled Physical Therapy to improve functional independence and quality of life. Patient's/ family goals   home    Time in  1157  Time out  1227    Total Treatment Time  10 minutes    Evaluation time includes thorough review of current medical information, gathering information on past medical history/social history and prior level of function, completion of standardized testing/informal observation of tasks, assessment of data, and development of Plan of care and goals.      CPT codes:  Low Complexity PT evaluation (05046)  Therapeutic exercises (60926)   10 minutes  1 unit(s)    Donte Velazquez PT

## 2022-11-16 NOTE — PROGRESS NOTES
[address]  [place]. [place]        Date:2022                                                  Patient Name: Brett Augustin    MRN: [id number]    : 1935    Room: [address] [place] [address]      Evaluating OT: Susan Price OTR/L #[id number]     Referring Provider and Specific Provider Orders/Date:      11/15/22 1500  OT eval and treat  Start:  11/15/22 1500,   End:  11/15/22 1500,   ONE TIME,   Standing Count:  1 Occurrences,   R         Jaspreet Deck, DO      Placement Recommendation: Home with Home Health OT if patient is able to meet goals       Diagnosis:   1. Bilateral pleural effusion    2. COPD exacerbation ([address]    3. Urinary retention    4. Cardiomegaly    5. Acute on chronic congestive heart failure, unspecified heart failure type ([address]    6.  Postprocedural pneumothorax           Surgery: None       Pertinent Medical History:       Past Medical History:   Diagnosis Date    COPD (chronic obstructive pulmonary disease) ([address]     Hyperlipidemia     Hypertension     Osteoarthritis     Type II or unspecified type diabetes mellitus without mention of complication, not stated as uncontrolled          Past Surgical History:   Procedure Laterality Date    [place] [address]     COLONOSCOPY      TUMOR REMOVAL      under lt arm        Precautions:  Fall Risk, up as tolerated, 4L O2 via nasal canula      Assessment of current deficits    [x] Functional mobility  [x]ADLs  [x] Strength               []Cognition    [x] Functional transfers   [x] IADLs         [x] Safety Awareness   [x]Endurance    [] Fine Coordination              [x] Balance      [] Vision/perception   []Sensation     []Gross Motor Coordination  [] ROM  [] Delirium                   [] Motor Control     OT PLAN OF CARE   OT POC based on physician orders, patient diagnosis and results of clinical assessment    Frequency/Duration 1-3 days/wk for 2 weeks PRN     Specific OT Treatment Interventions to include:   * Instruction/training on adapted ADL techniques and AE recommendations to increase functional independence within precautions       * Training on energy conservation strategies, correct breathing pattern and techniques to improve independence/tolerance for self-care routine  * Functional transfer/mobility training/DME recommendations for increased independence, safety, and fall prevention  * Patient/Family education to increase follow through with safety techniques and functional independence  * Recommendation of environmental modifications for increased safety with functional transfers/mobility and ADLs  * Therapeutic exercise to improve motor endurance, ROM, and functional strength for ADLs/functional transfers  * Therapeutic activities to facilitate/challenge dynamic balance, stand tolerance for increased safety and independence with ADLs  * Therapeutic activities to facilitate gross/fine motor skills for increased independence with ADLs    Recommended Adaptive Equipment: TBD      Home Living: Lives with spouse, single family home, 2 story but resides on 1st, 4 steps to enter with rail. Bathroom set-up: Tub/shower with grab bars, shower chair, standard commode with grab bars surrounding. Equipment owned: cane, wheeled walker, bedside commode    Prior Level of Function: Independent with ADLs , daughter assists with IADLs; ambulated independently using cane. Driving: No      Pain Level: Pt denied pain ; Nursing notified.       Cognition: A&O: 4/4; Follows 2-3 step directions   Memory: intact   Sequencing: intact   Problem solving: fair    Judgement/safety: fair     [facility]   AM-PAC Daily Activity Inpatient   How much help for putting on and taking off regular lower body clothing?: A Lot  How much help for Bathing?: A Lot  How much help for Toileting?: A Lot  How much help for putting on and taking off regular upper body clothing?: A Little  How much help for taking care of personal grooming?: A Little  How much help for eating meals?: A Little  AM-PAC Inpatient Daily Activity Raw Score: 15  AM-PAC Inpatient ADL T-Scale Score : 34.69  ADL Inpatient CMS 0-100% Score: 56.46  ADL Inpatient CMS G-Code Modifier : CK     Functional Assessment:    Initial Eval Status  Date: 11/16/22   Treatment Status  Date: STGs = LTGs  Time frame: 10-14 days   Feeding Supervision     Independent    Grooming Supervision   Set-up for face wash seated in chair     Moderate [place]    UB Dressing Minimal Assist   For gown mgmt to donning robe around back-side     Moderate [place]    LB Dressing Maximal Assist   To thread LEs through undergarments and don over hips upon standing supported with walker. Dependent to don socks and shoes while seated in chair. Moderate [place]    Bathing Moderate Assist   For UB/LB sponge bathing seated in chair. Moderate [place]    Toileting Maximal Assist   Pt has sapp catheter. Max A for rear hygiene. Moderate [place]    Bed Mobility  Supine to sit: Minimal Assist  Sit to supine: Not Assessed     Supine to sit: Independent   Sit to supine: Independent    Functional Transfers Sit to stand: Minimal Assist   Stand to sit: Minimal Assist     Transfer training with verbal cues for hand placement throughout session to improve safety. Independent   Functional Mobility Minimal Assist with wheeled walker to improve balance less than household distances, verbal cues for walker sequence and safety.      Moderate [place] with use of wheeled walker    Balance Sitting:     Static: fair plus    Dynamic: fair plus  Standing: fair with walker     Sitting:     Static: good    Dynamic: good  Standing: good    Activity Tolerance fair    Increase standing tolerance >3  minutes for improved engagement with functional transfers and indep in ADLs     Visual/  Perceptual Glasses: yes     L eye blind      NA      Hand Dominance: left      AROM (PROM) Strength Additional Info:  Goal:   RUE  WFL 3+/5 good  and wfl FMC/dexterity noted during ADL tasks   Improve overall RUE strength  for participation in functional tasks   LUE WFL 3+/5 good  and wfl FMC/dexterity noted during ADL tasks   Improve overall LUE strength  for participation in functional tasks     Hearing:  [facility]   Sensation:   No c/o numbness or tingling  Tone:  WFL   Edema:      Vitals:  HR at rest: 100 bpm HR with activity: 65 bpm HR at end of session:  bpm   SpO2 at rest: 95% SpO2 with activity: 96% SpO2 at end of session: %   BP at rest:  BP with activity:  BP at end of session:      Comments: RN cleared patient for OT. Upon arrival patient in supine. Therapist facilitated and instructed pt on adapted  techniques & compensatory strategies to improve safety and independence with basic ADLs, bed mobility, functional transfers and mobility to allow pt to achieve highest level of independence and safely. Pt demonstrated fair understanding of education & follow through. At end of session, patient was in chair, family at bedside, with call light and phone within reach, all lines and tubes intact. Overall, patient demonstrated  decreased independence and safety during completion of ADL tasks. Pt would benefit from continued skilled OT to increase safety and independence with completion of ADL tasks and functional mobility for improved quality of life. Treatment: OT treatment provided this date includes:   Instructions/training on safety, sequencing, and adapted techniques for completion of ADLs. Facilitated bed mobility with cues for proper body mechanics and sequencing to prepare for ADL completion.   Instruction/training on safe functional mobility/transfer techniques including hand and feet placement   Instruction/training on energy conservation/work simplification for completion of ADLs   Skilled monitoring of O2 sats - see section above     Rehab Potential: Good for established goals. Patient / Family Goal: not stated       Patient and/or family were instructed on functional diagnosis, prognosis/goals and OT plan of care. Demonstrated fair understanding. Eval Complexity: Low    Time In: 10:20 AM   Time Out: 11:05 AM    Total Treatment Time: 25       Min Units   OT Eval Low 97165  X  1    OT Eval Medium 06177      OT Eval High 39673      OT Re-Eval 33861            ADL/Self Care 80166 25 2   Therapeutic Activities 63318       Therapeutic Ex 29846       Orthotic Management 64439       Manual 09136     Neuro Re-Ed 71711       Non-Billable Time        Evaluation Time additionally includes thorough review of current medical information, gathering information on past medical history/social history and prior level of function, interpretation of standardized testing/informal observation of tasks, assessment of data and development of plan of care and goals.         Evaluating OT: Urszula Scott OTR/L #[id number]

## 2022-11-16 NOTE — CARE COORDINATION
11/16/2022 1430 CM for transition of care needs at d/c. Pt plan is for home with her . Pt has 6 children that are supportive and can assist when needed. Pt is independent with ADL's and doesn't drive, pt's  doesn't drive either. Pt's daughter Cherelle Hurtado provides transportation. Pt doesn't wear home O2 but is on 4 liters here, will need walk test to qualify. Pt had a right thoracentesis today in IR and 950 cc's was removed. Pt doesn't anticipate any home going needs. Family will provide transportation at d/c. CM will follow.  Electronically signed by Odette Grant RN on 11/16/2022 at 2:46 PM

## 2022-11-16 NOTE — CONSULTS
11/16/2022 10:01 AM  Noemí Bailey  [id number]     Chief Complaint:    Urinary retention      History of Present Illness: The patient is a 81 y.o. female patient who has a history of COPD and presented to the hospital with complaints of shortness of breath. She was admitted for right pleural effusion. A thoracentesis has been ordered. She was seen in the ER 4 days ago with complaints of urinary retention. A sapp catheter was inserted at that time for unknown immediate output. There are notes that she had 600cc total output in the time of her visit prior to discharge. She has never seen a Urologist in the past. She denies trouble urinating in the past. She denies incontinence. Past Medical History:   Diagnosis Date    COPD (chronic obstructive pulmonary disease) ([facility]     Hyperlipidemia     Hypertension     Osteoarthritis     Type II or unspecified type diabetes mellitus without mention of complication, not stated as uncontrolled          Past Surgical History:   Procedure Laterality Date    [facility]  2003    TUMOR REMOVAL  1954    under lt arm       Medications Prior to Admission:    Medications Prior to Admission: albuterol-ipratropium (COMBIVENT RESPIMAT)  MCG/ACT AERS inhaler, Inhale 1 puff into the lungs 2 times daily  amLODIPine (NORVASC) 5 MG tablet, Take 1 tablet by mouth daily  atorvastatin (LIPITOR) 20 MG tablet, Take 1 tablet by mouth daily  losartan (COZAAR) 50 MG tablet, Take 1 tablet by mouth daily  metFORMIN (GLUCOPHAGE) 500 MG tablet, Take 1 tablet by mouth 2 times daily (with meals)  metoprolol succinate (TOPROL XL) 50 MG extended release tablet, Take 1 tablet by mouth daily  pregabalin (LYRICA) 25 MG capsule, Take 1 capsule by mouth 2 times daily for 60 days.   triamterene-hydroCHLOROthiazide (DYAZIDE) 37.5-25 MG per capsule, Take 1 capsule by mouth daily  blood glucose test strips ([address] ULTRA TEST VI) strip, 1 each by Does not apply route 2 times daily  Cholecalciferol (VITAMIN D3) 50 MCG (2000 UT) CAPS, Take by mouth  ascorbic acid (VITAMIN C) 250 MG tablet, Take 250 mg by mouth daily  vitamin B-12 (CYANOCOBALAMIN) 1000 MCG tablet, Take 1,000 mcg by mouth daily  Lancets MISC, 1 each by Does not apply route 2 times daily  timolol (TIMOPTIC) 0.5 % ophthalmic solution, INSTILL 1 DROP INTO EACH EYE TWICE DAILY  latanoprost (XALATAN) 0.005 % ophthalmic solution, INSTILL 1 DROP INTO EACH EYE ONCE DAILY AT BEDTIME  brimonidine (ALPHAGAN) 0.2 % ophthalmic solution, INSTILL 1 DROP INTO LEFT EYE TWICE DAILY  Blood Glucose Monitoring Suppl DELTA, 1 Units by Does not apply route 2 times daily Please give whatever insurance covers  magnesium (MAGNESIUM-OXIDE) 250 MG TABS tablet, Take 250 mg by mouth daily  Multiple Vitamin (ONE-A-DAY ESSENTIAL PO), Take 1 tablet by mouth.    calcium carbonate-vitamin D 600-200 MG-UNIT TABS, Take 1 tablet by mouth daily. Misc Natural Products (OSTEO BI-FLEX ADV JOINT SHIELD PO), Take 2 tablets by mouth daily. timolol (BETIMOL) 0.5 % ophthalmic solution, Place 1 drop into both eyes daily. aspirin 81 MG EC tablet, Take 81 mg by mouth daily. Allergies:    Pcn [penicillins] and Sulfa antibiotics    Social History:    reports that she quit smoking about 22 years ago. She has a 9.00 pack-year smoking history. She has never used smokeless tobacco. She reports that she does not drink alcohol and does not use drugs. Family History:   Non-contributory to this Urological problem  family history includes Diabetes in her mother; Heart Disease in her father and mother; High Blood Pressure in her father and mother; Stroke in her mother.     Review of Systems:  Constitutional: No fever or chills   Respiratory: +shortness of breath   Cardiovascular: negative for chest pain and dyspnea  Gastrointestinal: negative for abdominal pain, diarrhea, nausea and vomiting   Derm: negative for rash and skin lesion(s)  Neurological: negative for seizures and tremors  Musculoskeletal: Negative    Psychiatric: Negative   : As above in the HPI, otherwise negative  All other reviews are negative    Physical Exam:     Vitals:  BP (!) 93/52   Pulse 97   Temp 98 °F (36.7 °C) (Oral)   Resp 16   Ht 5' 6\" (1.676 m)   Wt 155 lb 12.8 oz (70.7 kg)   LMP  (LMP Unknown)   SpO2 94%   BMI 25.15 kg/m²     General:  Awake, alert, oriented X 3. No apparent distress. HEENT:  Normocephalic, atraumatic. Lungs:  Respirations symmetric and non-labored. On nasal canula oxygen   Abdomen:  soft, nontender, no masses  Extremities:  No clubbing, cyanosis, or edema  Skin:  Warm and dry, no open lesions or rashes  Neuro:  There are no motor or sensory deficits in the 4 quadrant extremities   Rectal: deferred  Genitourinary:  sapp draining yellow urine     Labs:     Recent Labs     11/15/22  1021 11/16/22  0650   WBC 5.1 5.5   RBC 3.29* 3.62   HGB 10.2* 11.2*   HCT 32.1* 35.3   MCV 97.6 97.5   MCH 31.0 30.9   MCHC 31.8* 31.7*   RDW 14.0 14.0    188   MPV 11.3 10.7         Recent Labs     11/15/22  1021 11/16/22  0650   CREATININE 1.0 1.0       No results found for: PSA      Assessment/plan:  Urinary retention       Creatinine stable   Urine culture from 11/12 unremarkable   Antibiotics per primary   Sapp inserted in the ER 4 days ago for unknown output   Will plan on doing a voiding trial closer to discharge once the patient is more ambulatory, orders placed  Will follow peripherally in the meantime       Electronically signed by SUDHAKAR Grajeda CNP on 11/16/2022 at 10:01 AM  [facility]     Agree with above  Seen and examined  Agree with the plan and treatment  Her family is present   Will need out patient eval  VT when stable    [employer], DO

## 2022-11-16 NOTE — CONSULTS
CHIEF COMPLAINT: SOB/CHF    HISTORY OF PRESENT ILLNESS: Patient is a 81 y.o. female seen at the request of Cathy Pelayo MD and not followed by cardiology. Past medical history of COPD, former smoker, HTN, HLD,  and DM presenting with progressive SOB and volume overload. No CP or angina. Denies prior cardiac history.      Past Medical History:   Diagnosis Date    COPD (chronic obstructive pulmonary disease) ([address]     Hyperlipidemia     Hypertension     Osteoarthritis     Type II or unspecified type diabetes mellitus without mention of complication, not stated as uncontrolled        Patient Active Problem List   Diagnosis    Type 2 diabetes mellitus without complication, without long-term current use of insulin (HCC)    Mixed hyperlipidemia    Essential hypertension    Osteoarthritis    RLS (restless legs syndrome)    Chronic obstructive pulmonary disease (HCC)    Diabetic polyneuropathy associated with diabetes mellitus due to underlying condition (HCC)    Pleural effusion, right       Allergies   Allergen Reactions    Pcn [Penicillins] Hives    Sulfa Antibiotics Hives       Current Facility-Administered Medications   Medication Dose Route Frequency Provider Last Rate Last Admin    sodium chloride flush 0.9 % injection 10 mL  10 mL IntraVENous PRN Moira J [place], DO        amLODIPine (NORVASC) tablet 5 mg  5 mg Oral Daily Moira J [place]andalia, DO        aspirin EC tablet 81 mg  81 mg Oral Daily Moira J [place]andalia, DO        atorvastatin (LIPITOR) tablet 20 mg  20 mg Oral Daily Moira J Vandalia, DO   20 mg at 11/16/22 1018    losartan (COZAAR) tablet 50 mg  50 mg Oral Daily Moira J Esvin, DO        metoprolol succinate (TOPROL XL) extended release tablet 50 mg  50 mg Oral Daily Moira J Esvin, DO   50 mg at 11/16/22 1018    pregabalin (LYRICA) capsule 25 mg  25 mg Oral BID Moira J Esvin, DO   25 mg at 11/16/22 1017    sodium chloride flush 0.9 % injection 5-40 mL  5-40 mL IntraVENous 2 times per day Marcarol ann Gil, DO 10 mL at 11/16/22 1019    sodium chloride flush 0.9 % injection 5-40 mL  5-40 mL IntraVENous PRN Moira Mcallister, DO        0.9 % sodium chloride infusion   IntraVENous PRN Moira Mcallister, DO        [Held by provider] enoxaparin (LOVENOX) injection 40 mg  40 mg SubCUTAneous Daily Moira Mcallister DO        ondansetron (ZOFRAN-ODT) disintegrating tablet 4 mg  4 mg Oral Q8H PRN Moira Mcallister, DO        Or    ondansetron (ZOFRAN) injection 4 mg  4 mg IntraVENous Q6H PRN Moira Mcallister, DO        polyethylene glycol (GLYCOLAX) packet 17 g  17 g Oral Daily PRN Moira Mcallister, DO        acetaminophen (TYLENOL) tablet 650 mg  650 mg Oral Q6H PRN Philip Tang DO   650 mg at 11/15/22 2019    Or    acetaminophen (TYLENOL) suppository 650 mg  650 mg Rectal Q6H PRN Moira Mcallister, DO        glucose chewable tablet 16 g  4 tablet Oral PRN Moira Mcallister, DO        dextrose bolus 10% 125 mL  125 mL IntraVENous PRN Moira Mcallister, DO        Or    dextrose bolus 10% 250 mL  250 mL IntraVENous PRN Moira Mcallister, DO        glucagon (rDNA) injection 1 mg  1 mg SubCUTAneous PRN Moira Mcallister, DO        dextrose 10 % infusion   IntraVENous Continuous PRN Moira Mcallister, DO        insulin lispro (HUMALOG) injection vial 0-4 Units  0-4 Units SubCUTAneous TID WC Moira Mcallister, DO        insulin lispro (HUMALOG) injection vial 0-4 Units  0-4 Units SubCUTAneous Nightly Moira Mcallister DO        ipratropium-albuterol (DUONEB) nebulizer solution 1 ampule  1 ampule Inhalation BID Moira Mcallister DO   1 ampule at 11/16/22 0609    triamterene-hydroCHLOROthiazide (MAXZIDE-25) 37.5-25 MG per tablet 1 tablet  1 tablet Oral Daily Neha Verle Cogan, DO           Social History     Socioeconomic History    Marital status:      Spouse name: Not on file    Number of children: Not on file    Years of education: Not on file    Highest education level: Not on file   Occupational History    Not on file   Tobacco Use    Smoking status: Former     Packs/day: 1.00 Years: 9.00     Pack years: 9.00     Types: Cigarettes     Quit date: 2000     Years since quittin.1    Smokeless tobacco: Never   Vaping Use    Vaping Use: Never used   Substance and Sexual Activity    Alcohol use: No     Alcohol/week: 0.0 standard drinks    Drug use: No    Sexual activity: Never     Partners: Male   Other Topics Concern    Not on file   Social History Narrative    Not on file     Social Determinants of Health     Financial Resource Strain: Low Risk     Difficulty of Paying Living Expenses: Not hard at all   Food Insecurity: No Food Insecurity    Worried About Running Out of Food in the Last Year: Never true    Ran Out of Food in the Last Year: Never true   Transportation Needs: Not on file   Physical Activity: Not on file   Stress: Not on file   Social Connections: Not on file   Intimate Partner Violence: Not on file   Housing Stability: Not on file       Family History   Problem Relation Age of Onset    Heart Disease Mother     High Blood Pressure Mother     Stroke Mother     Diabetes Mother     Heart Disease Father     High Blood Pressure Father        Review of Systems:   Heart: as above   Lungs: as above   Eyes: denies changes in vision or discharge. Ears: denies changes in hearing or pain. Nose: denies epistaxis or masses   Throat: denies sore throat or trouble swallowing. Neuro: denies numbness, tingling, tremors. Skin: denies rashes or itching. : denies hematuria, dysuria   GI: denies vomiting, diarrhea   Psych: denies mood changed, anxiety, depression. all others negative. Physical Exam   BP (!) 93/52   Pulse 97   Temp 98 °F (36.7 °C) (Oral)   Resp 16   Ht 5' 6\" (1.676 m)   Wt 155 lb 12.8 oz (70.7 kg)   LMP  (LMP Unknown)   SpO2 94%   BMI 25.15 kg/m²   Constitutional: Oriented to person, place, and time. Well-developed and well-nourished. No distress. Head: Normocephalic and atraumatic.    Eyes: EOM are normal. Pupils are equal, round, and reactive to light.   Neck: Normal range of motion. Neck supple. No hepatojugular reflux and no JVD present. Carotid bruit is not present. No tracheal deviation present. No thyromegaly present. Cardiovascular: Normal rate, regular rhythm, normal heart sounds and intact distal pulses. Exam reveals no gallop and no friction rub. No murmur heard. Pulmonary/Chest: Effort normal and breath sounds normal. No respiratory distress. No wheezes. No rales. No tenderness. Abdominal: Soft. Bowel sounds are normal. No distension and no mass. No tenderness. No rebound and no guarding. Musculoskeletal: Normal range of motion. No edema and no tenderness. Lymphadenopathy:   No cervical adenopathy. No groin adenopathy. Neurological: Alert and oriented to person, place, and time. Skin: Skin is warm and dry. No rash noted. Not diaphoretic. No erythema. Psychiatric: Normal mood and affect.  Behavior is normal.     CBC:   Lab Results   Component Value Date/Time    WBC 5.5 11/16/2022 06:50 AM    RBC 3.62 11/16/2022 06:50 AM    HGB 11.2 11/16/2022 06:50 AM    HCT 35.3 11/16/2022 06:50 AM    MCV 97.5 11/16/2022 06:50 AM    MCH 30.9 11/16/2022 06:50 AM    MCHC 31.7 11/16/2022 06:50 AM    RDW 14.0 11/16/2022 06:50 AM     11/16/2022 06:50 AM    MPV 10.7 11/16/2022 06:50 AM     BMP:   Lab Results   Component Value Date/Time     11/16/2022 06:50 AM    K 4.4 11/16/2022 06:50 AM     11/16/2022 06:50 AM    CO2 24 11/16/2022 06:50 AM    BUN 23 11/16/2022 06:50 AM    LABALBU 3.8 11/16/2022 06:50 AM    LABALBU 4.6 04/05/2012 09:35 AM    CREATININE 1.0 11/16/2022 06:50 AM    CALCIUM 9.8 11/16/2022 06:50 AM    GFRAA >60 01/04/2022 09:48 AM    LABGLOM 55 11/16/2022 06:50 AM     Magnesium:  No results found for: MG  Cardiac Enzymes:   Lab Results   Component Value Date    TROPHS 44 (H) 11/15/2022    TROPHS 47 (H) 11/15/2022      PT/INR:    Lab Results   Component Value Date/Time    PROTIME 12.9 11/16/2022 06:50 AM    INR 1.1 11/16/2022 06:50 AM     TSH:    Lab Results   Component Value Date/Time    TSH 2.830 11/11/2015 12:00 PM     Rhythm Strip: normal sinus rhythm. EKG:  Sinus rhythm with PAC's and PVC's. Non-specific ST-T changes. Echo Summary 11/15/2022:   Ejection fraction is visually estimated at 20%. Overall ejection fraction severely decreased. Left ventricle is moderately enlarged. Normal right ventricle structure and function. Left atrial volume index of 68 ml per meters squared BSA. Physiologic and/or trace aortic regurgitation is noted. Mild to moderate mitral regurgitation is present. Mild tricuspid regurgitation. No evidence for hemodynamically significant pericardial effusion. ASSESSMENT AND PLAN:  Patient Active Problem List   Diagnosis    Type 2 diabetes mellitus without complication, without long-term current use of insulin (HCC)    Mixed hyperlipidemia    Essential hypertension    Osteoarthritis    RLS (restless legs syndrome)    Chronic obstructive pulmonary disease (HCC)    Diabetic polyneuropathy associated with diabetes mellitus due to underlying condition (HCC)    Pleural effusion, right     1. Acute systolic CHF:    Echo with severe LV dysfunction. Diurese with IV lasix. Ischemia evaluation. Start with pharm stress in am.     BB/ARB. Add aldactone. Stop norvasc and dyazide. 2. VHD: Trace AI/mild-mod MR/mild TR by 11/15/2022 echo. Observe. 3. COPD: Per primary service. 4. HTN: Observe. 5. Lipids: Statin. 6. DM: Per primary service. Keven Stanley D.O.   Cardiologist  Cardiology, [address] [place] [address]

## 2022-11-16 NOTE — PROGRESS NOTES
Patient came down to Special Procedures for ultrasound guided right thoracentesis. Procedure was explained, questions were answered. 1301 Starting procedure VS 88/59  89  20  96% on 4LPM by nasal cannula    1307 Ending procedure VS 88/59  90  20  97% on 4LPM by nasal cannula    950 cc of hazy yellow  color pleural fluid drained from patient, petrolatum dressing 2 x 2 and tegaderm applied to right back. Patient tolerated procedure    Post procedure chest xray taken    Respiratory came took specimen for [address]    Specimen sent to lab, RN notified to send labels to the lab    Nurse to nurse called spoke with Mario Alberto Elizabeth, nurse notified of above information    Patient transported back to floor.

## 2022-11-16 NOTE — PROGRESS NOTES
[facility] Progress Note    Admitting Date and Time: 11/15/2022  9:23 AM  Admit Dx: Cardiomegaly [I51.7]  Urinary retention [R33.9]  COPD exacerbation (HCC) [J44.1]  Bilateral pleural effusion [J90]  Pleural effusion, right [J90]  Acute on chronic congestive heart failure, unspecified heart failure type ([address] [I50.9]    Subjective:  Patient is being followed for Cardiomegaly [I51.7]  Urinary retention [R33.9]  COPD exacerbation (HCC) [J44.1]  Bilateral pleural effusion [J90]  Pleural effusion, right [J90]  Acute on chronic congestive heart failure, unspecified heart failure type ([address] [I50.9]     Results of the echocardiogram were explained to the daughter. And all questions were answered. ROS: denies fever, chills, cp, sob, n/v, HA unless stated above.       amLODIPine  5 mg Oral Daily    aspirin  81 mg Oral Daily    atorvastatin  20 mg Oral Daily    losartan  50 mg Oral Daily    metoprolol succinate  50 mg Oral Daily    pregabalin  25 mg Oral BID    sodium chloride flush  5-40 mL IntraVENous 2 times per day    enoxaparin  40 mg SubCUTAneous Daily    insulin lispro  0-4 Units SubCUTAneous TID WC    insulin lispro  0-4 Units SubCUTAneous Nightly    ipratropium-albuterol  1 ampule Inhalation BID    triamterene-hydroCHLOROthiazide  1 tablet Oral Daily     sodium chloride flush, 10 mL, PRN  sodium chloride flush, 5-40 mL, PRN  sodium chloride, , PRN  ondansetron, 4 mg, Q8H PRN   Or  ondansetron, 4 mg, Q6H PRN  polyethylene glycol, 17 g, Daily PRN  acetaminophen, 650 mg, Q6H PRN   Or  acetaminophen, 650 mg, Q6H PRN  glucose, 4 tablet, PRN  dextrose bolus, 125 mL, PRN   Or  dextrose bolus, 250 mL, PRN  glucagon (rDNA), 1 mg, PRN  dextrose, , Continuous PRN         Objective:    BP (!) 93/52   Pulse 97   Temp 98 °F (36.7 °C) (Oral)   Resp 16   Ht 5' 6\" (1.676 m)   Wt 155 lb 12.8 oz (70.7 kg)   LMP  (LMP Unknown)   SpO2 94%   BMI 25.15 kg/m²     General Appearance: alert and oriented to person, place and time and in no acute distress  Skin: warm and dry  Head: normocephalic and atraumatic  Eyes: pupils equal, round, and reactive to light, extraocular eye movements intact, conjunctivae normal  Neck: neck supple and non tender without mass   Pulmonary/Chest: clear to auscultation bilaterally- no wheezes, rales or rhonchi, normal air movement, no respiratory distress  Cardiovascular: normal rate, normal S1 and S2 and no carotid bruits  Abdomen: soft, non-tender, non-distended, normal bowel sounds, no masses or organomegaly  Extremities: no cyanosis, no clubbing and no edema  Neurologic: no cranial nerve deficit and speech normal        Recent Labs     11/15/22  1021 11/16/22  0650    137   K 3.9 4.4    102   CO2 20* 24   BUN 26* 23   CREATININE 1.0 1.0   GLUCOSE 124* 117*   CALCIUM 9.3 9.8       Recent Labs     11/15/22  1021 11/16/22  0650   WBC 5.1 5.5   RBC 3.29* 3.62   HGB 10.2* 11.2*   HCT 32.1* 35.3   MCV 97.6 97.5   MCH 31.0 30.9   MCHC 31.8* 31.7*   RDW 14.0 14.0    188   MPV 11.3 10.7       Assessment:    Principal Problem:    Pleural effusion, right  Resolved Problems:    * No resolved hospital problems. *      Plan:    Hypotension - Cozaar, dyazide, and toprol with holding parameters. Will hold off on IVF at this time due to pulmonary vascular congestion. Acute hypoxic respiratory failure 2/2 right pleural effusion - Currently on 4 L NC and does not use oxygen at home. Acute on chronic systolic CHF exacerbation, EF 20% - Will consult cardiology. Continue BB and consider starting jardiance. Will hold off on diuresis for now as blood pressure is low and aside from the right pleural effusion, patient does not appear to be hypervolemic. Will defer to cardiology. Will need life vest for primary prevention. HF clinic consult placed. Moderate right pleural effusion S/P diagnostic and therapeutic right thoracentesis -  970 cc of hazy yell pleural fluid drained.  Will follow up with labs, cytology, and culture. Urinary retention with indwelling sapp - Maintain sapp and plan for voiding trial prior to discharge as per urology   Normocytic anemia - Will check iron studies. Transfuse if hemoglobin is less than 7. DM-II - At home patient is on metformin. Will hold and start insulin SS  HLD - Continue lipitor  COPD - Stable on combivent. Does not see pulmonology as outpatient  Former smoker  DVT prophylaxis - Lovenox held for thoracentesis     PT/OT      NOTE: This report was transcribed using voice recognition software. Every effort was made to ensure accuracy; however, inadvertent computerized transcription errors may be present.     Electronically signed by Drew Carbone DO on 11/16/2022 at 12:29 PM

## 2022-11-17 ENCOUNTER — APPOINTMENT (OUTPATIENT)
Dept: NUCLEAR MEDICINE | Age: 87
DRG: 291 | End: 2022-11-17
Payer: MEDICARE

## 2022-11-17 ENCOUNTER — APPOINTMENT (OUTPATIENT)
Dept: NON INVASIVE DIAGNOSTICS | Age: 87
DRG: 291 | End: 2022-11-17
Payer: MEDICARE

## 2022-11-17 PROBLEM — J44.1 COPD EXACERBATION (HCC): Status: ACTIVE | Noted: 2022-11-17

## 2022-11-17 PROBLEM — R33.9 URINARY RETENTION: Status: ACTIVE | Noted: 2022-11-17

## 2022-11-17 PROBLEM — I50.9 ACUTE ON CHRONIC CONGESTIVE HEART FAILURE (HCC): Status: ACTIVE | Noted: 2022-11-17

## 2022-11-17 PROBLEM — I95.9 HYPOTENSION: Status: ACTIVE | Noted: 2022-11-17

## 2022-11-17 PROBLEM — R94.39 ABNORMAL CARDIOVASCULAR STRESS TEST: Status: ACTIVE | Noted: 2022-11-17

## 2022-11-17 LAB
ALBUMIN SERPL-MCNC: 3.6 G/DL (ref 3.5–5.2)
ALP BLD-CCNC: 92 U/L (ref 35–104)
ALT SERPL-CCNC: 24 U/L (ref 0–32)
ANION GAP SERPL CALCULATED.3IONS-SCNC: 10 MMOL/L (ref 7–16)
AST SERPL-CCNC: 32 U/L (ref 0–31)
BILIRUB SERPL-MCNC: 0.8 MG/DL (ref 0–1.2)
BUN BLDV-MCNC: 25 MG/DL (ref 6–23)
CALCIUM SERPL-MCNC: 9.3 MG/DL (ref 8.6–10.2)
CHLORIDE BLD-SCNC: 101 MMOL/L (ref 98–107)
CO2: 28 MMOL/L (ref 22–29)
CREAT SERPL-MCNC: 0.9 MG/DL (ref 0.5–1)
GFR SERPL CREATININE-BSD FRML MDRD: >60 ML/MIN/1.73
GLUCOSE BLD-MCNC: 109 MG/DL (ref 74–99)
GRAM STAIN ORDERABLE: NORMAL
HCT VFR BLD CALC: 35.1 % (ref 34–48)
HEMOGLOBIN: 11.2 G/DL (ref 11.5–15.5)
LV EF: 30 %
LVEF MODALITY: NORMAL
MCH RBC QN AUTO: 30.9 PG (ref 26–35)
MCHC RBC AUTO-ENTMCNC: 31.9 % (ref 32–34.5)
MCV RBC AUTO: 96.7 FL (ref 80–99.9)
METER GLUCOSE: 114 MG/DL (ref 74–99)
METER GLUCOSE: 116 MG/DL (ref 74–99)
METER GLUCOSE: 117 MG/DL (ref 74–99)
METER GLUCOSE: 135 MG/DL (ref 74–99)
PDW BLD-RTO: 14 FL (ref 11.5–15)
PLATELET # BLD: 179 E9/L (ref 130–450)
PMV BLD AUTO: 10.8 FL (ref 7–12)
POTASSIUM SERPL-SCNC: 3.5 MMOL/L (ref 3.5–5)
RBC # BLD: 3.63 E12/L (ref 3.5–5.5)
SODIUM BLD-SCNC: 139 MMOL/L (ref 132–146)
TOTAL PROTEIN: 5.8 G/DL (ref 6.4–8.3)
WBC # BLD: 5.7 E9/L (ref 4.5–11.5)

## 2022-11-17 PROCEDURE — 85027 COMPLETE CBC AUTOMATED: CPT

## 2022-11-17 PROCEDURE — 78452 HT MUSCLE IMAGE SPECT MULT: CPT | Performed by: INTERNAL MEDICINE

## 2022-11-17 PROCEDURE — 6360000002 HC RX W HCPCS: Performed by: INTERNAL MEDICINE

## 2022-11-17 PROCEDURE — 99233 SBSQ HOSP IP/OBS HIGH 50: CPT | Performed by: INTERNAL MEDICINE

## 2022-11-17 PROCEDURE — 82962 GLUCOSE BLOOD TEST: CPT

## 2022-11-17 PROCEDURE — 6370000000 HC RX 637 (ALT 250 FOR IP): Performed by: INTERNAL MEDICINE

## 2022-11-17 PROCEDURE — 99232 SBSQ HOSP IP/OBS MODERATE 35: CPT | Performed by: STUDENT IN AN ORGANIZED HEALTH CARE EDUCATION/TRAINING PROGRAM

## 2022-11-17 PROCEDURE — 2060000000 HC ICU INTERMEDIATE R&B

## 2022-11-17 PROCEDURE — 36415 COLL VENOUS BLD VENIPUNCTURE: CPT

## 2022-11-17 PROCEDURE — 94640 AIRWAY INHALATION TREATMENT: CPT

## 2022-11-17 PROCEDURE — 2580000003 HC RX 258: Performed by: INTERNAL MEDICINE

## 2022-11-17 PROCEDURE — 3430000000 HC RX DIAGNOSTIC RADIOPHARMACEUTICAL: Performed by: RADIOLOGY

## 2022-11-17 PROCEDURE — 93017 CV STRESS TEST TRACING ONLY: CPT

## 2022-11-17 PROCEDURE — 93018 CV STRESS TEST I&R ONLY: CPT | Performed by: INTERNAL MEDICINE

## 2022-11-17 PROCEDURE — 80053 COMPREHEN METABOLIC PANEL: CPT

## 2022-11-17 PROCEDURE — 2700000000 HC OXYGEN THERAPY PER DAY

## 2022-11-17 PROCEDURE — 78452 HT MUSCLE IMAGE SPECT MULT: CPT

## 2022-11-17 PROCEDURE — 93016 CV STRESS TEST SUPVJ ONLY: CPT | Performed by: INTERNAL MEDICINE

## 2022-11-17 PROCEDURE — A9500 TC99M SESTAMIBI: HCPCS | Performed by: RADIOLOGY

## 2022-11-17 RX ADMIN — TIMOLOL MALEATE 1 DROP: 5 SOLUTION OPHTHALMIC at 20:28

## 2022-11-17 RX ADMIN — SODIUM CHLORIDE, PRESERVATIVE FREE 10 ML: 5 INJECTION INTRAVENOUS at 20:28

## 2022-11-17 RX ADMIN — ASPIRIN 81 MG: 81 TABLET, COATED ORAL at 10:57

## 2022-11-17 RX ADMIN — TIMOLOL MALEATE 1 DROP: 5 SOLUTION OPHTHALMIC at 11:00

## 2022-11-17 RX ADMIN — LATANOPROST 1 DROP: 50 SOLUTION OPHTHALMIC at 20:28

## 2022-11-17 RX ADMIN — METOPROLOL SUCCINATE 50 MG: 50 TABLET, EXTENDED RELEASE ORAL at 10:57

## 2022-11-17 RX ADMIN — SODIUM CHLORIDE, PRESERVATIVE FREE 10 ML: 5 INJECTION INTRAVENOUS at 10:58

## 2022-11-17 RX ADMIN — Medication 30 MILLICURIE: at 09:55

## 2022-11-17 RX ADMIN — PREGABALIN 25 MG: 25 CAPSULE ORAL at 10:57

## 2022-11-17 RX ADMIN — SPIRONOLACTONE 12.5 MG: 25 TABLET ORAL at 10:57

## 2022-11-17 RX ADMIN — IPRATROPIUM BROMIDE AND ALBUTEROL SULFATE 1 AMPULE: .5; 2.5 SOLUTION RESPIRATORY (INHALATION) at 17:34

## 2022-11-17 RX ADMIN — IPRATROPIUM BROMIDE AND ALBUTEROL SULFATE 1 AMPULE: .5; 2.5 SOLUTION RESPIRATORY (INHALATION) at 06:06

## 2022-11-17 RX ADMIN — Medication 10 MILLICURIE: at 08:35

## 2022-11-17 RX ADMIN — PREGABALIN 25 MG: 25 CAPSULE ORAL at 20:28

## 2022-11-17 RX ADMIN — ATORVASTATIN CALCIUM 20 MG: 20 TABLET, FILM COATED ORAL at 10:57

## 2022-11-17 RX ADMIN — BRIMONIDINE TARTRATE 1 DROP: 2 SOLUTION OPHTHALMIC at 20:28

## 2022-11-17 RX ADMIN — LOSARTAN POTASSIUM 50 MG: 50 TABLET, FILM COATED ORAL at 10:57

## 2022-11-17 RX ADMIN — BRIMONIDINE TARTRATE 1 DROP: 2 SOLUTION OPHTHALMIC at 10:59

## 2022-11-17 RX ADMIN — REGADENOSON 0.4 MG: 0.08 INJECTION, SOLUTION INTRAVENOUS at 09:49

## 2022-11-17 ASSESSMENT — PAIN SCALES - GENERAL
PAINLEVEL_OUTOF10: 0
PAINLEVEL_OUTOF10: 0

## 2022-11-17 NOTE — PROGRESS NOTES
Physical Therapy    [id number]    Patient unavailable for physical therapy treatment due to out of the room for a stress test.  Grey Wang  [facility]  LIC # [id number]

## 2022-11-17 NOTE — DISCHARGE INSTRUCTIONS
Call upon discharge to schedule a follow-up visit with Lit Vaelntine/Purnima/Qamar ([facility]) at [phone]  / CONGESTIVE HEART FAILURE  DISCHARGE INSTRUCTIONS:  GUIDELINES TO FOLLOW AT HOME    Self- Managed Care:     MEDICATIONS:  Take your medication as directed. If you are experiencing any side effects, inform your doctor, Do not stop taking any of your medications without letting your doctor know. Check with your doctor before taking any over-the-counter medications / herbal / or dietary supplements. They may interfere with your other medications. Do not take ibuprofen (Advil or Motrin) and naproxen (Aleve) without talking to your doctor first. They could make your heart failure worse. WEIGHT MONITORING:   Weigh yourself everyday (with the same scale) around the same time of the day and write it down. (you can chart them on a calendar or keep track of them on paper. Notify your doctor of a weight gain of 3 pounds or more in 1 day   OR a total of 5 pounds or more in 1 week    Take your weight record to your doctor visits  Also, the same goes if you loose more than 3# in one day, let your heart doctor know. DIET:   Cardiac heart healthy diet- Low saturated / low trans fat, no added salt, caffeine restricted, Low sodium diet-   No more than 2,000mg (2 grams) of salt / sodium per day (which equals to a little less than  a teaspoon of salt)  If your doctor wants you on a fluid restriction. ..it is usually recommended a fluid limit of 2,000cc -  Fluid restriction- 2,000 ml (milliliters) = 64 ounces = you can have 8 glasses of fluid per day (each glass 8 ounces)    Follow a low salt diet - avoid using salt at the table, avoid / limit use of canned soups, processed / packaged foods, salted snacks, olives and pickles.   Do not use a salt substitute without checking with your doctor, they may contain a high amount of potassioum. (Mrs. Leticia Fuentes is safe to use).    Limit the use of alcohol       CALL YOUR DOCTOR THE FIRST DAY YOU NOTICE ANY OF THESE   SYMPTOMS:  You have a weight gain of 3 pounds or more in 1 day         OR 5 pounds or more in one week  More shortness of breath  More swelling of your stomach, legs, ankles or feet  Feeling more tired, No energy  Dry hacky cough  Dizziness  More chest pain / discomfort       (CALL 911 IF ANY OF THE FOLLOWING OCCURS  Chest pain (not relieved with nitroglycerine, if you have been prescribed this medication)  Severe shortness of breath  Faint / Pass out  Confusion / cannot think clearly  If symptoms get worse           SMOKING - TOBACCO USE:  * IF YOU SMOKE - STOP! Kick the habit. [address] Program is offered at [place] [id number] and [address] [facility]. Call [phone] extension [facility] for more information. ACTIVITY:   (Ask your doctor when you will be able to return to work and before starting any exercise program.  Do not drive unless unless your doctor has given you permission to do so). Start light exercise. Even if you can only do a small amount, exercise will help you get stronger, have more energy, help manage your weight and decrease  stress. Walking is an easy way to get exercise. Start out slowly and  increase the amount you walk as tolerated  If you become short of breath, dizzy or have chest pain; stop, sit down, and rest.  If you feel \"wiped out\" the day after you exercise, walk at a slower pace or for a shorter distance. You can gradually increase the pace or amount of time. (Do not exercise right after a meal or in extreme temperatures, such as above 85 degrees, if the air is really humid, or wind chill is less than 20 degrees)                                             ADDITIONAL INFORMATION:  Avoid getting sick from colds and the flu.  Stay away from friends or family that you know may have a contagious illness  Get plenty of rest   Get a flu shot each year. Get a pneumococcal vaccine shot. If you have had one before, ask your doctor whether you need another dose. My Goal for Self-management of Heart Failure Includes 5 steps :    1. Notice a change in symptoms ( weight gain, short of breath, leg swelling, decreased activity level, bloating. ...)    2. Evaluate the change: (use the Heart Failure Zones )     3. Decide to take action: decide what your options are, such as: (call your doctor for an extra visit, take a prescribed medication, such as your water pill if your doctor has given you directions to do so, [address])    4. Come up with a strategy:  (now you call the doctor for advice / appointment. This is where you take action!!! Do not wait, catch the symptom early and treat it before it worsens. 5. Evaluate the response: The next day, check your Heart Failure Zones: are you in the GREEN ZONE (safe zone)? Worsening symptoms of YELLOW ZONE? Or have you moved to the RED ZONE and need to call 911 or go to the Emergency Room for evaluation? Call your doctor's office to update them on your symptoms of heart failure.

## 2022-11-17 NOTE — PROGRESS NOTES
[facility] Progress Note    Admitting Date and Time: 11/15/2022  9:23 AM  Admit Dx: Cardiomegaly [I51.7]  Urinary retention [R33.9]  COPD exacerbation (HCC) [J44.1]  Bilateral pleural effusion [J90]  Pleural effusion, right [J90]  Acute on chronic congestive heart failure, unspecified heart failure type ([address] [I50.9]    Subjective:    Patient having stress test today, feels ok.     ROS: denies fever, chills, cp, sob, n/v, HA unless stated above.      spironolactone  12.5 mg Oral Daily    latanoprost  1 drop Both Eyes Nightly    timolol  1 drop Both Eyes BID    brimonidine  1 drop Left Eye BID    aspirin  81 mg Oral Daily    atorvastatin  20 mg Oral Daily    losartan  50 mg Oral Daily    metoprolol succinate  50 mg Oral Daily    pregabalin  25 mg Oral BID    sodium chloride flush  5-40 mL IntraVENous 2 times per day    [Held by provider] enoxaparin  40 mg SubCUTAneous Daily    insulin lispro  0-4 Units SubCUTAneous TID WC    insulin lispro  0-4 Units SubCUTAneous Nightly    ipratropium-albuterol  1 ampule Inhalation BID     sodium chloride flush, 10 mL, PRN  sodium chloride flush, 5-40 mL, PRN  sodium chloride, , PRN  ondansetron, 4 mg, Q8H PRN   Or  ondansetron, 4 mg, Q6H PRN  polyethylene glycol, 17 g, Daily PRN  acetaminophen, 650 mg, Q6H PRN   Or  acetaminophen, 650 mg, Q6H PRN  glucose, 4 tablet, PRN  dextrose bolus, 125 mL, PRN   Or  dextrose bolus, 250 mL, PRN  glucagon (rDNA), 1 mg, PRN  dextrose, , Continuous PRN       Objective:    /60   Pulse 78   Temp 97.8 °F (36.6 °C) (Axillary)   Resp 16   Ht 5' 6\" (1.676 m)   Wt 155 lb 12.8 oz (70.7 kg)   LMP  (LMP Unknown)   SpO2 92%   BMI 25.15 kg/m²     General Appearance: alert and oriented to person, place and time and in no acute distress  Skin: warm and dry  Head: normocephalic and atraumatic  Eyes: pupils equal, round, and reactive to light, extraocular eye movements intact, conjunctivae normal  Neck: neck supple and non tender without mass   Pulmonary/Chest: clear to auscultation bilaterally- no wheezes, rales or rhonchi, normal air movement, no respiratory distress  Cardiovascular: normal rate, normal S1 and S2 and no carotid bruits  Abdomen: soft, non-tender, non-distended, normal bowel sounds, no masses or organomegaly  Extremities: no cyanosis, no clubbing and no edema  Neurologic: no cranial nerve deficit and speech normal        Recent Labs     11/15/22  1021 11/16/22  0650 11/17/22  0612    137 139   K 3.9 4.4 3.5    102 101   CO2 20* 24 28   BUN 26* 23 25*   CREATININE 1.0 1.0 0.9   GLUCOSE 124* 117* 109*   CALCIUM 9.3 9.8 9.3       Recent Labs     11/15/22  1021 11/16/22  0650 11/17/22  0612   WBC 5.1 5.5 5.7   RBC 3.29* 3.62 3.63   HGB 10.2* 11.2* 11.2*   HCT 32.1* 35.3 35.1   MCV 97.6 97.5 96.7   MCH 31.0 30.9 30.9   MCHC 31.8* 31.7* 31.9*   RDW 14.0 14.0 14.0    188 179   MPV 11.3 10.7 10.8       Assessment:    Principal Problem:    Bilateral pleural effusion  Resolved Problems:    * No resolved hospital problems. *      Plan:    Hypotension - Cozaar, dyazide, and toprol with holding parameters. Will hold off on IVF at this time due to pulmonary vascular congestion. BP slightly improved today  Acute hypoxic respiratory failure 2/2 right pleural effusion - Currently on 4 L NC and does not use oxygen at home. Acute on chronic systolic CHF exacerbation, EF 20% - Will consult cardiology. Continue BB and consider starting jardiance. Will hold off on diuresis for now as blood pressure is low and aside from the right pleural effusion, patient does not appear to be hypervolemic. Will defer to cardiology. Will need life vest for primary prevention. HF clinic consult placed. Abnormal stress test - stress today showed mild-moderate casi-infarct ischemia in LAD/diagonal territory and severe global hypokinesis. Per cardiology will be doing medical management for now, no plans for cath.    Moderate right pleural effusion S/P diagnostic and therapeutic right thoracentesis -  970 cc of hazy yell pleural fluid drained. Culture NGTD  Urinary retention with indwelling sapp - Maintain sapp and plan for voiding trial prior to discharge as per urology   Normocytic anemia - Will check iron studies. Transfuse if hemoglobin is less than 7. DM-II - At home patient is on metformin. Will hold and start insulin SS  HLD - Continue lipitor  COPD - Stable on combivent. Does not see pulmonology as outpatient  Former smoker  DVT prophylaxis - Lovenox held for thoracentesis     PT/OT      NOTE: This report was transcribed using voice recognition software. Every effort was made to ensure accuracy; however, inadvertent computerized transcription errors may be present.     Electronically signed by Gabriel Snider DO on 11/17/2022 at 5:11 PM

## 2022-11-17 NOTE — PROGRESS NOTES
CHIEF COMPLAINT: SOB/CHF    HISTORY OF PRESENT ILLNESS: Patient is a 81 y.o. female seen at the request of Elmo Greene MD and not followed by cardiology. Past medical history of COPD, former smoker, HTN, HLD,  and DM presenting with progressive SOB and volume overload. No CP or angina. Denies prior cardiac history.      Past Medical History:   Diagnosis Date    COPD (chronic obstructive pulmonary disease) ([address]     Hyperlipidemia     Hypertension     Osteoarthritis     Type II or unspecified type diabetes mellitus without mention of complication, not stated as uncontrolled        Patient Active Problem List   Diagnosis    Type 2 diabetes mellitus without complication, without long-term current use of insulin (HCC)    Mixed hyperlipidemia    Essential hypertension    Osteoarthritis    RLS (restless legs syndrome)    Chronic obstructive pulmonary disease (HCC)    Diabetic polyneuropathy associated with diabetes mellitus due to underlying condition ([facility])    Pleural effusion, right       Allergies   Allergen Reactions    Pcn [Penicillins] Hives    Sulfa Antibiotics Hives       Current Facility-Administered Medications   Medication Dose Route Frequency Provider Last Rate Last Admin    spironolactone (ALDACTONE) tablet 12.5 mg  12.5 mg Oral Daily Huey Trejo DO        regadenoson (LEXISCAN) injection 0.4 mg  0.4 mg IntraVENous ONCE PRN Huey Trejo DO        latanoprost (XALATAN) 0.005 % ophthalmic solution 1 drop  1 drop Both Eyes Nightly Marva Hyatt MD   1 drop at 11/16/22 2100    timolol (TIMOPTIC) 0.5 % ophthalmic solution 1 drop  1 drop Both Eyes BID Marva Hyatt MD   1 drop at 11/16/22 2100    brimonidine (ALPHAGAN) 0.2 % ophthalmic solution 1 drop  1 drop Left Eye BID Marva Hyatt MD   1 drop at 11/16/22 2100    sodium chloride flush 0.9 % injection 10 mL  10 mL IntraVENous PRN Moira Mcallister DO        aspirin EC tablet 81 mg  81 mg Oral Daily Moira Gonzalez DO atorvastatin (LIPITOR) tablet 20 mg  20 mg Oral Daily Moira Perezon, DO   20 mg at 11/16/22 1018    losartan (COZAAR) tablet 50 mg  50 mg Oral Daily Moira Perezon, DO        metoprolol succinate (TOPROL XL) extended release tablet 50 mg  50 mg Oral Daily Moira Perezon, DO   50 mg at 11/16/22 1018    pregabalin (LYRICA) capsule 25 mg  25 mg Oral BID Moira Mcallister, DO   25 mg at 11/16/22 2100    sodium chloride flush 0.9 % injection 5-40 mL  5-40 mL IntraVENous 2 times per day Moira Mcallister, DO   10 mL at 11/16/22 2100    sodium chloride flush 0.9 % injection 5-40 mL  5-40 mL IntraVENous PRN Moira Mcallister, DO        0.9 % sodium chloride infusion   IntraVENous PRN Moira Mcallister, DO        [Held by provider] enoxaparin (LOVENOX) injection 40 mg  40 mg SubCUTAneous Daily Moira Mcallister, DO        ondansetron (ZOFRAN-ODT) disintegrating tablet 4 mg  4 mg Oral Q8H PRN Moira Mcallister, DO        Or    ondansetron (ZOFRAN) injection 4 mg  4 mg IntraVENous Q6H PRN Moira Mcallister, DO        polyethylene glycol (GLYCOLAX) packet 17 g  17 g Oral Daily PRN Moira Mcallister, DO        acetaminophen (TYLENOL) tablet 650 mg  650 mg Oral Q6H PRN Santi Yeung, DO   650 mg at 11/15/22 2019    Or    acetaminophen (TYLENOL) suppository 650 mg  650 mg Rectal Q6H PRN Moira Mcallister, DO        glucose chewable tablet 16 g  4 tablet Oral PRN Moira Perezon, DO        dextrose bolus 10% 125 mL  125 mL IntraVENous PRN Moira Perezon, DO        Or    dextrose bolus 10% 250 mL  250 mL IntraVENous PRN Moira Perezon, DO        glucagon (rDNA) injection 1 mg  1 mg SubCUTAneous PRN Moira Mcallister, DO        dextrose 10 % infusion   IntraVENous Continuous PRN Moira Perezon, DO        insulin lispro (HUMALOG) injection vial 0-4 Units  0-4 Units SubCUTAneous TID WC Moira Perezon, DO        insulin lispro (HUMALOG) injection vial 0-4 Units  0-4 Units SubCUTAneous Nightly Moira Mcallister DO        ipratropium-albuterol (DUONEB) nebulizer solution 1 ampule  1 ampule Inhalation BID Zach Osorio, DO   1 ampule at 22 0606       Social History     Socioeconomic History    Marital status:      Spouse name: Not on file    Number of children: Not on file    Years of education: Not on file    Highest education level: Not on file   Occupational History    Not on file   Tobacco Use    Smoking status: Former     Packs/day: 1.00     Years: 9.00     Pack years: 9.00     Types: Cigarettes     Quit date: 2000     Years since quittin.1    Smokeless tobacco: Never   Vaping Use    Vaping Use: Never used   Substance and Sexual Activity    Alcohol use: No     Alcohol/week: 0.0 standard drinks    Drug use: No    Sexual activity: Never     Partners: Male   Other Topics Concern    Not on file   Social History Narrative    Not on file     Social Determinants of Health     Financial Resource Strain: Low Risk     Difficulty of Paying Living Expenses: Not hard at all   Food Insecurity: No Food Insecurity    Worried About Running Out of Food in the Last Year: Never true    Ran Out of Food in the Last Year: Never true   Transportation Needs: Not on file   Physical Activity: Not on file   Stress: Not on file   Social Connections: Not on file   Intimate Partner Violence: Not on file   Housing Stability: Not on file       Family History   Problem Relation Age of Onset    Heart Disease Mother     High Blood Pressure Mother     Stroke Mother     Diabetes Mother     Heart Disease Father     High Blood Pressure Father        Review of Systems:   Heart: as above   Lungs: as above   Eyes: denies changes in vision or discharge. Ears: denies changes in hearing or pain. Nose: denies epistaxis or masses   Throat: denies sore throat or trouble swallowing. Neuro: denies numbness, tingling, tremors. Skin: denies rashes or itching. : denies hematuria, dysuria   GI: denies vomiting, diarrhea   Psych: denies mood changed, anxiety, depression. all others negative.     Physical Exam BP (!) 114/55   Pulse 77   Temp 98 °F (36.7 °C) (Oral)   Resp 18   Ht 5' 6\" (1.676 m)   Wt 155 lb 12.8 oz (70.7 kg)   LMP  (LMP Unknown)   SpO2 93%   BMI 25.15 kg/m²   Constitutional: Oriented to person, place, and time. Well-developed and well-nourished. No distress. Head: Normocephalic and atraumatic. Eyes: EOM are normal. Pupils are equal, round, and reactive to light. Neck: Normal range of motion. Neck supple. No hepatojugular reflux and no JVD present. Carotid bruit is not present. No tracheal deviation present. No thyromegaly present. Cardiovascular: Normal rate, regular rhythm, normal heart sounds and intact distal pulses. Exam reveals no gallop and no friction rub. No murmur heard. Pulmonary/Chest: Effort normal and breath sounds normal. No respiratory distress. No wheezes. No rales. No tenderness. Abdominal: Soft. Bowel sounds are normal. No distension and no mass. No tenderness. No rebound and no guarding. Musculoskeletal: Normal range of motion. No edema and no tenderness. Lymphadenopathy:   No cervical adenopathy. No groin adenopathy. Neurological: Alert and oriented to person, place, and time. Skin: Skin is warm and dry. No rash noted. Not diaphoretic. No erythema. Psychiatric: Normal mood and affect.  Behavior is normal.     CBC:   Lab Results   Component Value Date/Time    WBC 5.7 11/17/2022 06:12 AM    RBC 3.63 11/17/2022 06:12 AM    HGB 11.2 11/17/2022 06:12 AM    HCT 35.1 11/17/2022 06:12 AM    MCV 96.7 11/17/2022 06:12 AM    MCH 30.9 11/17/2022 06:12 AM    MCHC 31.9 11/17/2022 06:12 AM    RDW 14.0 11/17/2022 06:12 AM     11/17/2022 06:12 AM    MPV 10.8 11/17/2022 06:12 AM     BMP:   Lab Results   Component Value Date/Time     11/17/2022 06:12 AM    K 3.5 11/17/2022 06:12 AM     11/17/2022 06:12 AM    CO2 28 11/17/2022 06:12 AM    BUN 25 11/17/2022 06:12 AM    LABALBU 3.6 11/17/2022 06:12 AM    LABALBU 4.6 04/05/2012 09:35 AM    CREATININE 0.9 11/17/2022 06:12 AM    CALCIUM 9.3 11/17/2022 06:12 AM    GFRAA >60 01/04/2022 09:48 AM    LABGLOM >60 11/17/2022 06:12 AM     Magnesium:  No results found for: MG  Cardiac Enzymes:   Lab Results   Component Value Date    TROPHS 44 (H) 11/15/2022    TROPHS 47 (H) 11/15/2022      PT/INR:    Lab Results   Component Value Date/Time    PROTIME 12.9 11/16/2022 06:50 AM    INR 1.1 11/16/2022 06:50 AM     TSH:    Lab Results   Component Value Date/Time    TSH 2.830 11/11/2015 12:00 PM     Rhythm Strip: normal sinus rhythm. EKG:  Sinus rhythm with PAC's and PVC's. Non-specific ST-T changes. Echo Summary 11/15/2022:   Ejection fraction is visually estimated at 20%. Overall ejection fraction severely decreased. Left ventricle is moderately enlarged. Normal right ventricle structure and function. Left atrial volume index of 68 ml per meters squared BSA. Physiologic and/or trace aortic regurgitation is noted. Mild to moderate mitral regurgitation is present. Mild tricuspid regurgitation. No evidence for hemodynamically significant pericardial effusion. ASSESSMENT AND PLAN:  Patient Active Problem List   Diagnosis    Type 2 diabetes mellitus without complication, without long-term current use of insulin ([facility])    Mixed hyperlipidemia    Essential hypertension    Osteoarthritis    RLS (restless legs syndrome)    Chronic obstructive pulmonary disease (HCC)    Diabetic polyneuropathy associated with diabetes mellitus due to underlying condition ([facility])    Pleural effusion, right     1. Acute systolic CHF:    Echo with severe LV dysfunction. Diurese with IV lasix. Ischemia evaluation. Pharm stress today. BB/ARB/aldactone. Stopped norvasc and dyazide. 2. VHD: Trace AI/mild-mod MR/mild TR by 11/15/2022 echo. Observe. 3. COPD: Per primary service. 4. HTN: Observe. 5. Lipids: Statin. 6. DM: Per primary service. Cheryl Callaway D.O.   Cardiologist  Cardiology, [facility]) Physicians

## 2022-11-17 NOTE — PROCEDURES
Lexiscan Stress EKG Report:    Dx CP    Baseline EKG: normal sinus rhythm, nonspecific ST and T waves changes. Stress EKG: No ST-T changes. Arrhythmias: None. Symptoms: None. Summary:  Unremarkable lexiscan stress EKG. See separate report for stress perfusion results. Kev Mccray D.O.   Cardiologist  Cardiology, [id number] [facility]

## 2022-11-17 NOTE — CARE COORDINATION
11/17/2022 1515 Plan is for pt to return home with her  as prior and the support of their 6 children. Pt's children provide transportation since they don't drive. Pt is still on O2 1 liter and nursing is attempting to wean if unable will need to have a walk test to qualify. Pt would like a [place] [id number] with [address], her  is active with them. Referral made to [facility] and she will review for acceptance. Pt had a stress test today and a thoracentesis yesterday and 950 cc's was removed. Family will provide transportation at d/c. CM will follow. Electronically signed by Alex Armijo RN on 11/17/2022 at 3:22 PM     Addendum  Per Derek Smith liaison at [facility] they can accept pt.   Electronically signed by Alex Armijo RN on 11/17/2022 at 3:58 PM

## 2022-11-18 LAB
ALBUMIN SERPL-MCNC: 3.5 G/DL (ref 3.5–5.2)
ALP BLD-CCNC: 95 U/L (ref 35–104)
ALT SERPL-CCNC: 26 U/L (ref 0–32)
ANION GAP SERPL CALCULATED.3IONS-SCNC: 11 MMOL/L (ref 7–16)
AST SERPL-CCNC: 57 U/L (ref 0–31)
BILIRUB SERPL-MCNC: 0.7 MG/DL (ref 0–1.2)
BUN BLDV-MCNC: 32 MG/DL (ref 6–23)
CALCIUM SERPL-MCNC: 9.1 MG/DL (ref 8.6–10.2)
CHLORIDE BLD-SCNC: 101 MMOL/L (ref 98–107)
CO2: 24 MMOL/L (ref 22–29)
CREAT SERPL-MCNC: 1.3 MG/DL (ref 0.5–1)
GFR SERPL CREATININE-BSD FRML MDRD: 40 ML/MIN/1.73
GLUCOSE BLD-MCNC: 107 MG/DL (ref 74–99)
HCT VFR BLD CALC: 34.7 % (ref 34–48)
HEMOGLOBIN: 11.2 G/DL (ref 11.5–15.5)
MCH RBC QN AUTO: 31 PG (ref 26–35)
MCHC RBC AUTO-ENTMCNC: 32.3 % (ref 32–34.5)
MCV RBC AUTO: 96.1 FL (ref 80–99.9)
METER GLUCOSE: 112 MG/DL (ref 74–99)
METER GLUCOSE: 120 MG/DL (ref 74–99)
METER GLUCOSE: 127 MG/DL (ref 74–99)
METER GLUCOSE: 150 MG/DL (ref 74–99)
PDW BLD-RTO: 13.7 FL (ref 11.5–15)
PLATELET # BLD: 175 E9/L (ref 130–450)
PMV BLD AUTO: 10.5 FL (ref 7–12)
POTASSIUM SERPL-SCNC: 3.5 MMOL/L (ref 3.5–5)
RBC # BLD: 3.61 E12/L (ref 3.5–5.5)
REPORT: NORMAL
SODIUM BLD-SCNC: 136 MMOL/L (ref 132–146)
THIS TEST SENT TO: NORMAL
TOTAL PROTEIN: 5.9 G/DL (ref 6.4–8.3)
WBC # BLD: 6.9 E9/L (ref 4.5–11.5)

## 2022-11-18 PROCEDURE — 6370000000 HC RX 637 (ALT 250 FOR IP): Performed by: INTERNAL MEDICINE

## 2022-11-18 PROCEDURE — 82962 GLUCOSE BLOOD TEST: CPT

## 2022-11-18 PROCEDURE — 36415 COLL VENOUS BLD VENIPUNCTURE: CPT

## 2022-11-18 PROCEDURE — 2060000000 HC ICU INTERMEDIATE R&B

## 2022-11-18 PROCEDURE — 99232 SBSQ HOSP IP/OBS MODERATE 35: CPT | Performed by: STUDENT IN AN ORGANIZED HEALTH CARE EDUCATION/TRAINING PROGRAM

## 2022-11-18 PROCEDURE — 99233 SBSQ HOSP IP/OBS HIGH 50: CPT | Performed by: INTERNAL MEDICINE

## 2022-11-18 PROCEDURE — 80053 COMPREHEN METABOLIC PANEL: CPT

## 2022-11-18 PROCEDURE — 2700000000 HC OXYGEN THERAPY PER DAY

## 2022-11-18 PROCEDURE — 97110 THERAPEUTIC EXERCISES: CPT

## 2022-11-18 PROCEDURE — 97530 THERAPEUTIC ACTIVITIES: CPT

## 2022-11-18 PROCEDURE — 97530 THERAPEUTIC ACTIVITIES: CPT | Performed by: OCCUPATIONAL THERAPIST

## 2022-11-18 PROCEDURE — 94640 AIRWAY INHALATION TREATMENT: CPT

## 2022-11-18 PROCEDURE — 85027 COMPLETE CBC AUTOMATED: CPT

## 2022-11-18 PROCEDURE — 97535 SELF CARE MNGMENT TRAINING: CPT | Performed by: OCCUPATIONAL THERAPIST

## 2022-11-18 PROCEDURE — 2580000003 HC RX 258: Performed by: INTERNAL MEDICINE

## 2022-11-18 RX ORDER — ISOSORBIDE MONONITRATE 30 MG/1
30 TABLET, EXTENDED RELEASE ORAL DAILY
Status: DISCONTINUED | OUTPATIENT
Start: 2022-11-18 | End: 2022-11-23 | Stop reason: HOSPADM

## 2022-11-18 RX ORDER — ATORVASTATIN CALCIUM 40 MG/1
40 TABLET, FILM COATED ORAL DAILY
Status: DISCONTINUED | OUTPATIENT
Start: 2022-11-19 | End: 2022-11-23 | Stop reason: HOSPADM

## 2022-11-18 RX ADMIN — PREGABALIN 25 MG: 25 CAPSULE ORAL at 19:49

## 2022-11-18 RX ADMIN — BRIMONIDINE TARTRATE 1 DROP: 2 SOLUTION OPHTHALMIC at 19:55

## 2022-11-18 RX ADMIN — BRIMONIDINE TARTRATE 1 DROP: 2 SOLUTION OPHTHALMIC at 09:59

## 2022-11-18 RX ADMIN — LOSARTAN POTASSIUM 50 MG: 50 TABLET, FILM COATED ORAL at 10:00

## 2022-11-18 RX ADMIN — IPRATROPIUM BROMIDE AND ALBUTEROL SULFATE 1 AMPULE: .5; 2.5 SOLUTION RESPIRATORY (INHALATION) at 18:05

## 2022-11-18 RX ADMIN — SPIRONOLACTONE 12.5 MG: 25 TABLET ORAL at 10:01

## 2022-11-18 RX ADMIN — PREGABALIN 25 MG: 25 CAPSULE ORAL at 10:00

## 2022-11-18 RX ADMIN — LATANOPROST 1 DROP: 50 SOLUTION OPHTHALMIC at 19:47

## 2022-11-18 RX ADMIN — SODIUM CHLORIDE, PRESERVATIVE FREE 10 ML: 5 INJECTION INTRAVENOUS at 19:47

## 2022-11-18 RX ADMIN — TIMOLOL MALEATE 1 DROP: 5 SOLUTION OPHTHALMIC at 09:51

## 2022-11-18 RX ADMIN — IPRATROPIUM BROMIDE AND ALBUTEROL SULFATE 1 AMPULE: .5; 2.5 SOLUTION RESPIRATORY (INHALATION) at 06:22

## 2022-11-18 RX ADMIN — TIMOLOL MALEATE 1 DROP: 5 SOLUTION OPHTHALMIC at 19:53

## 2022-11-18 RX ADMIN — ISOSORBIDE MONONITRATE 30 MG: 30 TABLET, EXTENDED RELEASE ORAL at 10:00

## 2022-11-18 RX ADMIN — ASPIRIN 81 MG: 81 TABLET, COATED ORAL at 10:00

## 2022-11-18 RX ADMIN — SODIUM CHLORIDE, PRESERVATIVE FREE 10 ML: 5 INJECTION INTRAVENOUS at 10:02

## 2022-11-18 RX ADMIN — METOPROLOL SUCCINATE 50 MG: 50 TABLET, EXTENDED RELEASE ORAL at 10:00

## 2022-11-18 ASSESSMENT — PAIN SCALES - GENERAL
PAINLEVEL_OUTOF10: 0
PAINLEVEL_OUTOF10: 0

## 2022-11-18 NOTE — PROGRESS NOTES
CHIEF COMPLAINT: SOB/CHF    HISTORY OF PRESENT ILLNESS: Patient is a 81 y.o. female seen at the request of Won Joyce MD and not followed by cardiology. Past medical history of COPD, former smoker, HTN, HLD,  and DM presenting with progressive SOB and volume overload. No CP or angina. Denies prior cardiac history.      Past Medical History:   Diagnosis Date    COPD (chronic obstructive pulmonary disease) ([address]     H/O cardiovascular stress test 11/17/2022    Lexiscan    Hyperlipidemia     Hypertension     Osteoarthritis     Type II or unspecified type diabetes mellitus without mention of complication, not stated as uncontrolled        Patient Active Problem List   Diagnosis    Type 2 diabetes mellitus without complication, without long-term current use of insulin (HCC)    Mixed hyperlipidemia    Essential hypertension    Osteoarthritis    RLS (restless legs syndrome)    Chronic obstructive pulmonary disease (HCC)    Diabetic polyneuropathy associated with diabetes mellitus due to underlying condition (HCC)    Bilateral pleural effusion    Acute on chronic congestive heart failure (HCC)    Urinary retention    Abnormal cardiovascular stress test    Hypotension    COPD exacerbation (HCC)       Allergies   Allergen Reactions    Pcn [Penicillins] Hives    Sulfa Antibiotics Hives       Current Facility-Administered Medications   Medication Dose Route Frequency Provider Last Rate Last Admin    spironolactone (ALDACTONE) tablet 12.5 mg  12.5 mg Oral Daily Kirk Duel, DO   12.5 mg at 11/17/22 1057    latanoprost (XALATAN) 0.005 % ophthalmic solution 1 drop  1 drop Both Eyes Nightly Nabilmetmallory Eastman MD   1 drop at 11/17/22 2028    timolol (TIMOPTIC) 0.5 % ophthalmic solution 1 drop  1 drop Both Eyes BID Clemetine MD Raiza   1 drop at 11/17/22 2028    brimonidine (ALPHAGAN) 0.2 % ophthalmic solution 1 drop  1 drop Left Eye BID Clemetine MD Raiza   1 drop at 11/17/22 2028    sodium chloride flush 0.9 % injection 10 mL  10 mL IntraVENous PRN Moira J Esvin, DO        aspirin EC tablet 81 mg  81 mg Oral Daily Moira J Esvin, DO   81 mg at 11/17/22 1057    atorvastatin (LIPITOR) tablet 20 mg  20 mg Oral Daily Moira J Omaha, DO   20 mg at 11/17/22 1057    losartan (COZAAR) tablet 50 mg  50 mg Oral Daily Moira J Omaha, DO   50 mg at 11/17/22 1057    metoprolol succinate (TOPROL XL) extended release tablet 50 mg  50 mg Oral Daily Moira J Esvin, DO   50 mg at 11/17/22 1057    pregabalin (LYRICA) capsule 25 mg  25 mg Oral BID Moira J Omaha, DO   25 mg at 11/17/22 2028    sodium chloride flush 0.9 % injection 5-40 mL  5-40 mL IntraVENous 2 times per day Gerardine Kyrgyz, DO   10 mL at 11/17/22 2028    sodium chloride flush 0.9 % injection 5-40 mL  5-40 mL IntraVENous PRN Moira BHAGAT [place], DO        0.9 % sodium chloride infusion   IntraVENous PRN Moira BHAGAT Esvin, DO        [Held by provider] enoxaparin (LOVENOX) injection 40 mg  40 mg SubCUTAneous Daily Moira J [place], DO        ondansetron (ZOFRAN-ODT) disintegrating tablet 4 mg  4 mg Oral Q8H PRN Moira J Omaha, DO        Or    ondansetron (ZOFRAN) injection 4 mg  4 mg IntraVENous Q6H PRN Moira BHAGAT [place], DO        polyethylene glycol (GLYCOLAX) packet 17 g  17 g Oral Daily PRN Moira BHAGAT [place], DO        acetaminophen (TYLENOL) tablet 650 mg  650 mg Oral Q6H PRN Gerardine Kyrgyz, DO   650 mg at 11/15/22 2019    Or    acetaminophen (TYLENOL) suppository 650 mg  650 mg Rectal Q6H PRN Moira BHAGAT Esvin, DO        glucose chewable tablet 16 g  4 tablet Oral PRN Moira J [place], DO        dextrose bolus 10% 125 mL  125 mL IntraVENous PRN Moira BHAGAT Esvin, DO        Or    dextrose bolus 10% 250 mL  250 mL IntraVENous PRN Moira BHAGAT [place], DO        glucagon (rDNA) injection 1 mg  1 mg SubCUTAneous PRN Moira J [place], DO        dextrose 10 % infusion   IntraVENous Continuous PRN Moira Mcallister DO        insulin lispro (HUMALOG) injection vial 0-4 Units  0-4 Units SubCUTAneous TID  Moira Crowell DO insulin lispro (HUMALOG) injection vial 0-4 Units  0-4 Units SubCUTAneous Nightly Moira Mcallister DO        ipratropium-albuterol (DUONEB) nebulizer solution 1 ampule  1 ampule Inhalation BID Moira Mcallister DO   1 ampule at 22 0622       Social History     Socioeconomic History    Marital status:      Spouse name: Not on file    Number of children: Not on file    Years of education: Not on file    Highest education level: Not on file   Occupational History    Not on file   Tobacco Use    Smoking status: Former     Packs/day: 1.00     Years: 9.00     Pack years: 9.00     Types: Cigarettes     Quit date: 2000     Years since quittin.1    Smokeless tobacco: Never   Vaping Use    Vaping Use: Never used   Substance and Sexual Activity    Alcohol use: No     Alcohol/week: 0.0 standard drinks    Drug use: No    Sexual activity: Never     Partners: Male   Other Topics Concern    Not on file   Social History Narrative    Not on file     Social Determinants of Health     Financial Resource Strain: Low Risk     Difficulty of Paying Living Expenses: Not hard at all   Food Insecurity: No Food Insecurity    Worried About Running Out of Food in the Last Year: Never true    Ran Out of Food in the Last Year: Never true   Transportation Needs: Not on file   Physical Activity: Not on file   Stress: Not on file   Social Connections: Not on file   Intimate Partner Violence: Not on file   Housing Stability: Not on file       Family History   Problem Relation Age of Onset    Heart Disease Mother     High Blood Pressure Mother     Stroke Mother     Diabetes Mother     Heart Disease Father     High Blood Pressure Father        Review of Systems:   Heart: as above   Lungs: as above   Eyes: denies changes in vision or discharge. Ears: denies changes in hearing or pain. Nose: denies epistaxis or masses   Throat: denies sore throat or trouble swallowing. Neuro: denies numbness, tingling, tremors.    Skin: denies rashes or itching. : denies hematuria, dysuria   GI: denies vomiting, diarrhea   Psych: denies mood changed, anxiety, depression. all others negative. Physical Exam   BP (!) 108/49   Pulse 74   Temp 97.9 °F (36.6 °C) (Oral)   Resp 19   Ht 5' 6\" (1.676 m)   Wt 159 lb (72.1 kg)   LMP  (LMP Unknown)   SpO2 93%   BMI 25.66 kg/m²   Constitutional: Oriented to person, place, and time. Well-developed and well-nourished. No distress. Head: Normocephalic and atraumatic. Eyes: EOM are normal. Pupils are equal, round, and reactive to light. Neck: Normal range of motion. Neck supple. No hepatojugular reflux and no JVD present. Carotid bruit is not present. No tracheal deviation present. No thyromegaly present. Cardiovascular: Normal rate, regular rhythm, normal heart sounds and intact distal pulses. Exam reveals no gallop and no friction rub. No murmur heard. Pulmonary/Chest: Effort normal and breath sounds normal. No respiratory distress. No wheezes. No rales. No tenderness. Abdominal: Soft. Bowel sounds are normal. No distension and no mass. No tenderness. No rebound and no guarding. Musculoskeletal: Normal range of motion. No edema and no tenderness. Lymphadenopathy:   No cervical adenopathy. No groin adenopathy. Neurological: Alert and oriented to person, place, and time. Skin: Skin is warm and dry. No rash noted. Not diaphoretic. No erythema. Psychiatric: Normal mood and affect.  Behavior is normal.     CBC:   Lab Results   Component Value Date/Time    WBC 6.9 11/18/2022 05:53 AM    RBC 3.61 11/18/2022 05:53 AM    HGB 11.2 11/18/2022 05:53 AM    HCT 34.7 11/18/2022 05:53 AM    MCV 96.1 11/18/2022 05:53 AM    MCH 31.0 11/18/2022 05:53 AM    MCHC 32.3 11/18/2022 05:53 AM    RDW 13.7 11/18/2022 05:53 AM     11/18/2022 05:53 AM    MPV 10.5 11/18/2022 05:53 AM     BMP:   Lab Results   Component Value Date/Time     11/18/2022 05:53 AM    K 3.5 11/18/2022 05:53 AM     11/18/2022 05:53 AM    CO2 24 11/18/2022 05:53 AM    BUN 32 11/18/2022 05:53 AM    LABALBU 3.5 11/18/2022 05:53 AM    LABALBU 4.6 04/05/2012 09:35 AM    CREATININE 1.3 11/18/2022 05:53 AM    CALCIUM 9.1 11/18/2022 05:53 AM    GFRAA >60 01/04/2022 09:48 AM    LABGLOM 40 11/18/2022 05:53 AM     Magnesium:  No results found for: MG  Cardiac Enzymes:   Lab Results   Component Value Date    TROPHS 44 (H) 11/15/2022    TROPHS 47 (H) 11/15/2022      PT/INR:    Lab Results   Component Value Date/Time    PROTIME 12.9 11/16/2022 06:50 AM    INR 1.1 11/16/2022 06:50 AM     TSH:    Lab Results   Component Value Date/Time    TSH 2.830 11/11/2015 12:00 PM     Rhythm Strip: normal sinus rhythm. EKG:  Sinus rhythm with PAC's and PVC's. Non-specific ST-T changes. Echo Summary 11/15/2022:   Ejection fraction is visually estimated at 20%. Overall ejection fraction severely decreased. Left ventricle is moderately enlarged. Normal right ventricle structure and function. Left atrial volume index of 68 ml per meters squared BSA. Physiologic and/or trace aortic regurgitation is noted. Mild to moderate mitral regurgitation is present. Mild tricuspid regurgitation. No evidence for hemodynamically significant pericardial effusion. Stress Summary 11/16/2022:  1: The stress EKG report is provided separately. 2  This is an abnormal myocardial perfusion scan. Findings suggestive   of an infarction with mild-to-moderate casi-infarct ischemia in the   LAD/diagonal territory. 3: Left ventricle is moderately dilated with severe global   hypokinesis. There appears to be akinesis of the apical segments. 4: Prognostically, this is an intermediate to high risk study.      ASSESSMENT AND PLAN:  Patient Active Problem List   Diagnosis    Type 2 diabetes mellitus without complication, without long-term current use of insulin (HCC)    Mixed hyperlipidemia    Essential hypertension    Osteoarthritis    RLS (restless legs syndrome)    Chronic obstructive pulmonary disease (HCC)    Diabetic polyneuropathy associated with diabetes mellitus due to underlying condition (HCC)    Bilateral pleural effusion    Acute on chronic congestive heart failure (HCC)    Urinary retention    Abnormal cardiovascular stress test    Hypotension    COPD exacerbation ([address]     1. Acute systolic CHF:    Echo with severe LV dysfunction. Diurese with IV lasix. Pharm stress suggests an LAD infarct with some casi-infarct ischemia of the LAD territory. Eventual cath after compensation. BB/ARB/aldactone. Stopped norvasc and dyazide. 2. CAD:     Pharm stress suggests an LAD infarct with some casi-infarct ischemia of the LAD territory. Eventual cath after compensation. ASA/statin/BB/nitrate. 3. VHD: Trace AI/mild-mod MR/mild TR by 11/15/2022 echo. Observe. 4. COPD: Per primary service. 5. HTN: Observe. 6. Lipids: Statin. 7. DM: Per primary service. 8. JAI: Follow labs. Priscilla Dixon D.O.   Cardiologist  Cardiology, [facility]

## 2022-11-18 NOTE — PROGRESS NOTES
[facility] Progress Note    Admitting Date and Time: 11/15/2022  9:23 AM  Admit Dx: Cardiomegaly [I51.7]  Urinary retention [R33.9]  COPD exacerbation (HCC) [J44.1]  Bilateral pleural effusion [J90]  Pleural effusion, right [J90]  Acute on chronic congestive heart failure, unspecified heart failure type ([address] [I50.9]    Subjective:    Patient reported improvement in her breathing. Denies any chest pain or other new complaints. ROS: denies fever, chills, cp, sob, n/v, HA unless stated above.       [START ON 11/19/2022] atorvastatin  40 mg Oral Daily    isosorbide mononitrate  30 mg Oral Daily    spironolactone  12.5 mg Oral Daily    latanoprost  1 drop Both Eyes Nightly    timolol  1 drop Both Eyes BID    brimonidine  1 drop Left Eye BID    aspirin  81 mg Oral Daily    losartan  50 mg Oral Daily    metoprolol succinate  50 mg Oral Daily    pregabalin  25 mg Oral BID    sodium chloride flush  5-40 mL IntraVENous 2 times per day    [Held by provider] enoxaparin  40 mg SubCUTAneous Daily    insulin lispro  0-4 Units SubCUTAneous TID WC    insulin lispro  0-4 Units SubCUTAneous Nightly    ipratropium-albuterol  1 ampule Inhalation BID     sodium chloride flush, 10 mL, PRN  sodium chloride flush, 5-40 mL, PRN  sodium chloride, , PRN  ondansetron, 4 mg, Q8H PRN   Or  ondansetron, 4 mg, Q6H PRN  polyethylene glycol, 17 g, Daily PRN  acetaminophen, 650 mg, Q6H PRN   Or  acetaminophen, 650 mg, Q6H PRN  glucose, 4 tablet, PRN  dextrose bolus, 125 mL, PRN   Or  dextrose bolus, 250 mL, PRN  glucagon (rDNA), 1 mg, PRN  dextrose, , Continuous PRN       Objective:    BP (!) 104/50   Pulse 78   Temp (!) 96.1 °F (35.6 °C) (Oral)   Resp 20   Ht 5' 6\" (1.676 m)   Wt 159 lb (72.1 kg)   LMP  (LMP Unknown)   SpO2 (!) 88% Comment: sleeping  BMI 25.66 kg/m²     General Appearance: alert and oriented to person, place and time and in no acute distress  Skin: warm and dry  Head: normocephalic and atraumatic  Eyes: pupils equal, round, and reactive to light, extraocular eye movements intact, conjunctivae normal  Neck: neck supple and non tender without mass   Pulmonary/Chest: clear to auscultation bilaterally- no wheezes, rales or rhonchi, normal air movement, no respiratory distress  Cardiovascular: normal rate, normal S1 and S2 and no carotid bruits  Abdomen: soft, non-tender, non-distended, normal bowel sounds, no masses or organomegaly  Extremities: no cyanosis, no clubbing and no edema  Neurologic: no cranial nerve deficit and speech normal        Recent Labs     11/16/22  0650 11/17/22  0612 11/18/22  0553    139 136   K 4.4 3.5 3.5    101 101   CO2 24 28 24   BUN 23 25* 32*   CREATININE 1.0 0.9 1.3*   GLUCOSE 117* 109* 107*   CALCIUM 9.8 9.3 9.1         Recent Labs     11/16/22  0650 11/17/22  0612 11/18/22  0553   WBC 5.5 5.7 6.9   RBC 3.62 3.63 3.61   HGB 11.2* 11.2* 11.2*   HCT 35.3 35.1 34.7   MCV 97.5 96.7 96.1   MCH 30.9 30.9 31.0   MCHC 31.7* 31.9* 32.3   RDW 14.0 14.0 13.7    179 175   MPV 10.7 10.8 10.5         Assessment:    Principal Problem:    Bilateral pleural effusion  Active Problems:    Acute on chronic congestive heart failure (HCC)    Urinary retention    Abnormal cardiovascular stress test    Hypotension    COPD exacerbation (HCC)  Resolved Problems:    * No resolved hospital problems. *      Plan:    Hypotension - Cozaar, dyazide, and toprol with holding parameters. Amlodipine and dyazide stopped by cardio. Will hold off on IVF at this time due to pulmonary vascular congestion. BP continuing to improve  Acute hypoxic respiratory failure 2/2 right pleural effusion - Currently on 2 L NC and does not use oxygen at home. Acute on chronic systolic CHF exacerbation, EF 20% - Consulted cardiology. Continue BB and consider starting jardiance. Diuresis per cardiology. Will need life vest for primary prevention. HF clinic consult placed.    Abnormal stress test - stress today showed mild-moderate casi-infarct ischemia in LAD/diagonal territory and severe global hypokinesis. Per cardiology will be doing medical management for now, no plans for cath until compensated for HFrEF. Moderate right pleural effusion S/P diagnostic and therapeutic right thoracentesis -  970 cc of hazy yell pleural fluid drained. Culture NGTD, cytology negative for malignant cells  Urinary retention with indwelling sapp - Maintain sapp and plan for voiding trial prior to discharge as per urology   Normocytic anemia - Will check iron studies. Transfuse if hemoglobin is less than 7. Hgb 11 today. DM-II - At home patient is on metformin. Will hold and start insulin SS  HLD - Continue lipitor  COPD - Stable on combivent. Does not see pulmonology as outpatient  DVT prophylaxis - Lovenox      PT/OT      NOTE: This report was transcribed using voice recognition software. Every effort was made to ensure accuracy; however, inadvertent computerized transcription errors may be present.     Electronically signed by Shara Villela DO on 11/18/2022 at 4:09 PM

## 2022-11-18 NOTE — CONSULTS
Nutrition Assessment     Type and Reason for Visit: Initial, Consult (LOw Na+ DI)    Nutrition Recommendations/Plan:   Continue current diet & monitor     Nutrition Assessment:  Pt Admits  w/ SOB  Dx: Acute hypoxic resp failure 2/2 b/l pleural effusion, noted Acute on chronic CHF Hx: COPD, HTN, DM. Noted 11/17 s/p thoracentesis 950cc. Pt meal intake is good mostly 75%. Pt ws very receptive to DI & H/O. Will continue to monitor. Nutrition Related Findings:   A/O x4, soft/rounded abd +BS, flatus, I/O -5.9L, non-pitting BLE edema, Elevated renal labs, AST 57(H). Wound Type: Surgical Incision (back/ s/p thoracentesis)    Current Nutrition Therapies:    ADULT DIET; Regular; 4 carb choices (60 gm/meal); Low Sodium (2 gm)    Anthropometric Measures:  Height: 5' 6\" (167.6 cm)  Current Body Wt: 159 lb (72.1 kg) (11/17 bed)   BMI: 25.7    Nutrition Diagnosis:   No nutrition diagnosis at this time     Nutrition Interventions:   Food and/or Nutrient Delivery: Continue Current Diet  Nutrition Education/Counseling: Education completed  Coordination of Nutrition Care: Continue to monitor while inpatient     Nutrition Education:  Educated on Low Na+ dieit  Learners: Patient and Family  Readiness: Acceptance  Method: Explanation and Handout  Response: Verbalizes Understanding  Contact name and number provided. Goals:     Goals: PO intake 75% or greater     Nutrition Monitoring and Evaluation:   Behavioral-Environmental Outcomes: None Identified  Food/Nutrient Intake Outcomes: Food and Nutrient Intake  Physical Signs/Symptoms Outcomes: Biochemical Data, GI Status, Fluid Status or Edema, Weight, Skin, Nutrition Focused Physical Findings    Discharge Planning:     Too soon to determine     Shy Baker RD  Contact: [phone]

## 2022-11-18 NOTE — PROGRESS NOTES
Occupational Therapy  OCCUPATIONAL THERAPY Treatment note  [address] [facility]  [facility]    Date:2022                                                   Patient Name: Nichelle Guo     MRN: [id number]     : 1935     Room: [address] [place] [address]     Herminio Cartwright OTR/L #[id number]      Referring Provider and Specific Provider Orders/Date:      11/15/22 1500   OT eval and treat  Start:  11/15/22 1500,   End:  11/15/22 1500,   ONE TIME,   Standing Count:  1 Occurrences,   R         [facility],        Placement Recommendation: Home with Home Health OT      Diagnosis:   1. Bilateral pleural effusion    2. COPD exacerbation ([address]    3. Urinary retention    4. Cardiomegaly    5. Acute on chronic congestive heart failure, unspecified heart failure type ([address]    6.  Postprocedural pneumothorax            Surgery: None        Pertinent Medical History:       Past Medical History        Past Medical History:   Diagnosis Date    COPD (chronic obstructive pulmonary disease) ([address]      Hyperlipidemia      Hypertension      Osteoarthritis      Type II or unspecified type diabetes mellitus without mention of complication, not stated as uncontrolled              Past Surgical History         Past Surgical History:   Procedure Laterality Date    [facility]    COLONOSCOPY       TUMOR REMOVAL        under lt arm             Precautions:  Fall Risk, up as tolerated, 2L O2 via nasal canula,sapp       Assessment of current deficits    [x] Functional mobility         [x]ADLs           [x] Strength                   []Cognition    [x] Functional transfers       [x] IADLs          [x] Safety Awareness   [x]Endurance    [] Fine Coordination                       [x] Balance      [] Vision/perception    []Sensation      []Gross Motor Coordination           [] ROM           [] Delirium                   [] Motor Control OT PLAN OF CARE   OT POC based on physician orders, patient diagnosis and results of clinical assessment     Frequency/Duration 1-3 days/wk for 2 weeks PRN      Specific OT Treatment Interventions to include:   * Instruction/training on adapted ADL techniques and AE recommendations to increase functional independence within precautions       * Training on energy conservation strategies, correct breathing pattern and techniques to improve independence/tolerance for self-care routine  * Functional transfer/mobility training/DME recommendations for increased independence, safety, and fall prevention  * Patient/Family education to increase follow through with safety techniques and functional independence  * Recommendation of environmental modifications for increased safety with functional transfers/mobility and ADLs  * Therapeutic exercise to improve motor endurance, ROM, and functional strength for ADLs/functional transfers  * Therapeutic activities to facilitate/challenge dynamic balance, stand tolerance for increased safety and independence with ADLs  * Therapeutic activities to facilitate gross/fine motor skills for increased independence with ADLs     Recommended Adaptive Equipment: TBD       Home Living: Lives with spouse, single family home, 2 story but resides on 1st, 4 steps to enter with rail. Bathroom set-up: Tub/shower with grab bars, shower chair, standard commode with grab bars surrounding. Equipment owned: cane, wheeled walker, bedside commode     Prior Level of Function: Independent with ADLs , daughter assists with IADLs; ambulated independently using cane. Driving: No       Pain Level: Pt denied pain ; Nursing notified.              Cognition: A&O: 4/4; Follows 2-3 step directions           Memory: intact           Sequencing: intact           Problem solving: fair            Judgement/safety: fair- with loss of balance and impulsivity      [facility]   AM-PAC Daily Activity Inpatient How much help for putting on and taking off regular lower body clothing?: A Lot  How much help for Bathing?: A Little  How much help for Toileting?: A Lot  How much help for putting on and taking off regular upper body clothing?: A Little  How much help for taking care of personal grooming?: A Little  How much help for eating meals?: A Little  AM-PAC Inpatient Daily Activity Raw Score: 16  AM-PAC Inpatient ADL T-Scale Score : 35.96  ADL Inpatient CMS 0-100% Score: 53.32  ADL Inpatient CMS G-Code Modifier : CK             Functional Assessment:     Initial Eval Status  Date: 11/16/22    Treatment Status  Date:11/18/22 STGs = LTGs  Time frame: 10-14 days   Feeding Supervision     Set up only  Independent    Grooming Supervision   Set-up for face wash seated in chair     Set up with hair wash, hair comb, face/hand wash and oral care from up in chair  Moderate [place]    UB Dressing Minimal Assist   For gown mgmt to donning robe around back-side     Min A with gown management from standing  Moderate [place]    LB Dressing Maximal Assist   To thread LEs through undergarments and don over hips upon standing supported with walker. Dependent to don socks and shoes while seated in chair. Mod A overall with pt able to doff socks and pull up pants with balance assist, but assist to thread pants, don eric ankle braces and shoes Moderate [place]    Bathing Moderate Assist   For UB/LB sponge bathing seated in chair. Sponge bathing from up in arm chair sitting/standing with min A for eric feet and balance assist for safety. Moderate [place]    Toileting Maximal Assist   Pt has sapp catheter. Max A for rear hygiene. Max A with sapp management  Moderate [place]    Bed Mobility  Supine to sit: Minimal Assist  Sit to supine: Not Assessed     Supine to sit: SBA with extended time Supine to sit:  Independent   Sit to supine: Independent    Functional Transfers Sit to stand: Minimal Assist   Stand to sit: Minimal Assist      Transfer training with verbal cues for hand placement throughout session to improve safety. Min A with ww from bed and arm chair. Loss of balance backwards x 3. Cues for wt shift forward and hand placement for safety  Independent   Functional Mobility Minimal Assist with wheeled walker to improve balance less than household distances, verbal cues for walker sequence and safety. Min A with ww for short distance functional mobility throughout the room to simulate house hold distances. Moderate [place] with use of wheeled walker    Balance Sitting:     Static: fair plus    Dynamic: fair plus  Standing: fair with walker     Sitting:     Static: fair+    Dynamic: fair   Standing: fair- with walker   Sitting:     Static: good    Dynamic: good  Standing: good    Activity Tolerance fair   Fair overall with 1min tolerance in standing; 2L with 93% and HR 91; room air for ADLs from 90-93%. Placed O2 back on at end of session. Increase standing tolerance >3  minutes for improved engagement with functional transfers and indep in ADLs      Visual/  Perceptual Glasses: yes      L eye blind       NA       Hand Dominance: left        AROM (PROM) Strength Additional Info:  Goal:   RUE  WFL 3+/5 good  and wfl FMC/dexterity noted during ADL tasks    Improve overall RUE strength  for participation in functional tasks   LUE WFL 3+/5 good  and wfl FMC/dexterity noted during ADL tasks    Improve overall LUE strength  for participation in functional tasks      Hearing:  [facility]   Sensation:   No c/o numbness or tingling  Tone:  WFL   Edema: none                   Comments: Upon arrival patient supine with breakfast. Pt required min A for most UB ADLs and mod A LB ADLs tasks. Limited with min A for standing during LB ADLs and functional transfers.  The biggest barriers reflect that of functional transfers, functional mobility, UB/LB ADLs, cognition, activity tolerance, balance, safety and strengthening. At end of session, patient sitting up in arm chair with call light and phone within reach, all lines and tubes intact. Overall patient demonstrated decreased independence and safety during completion of ADL/functional transfer/mobility tasks. Nursing updated on pt position and status following OT treatment. Pt would benefit from continued skilled OT to increase safety and independence with completion of ADL/IADL tasks for functional independence and quality of life. Treatment: OT treatment provided this date includes:  Instruction, education and training on safe facilitation and adapted techniques for completion of ADLs. These include neuromuscular reeducation to facilitate balance/righting reactions,safe functional transfer techniques, proper positioning/alignment to improve interaction with environment and overall function and on adapted techniques/work simplification for completion of ADLs. Education provided on hand/feet placement with bed rails, ww, arm chair and body mechanics for fall prevention. Cues for energy conservation and safety for in the home at [place], including modifications and DME. Extended time to complete all tasks, including skilled monitoring of patient's response during treatment session and vital signs. Prior to and at the end of session, environmental modifications / line management completed for patients safety and efficiency of treatment session. See above for further details. Pt has made fair progress towards set goals    OT 1-3x/week for 5-7 days during hospitalization      Treatment Time also includes thorough review of current medical information, gathering information on past medical history/social history and prior level of function, informal observation of tasks, assessment of data and education on plan of care and goals.      Treatment Time In: 0805    Treatment Time Out: 1450     Treatment Charges: Mins Units   ADL/Home [facility]     [id number] 34 2   Thera Activities [id number] [facility]       Neuro Re-ed         [id number]       Orthotic manage/training                               [id number]       Non Billable Time       Total Timed Treatment 45 3     Radha Ruiz OTR/L; [id number]

## 2022-11-18 NOTE — PROGRESS NOTES
Notified Dr. Pedro Hernández that Patient ambulated while on room air, pulse ox stayed above 92%. Later when patient took a nap without oxygen, pulse ox dropped to 85%. RN applied 2L NC, recovered pulse ox 90% while sleeping. Then when patient woke up, pulse ox 95% on 2L.

## 2022-11-18 NOTE — CARE COORDINATION
11/18/2022 1235 CM met with pt/ for transition of care needs at d/c.  Plan is for pt to return home with her  as prior and the support of their 6 children. Pt's children provide transportation since they don't drive. Pt is still on O2 2 liters and nursing is attempting to wean if unable will need to have a walk test to qualify. Referral was made for [facility] and per [address] they can accept for PT/OT/Nursing(orders in Epic). Pt has a thoracentesis 11/16(950cc's removed), and a stress test 11/17. Pt's family will provide transportation home. CM will follow.  Electronically signed by Olga Tapia RN on 11/18/2022 at 1:03 PM

## 2022-11-18 NOTE — CONSULTS
CHF NURSE NAVIGATOR CONSULT NOTE:      Patient currently admitted with diagnosis of Systolic heart failure. Patient was wake and alert lying in bed visiting with her  during the consultation. She was engaged and asked appropriate questions throughout the education session. She is agreeable to symptom monitoring, daily weights, and following a low sodium diet and follow up with PCP and Cardiology. Scheduling with the CHF clinic No: patient is unsure at this time . CHF clinic information given to patient. Future Appointments   Date Time Provider Lili Contreras   11/29/2022 11:45 AM Dianne Clark MD [facility] AND [facility]   12/9/2022 11:30 AM SUDHAKAR [facility] CARDIO [facility]   2/15/2023  9:00 AM Dianne Clark MD [facility]       Barriers to Care:  Contributing risk factors for Heart Failure are identified as overwhelmed by complexity of regimen and multiple comorbidies. Pt admitted with SOB. She has not seen cardiology in the past. She had a ST this admission and may need cardiac cath in the future. She has a scale at home and is able to manage her medications. She denies problems with transportation to appointments. She does her own shopping and cooking. She tries to follow a low sodium diet. Pt is uncertain about establishing with the CHF clinic until she finds out about future testing. Reviewed purpose of CHF clinic and role in HF management. The patient is ordered:  Diet: ADULT DIET; Regular; 4 carb choices (60 gm/meal);  Low Sodium (2 gm)   Sodium controlled diet Yes  Fluid restriction daily ordered (fluid restriction recommended if patient is hyponatremic and/or diuretic is initiated or increased) No  FR:   Daily Weights: Patient Vitals for the past 96 hrs (Last 3 readings):   Weight   11/17/22 2045 159 lb (72.1 kg)   11/15/22 1745 155 lb 12.8 oz (70.7 kg)     I/O:   Intake/Output Summary (Last 24 hours) at 11/18/2022 6326  Last data filed at 11/18/2022 1327  Gross per 24 hour   Intake 360 ml   Output 1000 ml   Net -640 ml              We reviewed the introduction to Heart Failure, the HF zones, signs and symptoms to report on day 1 of onset, medications, medication compliance, the importance of obtaining daily weights, following a low sodium diet, reading food labels for the sodium content, keeping physician appointments, and smoking cessation. We discussed writing / tracking daily weights on a calendar / log because a 5 pound gain in 1 week can sneak up if you are not tracking it. I advised patient they can reduce the risk for Heart Failure exacerbations by modifying / controlling the risk factors. We discussed self-managed care which includes the following:  to take medications as prescribed, report any intolerable side effects of medications to the cardiologist / doctor, do not just stop taking the medication; follow a cardiac heart healthy / low sodium diet; weigh yourself daily, exercise regularly- per doctor recommendation and not to smoke or use an excess amount of alcohol. We discussed calling the cardiologist / doctor with a weight gain of 3 pounds in one day or a total of 5 pounds or more in one week. Also, if you should have a significant weight loss of 3# or more in one day to call the doctor, they may need to decrease or hold the diuretic dose. On days you feels nauseated and not eating / drinking, having emesis or diarrhea,  informed to call the cardiologist  / doctor, they may need to decrease or hold diuretic to avoid dehydration. I stressed the importance of informing their cardiologist the first day of onset of any of the signs and symptoms in the \"Yellow Zone\" of the HF Zones. Patient verbalizes understanding. Greater than 30 minutes was spent educating patient. The Heart Failure Booklet given to the patient with additional patient education addressing:  What is Heart Failure?   Things You Can Do to Live Well with HF  How to Take Your Medications  How to Eat Less Salt  [place] its Salt? Exercising Well with Heart Failure  Signs and symptoms of HF to report  Weight Yourself Each Day  Home Self Management- activity, weight tracking, taking medications as prescribed, meals /diet planning (sodium and fluid restriction), how to read food labels, keeping physician follow ups, smoking cessation, follow the Heart Failure Zones  The Heart Failure zones  Every Dose Every Day      Instructed  to call 911 if you have any of the following symptoms:       Struggling to breathe unrelieved with rest,     Having chest pain     Have confusion or cant think clearly    Cam Quintanilla MSN, RN  Heart Failure [address] (CHF) AHA GUIDELINES  (Must be completed for Primary Diagnosis CHF or History of CHF)    Discharge Plan:  I placed the Heart Failure Home Instructions in patient's discharge instructions. Per Heart Failure GWTG, the patient should have a follow-up appointment made within 7 days of discharge.     New Diagnosis No    ECHO Results most recent: 11/15/22 EF 20%  Lab Results   Component Value Date    LVEF 30 2022                                        Social History     Tobacco Use   Smoking Status Former    Packs/day: 1.00    Years: 9.00    Pack years: 9.00    Types: Cigarettes    Quit date: 2000    Years since quittin.1   Smokeless Tobacco Never        Immunization History   Administered Date(s) Administered    COVID-19, MODERNA [place] border, Primary or Immunocompromised, (age 12y+), IM, 100 mcg/0.5mL 2021, 2021    COVID-19, MODERNA Booster BLUE border, (age 18y+), IM, 50mcg/0.25mL 2021    DTaP 2006    Influenza 10/28/2013    Influenza Virus Vaccine 2012, 2014    Influenza, FLUAD, (age 72 y+), Adjuvanted, 0.5mL 10/12/2021, 2022    Influenza, FLUZONE (age 72 y+), High Dose, 0.7mL 10/26/2020, 10/26/2020    Influenza, High Dose (Fluzone 65 yrs and older) 09/24/2012, 11/21/2016, 10/25/2017, 10/09/2018    Influenza, Triv, inactivated, subunit, adjuvanted, IM (Fluad 65 yrs and older) 11/13/2019    Pneumococcal Conjugate 13-valent ([id number]) 02/09/2016    Pneumococcal Polysaccharide ([id number]) 05/16/2011          Angiotensin-Converting-Enzyme (ACE) inhibitor ordered:  [] Yes  [x] No (specify contraindication):    [] Contraindicated  [] Hypotensive patient who was at immediate risk of cardiogenic shock  [] Hospitalized patient who experienced marked azotemia  [] Other Contraindications  [x] Not Eligible  [] Not Tolerant  [] Patient Reason  [] System Reason  [] Other Reason    Angiotensin II receptor blockers (ARB) ordered:  [x] Yes Losartan   [] No (specify contraindication):    [] Contraindicated  [] Hypotensive patient who was at immediate risk of cardiogenic shock  [] Hospitalized patient who experienced marked azotemia  [] Other Contraindications    ARNI - Angiotensin Receptor Neprilysin Inhibitor ordered:  [] Yes  [x] No (specify contraindication):    [] ACE inhibitor use within the prior 36 hours  [] Allergy  [] Hyperkalemia  [] Hypotension  [] Renal dysfunction defined as creatinine > 2.5 mg/dL in men or > 2.0 mg/dL in women  [] Other Contraindications  [x] Not Eligible  [] Not Tolerant  [] Patient Reason  []System Reason  []Other Reason      Beta Blocker ordered:    [x] Yes Toprol XL  [] No (specify contraindication):    [] Contraindicated  [] Asthma  [] Fluid Overload  [] Low Blood Pressure  [] Patient recently treated with an intravenous positive inotropic agent  [] Other Contraindications  [] Not Eligible  [] Not Tolerant  [] Patient Reason  [] System Reason    SGLT2 Inhibitor ordered:  [] Yes  [x] No (specify contraindication):    [] Contraindicated  [] Patient currently on dialysis  [] Ketoacidosis  [] Known hypersensitivity to the medication  [] Type I diabetes (not approved for use in patients with Type I diabetes due to increased risk of ketoacidosis)  [] Other Contraindications  [] Not Eligible  [] Not Tolerant  [] Patient Reason  [] System Reason  [x] Other Reason    Aldosterone Antagonist ordered:  [x] Yes Aldactone  [] No (specify contraindication):    [] Contraindicated  [] Allergy due to aldosterone receptor antagonist  [] Hyperkalemia  [] Renal dysfunction defined as creatinine >2.5 mg/dL in men or >2.0 mg/dL in women.   [] Other contraindications  [] Not Eligible  [] Not Tolerant  [] Patient Reason  [] System Reason  [] Other Reason

## 2022-11-18 NOTE — PROGRESS NOTES
Dr. Sarika Ling was aware that Blood pressure from right upper arm was 73/51 HR 77 but from left upper arm was 106/50 HR 69. Patient was asymptomatic. Dr. Sarika Ling stated to continue observe the patient.

## 2022-11-18 NOTE — PLAN OF CARE
Patient's chart updated to reflect:      . - HF care plan, HF education points and HF discharge instructions.  -Orders: 2 gram sodium diet, daily weights, I/O.  -PCP and cardiology follow up appointments to be scheduled within 7 days of hospital discharge. -CHF education session will be provided to the patient prior to hospital discharge.     Rosenda RODRIGUEZ, RN  Heart Failure Navigator

## 2022-11-18 NOTE — PLAN OF CARE
Problem: Discharge Planning  Goal: Discharge to home or other facility with appropriate resources  Outcome: Progressing     Problem: Pain  Goal: Verbalizes/displays adequate comfort level or baseline comfort level  Outcome: Progressing     Problem: ABCDS Injury Assessment  Goal: Absence of physical injury  Outcome: Progressing  Flowsheets (Taken 11/17/2022 2000)  Absence of Physical Injury: Implement safety measures based on patient assessment

## 2022-11-19 LAB
ANION GAP SERPL CALCULATED.3IONS-SCNC: 11 MMOL/L (ref 7–16)
BASOPHILS ABSOLUTE: 0.01 E9/L (ref 0–0.2)
BASOPHILS RELATIVE PERCENT: 0.2 % (ref 0–2)
BODY FLUID CULTURE, STERILE: NORMAL
BUN BLDV-MCNC: 36 MG/DL (ref 6–23)
CALCIUM SERPL-MCNC: 8.7 MG/DL (ref 8.6–10.2)
CHLORIDE BLD-SCNC: 100 MMOL/L (ref 98–107)
CO2: 25 MMOL/L (ref 22–29)
CREAT SERPL-MCNC: 1.4 MG/DL (ref 0.5–1)
EOSINOPHILS ABSOLUTE: 0.21 E9/L (ref 0.05–0.5)
EOSINOPHILS RELATIVE PERCENT: 3.2 % (ref 0–6)
FERRITIN: 115 NG/ML
GFR SERPL CREATININE-BSD FRML MDRD: 36 ML/MIN/1.73
GLUCOSE BLD-MCNC: 103 MG/DL (ref 74–99)
GRAM STAIN RESULT: NORMAL
HCT VFR BLD CALC: 33.1 % (ref 34–48)
HEMOGLOBIN: 10.5 G/DL (ref 11.5–15.5)
IMMATURE GRANULOCYTES #: 0.02 E9/L
IMMATURE GRANULOCYTES %: 0.3 % (ref 0–5)
IRON SATURATION: 11 % (ref 15–50)
IRON: 32 MCG/DL (ref 37–145)
LYMPHOCYTES ABSOLUTE: 1.22 E9/L (ref 1.5–4)
LYMPHOCYTES RELATIVE PERCENT: 18.4 % (ref 20–42)
MCH RBC QN AUTO: 30.9 PG (ref 26–35)
MCHC RBC AUTO-ENTMCNC: 31.7 % (ref 32–34.5)
MCV RBC AUTO: 97.4 FL (ref 80–99.9)
METER GLUCOSE: 109 MG/DL (ref 74–99)
METER GLUCOSE: 117 MG/DL (ref 74–99)
METER GLUCOSE: 141 MG/DL (ref 74–99)
METER GLUCOSE: 150 MG/DL (ref 74–99)
MONOCYTES ABSOLUTE: 0.73 E9/L (ref 0.1–0.95)
MONOCYTES RELATIVE PERCENT: 11 % (ref 2–12)
NEUTROPHILS ABSOLUTE: 4.45 E9/L (ref 1.8–7.3)
NEUTROPHILS RELATIVE PERCENT: 66.9 % (ref 43–80)
PDW BLD-RTO: 14.1 FL (ref 11.5–15)
PLATELET # BLD: 160 E9/L (ref 130–450)
PMV BLD AUTO: 11 FL (ref 7–12)
POTASSIUM SERPL-SCNC: 3.6 MMOL/L (ref 3.5–5)
RBC # BLD: 3.4 E12/L (ref 3.5–5.5)
SODIUM BLD-SCNC: 136 MMOL/L (ref 132–146)
TOTAL IRON BINDING CAPACITY: 285 MCG/DL (ref 250–450)
WBC # BLD: 6.6 E9/L (ref 4.5–11.5)

## 2022-11-19 PROCEDURE — 2700000000 HC OXYGEN THERAPY PER DAY

## 2022-11-19 PROCEDURE — 6370000000 HC RX 637 (ALT 250 FOR IP): Performed by: INTERNAL MEDICINE

## 2022-11-19 PROCEDURE — 85025 COMPLETE CBC W/AUTO DIFF WBC: CPT

## 2022-11-19 PROCEDURE — 94640 AIRWAY INHALATION TREATMENT: CPT

## 2022-11-19 PROCEDURE — 36415 COLL VENOUS BLD VENIPUNCTURE: CPT

## 2022-11-19 PROCEDURE — 80048 BASIC METABOLIC PNL TOTAL CA: CPT

## 2022-11-19 PROCEDURE — 99233 SBSQ HOSP IP/OBS HIGH 50: CPT | Performed by: INTERNAL MEDICINE

## 2022-11-19 PROCEDURE — 82728 ASSAY OF FERRITIN: CPT

## 2022-11-19 PROCEDURE — 82962 GLUCOSE BLOOD TEST: CPT

## 2022-11-19 PROCEDURE — 99232 SBSQ HOSP IP/OBS MODERATE 35: CPT | Performed by: STUDENT IN AN ORGANIZED HEALTH CARE EDUCATION/TRAINING PROGRAM

## 2022-11-19 PROCEDURE — 2580000003 HC RX 258: Performed by: INTERNAL MEDICINE

## 2022-11-19 PROCEDURE — 83550 IRON BINDING TEST: CPT

## 2022-11-19 PROCEDURE — 2060000000 HC ICU INTERMEDIATE R&B

## 2022-11-19 PROCEDURE — 83540 ASSAY OF IRON: CPT

## 2022-11-19 PROCEDURE — 6360000002 HC RX W HCPCS: Performed by: INTERNAL MEDICINE

## 2022-11-19 RX ADMIN — SPIRONOLACTONE 12.5 MG: 25 TABLET ORAL at 08:51

## 2022-11-19 RX ADMIN — TIMOLOL MALEATE 1 DROP: 5 SOLUTION OPHTHALMIC at 20:06

## 2022-11-19 RX ADMIN — ENOXAPARIN SODIUM 40 MG: 100 INJECTION SUBCUTANEOUS at 08:52

## 2022-11-19 RX ADMIN — IPRATROPIUM BROMIDE AND ALBUTEROL SULFATE 1 AMPULE: .5; 2.5 SOLUTION RESPIRATORY (INHALATION) at 18:02

## 2022-11-19 RX ADMIN — SODIUM CHLORIDE, PRESERVATIVE FREE 10 ML: 5 INJECTION INTRAVENOUS at 08:58

## 2022-11-19 RX ADMIN — IPRATROPIUM BROMIDE AND ALBUTEROL SULFATE 1 AMPULE: .5; 2.5 SOLUTION RESPIRATORY (INHALATION) at 06:20

## 2022-11-19 RX ADMIN — ACETAMINOPHEN 650 MG: 325 TABLET ORAL at 08:52

## 2022-11-19 RX ADMIN — PREGABALIN 25 MG: 25 CAPSULE ORAL at 20:05

## 2022-11-19 RX ADMIN — LATANOPROST 1 DROP: 50 SOLUTION OPHTHALMIC at 20:06

## 2022-11-19 RX ADMIN — BRIMONIDINE TARTRATE 1 DROP: 2 SOLUTION OPHTHALMIC at 08:57

## 2022-11-19 RX ADMIN — BRIMONIDINE TARTRATE 1 DROP: 2 SOLUTION OPHTHALMIC at 20:06

## 2022-11-19 RX ADMIN — TIMOLOL MALEATE 1 DROP: 5 SOLUTION OPHTHALMIC at 08:57

## 2022-11-19 RX ADMIN — METOPROLOL SUCCINATE 50 MG: 50 TABLET, EXTENDED RELEASE ORAL at 08:50

## 2022-11-19 RX ADMIN — ATORVASTATIN CALCIUM 40 MG: 40 TABLET, FILM COATED ORAL at 08:51

## 2022-11-19 RX ADMIN — SODIUM CHLORIDE, PRESERVATIVE FREE 10 ML: 5 INJECTION INTRAVENOUS at 20:06

## 2022-11-19 RX ADMIN — ASPIRIN 81 MG: 81 TABLET, COATED ORAL at 08:50

## 2022-11-19 RX ADMIN — ISOSORBIDE MONONITRATE 30 MG: 30 TABLET, EXTENDED RELEASE ORAL at 08:50

## 2022-11-19 RX ADMIN — LOSARTAN POTASSIUM 50 MG: 50 TABLET, FILM COATED ORAL at 08:51

## 2022-11-19 RX ADMIN — PREGABALIN 25 MG: 25 CAPSULE ORAL at 08:51

## 2022-11-19 ASSESSMENT — PAIN DESCRIPTION - ORIENTATION: ORIENTATION: RIGHT

## 2022-11-19 ASSESSMENT — PAIN SCALES - GENERAL: PAINLEVEL_OUTOF10: 2

## 2022-11-19 ASSESSMENT — PAIN DESCRIPTION - LOCATION: LOCATION: SHOULDER

## 2022-11-19 NOTE — PROGRESS NOTES
CHIEF COMPLAINT: SOB/CHF    HISTORY OF PRESENT ILLNESS: Patient is a 81 y.o. female seen at the request of Jen Molina MD and not followed by cardiology. Past medical history of COPD, former smoker, HTN, HLD,  and DM presenting with progressive SOB and volume overload. No CP or angina. Denies prior cardiac history.      Past Medical History:   Diagnosis Date    COPD (chronic obstructive pulmonary disease) ([address]     H/O cardiovascular stress test 11/17/2022    Lexiscan    Hyperlipidemia     Hypertension     Osteoarthritis     Type II or unspecified type diabetes mellitus without mention of complication, not stated as uncontrolled        Patient Active Problem List   Diagnosis    Type 2 diabetes mellitus without complication, without long-term current use of insulin (HCC)    Mixed hyperlipidemia    Essential hypertension    Osteoarthritis    RLS (restless legs syndrome)    Chronic obstructive pulmonary disease (HCC)    Diabetic polyneuropathy associated with diabetes mellitus due to underlying condition (HCC)    Bilateral pleural effusion    Acute on chronic congestive heart failure (HCC)    Urinary retention    Abnormal cardiovascular stress test    Hypotension    COPD exacerbation (HCC)       Allergies   Allergen Reactions    Pcn [Penicillins] Hives    Sulfa Antibiotics Hives       Current Facility-Administered Medications   Medication Dose Route Frequency Provider Last Rate Last Admin    atorvastatin (LIPITOR) tablet 40 mg  40 mg Oral Daily Priscilla Zack, DO   40 mg at 11/19/22 0851    isosorbide mononitrate (IMDUR) extended release tablet 30 mg  30 mg Oral Daily Priscilla Zack, DO   30 mg at 11/19/22 0850    spironolactone (ALDACTONE) tablet 12.5 mg  12.5 mg Oral Daily Marceney Zack, DO   12.5 mg at 11/19/22 0851    latanoprost (XALATAN) 0.005 % ophthalmic solution 1 drop  1 drop Both Eyes Nightly Wendy Paredes MD   1 drop at 11/18/22 1947    timolol (TIMOPTIC) 0.5 % ophthalmic solution 1 drop 1 drop Both Eyes BID Iram Michel MD   1 drop at 11/19/22 0857    brimonidine (ALPHAGAN) 0.2 % ophthalmic solution 1 drop  1 drop Left Eye BID Iram Michel MD   1 drop at 11/19/22 0857    sodium chloride flush 0.9 % injection 10 mL  10 mL IntraVENous PRN Moira BHAGAT [place], DO        aspirin EC tablet 81 mg  81 mg Oral Daily Moira Perezon, DO   81 mg at 11/19/22 0850    losartan (COZAAR) tablet 50 mg  50 mg Oral Daily Moira BHAGAT [place], DO   50 mg at 11/19/22 0851    metoprolol succinate (TOPROL XL) extended release tablet 50 mg  50 mg Oral Daily Moira BHAGAT [place], DO   50 mg at 11/19/22 0850    pregabalin (LYRICA) capsule 25 mg  25 mg Oral BID Moira BHAGAT [place], DO   25 mg at 11/19/22 0851    sodium chloride flush 0.9 % injection 5-40 mL  5-40 mL IntraVENous 2 times per day Philip Tang, DO   10 mL at 11/19/22 0858    sodium chloride flush 0.9 % injection 5-40 mL  5-40 mL IntraVENous PRN Moira BHAGAT [place], DO        0.9 % sodium chloride infusion   IntraVENous PRN Moira Perezon, DO        enoxaparin (LOVENOX) injection 40 mg  40 mg SubCUTAneous Daily Moira BHAGAT [place], DO   40 mg at 11/19/22 0852    ondansetron (ZOFRAN-ODT) disintegrating tablet 4 mg  4 mg Oral Q8H PRN Moira BHAGAT [place], DO        Or    ondansetron (ZOFRAN) injection 4 mg  4 mg IntraVENous Q6H PRN Moira BHAGAT [place], DO        polyethylene glycol (GLYCOLAX) packet 17 g  17 g Oral Daily PRN Moira BHAGAT [place], DO        acetaminophen (TYLENOL) tablet 650 mg  650 mg Oral Q6H PRN Philip Tang, DO   650 mg at 11/19/22 9206    Or    acetaminophen (TYLENOL) suppository 650 mg  650 mg Rectal Q6H PRN Moira BHAGAT [place], DO        glucose chewable tablet 16 g  4 tablet Oral PRN Moira BHAGAT [place], DO        dextrose bolus 10% 125 mL  125 mL IntraVENous PRN Moira Perezon, DO        Or    dextrose bolus 10% 250 mL  250 mL IntraVENous PRN Moira BHAGAT [place], DO        glucagon (rDNA) injection 1 mg  1 mg SubCUTAneous PRN Moira Perezon, DO        dextrose 10 % infusion   IntraVENous Continuous PRN Moira Peters, DO        insulin lispro (HUMALOG) injection vial 0-4 Units  0-4 Units SubCUTAneous TID WC Moira Mcallister, DO        insulin lispro (HUMALOG) injection vial 0-4 Units  0-4 Units SubCUTAneous Nightly Moira Mcallister DO        ipratropium-albuterol (DUONEB) nebulizer solution 1 ampule  1 ampule Inhalation BID Moira J Esvin DO   1 ampule at 22 0620       Social History     Socioeconomic History    Marital status:      Spouse name: Not on file    Number of children: Not on file    Years of education: Not on file    Highest education level: Not on file   Occupational History    Not on file   Tobacco Use    Smoking status: Former     Packs/day: 1.00     Years: 9.00     Pack years: 9.00     Types: Cigarettes     Quit date: 2000     Years since quittin.    Smokeless tobacco: Never   Vaping Use    Vaping Use: Never used   Substance and Sexual Activity    Alcohol use: No     Alcohol/week: 0.0 standard drinks    Drug use: No    Sexual activity: Never     Partners: Male   Other Topics Concern    Not on file   Social History Narrative    Not on file     Social Determinants of Health     Financial Resource Strain: Low Risk     Difficulty of Paying Living Expenses: Not hard at all   Food Insecurity: No Food Insecurity    Worried About Running Out of Food in the Last Year: Never true    Ran Out of Food in the Last Year: Never true   Transportation Needs: Not on file   Physical Activity: Not on file   Stress: Not on file   Social Connections: Not on file   Intimate Partner Violence: Not on file   Housing Stability: Not on file       Family History   Problem Relation Age of Onset    Heart Disease Mother     High Blood Pressure Mother     Stroke Mother     Diabetes Mother     Heart Disease Father     High Blood Pressure Father        Review of Systems:   Heart: as above   Lungs: as above   Eyes: denies changes in vision or discharge. Ears: denies changes in hearing or pain.    Nose: denies epistaxis or masses   Throat: denies sore throat or trouble swallowing. Neuro: denies numbness, tingling, tremors. Skin: denies rashes or itching. : denies hematuria, dysuria   GI: denies vomiting, diarrhea   Psych: denies mood changed, anxiety, depression. all others negative. Physical Exam   BP (!) 110/53   Pulse 70   Temp 98 °F (36.7 °C) (Oral)   Resp 16   Ht 5' 6\" (1.676 m)   Wt 156 lb (70.8 kg)   LMP  (LMP Unknown)   SpO2 91%   BMI 25.18 kg/m²   Constitutional: Oriented to person, place, and time. Well-developed and well-nourished. No distress. Head: Normocephalic and atraumatic. Eyes: EOM are normal. Pupils are equal, round, and reactive to light. Neck: Normal range of motion. Neck supple. No hepatojugular reflux and no JVD present. Carotid bruit is not present. No tracheal deviation present. No thyromegaly present. Cardiovascular: Normal rate, regular rhythm, normal heart sounds and intact distal pulses. Exam reveals no gallop and no friction rub. No murmur heard. Pulmonary/Chest: Effort normal and breath sounds normal. No respiratory distress. No wheezes. No rales. No tenderness. Abdominal: Soft. Bowel sounds are normal. No distension and no mass. No tenderness. No rebound and no guarding. Musculoskeletal: Normal range of motion. No edema and no tenderness. Lymphadenopathy:   No cervical adenopathy. No groin adenopathy. Neurological: Alert and oriented to person, place, and time. Skin: Skin is warm and dry. No rash noted. Not diaphoretic. No erythema. Psychiatric: Normal mood and affect.  Behavior is normal.     CBC:   Lab Results   Component Value Date/Time    WBC 6.6 11/19/2022 06:03 AM    RBC 3.40 11/19/2022 06:03 AM    HGB 10.5 11/19/2022 06:03 AM    HCT 33.1 11/19/2022 06:03 AM    MCV 97.4 11/19/2022 06:03 AM    MCH 30.9 11/19/2022 06:03 AM    MCHC 31.7 11/19/2022 06:03 AM    RDW 14.1 11/19/2022 06:03 AM     11/19/2022 06:03 AM    MPV 11.0 11/19/2022 06:03 AM     BMP:   Lab Results   Component Value Date/Time     11/19/2022 06:03 AM    K 3.6 11/19/2022 06:03 AM     11/19/2022 06:03 AM    CO2 25 11/19/2022 06:03 AM    BUN 36 11/19/2022 06:03 AM    LABALBU 3.5 11/18/2022 05:53 AM    LABALBU 4.6 04/05/2012 09:35 AM    CREATININE 1.4 11/19/2022 06:03 AM    CALCIUM 8.7 11/19/2022 06:03 AM    GFRAA >60 01/04/2022 09:48 AM    LABGLOM 36 11/19/2022 06:03 AM     Magnesium:  No results found for: MG  Cardiac Enzymes:   Lab Results   Component Value Date    TROPHS 44 (H) 11/15/2022    TROPHS 47 (H) 11/15/2022      PT/INR:    Lab Results   Component Value Date/Time    PROTIME 12.9 11/16/2022 06:50 AM    INR 1.1 11/16/2022 06:50 AM     TSH:    Lab Results   Component Value Date/Time    TSH 2.830 11/11/2015 12:00 PM     Rhythm Strip: normal sinus rhythm. EKG:  Sinus rhythm with PAC's and PVC's. Non-specific ST-T changes. Echo Summary 11/15/2022:   Ejection fraction is visually estimated at 20%. Overall ejection fraction severely decreased. Left ventricle is moderately enlarged. Normal right ventricle structure and function. Left atrial volume index of 68 ml per meters squared BSA. Physiologic and/or trace aortic regurgitation is noted. Mild to moderate mitral regurgitation is present. Mild tricuspid regurgitation. No evidence for hemodynamically significant pericardial effusion. Stress Summary 11/16/2022:  1: The stress EKG report is provided separately. 2  This is an abnormal myocardial perfusion scan. Findings suggestive   of an infarction with mild-to-moderate casi-infarct ischemia in the   LAD/diagonal territory. 3: Left ventricle is moderately dilated with severe global   hypokinesis. There appears to be akinesis of the apical segments. 4: Prognostically, this is an intermediate to high risk study.      ASSESSMENT AND PLAN:  Patient Active Problem List   Diagnosis    Type 2 diabetes mellitus without complication, without long-term current use of insulin (HCC)    Mixed hyperlipidemia    Essential hypertension    Osteoarthritis    RLS (restless legs syndrome)    Chronic obstructive pulmonary disease (HCC)    Diabetic polyneuropathy associated with diabetes mellitus due to underlying condition ([facility])    Bilateral pleural effusion    Acute on chronic congestive heart failure ([facility]    Urinary retention    Abnormal cardiovascular stress test    Hypotension    COPD exacerbation ([facility] [address]     1. Acute systolic CHF:    Echo with severe LV dysfunction. Diurese with IV lasix. -6.6 liters. Pharm stress suggests an LAD infarct with some casi-infarct ischemia of the LAD territory. Eventual cath after compensation. BB/ARB/aldactone. Stopped norvasc and dyazide. Follow labs. 2. CAD:     Pharm stress suggests an LAD infarct with some casi-infarct ischemia of the LAD territory. Eventual cath after compensation. ASA/statin/BB/nitrate. 3. VHD: Trace AI/mild-mod MR/mild TR by 11/15/2022 echo. Observe. 4. COPD: Per primary service. 5. HTN: Observe. 6. Lipids: Statin. 7. DM: Per primary service. 8. JAI: Follow labs. Ann Martinez D.O.   Cardiologist  Cardiology, [address] [facility]

## 2022-11-19 NOTE — PROGRESS NOTES
Pulse ox was_88___% on room air at rest.  Ambulated patient on room air. Oxygen saturation was __83___% on room air while ambulating. Oxygen applied. Recovery pulse ox was __92___% on __3___liters of oxygen while ambulating.

## 2022-11-20 LAB
ANION GAP SERPL CALCULATED.3IONS-SCNC: 10 MMOL/L (ref 7–16)
BUN BLDV-MCNC: 30 MG/DL (ref 6–23)
CALCIUM SERPL-MCNC: 8.7 MG/DL (ref 8.6–10.2)
CHLORIDE BLD-SCNC: 101 MMOL/L (ref 98–107)
CO2: 24 MMOL/L (ref 22–29)
CREAT SERPL-MCNC: 1 MG/DL (ref 0.5–1)
GFR SERPL CREATININE-BSD FRML MDRD: 55 ML/MIN/1.73
GLUCOSE BLD-MCNC: 192 MG/DL (ref 74–99)
METER GLUCOSE: 110 MG/DL (ref 74–99)
METER GLUCOSE: 151 MG/DL (ref 74–99)
METER GLUCOSE: 153 MG/DL (ref 74–99)
METER GLUCOSE: 97 MG/DL (ref 74–99)
POTASSIUM SERPL-SCNC: 4.1 MMOL/L (ref 3.5–5)
SODIUM BLD-SCNC: 135 MMOL/L (ref 132–146)

## 2022-11-20 PROCEDURE — 99232 SBSQ HOSP IP/OBS MODERATE 35: CPT | Performed by: STUDENT IN AN ORGANIZED HEALTH CARE EDUCATION/TRAINING PROGRAM

## 2022-11-20 PROCEDURE — 6370000000 HC RX 637 (ALT 250 FOR IP): Performed by: STUDENT IN AN ORGANIZED HEALTH CARE EDUCATION/TRAINING PROGRAM

## 2022-11-20 PROCEDURE — 2700000000 HC OXYGEN THERAPY PER DAY

## 2022-11-20 PROCEDURE — 6370000000 HC RX 637 (ALT 250 FOR IP): Performed by: INTERNAL MEDICINE

## 2022-11-20 PROCEDURE — 36415 COLL VENOUS BLD VENIPUNCTURE: CPT

## 2022-11-20 PROCEDURE — 2580000003 HC RX 258: Performed by: INTERNAL MEDICINE

## 2022-11-20 PROCEDURE — 94640 AIRWAY INHALATION TREATMENT: CPT

## 2022-11-20 PROCEDURE — 2060000000 HC ICU INTERMEDIATE R&B

## 2022-11-20 PROCEDURE — 82962 GLUCOSE BLOOD TEST: CPT

## 2022-11-20 PROCEDURE — 80048 BASIC METABOLIC PNL TOTAL CA: CPT

## 2022-11-20 PROCEDURE — 6360000002 HC RX W HCPCS: Performed by: INTERNAL MEDICINE

## 2022-11-20 PROCEDURE — 99233 SBSQ HOSP IP/OBS HIGH 50: CPT | Performed by: INTERNAL MEDICINE

## 2022-11-20 RX ORDER — FERROUS SULFATE 325(65) MG
325 TABLET ORAL 2 TIMES DAILY WITH MEALS
Status: DISCONTINUED | OUTPATIENT
Start: 2022-11-20 | End: 2022-11-23 | Stop reason: HOSPADM

## 2022-11-20 RX ORDER — LOSARTAN POTASSIUM 50 MG/1
25 TABLET ORAL DAILY
Status: DISCONTINUED | OUTPATIENT
Start: 2022-11-21 | End: 2022-11-23 | Stop reason: HOSPADM

## 2022-11-20 RX ORDER — METOPROLOL SUCCINATE 25 MG/1
25 TABLET, EXTENDED RELEASE ORAL DAILY
Status: DISCONTINUED | OUTPATIENT
Start: 2022-11-21 | End: 2022-11-23 | Stop reason: HOSPADM

## 2022-11-20 RX ADMIN — ENOXAPARIN SODIUM 40 MG: 100 INJECTION SUBCUTANEOUS at 08:48

## 2022-11-20 RX ADMIN — TIMOLOL MALEATE 1 DROP: 5 SOLUTION OPHTHALMIC at 20:33

## 2022-11-20 RX ADMIN — TIMOLOL MALEATE 1 DROP: 5 SOLUTION OPHTHALMIC at 08:49

## 2022-11-20 RX ADMIN — ASPIRIN 81 MG: 81 TABLET, COATED ORAL at 08:48

## 2022-11-20 RX ADMIN — FERROUS SULFATE TAB 325 MG (65 MG ELEMENTAL FE) 325 MG: 325 (65 FE) TAB at 16:37

## 2022-11-20 RX ADMIN — LATANOPROST 1 DROP: 50 SOLUTION OPHTHALMIC at 20:33

## 2022-11-20 RX ADMIN — PREGABALIN 25 MG: 25 CAPSULE ORAL at 08:48

## 2022-11-20 RX ADMIN — FERROUS SULFATE TAB 325 MG (65 MG ELEMENTAL FE) 325 MG: 325 (65 FE) TAB at 08:48

## 2022-11-20 RX ADMIN — IPRATROPIUM BROMIDE AND ALBUTEROL SULFATE 1 AMPULE: .5; 2.5 SOLUTION RESPIRATORY (INHALATION) at 05:59

## 2022-11-20 RX ADMIN — PREGABALIN 25 MG: 25 CAPSULE ORAL at 20:33

## 2022-11-20 RX ADMIN — SODIUM CHLORIDE, PRESERVATIVE FREE 10 ML: 5 INJECTION INTRAVENOUS at 08:53

## 2022-11-20 RX ADMIN — BRIMONIDINE TARTRATE 1 DROP: 2 SOLUTION OPHTHALMIC at 08:49

## 2022-11-20 RX ADMIN — IPRATROPIUM BROMIDE AND ALBUTEROL SULFATE 1 AMPULE: .5; 2.5 SOLUTION RESPIRATORY (INHALATION) at 18:37

## 2022-11-20 RX ADMIN — ATORVASTATIN CALCIUM 40 MG: 40 TABLET, FILM COATED ORAL at 08:48

## 2022-11-20 RX ADMIN — BRIMONIDINE TARTRATE 1 DROP: 2 SOLUTION OPHTHALMIC at 20:33

## 2022-11-20 RX ADMIN — SODIUM CHLORIDE, PRESERVATIVE FREE 10 ML: 5 INJECTION INTRAVENOUS at 20:33

## 2022-11-20 NOTE — PROGRESS NOTES
[facility] Progress Note    Admitting Date and Time: 11/15/2022  9:23 AM  Admit Dx: Cardiomegaly [I51.7]  Urinary retention [R33.9]  COPD exacerbation (HCC) [J44.1]  Bilateral pleural effusion [J90]  Pleural effusion, right [J90]  Acute on chronic congestive heart failure, unspecified heart failure type ([address] [I50.9]    Subjective:    Patient reported that she was feeling fine today, did not have any new complaints. Notes that her BP has been low. ROS: denies fever, chills, cp, sob, n/v, HA unless stated above.       ferrous sulfate  325 mg Oral BID WC    [START ON 11/21/2022] losartan  25 mg Oral Daily    [START ON 11/21/2022] metoprolol succinate  25 mg Oral Daily    atorvastatin  40 mg Oral Daily    isosorbide mononitrate  30 mg Oral Daily    spironolactone  12.5 mg Oral Daily    latanoprost  1 drop Both Eyes Nightly    timolol  1 drop Both Eyes BID    brimonidine  1 drop Left Eye BID    aspirin  81 mg Oral Daily    pregabalin  25 mg Oral BID    sodium chloride flush  5-40 mL IntraVENous 2 times per day    enoxaparin  40 mg SubCUTAneous Daily    insulin lispro  0-4 Units SubCUTAneous TID WC    insulin lispro  0-4 Units SubCUTAneous Nightly    ipratropium-albuterol  1 ampule Inhalation BID     sodium chloride flush, 10 mL, PRN  sodium chloride flush, 5-40 mL, PRN  sodium chloride, , PRN  ondansetron, 4 mg, Q8H PRN   Or  ondansetron, 4 mg, Q6H PRN  polyethylene glycol, 17 g, Daily PRN  acetaminophen, 650 mg, Q6H PRN   Or  acetaminophen, 650 mg, Q6H PRN  glucose, 4 tablet, PRN  dextrose bolus, 125 mL, PRN   Or  dextrose bolus, 250 mL, PRN  glucagon (rDNA), 1 mg, PRN  dextrose, , Continuous PRN       Objective:    BP (!) 86/40   Pulse 80   Temp 97.7 °F (36.5 °C) (Infrared)   Resp 16   Ht 5' 6\" (1.676 m)   Wt 162 lb 2 oz (73.5 kg)   LMP  (LMP Unknown)   SpO2 93%   BMI 26.17 kg/m²     General Appearance: alert and oriented to person, place and time and in no acute distress  Skin: warm and dry  Head: normocephalic and atraumatic  Eyes: pupils equal, round, and reactive to light, extraocular eye movements intact, conjunctivae normal  Neck: neck supple and non tender without mass   Pulmonary/Chest: clear to auscultation bilaterally- no wheezes, rales or rhonchi, normal air movement, no respiratory distress  Cardiovascular: normal rate, normal S1 and S2 and no carotid bruits  Abdomen: soft, non-tender, non-distended, normal bowel sounds, no masses or organomegaly  Extremities: no cyanosis, no clubbing and no edema  Neurologic: no cranial nerve deficit and speech normal        Recent Labs     11/18/22  0553 11/19/22  0603 11/20/22  1009    136 135   K 3.5 3.6 4.1    100 101   CO2 24 25 24   BUN 32* 36* 30*   CREATININE 1.3* 1.4* 1.0   GLUCOSE 107* 103* 192*   CALCIUM 9.1 8.7 8.7       Recent Labs     11/18/22  0553 11/19/22  0603   WBC 6.9 6.6   RBC 3.61 3.40*   HGB 11.2* 10.5*   HCT 34.7 33.1*   MCV 96.1 97.4   MCH 31.0 30.9   MCHC 32.3 31.7*   RDW 13.7 14.1    160   MPV 10.5 11.0       Assessment:    Principal Problem:    Bilateral pleural effusion  Active Problems:    Acute on chronic congestive heart failure (HCC)    Urinary retention    Abnormal cardiovascular stress test    Hypotension    COPD exacerbation (HCC)  Resolved Problems:    * No resolved hospital problems. *      Plan:    Hypotension - Cozaar, dyazide, and toprol with holding parameters. Amlodipine and dyazide stopped and losartan and metoprolol doses reduced today by cardio. Monitor BP  Acute hypoxic respiratory failure 2/2 right pleural effusion - Currently on 3 L NC and does not use oxygen at home. Also qualified for home O2 yesterday. Acute on chronic systolic CHF exacerbation, EF 20% - Consulted cardiology. Continue BB and consider starting jardiance. Diuresis per cardiology. Net -7.7L since admission. Will need life vest for primary prevention. HF clinic consult placed.    Abnormal stress test - stress today showed mild-moderate casi-infarct ischemia in LAD/diagonal territory and severe global hypokinesis. Per cardiology will be doing medical management for now, no plans for cath until compensated for HFrEF. Moderate right pleural effusion S/P diagnostic and therapeutic right thoracentesis -  970 cc of hazy yell pleural fluid drained. Culture negative, cytology negative for malignant cells  Urinary retention with indwelling sapp - Maintain sapp and plan for voiding trial prior to discharge as per urology   Normocytic anemia - Will check iron studies. Transfuse if hemoglobin is less than 7. Hgb 10.5 yesterday. DM-II - At home patient is on metformin. Will hold and start insulin SS  HLD - Continue lipitor  COPD - Stable on combivent. Does not see pulmonology as outpatient  DVT prophylaxis - Lovenox      PT/OT      NOTE: This report was transcribed using voice recognition software. Every effort was made to ensure accuracy; however, inadvertent computerized transcription errors may be present.     Electronically signed by Eliza Echevarria DO on 11/20/2022 at 12:35 PM

## 2022-11-20 NOTE — PROGRESS NOTES
CHIEF COMPLAINT: SOB/CHF    HISTORY OF PRESENT ILLNESS: Patient is a 81 y.o. female seen at the request of Cassandra Byers MD and not followed by cardiology. Past medical history of COPD, former smoker, HTN, HLD,  and DM presenting with progressive SOB and volume overload. No CP or angina. Denies prior cardiac history.      Past Medical History:   Diagnosis Date    COPD (chronic obstructive pulmonary disease) ([address]     H/O cardiovascular stress test 11/17/2022    Lexiscan    Hyperlipidemia     Hypertension     Osteoarthritis     Type II or unspecified type diabetes mellitus without mention of complication, not stated as uncontrolled        Patient Active Problem List   Diagnosis    Type 2 diabetes mellitus without complication, without long-term current use of insulin (HCC)    Mixed hyperlipidemia    Essential hypertension    Osteoarthritis    RLS (restless legs syndrome)    Chronic obstructive pulmonary disease (HCC)    Diabetic polyneuropathy associated with diabetes mellitus due to underlying condition (HCC)    Bilateral pleural effusion    Acute on chronic congestive heart failure (HCC)    Urinary retention    Abnormal cardiovascular stress test    Hypotension    COPD exacerbation (HCC)       Allergies   Allergen Reactions    Pcn [Penicillins] Hives    Sulfa Antibiotics Hives       Current Facility-Administered Medications   Medication Dose Route Frequency Provider Last Rate Last Admin    ferrous sulfate (IRON 325) tablet 325 mg  325 mg Oral BID  Shante Dias MD   325 mg at 11/20/22 0848    atorvastatin (LIPITOR) tablet 40 mg  40 mg Oral Daily Ardine Pile, DO   40 mg at 11/20/22 0848    isosorbide mononitrate (IMDUR) extended release tablet 30 mg  30 mg Oral Daily Ardine Pile, DO   30 mg at 11/19/22 0850    spironolactone (ALDACTONE) tablet 12.5 mg  12.5 mg Oral Daily Ardine Pile, DO   12.5 mg at 11/19/22 0851    latanoprost (XALATAN) 0.005 % ophthalmic solution 1 drop  1 drop Both Eyes Nightly Lino Lundborg, MD   1 drop at 11/19/22 2006    timolol (TIMOPTIC) 0.5 % ophthalmic solution 1 drop  1 drop Both Eyes BID Lino Lundborg, MD   1 drop at 11/20/22 0849    brimonidine (ALPHAGAN) 0.2 % ophthalmic solution 1 drop  1 drop Left Eye BID Lino Lundborg, MD   1 drop at 11/20/22 0849    sodium chloride flush 0.9 % injection 10 mL  10 mL IntraVENous PRN Moira Perezon, DO        aspirin EC tablet 81 mg  81 mg Oral Daily Moira Perezon, DO   81 mg at 11/20/22 0848    losartan (COZAAR) tablet 50 mg  50 mg Oral Daily Moira cMallister, DO   50 mg at 11/19/22 0851    metoprolol succinate (TOPROL XL) extended release tablet 50 mg  50 mg Oral Daily Emaline [place], DO   50 mg at 11/19/22 0850    pregabalin (LYRICA) capsule 25 mg  25 mg Oral BID Moira Mcallister, DO   25 mg at 11/20/22 0848    sodium chloride flush 0.9 % injection 5-40 mL  5-40 mL IntraVENous 2 times per day Zach Johnsonier, DO   10 mL at 11/20/22 0853    sodium chloride flush 0.9 % injection 5-40 mL  5-40 mL IntraVENous PRN Moira Mcallister, DO        0.9 % sodium chloride infusion   IntraVENous PRN Moira Perezon, DO        enoxaparin (LOVENOX) injection 40 mg  40 mg SubCUTAneous Daily Moira Mcallister, DO   40 mg at 11/20/22 0848    ondansetron (ZOFRAN-ODT) disintegrating tablet 4 mg  4 mg Oral Q8H PRN Moira Perezon, DO        Or    ondansetron (ZOFRAN) injection 4 mg  4 mg IntraVENous Q6H PRN Moira Perezon, DO        polyethylene glycol (GLYCOLAX) packet 17 g  17 g Oral Daily PRN Moira Perezon, DO        acetaminophen (TYLENOL) tablet 650 mg  650 mg Oral Q6H PRN Zach , DO   650 mg at 11/19/22 0832    Or    acetaminophen (TYLENOL) suppository 650 mg  650 mg Rectal Q6H PRN Moira Perezon, DO        glucose chewable tablet 16 g  4 tablet Oral PRN Moira J Esvin, DO        dextrose bolus 10% 125 mL  125 mL IntraVENous PRN Moria J [place], DO        Or    dextrose bolus 10% 250 mL  250 mL IntraVENous PRN Moira J [place], DO        glucagon (rDNA) injection 1 mg  1 mg SubCUTAneous PRN Moira Mcallister, DO        dextrose 10 % infusion   IntraVENous Continuous PRN Moira Mcallister, DO        insulin lispro (HUMALOG) injection vial 0-4 Units  0-4 Units SubCUTAneous TID WC Moira Mcallister, DO        insulin lispro (HUMALOG) injection vial 0-4 Units  0-4 Units SubCUTAneous Nightly Moira Mcallister, DO        ipratropium-albuterol (DUONEB) nebulizer solution 1 ampule  1 ampule Inhalation BID Moira Mcallister, DO   1 ampule at 22 0559       Social History     Socioeconomic History    Marital status:      Spouse name: Not on file    Number of children: Not on file    Years of education: Not on file    Highest education level: Not on file   Occupational History    Not on file   Tobacco Use    Smoking status: Former     Packs/day: 1.00     Years: 9.00     Pack years: 9.00     Types: Cigarettes     Quit date: 2000     Years since quittin.1    Smokeless tobacco: Never   Vaping Use    Vaping Use: Never used   Substance and Sexual Activity    Alcohol use: No     Alcohol/week: 0.0 standard drinks    Drug use: No    Sexual activity: Never     Partners: Male   Other Topics Concern    Not on file   Social History Narrative    Not on file     Social Determinants of Health     Financial Resource Strain: Low Risk     Difficulty of Paying Living Expenses: Not hard at all   Food Insecurity: No Food Insecurity    Worried About Running Out of Food in the Last Year: Never true    Ran Out of Food in the Last Year: Never true   Transportation Needs: Not on file   Physical Activity: Not on file   Stress: Not on file   Social Connections: Not on file   Intimate Partner Violence: Not on file   Housing Stability: Not on file       Family History   Problem Relation Age of Onset    Heart Disease Mother     High Blood Pressure Mother     Stroke Mother     Diabetes Mother     Heart Disease Father     High Blood Pressure Father        Review of Systems:   Heart: as above   Lungs: as above   Eyes: denies changes in vision or discharge. Ears: denies changes in hearing or pain. Nose: denies epistaxis or masses   Throat: denies sore throat or trouble swallowing. Neuro: denies numbness, tingling, tremors. Skin: denies rashes or itching. : denies hematuria, dysuria   GI: denies vomiting, diarrhea   Psych: denies mood changed, anxiety, depression. all others negative. Physical Exam   BP (!) 86/40   Pulse 80   Temp 97.7 °F (36.5 °C) (Infrared)   Resp 16   Ht 5' 6\" (1.676 m)   Wt 162 lb 2 oz (73.5 kg)   LMP  (LMP Unknown)   SpO2 93%   BMI 26.17 kg/m²   Constitutional: Oriented to person, place, and time. Well-developed and well-nourished. No distress. Head: Normocephalic and atraumatic. Eyes: EOM are normal. Pupils are equal, round, and reactive to light. Neck: Normal range of motion. Neck supple. No hepatojugular reflux and no JVD present. Carotid bruit is not present. No tracheal deviation present. No thyromegaly present. Cardiovascular: Normal rate, regular rhythm, normal heart sounds and intact distal pulses. Exam reveals no gallop and no friction rub. No murmur heard. Pulmonary/Chest: Effort normal and breath sounds normal. No respiratory distress. No wheezes. No rales. No tenderness. Abdominal: Soft. Bowel sounds are normal. No distension and no mass. No tenderness. No rebound and no guarding. Musculoskeletal: Normal range of motion. No edema and no tenderness. Lymphadenopathy:   No cervical adenopathy. No groin adenopathy. Neurological: Alert and oriented to person, place, and time. Skin: Skin is warm and dry. No rash noted. Not diaphoretic. No erythema. Psychiatric: Normal mood and affect.  Behavior is normal.     CBC:   Lab Results   Component Value Date/Time    WBC 6.6 11/19/2022 06:03 AM    RBC 3.40 11/19/2022 06:03 AM    HGB 10.5 11/19/2022 06:03 AM    HCT 33.1 11/19/2022 06:03 AM    MCV 97.4 11/19/2022 06:03 AM    MCH 30.9 11/19/2022 06:03 AM MCHC 31.7 11/19/2022 06:03 AM    RDW 14.1 11/19/2022 06:03 AM     11/19/2022 06:03 AM    MPV 11.0 11/19/2022 06:03 AM     BMP:   Lab Results   Component Value Date/Time     11/19/2022 06:03 AM    K 3.6 11/19/2022 06:03 AM     11/19/2022 06:03 AM    CO2 25 11/19/2022 06:03 AM    BUN 36 11/19/2022 06:03 AM    LABALBU 3.5 11/18/2022 05:53 AM    LABALBU 4.6 04/05/2012 09:35 AM    CREATININE 1.4 11/19/2022 06:03 AM    CALCIUM 8.7 11/19/2022 06:03 AM    GFRAA >60 01/04/2022 09:48 AM    LABGLOM 36 11/19/2022 06:03 AM     Magnesium:  No results found for: MG  Cardiac Enzymes:   Lab Results   Component Value Date    TROPHS 44 (H) 11/15/2022    TROPHS 47 (H) 11/15/2022      PT/INR:    Lab Results   Component Value Date/Time    PROTIME 12.9 11/16/2022 06:50 AM    INR 1.1 11/16/2022 06:50 AM     TSH:    Lab Results   Component Value Date/Time    TSH 2.830 11/11/2015 12:00 PM     Rhythm Strip: normal sinus rhythm. EKG:  Sinus rhythm with PAC's and PVC's. Non-specific ST-T changes. Echo Summary 11/15/2022:   Ejection fraction is visually estimated at 20%. Overall ejection fraction severely decreased. Left ventricle is moderately enlarged. Normal right ventricle structure and function. Left atrial volume index of 68 ml per meters squared BSA. Physiologic and/or trace aortic regurgitation is noted. Mild to moderate mitral regurgitation is present. Mild tricuspid regurgitation. No evidence for hemodynamically significant pericardial effusion. Stress Summary 11/16/2022:  1: The stress EKG report is provided separately. 2  This is an abnormal myocardial perfusion scan. Findings suggestive   of an infarction with mild-to-moderate casi-infarct ischemia in the   LAD/diagonal territory. 3: Left ventricle is moderately dilated with severe global   hypokinesis. There appears to be akinesis of the apical segments. 4: Prognostically, this is an intermediate to high risk study.      ASSESSMENT AND PLAN:  Patient Active Problem List   Diagnosis    Type 2 diabetes mellitus without complication, without long-term current use of insulin (HCC)    Mixed hyperlipidemia    Essential hypertension    Osteoarthritis    RLS (restless legs syndrome)    Chronic obstructive pulmonary disease (HCC)    Diabetic polyneuropathy associated with diabetes mellitus due to underlying condition ([facility])    Bilateral pleural effusion    Acute on chronic congestive heart failure ([facility]    Urinary retention    Abnormal cardiovascular stress test    Hypotension    COPD exacerbation ([facility]     1. Acute systolic CHF:    Echo with severe LV dysfunction. Diurese with IV lasix. -7.7 liters. Pharm stress suggests an LAD infarct with some casi-infarct ischemia of the LAD territory. Consider eventual cath after compensation. BB/ARB/aldactone. (Reduced BB and ARB dose due to low BP). Stopped norvasc and dyazide. Follow labs. Move toward discharge. 2. CAD:     Pharm stress suggests an LAD infarct with some casi-infarct ischemia of the LAD territory. Eventual cath after compensation. ASA/statin/BB/nitrate. 3. VHD: Trace AI/mild-mod MR/mild TR by 11/15/2022 echo. Observe. 4. COPD: Per primary service. 5. HTN: Observe. 6. Lipids: Statin. 7. DM: Per primary service. 8. JAI: Follow labs. Ann Martinez D.O.   Cardiologist  Cardiology, [facility]

## 2022-11-21 LAB
ANION GAP SERPL CALCULATED.3IONS-SCNC: 10 MMOL/L (ref 7–16)
BASOPHILS ABSOLUTE: 0.02 E9/L (ref 0–0.2)
BASOPHILS RELATIVE PERCENT: 0.4 % (ref 0–2)
BUN BLDV-MCNC: 24 MG/DL (ref 6–23)
CALCIUM SERPL-MCNC: 9 MG/DL (ref 8.6–10.2)
CHLORIDE BLD-SCNC: 104 MMOL/L (ref 98–107)
CO2: 25 MMOL/L (ref 22–29)
CREAT SERPL-MCNC: 0.8 MG/DL (ref 0.5–1)
EOSINOPHILS ABSOLUTE: 0.23 E9/L (ref 0.05–0.5)
EOSINOPHILS RELATIVE PERCENT: 4.3 % (ref 0–6)
GFR SERPL CREATININE-BSD FRML MDRD: >60 ML/MIN/1.73
GLUCOSE BLD-MCNC: 106 MG/DL (ref 74–99)
HCT VFR BLD CALC: 34.5 % (ref 34–48)
HEMOGLOBIN: 10.8 G/DL (ref 11.5–15.5)
IMMATURE GRANULOCYTES #: 0.02 E9/L
IMMATURE GRANULOCYTES %: 0.4 % (ref 0–5)
LYMPHOCYTES ABSOLUTE: 0.9 E9/L (ref 1.5–4)
LYMPHOCYTES RELATIVE PERCENT: 16.9 % (ref 20–42)
MCH RBC QN AUTO: 30.6 PG (ref 26–35)
MCHC RBC AUTO-ENTMCNC: 31.3 % (ref 32–34.5)
MCV RBC AUTO: 97.7 FL (ref 80–99.9)
METER GLUCOSE: 100 MG/DL (ref 74–99)
METER GLUCOSE: 103 MG/DL (ref 74–99)
METER GLUCOSE: 112 MG/DL (ref 74–99)
METER GLUCOSE: 128 MG/DL (ref 74–99)
MONOCYTES ABSOLUTE: 0.53 E9/L (ref 0.1–0.95)
MONOCYTES RELATIVE PERCENT: 9.9 % (ref 2–12)
NEUTROPHILS ABSOLUTE: 3.63 E9/L (ref 1.8–7.3)
NEUTROPHILS RELATIVE PERCENT: 68.1 % (ref 43–80)
PDW BLD-RTO: 13.6 FL (ref 11.5–15)
PLATELET # BLD: 170 E9/L (ref 130–450)
PMV BLD AUTO: 10.6 FL (ref 7–12)
POTASSIUM SERPL-SCNC: 4.1 MMOL/L (ref 3.5–5)
PRO-BNP: 9030 PG/ML (ref 0–450)
RBC # BLD: 3.53 E12/L (ref 3.5–5.5)
SODIUM BLD-SCNC: 139 MMOL/L (ref 132–146)
WBC # BLD: 5.3 E9/L (ref 4.5–11.5)

## 2022-11-21 PROCEDURE — 99233 SBSQ HOSP IP/OBS HIGH 50: CPT | Performed by: INTERNAL MEDICINE

## 2022-11-21 PROCEDURE — 83880 ASSAY OF NATRIURETIC PEPTIDE: CPT

## 2022-11-21 PROCEDURE — 97110 THERAPEUTIC EXERCISES: CPT

## 2022-11-21 PROCEDURE — 97530 THERAPEUTIC ACTIVITIES: CPT

## 2022-11-21 PROCEDURE — 80048 BASIC METABOLIC PNL TOTAL CA: CPT

## 2022-11-21 PROCEDURE — 36415 COLL VENOUS BLD VENIPUNCTURE: CPT

## 2022-11-21 PROCEDURE — 6370000000 HC RX 637 (ALT 250 FOR IP): Performed by: INTERNAL MEDICINE

## 2022-11-21 PROCEDURE — 6370000000 HC RX 637 (ALT 250 FOR IP): Performed by: STUDENT IN AN ORGANIZED HEALTH CARE EDUCATION/TRAINING PROGRAM

## 2022-11-21 PROCEDURE — 51798 US URINE CAPACITY MEASURE: CPT

## 2022-11-21 PROCEDURE — 82962 GLUCOSE BLOOD TEST: CPT

## 2022-11-21 PROCEDURE — 94640 AIRWAY INHALATION TREATMENT: CPT

## 2022-11-21 PROCEDURE — 99232 SBSQ HOSP IP/OBS MODERATE 35: CPT | Performed by: STUDENT IN AN ORGANIZED HEALTH CARE EDUCATION/TRAINING PROGRAM

## 2022-11-21 PROCEDURE — 6360000002 HC RX W HCPCS: Performed by: INTERNAL MEDICINE

## 2022-11-21 PROCEDURE — 2580000003 HC RX 258: Performed by: INTERNAL MEDICINE

## 2022-11-21 PROCEDURE — 85025 COMPLETE CBC W/AUTO DIFF WBC: CPT

## 2022-11-21 PROCEDURE — 2060000000 HC ICU INTERMEDIATE R&B

## 2022-11-21 PROCEDURE — 2700000000 HC OXYGEN THERAPY PER DAY

## 2022-11-21 RX ORDER — FUROSEMIDE 20 MG/1
20 TABLET ORAL DAILY
Status: DISCONTINUED | OUTPATIENT
Start: 2022-11-21 | End: 2022-11-23 | Stop reason: HOSPADM

## 2022-11-21 RX ADMIN — LOSARTAN POTASSIUM 25 MG: 50 TABLET, FILM COATED ORAL at 09:12

## 2022-11-21 RX ADMIN — SODIUM CHLORIDE, PRESERVATIVE FREE 10 ML: 5 INJECTION INTRAVENOUS at 09:11

## 2022-11-21 RX ADMIN — IPRATROPIUM BROMIDE AND ALBUTEROL SULFATE 1 AMPULE: .5; 2.5 SOLUTION RESPIRATORY (INHALATION) at 06:19

## 2022-11-21 RX ADMIN — PREGABALIN 25 MG: 25 CAPSULE ORAL at 20:31

## 2022-11-21 RX ADMIN — ATORVASTATIN CALCIUM 40 MG: 40 TABLET, FILM COATED ORAL at 09:05

## 2022-11-21 RX ADMIN — IPRATROPIUM BROMIDE AND ALBUTEROL SULFATE 1 AMPULE: .5; 2.5 SOLUTION RESPIRATORY (INHALATION) at 18:11

## 2022-11-21 RX ADMIN — ENOXAPARIN SODIUM 40 MG: 100 INJECTION SUBCUTANEOUS at 09:06

## 2022-11-21 RX ADMIN — SODIUM CHLORIDE, PRESERVATIVE FREE 10 ML: 5 INJECTION INTRAVENOUS at 20:31

## 2022-11-21 RX ADMIN — FERROUS SULFATE TAB 325 MG (65 MG ELEMENTAL FE) 325 MG: 325 (65 FE) TAB at 09:14

## 2022-11-21 RX ADMIN — FERROUS SULFATE TAB 325 MG (65 MG ELEMENTAL FE) 325 MG: 325 (65 FE) TAB at 18:27

## 2022-11-21 RX ADMIN — ASPIRIN 81 MG: 81 TABLET, COATED ORAL at 09:05

## 2022-11-21 RX ADMIN — LATANOPROST 1 DROP: 50 SOLUTION OPHTHALMIC at 20:36

## 2022-11-21 RX ADMIN — TIMOLOL MALEATE 1 DROP: 5 SOLUTION OPHTHALMIC at 09:09

## 2022-11-21 RX ADMIN — BRIMONIDINE TARTRATE 1 DROP: 2 SOLUTION OPHTHALMIC at 20:31

## 2022-11-21 RX ADMIN — BRIMONIDINE TARTRATE 1 DROP: 2 SOLUTION OPHTHALMIC at 09:09

## 2022-11-21 RX ADMIN — PREGABALIN 25 MG: 25 CAPSULE ORAL at 09:06

## 2022-11-21 RX ADMIN — SPIRONOLACTONE 12.5 MG: 25 TABLET ORAL at 09:05

## 2022-11-21 RX ADMIN — TIMOLOL MALEATE 1 DROP: 5 SOLUTION OPHTHALMIC at 20:34

## 2022-11-21 RX ADMIN — ISOSORBIDE MONONITRATE 30 MG: 30 TABLET, EXTENDED RELEASE ORAL at 09:06

## 2022-11-21 RX ADMIN — METOPROLOL SUCCINATE 25 MG: 25 TABLET, EXTENDED RELEASE ORAL at 09:06

## 2022-11-21 ASSESSMENT — PAIN SCALES - GENERAL
PAINLEVEL_OUTOF10: 0

## 2022-11-21 NOTE — PLAN OF CARE
Problem: Discharge Planning  Goal: Discharge to home or other facility with appropriate resources  11/20/2022 2215 by Rolando Cushing, RN  Outcome: Progressing     Problem: Pain  Goal: Verbalizes/displays adequate comfort level or baseline comfort level  11/20/2022 2215 by Rolando Cushing, RN  Outcome: Progressing     Problem: ABCDS Injury Assessment  Goal: Absence of physical injury  11/20/2022 2215 by Rolando Cushing, RN  Outcome: Progressing  Flowsheets (Taken 11/20/2022 2212)  Absence of Physical Injury: Implement safety measures based on patient assessment     Problem: Chronic Conditions and Co-morbidities  Goal: Patient's chronic conditions and co-morbidity symptoms are monitored and maintained or improved  11/20/2022 2215 by Rolando Cushing, RN  Outcome: Progressing     Problem: Respiratory - Adult  Goal: Achieves optimal ventilation and oxygenation  11/20/2022 2215 by Rolando Cushing, RN  Outcome: Progressing     Problem: Cardiovascular - Adult  Goal: Absence of cardiac dysrhythmias or at baseline  11/20/2022 2215 by Rolando Cushing, RN  Outcome: Progressing     Problem: Skin/Tissue Integrity - Adult  Goal: Skin integrity remains intact  11/20/2022 2215 by Rolando Cushing, RN  Outcome: Progressing     Problem: Musculoskeletal - Adult  Goal: Return mobility to safest level of function  11/20/2022 2215 by Rolando Cushing, RN  Outcome: Progressing     Problem: Gastrointestinal - Adult  Goal: Maintains adequate nutritional intake  11/20/2022 2215 by Rolando Cushing, RN  Outcome: Progressing     Problem: Genitourinary - Adult  Goal: Absence of urinary retention  11/20/2022 2215 by Rolando Cushing, RN  Outcome: Progressing     Problem: Infection - Adult  Goal: Absence of infection at discharge  11/20/2022 2215 by Rolando Cushing, RN  Outcome: Progressing     Problem: Metabolic/Fluid and Electrolytes - Adult  Goal: Hemodynamic stability and optimal renal function maintained  11/20/2022 2215 by Rolando Cushing, RN  Outcome: Progressing     Problem: Safety - Adult  Goal: Free from fall injury  Outcome: Progressing

## 2022-11-21 NOTE — PROGRESS NOTES
Physical Therapy Treatment Note/Plan of Care    Room #:  6868/5372-70  Patient Name: Chichi Vicente  YOB: 1935  MRN: [id number]    Date of Service: 11/21/2022     Tentative placement recommendation: Home Health Physical Therapy   Equipment recommendation: [address]       Evaluating Physical Therapist: Casa Liang #[id number]      Specific Provider Orders/Date/Referring Provider :     11/15/22 1500    PT eval and treat  Start:  11/15/22 1500,   End:  11/15/22 1500,   ONE TIME,   Standing Count:  1 Occurrences,   R         Moira Moreno DO     Admitting Diagnosis:   Cardiomegaly [I51.7]  Urinary retention [R33.9]  COPD exacerbation ([address] [J44.1]  Bilateral pleural effusion [J90]  Pleural effusion, right [J90]  Acute on chronic congestive heart failure, unspecified heart failure type ([address] [I50.9]      Surgery: none  Visit Diagnoses         Codes    Bilateral pleural effusion    -  Primary J90    COPD exacerbation ([address]     J44.1    Urinary retention     R33.9    Cardiomegaly     I51.7    Acute on chronic congestive heart failure, unspecified heart failure type ([address]     I50.9    Postprocedural pneumothorax     J95.811            Patient Active Problem List   Diagnosis    Type 2 diabetes mellitus without complication, without long-term current use of insulin (HCC)    Mixed hyperlipidemia    Essential hypertension    Osteoarthritis    RLS (restless legs syndrome)    Chronic obstructive pulmonary disease (HCC)    Diabetic polyneuropathy associated with diabetes mellitus due to underlying condition (HCC)    Bilateral pleural effusion    Acute on chronic congestive heart failure (HCC)    Urinary retention    Abnormal cardiovascular stress test    Hypotension    COPD exacerbation (HCC)        ASSESSMENT of Current Deficits Patient able to ambulate on 3L and her SPO2 was 93-97% throughout tx session using a wheeled walker. Patient needing a seated rest period due to impaired endurance. The patient will benefit from continued skilled therapy to increase strength and improve balance for safe functional mobility, to decrease risk of falls, and to meet goals at discharge. PHYSICAL THERAPY  PLAN OF CARE       Physical therapy plan of care is established based on physician order,  patient diagnosis and clinical assessment    Current Treatment Recommendations:    -Bed Mobility: Lower extremity exercises   -Sitting Balance: Incorporate reaching activities to activate trunk muscles   -Standing Balance: Perform strengthening exercises in standing to promote motor control with or without upper extremity support   -Transfers: Provide instruction on proper hand and foot position for adequate transfer of weight onto lower extremities and use of gait device if needed and Cues for hand placement, technique and safety. Provide stabilization to prevent fall   -Gait: Gait training and Standing activities to improve: base of support, weight shift, weight bearing    -Endurance: Utilize Supervised activities to increase level of endurance to allow for safe functional mobility including transfers and gait   -Stairs: Stair training with instruction on proper technique and hand placement on rail    PT long term treatment goals are located in below grid    Patient and or family understand(s) diagnosis, prognosis, and plan of care. Frequency of treatments: Patient will be seen  daily.          Prior Level of Function: Patient ambulated with cane   Rehab Potential: good  for baseline    Past medical history:   Past Medical History:   Diagnosis Date    COPD (chronic obstructive pulmonary disease) ([facility]     H/O cardiovascular stress test 11/17/2022    Lexiscan    Hyperlipidemia     Hypertension     Osteoarthritis     Type II or unspecified type diabetes mellitus without mention of complication, not stated as uncontrolled      Past Surgical History:   Procedure Laterality Date    [address] 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   How much difficulty turning over in bed?: A Little  How much difficulty sitting down on / standing up from a chair with arms?: A Little  How much difficulty moving from lying on back to sitting on side of bed?: A Little  How much help from another person moving to and from a bed to a chair?: A Little  How much help from another person needed to walk in hospital room?: A Little  How much help from another person for climbing 3-5 steps with a railing?: A Little  AM-PAC Inpatient Mobility Raw Score : 18  AM-PAC Inpatient T-Scale Score : 43.63  Mobility Inpatient CMS 0-100% Score: 46.58  Mobility Inpatient CMS G-Code Modifier : CK    Nursing cleared patient for PT treatment. 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 OBJECTIVE;   Initial Evaluation  Date: 11/16/2022 Treatment Date:  11/21/2022       Short Term/ Long Term   Goals   Was pt agreeable to Eval/treatment? Yes yes To be met in 4 days   Pain level   0/10   0/10    Bed Mobility    Rolling: Not assessed patient in chair    Supine to sit: Not assessed patient in chair    Sit to supine: Not assessed patient in chair    Scooting: Not assessed patient in chair   Rolling: Supervision    Supine to sit: Supervision    Sit to supine: Not assessed    Scooting: Supervision     Rolling: Independent    Supine to sit:  Independent    Sit to supine: Independent    Scooting: Independent     Transfers Sit to stand: Minimal assist of 1 cues for hand placement  Sit to stand: Minimal assist of 1 Cues for hand placement and safety     Sit to stand: Independent Ambulation     75, 25 feet using  wheeled walker with Minimal assist of 1   for walker control, balance, upright, and O2 line management  and cues for upright posture and safety 2 x 90 feet using  wheeled walker with Minimal assist of 1   cues for upright posture, safety, pacing, and pursed lip breathing     150 feet using  wheeled walker with Modified Independent    Stair negotiation: ascended and descended   Not assessed     3 steps with hr and supervision    ROM Within functional limits    Increase range of motion 10% of affected joints    Strength BUE:  refer to OT eval  RLE:  4-/5  LLE:  4-/5  Increase strength in affected mm groups by 1/3 grade   Balance Sitting EOB:  not assessed   Dynamic Standing:  fair wheeled walker   Sitting EOB/chair: good   Dynamic Standing: fair wheeled walker   Sitting EOB:  good   Dynamic Standing: good      Patient is Alert & Oriented x person, place, time, and situation and follows directions    Sensation:  Patient  denies numbness/tingling   Edema:  no   Endurance: fair      Vitals:  3 liters nasal cannula   Blood Pressure at rest  Blood Pressure during session    Heart Rate at rest  Heart Rate during session    SPO2 at rest 95%  SPO2 during session 93-97%      Patient education  Patient educated on role of Physical Therapy, risks of immobility, safety and plan of care,  importance of mobility while in hospital , purse lip breathing, ankle pumps, quad set and glut set for edema control, blood clot prevention, importance and purpose of adaptive device and adjusted to proper height for the patient. , and safety      Patient response to education:   Pt verbalized understanding Pt demonstrated skill Pt requires further education in this area   Yes Partial Yes      Treatment:  Patient practiced and was instructed/facilitated in the following treatment: Patient to edge of the bed. Sat edge of bed 10 minutes with Supervision  to increase dynamic sitting balance and activity tolerance. Assisted pt with donning her ankle brace, socks, and shoes. Pt stood, ambulated in the hallway, needed a seated rest period in the waiting room, instructed on pursed lip breathing and pacing, she stood, ambulated back to the chair in her room. Pt performed seated exercises. Therapeutic Exercises:  ankle pumps, heel raises, hip abduction/adduction, long arc quad, and seated marching,  x 20 reps. AROM     At end of session, patient in chair with  .  call light and phone within reach,  all lines and tubes intact, nursing notified. Patient would benefit from continued skilled Physical Therapy to improve functional independence and quality of life. Patient's/ family goals   home    Time in 09:25  Time out 09:55    Total Treatment Time  30 minutes        CPT codes:    Therapeutic activities (30338)   21 minutes  1 unit(s)  Therapeutic exercises (90898)   9 minutes  1 unit(s)    Tyler Wang  [facility]  LIC # [id number]

## 2022-11-21 NOTE — PROGRESS NOTES
[facility] Progress Note    Admitting Date and Time: 11/15/2022  9:23 AM  Admit Dx: Cardiomegaly [I51.7]  Urinary retention [R33.9]  COPD exacerbation (HCC) [J44.1]  Bilateral pleural effusion [J90]  Pleural effusion, right [J90]  Acute on chronic congestive heart failure, unspecified heart failure type ([address] [I50.9]    Subjective:    Patient had no new complaints today, no CP and breathing feels fine. ROS: denies fever, chills, cp, sob, n/v, HA unless stated above.       furosemide  20 mg Oral Daily    ferrous sulfate  325 mg Oral BID WC    losartan  25 mg Oral Daily    metoprolol succinate  25 mg Oral Daily    atorvastatin  40 mg Oral Daily    isosorbide mononitrate  30 mg Oral Daily    spironolactone  12.5 mg Oral Daily    latanoprost  1 drop Both Eyes Nightly    timolol  1 drop Both Eyes BID    brimonidine  1 drop Left Eye BID    aspirin  81 mg Oral Daily    pregabalin  25 mg Oral BID    sodium chloride flush  5-40 mL IntraVENous 2 times per day    enoxaparin  40 mg SubCUTAneous Daily    insulin lispro  0-4 Units SubCUTAneous TID WC    insulin lispro  0-4 Units SubCUTAneous Nightly    ipratropium-albuterol  1 ampule Inhalation BID     sodium chloride flush, 10 mL, PRN  sodium chloride flush, 5-40 mL, PRN  sodium chloride, , PRN  ondansetron, 4 mg, Q8H PRN   Or  ondansetron, 4 mg, Q6H PRN  polyethylene glycol, 17 g, Daily PRN  acetaminophen, 650 mg, Q6H PRN   Or  acetaminophen, 650 mg, Q6H PRN  glucose, 4 tablet, PRN  dextrose bolus, 125 mL, PRN   Or  dextrose bolus, 250 mL, PRN  glucagon (rDNA), 1 mg, PRN  dextrose, , Continuous PRN       Objective:    BP (!) 90/51   Pulse 83   Temp 97.7 °F (36.5 °C) (Oral)   Resp 16   Ht 5' 6\" (1.676 m)   Wt 158 lb (71.7 kg)   LMP  (LMP Unknown)   SpO2 90%   BMI 25.50 kg/m²     General Appearance: alert and oriented to person, place and time and in no acute distress  Skin: warm and dry  Head: normocephalic and atraumatic  Eyes: pupils equal, round, and reactive to light, extraocular eye movements intact, conjunctivae normal  Neck: neck supple and non tender without mass   Pulmonary/Chest: clear to auscultation bilaterally- no wheezes, rales or rhonchi, normal air movement, no respiratory distress  Cardiovascular: normal rate, normal S1 and S2 and no carotid bruits  Abdomen: soft, non-tender, non-distended, normal bowel sounds, no masses or organomegaly  Extremities: no cyanosis, no clubbing and no edema  Neurologic: no cranial nerve deficit and speech normal        Recent Labs     11/19/22  0603 11/20/22  1009 11/21/22  0750    135 139   K 3.6 4.1 4.1    101 104   CO2 25 24 25   BUN 36* 30* 24*   CREATININE 1.4* 1.0 0.8   GLUCOSE 103* 192* 106*   CALCIUM 8.7 8.7 9.0       Recent Labs     11/19/22  0603 11/21/22  0750   WBC 6.6 5.3   RBC 3.40* 3.53   HGB 10.5* 10.8*   HCT 33.1* 34.5   MCV 97.4 97.7   MCH 30.9 30.6   MCHC 31.7* 31.3*   RDW 14.1 13.6    170   MPV 11.0 10.6       Assessment:    Principal Problem:    Bilateral pleural effusion  Active Problems:    Acute on chronic congestive heart failure (HCC)    Urinary retention    Abnormal cardiovascular stress test    Hypotension    COPD exacerbation (HCC)  Resolved Problems:    * No resolved hospital problems. *      Plan:    Hypotension - Cozaar, dyazide, and toprol with holding parameters. Amlodipine and dyazide stopped and losartan and metoprolol doses reduced today by cardio. Monitor BP  Acute hypoxic respiratory failure 2/2 right pleural effusion - Currently on 3 L NC and does not use oxygen at home. Also qualified for home O2 yesterday. Acute on chronic systolic CHF exacerbation, EF 20% - Consulted cardiology. Continue BB and consider starting jardiance. Started on PO lasix today per cardio. Net -10L since admission. Will need life vest for primary prevention. HF clinic consult placed.    Abnormal stress test - stress today showed mild-moderate casi-infarct ischemia in LAD/diagonal territory and severe global hypokinesis. Per cardiology will be doing medical management for now, but will need cath prior to discharge - patient wants to discuss with family. Moderate right pleural effusion S/P diagnostic and therapeutic right thoracentesis -  970 cc of hazy yell pleural fluid drained. Culture negative, cytology negative for malignant cells  Urinary retention with indwelling sapp - Maintain sapp and plan for voiding trial prior to discharge as per urology   Normocytic anemia - Will check iron studies. Transfuse if hemoglobin is less than 7. Hgb 10.8 today. DM-II - At home patient is on metformin. Will hold and start insulin SS  HLD - Continue lipitor  COPD - Stable on combivent. Does not see pulmonology as outpatient  DVT prophylaxis - Lovenox          NOTE: This report was transcribed using voice recognition software. Every effort was made to ensure accuracy; however, inadvertent computerized transcription errors may be present.     Electronically signed by Annie Lagunas DO on 11/21/2022 at 1:10 PM

## 2022-11-21 NOTE — PROGRESS NOTES
INPATIENT CARDIOLOGY FOLLOW-UP    Name: Driss Smith    Age: 81 y.o. Date of Admission: 11/15/2022  9:23 AM    Date of Service: 11/21/2022    Primary Cardiologist: New to Dr Tayler Rothman this admission    Chief Complaint: Follow-up for new CHF/cardiomyopathy    Interim History:    Feels better. Still on nasal cannula at 3 L. Denies chest pain or shortness of breath.     Net -9.3 L    Review of Systems:   Negative except as described above    Problem List:  Patient Active Problem List   Diagnosis    Type 2 diabetes mellitus without complication, without long-term current use of insulin ([facility])    Mixed hyperlipidemia    Essential hypertension    Osteoarthritis    RLS (restless legs syndrome)    Chronic obstructive pulmonary disease ([facility])    Diabetic polyneuropathy associated with diabetes mellitus due to underlying condition ([facility])    Bilateral pleural effusion    Acute on chronic congestive heart failure ([facility] [address]    Urinary retention    Abnormal cardiovascular stress test    Hypotension    COPD exacerbation ([facility])       Current Medications:    Current Facility-Administered Medications:     ferrous sulfate (IRON 325) tablet 325 mg, 325 mg, Oral, BID WC, Tamera Hua MD, 325 mg at 11/20/22 1637    losartan (COZAAR) tablet 25 mg, 25 mg, Oral, Daily, Mauricio Manriquez, DO    metoprolol succinate (TOPROL XL) extended release tablet 25 mg, 25 mg, Oral, Daily, Mauricio Manriquez, DO    atorvastatin (LIPITOR) tablet 40 mg, 40 mg, Oral, Daily, Mauricio Manriquez, DO, 40 mg at 11/20/22 0848    isosorbide mononitrate (IMDUR) extended release tablet 30 mg, 30 mg, Oral, Daily, Mauricio Manriquez DO, 30 mg at 11/19/22 4568    spironolactone (ALDACTONE) tablet 12.5 mg, 12.5 mg, Oral, Daily, Mauricio Manriquez, DO, 12.5 mg at 11/19/22 0851    latanoprost (XALATAN) 0.005 % ophthalmic solution 1 drop, 1 drop, Both Eyes, Nightly, Ame Hall MD, 1 drop at 11/20/22 2033    timolol (TIMOPTIC) 0.5 % ophthalmic solution 1 drop, 1 drop, Both Eyes, BID, Alberta Lopez MD, 1 drop at 11/20/22 2033    brimonidine (ALPHAGAN) 0.2 % ophthalmic solution 1 drop, 1 drop, Left Eye, BID, Alberta Lopez MD, 1 drop at 11/20/22 2033    sodium chloride flush 0.9 % injection 10 mL, 10 mL, IntraVENous, PRN, Moira Perezon, DO    aspirin EC tablet 81 mg, 81 mg, Oral, Daily, Moira Mcallister, DO, 81 mg at 11/20/22 0848    pregabalin (LYRICA) capsule 25 mg, 25 mg, Oral, BID, Moira Mcallister, DO, 25 mg at 11/20/22 2033    sodium chloride flush 0.9 % injection 5-40 mL, 5-40 mL, IntraVENous, 2 times per day, Moira Mcallister, DO, 10 mL at 11/20/22 2033    sodium chloride flush 0.9 % injection 5-40 mL, 5-40 mL, IntraVENous, PRN, Moira Mcallister, DO    0.9 % sodium chloride infusion, , IntraVENous, PRN, Moira Perezon, DO    enoxaparin (LOVENOX) injection 40 mg, 40 mg, SubCUTAneous, Daily, Moira Mcallister, DO, 40 mg at 11/20/22 0848    ondansetron (ZOFRAN-ODT) disintegrating tablet 4 mg, 4 mg, Oral, Q8H PRN **OR** ondansetron (ZOFRAN) injection 4 mg, 4 mg, IntraVENous, Q6H PRN, Moira Perezon, DO    polyethylene glycol (GLYCOLAX) packet 17 g, 17 g, Oral, Daily PRN, Moira Perezon, DO    acetaminophen (TYLENOL) tablet 650 mg, 650 mg, Oral, Q6H PRN, 650 mg at 11/19/22 0852 **OR** acetaminophen (TYLENOL) suppository 650 mg, 650 mg, Rectal, Q6H PRN, Moira Perezon, DO    glucose chewable tablet 16 g, 4 tablet, Oral, PRN, Moira Perezon, DO    dextrose bolus 10% 125 mL, 125 mL, IntraVENous, PRN **OR** dextrose bolus 10% 250 mL, 250 mL, IntraVENous, PRN, Moira J Eastman, DO    glucagon (rDNA) injection 1 mg, 1 mg, SubCUTAneous, PRN, Moira Mcallister DO    dextrose 10 % infusion, , IntraVENous, Continuous PRN, Moira Mcallister DO    insulin lispro (HUMALOG) injection vial 0-4 Units, 0-4 Units, SubCUTAneous, TID WC, Moira Mcallister DO    insulin lispro (HUMALOG) injection vial 0-4 Units, 0-4 Units, SubCUTAneous, Nightly, Moira Mcallister DO    ipratropium-albuterol (DUONEB) nebulizer solution 1 ampule, 1 ampule, Inhalation, BID, Marlise Mercury, DO, 1 ampule at 11/21/22 9863    Physical Exam:  BP (!) 104/45   Pulse 73   Temp 97.8 °F (36.6 °C) (Oral)   Resp 16   Ht 5' 6\" (1.676 m)   Wt 158 lb (71.7 kg)   LMP  (LMP Unknown)   SpO2 96%   BMI 25.50 kg/m²   Wt Readings from Last 3 Encounters:   11/21/22 158 lb (71.7 kg)   11/12/22 151 lb (68.5 kg)   11/08/22 161 lb (73 kg)     Appearance: Elderly female, awake, alert, nasal cannula  Skin: Intact, no rash  Head: Normocephalic, atraumatic  Eyes: EOMI, no conjunctival erythema  ENMT: No pharyngeal erythema, MMM, no rhinorrhea  Neck: Supple, no elevated JVP, no carotid bruits  Lungs: Clear to auscultation bilaterally. No wheezes, rales, or rhonchi. Cardiac: PMI nondisplaced, Regular rhythm with a normal rate, S1 & S2 normal, no murmurs  Abdomen: Soft, nontender, +bowel sounds  Extremities: Moves all extremities x 4, no lower extremity edema  Neurologic: No focal motor deficits apparent, normal mood and affect  Peripheral Pulses: Intact posterior tibial pulses bilaterally    Intake/Output:    Intake/Output Summary (Last 24 hours) at 11/21/2022 0722  Last data filed at 11/21/2022 0514  Gross per 24 hour   Intake 360 ml   Output 2100 ml   Net -1740 ml     No intake/output data recorded. Laboratory Tests:  Recent Labs     11/19/22  0603 11/20/22  1009    135   K 3.6 4.1    101   CO2 25 24   BUN 36* 30*   CREATININE 1.4* 1.0   GLUCOSE 103* 192*   CALCIUM 8.7 8.7     No results found for: MG  No results for input(s): ALKPHOS, ALT, AST, PROT, BILITOT, BILIDIR, LABALBU in the last 72 hours.   Recent Labs     11/19/22  0603   WBC 6.6   RBC 3.40*   HGB 10.5*   HCT 33.1*   MCV 97.4   MCH 30.9   MCHC 31.7*   RDW 14.1      MPV 11.0     Lab Results   Component Value Date    TROPONINI <0.01 04/02/2017     Lab Results   Component Value Date    INR 1.1 11/16/2022    PROTIME 12.9 (H) 11/16/2022     Lab Results   Component Value Date    TSH 2.830 11/11/2015     Lab Results Component Value Date    LABA1C 5.8 2022     No results found for: EAG  Lab Results   Component Value Date    CHOL 159 2022    CHOL 123 2021    CHOL 151 06/10/2020     Lab Results   Component Value Date    TRIG 178 (H) 2022    TRIG 151 (H) 2021    TRIG 152 (H) 06/10/2020     Lab Results   Component Value Date    HDL 40 2022    HDL 36 2021    HDL 45 06/10/2020     Lab Results   Component Value Date    LDLCALC 83 2022    LDLCALC 57 2021    LDLCALC 76 06/10/2020     Lab Results   Component Value Date    LABVLDL 36 2022    LABVLDL 30 2021    LABVLDL 30 06/10/2020     No results found for: CHOLHDLRATIO  No results for input(s): PROBNP in the last 72 hours. Cardiac Tests:    EK/15/2022: Sinus rhythm with occasional PAC and PVC. 84 beats minute. Normal axis. Normal anterior infarct. Nonspecific T wave changes    Telemetry: Sinus rhythm 80s, PACs    Chest X-ray:   22    Impression   Reduction in the volume of right pleural fluid following thoracentesis. No   evidence of a pneumothorax. Small left pleural effusion remains. CTA chest 11/15/22  Impression   1. There is no pulmonary embolus. 2. Moderate size right pleural effusion with compression atelectasis of the   right lower lobe   3. Small left pleural effusion   4. Emphysematous changes. 5. Cardiomegaly with findings of mild vascular congestion     Echocardiogram:   TTE 11/15/22 Alicia Brady   Summary   Ejection fraction is visually estimated at 20%. Overall ejection fraction severely decreased. Left ventricle is moderately enlarged. Normal right ventricle structure and function. Left atrial volume index of 68 ml per meters squared BSA. Physiologic and/or trace aortic regurgitation is noted. Mild to moderate mitral regurgitation is present. Mild tricuspid regurgitation. No evidence for hemodynamically significant pericardial effusion.     Stress test:    Pharm stress 11/17/22  Impression   1: The stress EKG report is provided separately. 2  This is an abnormal myocardial perfusion scan. Findings suggestive   of an infarction with mild-to-moderate casi-infarct ischemia in the   LAD/diagonal territory. 3: Left ventricle is moderately dilated with severe global   hypokinesis. EF 30%. There appears to be akinesis of the apical segments. 4: Prognostically, this is an intermediate to high risk study. Cardiac catheterization: NA    ----------------------------------------------------------------------------------------------------------------------------------------------------------------  IMPRESSION:  Acute heart failure with reduced ejection fraction. proBNP 13,000-> 9000 pg/mL. -9.3 L. Improving  Ischemic cardiomyopathy newly diagnosed. EF 20%  Mild to moderate MR, mild TR  Minimally elevated hs-cTnT: Flat pattern, not consistent with ACS. Likely chronic myocardial injury  Pleural effusion s/p right thoracentesis 11/16/2022 950 mL yellow fluid  Acute hypoxic respiratory failure  JAI creatinine 1.4 -> 1.0 improved  Mild anemia Hgb 10.8  Hypertension, running hypotensive  Hyperlipidemia  Type 2 diabetes reported, A1c 5.8%  COPD/emphysema    RECOMMENDATIONS:  Improved from cardiac standpoint.     Start low-dose oral loop diuretic  Continue GDMT for cardiomyopathy -up titration limited by hypotension in fact doses have been reduced already, further reduction may be necessary  Metoprolol succinate 25 mg daily  Losartan 25 mg daily, consider transition to sacubitril/valsartan  Spironolactone 12.5 mg daily  Consider SGLT2 inhibitor  Continue aspirin and statin for medical treatment of CAD; isosorbide mononitrate if BP tolerates; currently no anginal symptoms  Recommend left heart catheterization prior to discharge to confirm ischemic cardiomyopathy and assess for revascularization options -states she would like to discuss with her family first  Consider wearable cardioverter defibrillator given severe LV dysfunction for primary prophylaxis sudden cardiac death-she would like to discuss with her family first  Aggressive risk factor modification  Further care per primary service    Nani Basilio MD, [address] [facility]    NOTE: This report was transcribed using voice recognition software. Every effort was made to ensure accuracy; however, inadvertent computerized transcription errors may be present.

## 2022-11-21 NOTE — CARE COORDINATION
11-21-Cm note: pt sitting in her chair at the bedside, she is still requiring oxygen, will need another test if not discharged today, pt chose [employer] to supply it, will consult when dc date is known. Cardiology is recommending [employer] but pt wants to speak to her family before considering one, also recommending a heart cath but pt wants to talk to her family about that also. [facility] home care has accepted pt (orders in Epic) cm following for oxygen at dc . Family will transport home .  Electronically signed by Rachid Will RN on 11/21/2022 at 1:33 PM

## 2022-11-21 NOTE — PROGRESS NOTES
Patient stated she voided a small amount but it was mixed with stool so I was unable to measure. Patient did not void in hat. Bladder scan directly after void revealed 164ml's in bladder at that time.

## 2022-11-22 LAB
ANION GAP SERPL CALCULATED.3IONS-SCNC: 8 MMOL/L (ref 7–16)
BASOPHILS ABSOLUTE: 0.01 E9/L (ref 0–0.2)
BASOPHILS RELATIVE PERCENT: 0.2 % (ref 0–2)
BUN BLDV-MCNC: 24 MG/DL (ref 6–23)
CALCIUM SERPL-MCNC: 9.4 MG/DL (ref 8.6–10.2)
CHLORIDE BLD-SCNC: 103 MMOL/L (ref 98–107)
CO2: 29 MMOL/L (ref 22–29)
CREAT SERPL-MCNC: 0.9 MG/DL (ref 0.5–1)
EOSINOPHILS ABSOLUTE: 0.15 E9/L (ref 0.05–0.5)
EOSINOPHILS RELATIVE PERCENT: 2.8 % (ref 0–6)
GFR SERPL CREATININE-BSD FRML MDRD: >60 ML/MIN/1.73
GLUCOSE BLD-MCNC: 126 MG/DL (ref 74–99)
HCT VFR BLD CALC: 33.8 % (ref 34–48)
HEMOGLOBIN: 10.7 G/DL (ref 11.5–15.5)
IMMATURE GRANULOCYTES #: 0.02 E9/L
IMMATURE GRANULOCYTES %: 0.4 % (ref 0–5)
LYMPHOCYTES ABSOLUTE: 1 E9/L (ref 1.5–4)
LYMPHOCYTES RELATIVE PERCENT: 18.9 % (ref 20–42)
MCH RBC QN AUTO: 31.2 PG (ref 26–35)
MCHC RBC AUTO-ENTMCNC: 31.7 % (ref 32–34.5)
MCV RBC AUTO: 98.5 FL (ref 80–99.9)
METER GLUCOSE: 103 MG/DL (ref 74–99)
METER GLUCOSE: 112 MG/DL (ref 74–99)
METER GLUCOSE: 124 MG/DL (ref 74–99)
METER GLUCOSE: 199 MG/DL (ref 74–99)
MONOCYTES ABSOLUTE: 0.5 E9/L (ref 0.1–0.95)
MONOCYTES RELATIVE PERCENT: 9.5 % (ref 2–12)
NEUTROPHILS ABSOLUTE: 3.6 E9/L (ref 1.8–7.3)
NEUTROPHILS RELATIVE PERCENT: 68.2 % (ref 43–80)
PDW BLD-RTO: 13.7 FL (ref 11.5–15)
PLATELET # BLD: 172 E9/L (ref 130–450)
PMV BLD AUTO: 11 FL (ref 7–12)
POTASSIUM SERPL-SCNC: 4.4 MMOL/L (ref 3.5–5)
RBC # BLD: 3.43 E12/L (ref 3.5–5.5)
SODIUM BLD-SCNC: 140 MMOL/L (ref 132–146)
WBC # BLD: 5.3 E9/L (ref 4.5–11.5)

## 2022-11-22 PROCEDURE — 2580000003 HC RX 258: Performed by: INTERNAL MEDICINE

## 2022-11-22 PROCEDURE — 82962 GLUCOSE BLOOD TEST: CPT

## 2022-11-22 PROCEDURE — 99233 SBSQ HOSP IP/OBS HIGH 50: CPT | Performed by: INTERNAL MEDICINE

## 2022-11-22 PROCEDURE — 2700000000 HC OXYGEN THERAPY PER DAY

## 2022-11-22 PROCEDURE — 97110 THERAPEUTIC EXERCISES: CPT

## 2022-11-22 PROCEDURE — 6370000000 HC RX 637 (ALT 250 FOR IP): Performed by: INTERNAL MEDICINE

## 2022-11-22 PROCEDURE — 97530 THERAPEUTIC ACTIVITIES: CPT

## 2022-11-22 PROCEDURE — 97535 SELF CARE MNGMENT TRAINING: CPT

## 2022-11-22 PROCEDURE — 99239 HOSP IP/OBS DSCHRG MGMT >30: CPT | Performed by: STUDENT IN AN ORGANIZED HEALTH CARE EDUCATION/TRAINING PROGRAM

## 2022-11-22 PROCEDURE — 85025 COMPLETE CBC W/AUTO DIFF WBC: CPT

## 2022-11-22 PROCEDURE — 2060000000 HC ICU INTERMEDIATE R&B

## 2022-11-22 PROCEDURE — 80048 BASIC METABOLIC PNL TOTAL CA: CPT

## 2022-11-22 PROCEDURE — 36415 COLL VENOUS BLD VENIPUNCTURE: CPT

## 2022-11-22 PROCEDURE — 94640 AIRWAY INHALATION TREATMENT: CPT

## 2022-11-22 PROCEDURE — 6370000000 HC RX 637 (ALT 250 FOR IP): Performed by: STUDENT IN AN ORGANIZED HEALTH CARE EDUCATION/TRAINING PROGRAM

## 2022-11-22 PROCEDURE — 6360000002 HC RX W HCPCS: Performed by: INTERNAL MEDICINE

## 2022-11-22 RX ORDER — LOSARTAN POTASSIUM 25 MG/1
25 TABLET ORAL DAILY
Qty: 30 TABLET | Refills: 0 | DISCHARGE
Start: 2022-11-23 | End: 2022-12-22 | Stop reason: ALTCHOICE

## 2022-11-22 RX ORDER — METOPROLOL SUCCINATE 25 MG/1
25 TABLET, EXTENDED RELEASE ORAL DAILY
Qty: 30 TABLET | Refills: 0 | DISCHARGE
Start: 2022-11-23 | End: 2022-12-22 | Stop reason: DRUGHIGH

## 2022-11-22 RX ORDER — ISOSORBIDE MONONITRATE 30 MG/1
30 TABLET, EXTENDED RELEASE ORAL DAILY
Qty: 30 TABLET | Refills: 0 | DISCHARGE
Start: 2022-11-23 | End: 2022-12-22 | Stop reason: ALTCHOICE

## 2022-11-22 RX ORDER — ATORVASTATIN CALCIUM 40 MG/1
40 TABLET, FILM COATED ORAL DAILY
Qty: 30 TABLET | Refills: 0 | DISCHARGE
Start: 2022-11-23 | End: 2022-12-06 | Stop reason: DRUGHIGH

## 2022-11-22 RX ORDER — FUROSEMIDE 20 MG/1
20 TABLET ORAL DAILY
Qty: 60 TABLET | Refills: 0 | Status: ON HOLD | DISCHARGE
Start: 2022-11-23 | End: 2023-01-27 | Stop reason: HOSPADM

## 2022-11-22 RX ORDER — FERROUS SULFATE 325(65) MG
325 TABLET ORAL 2 TIMES DAILY WITH MEALS
Qty: 30 TABLET | Refills: 0 | DISCHARGE
Start: 2022-11-22

## 2022-11-22 RX ADMIN — ENOXAPARIN SODIUM 40 MG: 100 INJECTION SUBCUTANEOUS at 08:06

## 2022-11-22 RX ADMIN — LOSARTAN POTASSIUM 25 MG: 50 TABLET, FILM COATED ORAL at 08:07

## 2022-11-22 RX ADMIN — ASPIRIN 81 MG: 81 TABLET, COATED ORAL at 08:07

## 2022-11-22 RX ADMIN — TIMOLOL MALEATE 1 DROP: 5 SOLUTION OPHTHALMIC at 21:00

## 2022-11-22 RX ADMIN — PREGABALIN 25 MG: 25 CAPSULE ORAL at 20:41

## 2022-11-22 RX ADMIN — TIMOLOL MALEATE 1 DROP: 5 SOLUTION OPHTHALMIC at 08:14

## 2022-11-22 RX ADMIN — FUROSEMIDE 20 MG: 20 TABLET ORAL at 08:07

## 2022-11-22 RX ADMIN — FERROUS SULFATE TAB 325 MG (65 MG ELEMENTAL FE) 325 MG: 325 (65 FE) TAB at 17:16

## 2022-11-22 RX ADMIN — FERROUS SULFATE TAB 325 MG (65 MG ELEMENTAL FE) 325 MG: 325 (65 FE) TAB at 08:07

## 2022-11-22 RX ADMIN — SODIUM CHLORIDE, PRESERVATIVE FREE 10 ML: 5 INJECTION INTRAVENOUS at 08:21

## 2022-11-22 RX ADMIN — ATORVASTATIN CALCIUM 40 MG: 40 TABLET, FILM COATED ORAL at 08:08

## 2022-11-22 RX ADMIN — BRIMONIDINE TARTRATE 1 DROP: 2 SOLUTION OPHTHALMIC at 08:15

## 2022-11-22 RX ADMIN — METOPROLOL SUCCINATE 25 MG: 25 TABLET, EXTENDED RELEASE ORAL at 08:07

## 2022-11-22 RX ADMIN — IPRATROPIUM BROMIDE AND ALBUTEROL SULFATE 1 AMPULE: .5; 2.5 SOLUTION RESPIRATORY (INHALATION) at 17:06

## 2022-11-22 RX ADMIN — ISOSORBIDE MONONITRATE 30 MG: 30 TABLET, EXTENDED RELEASE ORAL at 08:17

## 2022-11-22 RX ADMIN — PREGABALIN 25 MG: 25 CAPSULE ORAL at 08:07

## 2022-11-22 RX ADMIN — IPRATROPIUM BROMIDE AND ALBUTEROL SULFATE 1 AMPULE: .5; 2.5 SOLUTION RESPIRATORY (INHALATION) at 06:04

## 2022-11-22 RX ADMIN — SODIUM CHLORIDE, PRESERVATIVE FREE 10 ML: 5 INJECTION INTRAVENOUS at 20:41

## 2022-11-22 ASSESSMENT — PAIN SCALES - GENERAL
PAINLEVEL_OUTOF10: 0

## 2022-11-22 NOTE — DISCHARGE SUMMARY
[facility] Physician Discharge Summary       [address] [place] 060 1266    Go on 11/29/2022  Appointment scheduled on 11/29/22 at 11:45 am, hospital follow up, Bring all pill bottles to appointment    Jena Robles, APRN [facility]  [address] [facility] [address] [place] [address]  [phone]    Go on 12/9/2022  Appointment scheduled on 12/9/22 at 11:30 am, hospital follow up, Bring all pill bottles to appointment      Activity level: as per accepting facility    Diet: ADULT DIET; Regular; 4 carb choices (60 gm/meal); Low Sodium (2 gm)  Diet NPO Exceptions are: Sips of Water with Meds    Labs:as per accepting facility    Dispo:[facility]    Condition at discharge: Stable    Continue supplemental oxygen via nasal canula @ 3 LPM round-the-clock. Patient ID:  Noemí Bailey  [id number]  89 y.o.  1935    Admit date: 11/15/2022    Discharge date and time:  11/22/2022  5:01 PM    Admission Diagnoses: Principal Problem:    Bilateral pleural effusion  Active Problems:    Acute on chronic congestive heart failure (HCC)    Urinary retention    Abnormal cardiovascular stress test    Hypotension    COPD exacerbation (HCC)  Resolved Problems:    * No resolved hospital problems. *      Discharge Diagnoses: Principal Problem:    Bilateral pleural effusion  Active Problems:    Acute on chronic congestive heart failure (HCC)    Urinary retention    Abnormal cardiovascular stress test    Hypotension    COPD exacerbation (HCC)  Resolved Problems:    * No resolved hospital problems. *      Consults:  IP CONSULT TO UROLOGY  IP CONSULT TO CARDIOLOGY  IP CONSULT TO HEART FAILURE NURSE/COORDINATOR  IP CONSULT TO DIETITIAN    Procedures: [place] stress test    Hospital Course: Patient was admitted with acute respiratory failure due to CHF exacerbation. Cardiology was consulted and echo was done which showed change in EF down to 20%. Stress test was then done which showed severe global hypokinesis and mild-moderate casi-infarct ischemia in LAD/diagonal territory. Cardiology recommended transfer to [address] [place] [address] for cardiac cath. She was sent there in stable condition. Discharge Exam:  Vitals:    11/22/22 0032 11/22/22 0800 11/22/22 1130 11/22/22 1500   BP: 114/70 (!) 109/55 (!) 99/52 (!) 98/47   Pulse: 70 68  70   Resp: 16 26  18   Temp: 98 °F (36.7 °C) 98.8 °F (37.1 °C)  98 °F (36.7 °C)   TempSrc: Oral Oral  Oral   SpO2: 96% 96% 93% 95%   Weight: 160 lb 9 oz (72.8 kg)      Height:           General Appearance: alert and oriented to person, place and time and in no acute distress  Skin: warm and dry  Head: normocephalic and atraumatic  Eyes: pupils equal, round, and reactive to light, extraocular eye movements intact, conjunctivae normal  Neck: neck supple and non tender without mass   Pulmonary/Chest: clear to auscultation bilaterally- no wheezes, rales or rhonchi, normal air movement, no respiratory distress  Cardiovascular: normal rate, normal S1 and S2 and no carotid bruits  Abdomen: soft, non-tender, non-distended, normal bowel sounds, no masses or organomegaly  Extremities: no cyanosis, no clubbing and no edema  Neurologic: no cranial nerve deficit and speech normal      I/O last 3 completed shifts: In: 680 [P.O.:670; I.V.:10]  Out: 3850 [Urine:3850]  I/O this shift:  In: 300 [P.O.:300]  Out: 600 [Urine:600]      LABS:  Recent Labs     11/20/22  1009 11/21/22  0750 11/22/22  0800    139 140   K 4.1 4.1 4.4    104 103   CO2 24 25 29   BUN 30* 24* 24*   CREATININE 1.0 0.8 0.9   GLUCOSE 192* 106* 126*   CALCIUM 8.7 9.0 9.4       Recent Labs     11/21/22  0750 11/22/22  0800   WBC 5.3 5.3   RBC 3.53 3.43*   HGB 10.8* 10.7*   HCT 34.5 33.8*   MCV 97.7 98.5   MCH 30.6 31.2   MCHC 31.3* 31.7*   RDW 13.6 13.7    172   MPV 10.6 11.0       No results for input(s): POCGLU in the last 72 hours.       Imaging:   NM Cardiac Stress Test Nuclear Imaging   Final Result   1: The stress EKG report is provided separately. 2  This is an abnormal myocardial perfusion scan. Findings suggestive   of an infarction with mild-to-moderate casi-infarct ischemia in the   LAD/diagonal territory. 3: Left ventricle is moderately dilated with severe global   hypokinesis. There appears to be akinesis of the apical segments. 4: Prognostically, this is an intermediate to high risk study. IR GUIDED THORACENTESIS PLEURAL   Final Result   Successful ultrasound guided thoracentesis. XR CHEST PORTABLE   Final Result   Reduction in the volume of right pleural fluid following thoracentesis. No   evidence of a pneumothorax. Small left pleural effusion remains. XR CHEST PORTABLE   Final Result   Pleural effusions right greater than left with a adjacent likely atelectasis. Cardiomegaly. CTA PULMONARY W CONTRAST   Final Result   1. There is no pulmonary embolus. 2. Moderate size right pleural effusion with compression atelectasis of the   right lower lobe   3. Small left pleural effusion   4. Emphysematous changes. 5. Cardiomegaly with findings of mild vascular congestion         XR CHEST (2 VW)   Final Result   1. Cardiomegaly with findings of mild vascular congestion   2.  Small right pleural effusion and trace left pleural effusion             Patient Instructions:      Medication List        START taking these medications      ferrous sulfate 325 (65 Fe) MG tablet  Commonly known as: IRON 325  Take 1 tablet by mouth 2 times daily (with meals)     furosemide 20 MG tablet  Commonly known as: LASIX  Take 1 tablet by mouth daily  Start taking on: November 23, 2022     isosorbide mononitrate 30 MG extended release tablet  Commonly known as: IMDUR  Take 1 tablet by mouth daily  Start taking on: November 23, 2022            CHANGE how you take these medications      atorvastatin 40 MG tablet  Commonly known as: LIPITOR  Take 1 tablet by mouth daily  Start taking on: November 23, 2022  What changed:   medication strength  how much to take     losartan 25 MG tablet  Commonly known as: COZAAR  Take 1 tablet by mouth daily HOLD IF SBP < 100  Start taking on: November 23, 2022  What changed:   medication strength  how much to take  additional instructions     metoprolol succinate 25 MG extended release tablet  Commonly known as: TOPROL XL  Take 1 tablet by mouth daily Do not crush or chew.-Hold SBP less 90 or HR < 60  Start taking on: November 23, 2022  What changed:   medication strength  how much to take  additional instructions            CONTINUE taking these medications      albuterol-ipratropium  MCG/ACT Aers inhaler  Commonly known as: Combivent Respimat  Inhale 1 puff into the lungs 2 times daily     ascorbic acid 250 MG tablet  Commonly known as: VITAMIN C     aspirin 81 MG EC tablet     blood glucose monitor kit and supplies  1 Units by Does not apply route 2 times daily Please give whatever insurance covers     blood glucose test strips strip  Commonly known as: ASCENSIA AUTODISC VI;ONE TOUCH ULTRA TEST VI  1 each by Does not apply route 2 times daily     brimonidine 0.2 % ophthalmic solution  Commonly known as: ALPHAGAN     calcium carbonate-vitamin D 600-200 MG-UNIT Tabs     Lancets Misc  1 each by Does not apply route 2 times daily     latanoprost 0.005 % ophthalmic solution  Commonly known as: XALATAN     magnesium 250 MG Tabs tablet  Commonly known as: MAGNESIUM-OXIDE     metFORMIN 500 MG tablet  Commonly known as: GLUCOPHAGE  Take 1 tablet by mouth 2 times daily (with meals)     ONE-A-DAY ESSENTIAL PO     OSTEO BI-FLEX ADV JOINT SHIELD PO     pregabalin 25 MG capsule  Commonly known as: Lyrica  Take 1 capsule by mouth 2 times daily for 60 days.      timolol 0.5 % ophthalmic solution  Commonly known as: BETIMOL     timolol 0.5 % ophthalmic solution  Commonly known as: TIMOPTIC     vitamin B-12 1000 MCG tablet  Commonly known as: CYANOCOBALAMIN     Vitamin D3 50 MCG (2000 UT) Caps            STOP taking these medications      amLODIPine 5 MG tablet  Commonly known as: NORVASC     triamterene-hydroCHLOROthiazide 37.5-25 MG per capsule  Commonly known as: Dyazide               Where to Get Your Medications        Information about where to get these medications is not yet available    Ask your nurse or doctor about these medications  atorvastatin 40 MG tablet  ferrous sulfate 325 (65 Fe) MG tablet  furosemide 20 MG tablet  isosorbide mononitrate 30 MG extended release tablet  losartan 25 MG tablet  metoprolol succinate 25 MG extended release tablet           Note that more than 30 minutes was spent in preparing discharge papers, discussing discharge with patient, medication review, etc.    Signed:  Electronically signed by Remedios Umana MD on 11/22/2022 at 5:01 PM    NOTE: This report was transcribed using voice recognition software. Every effort was made to ensure accuracy; however, inadvertent computerized transcription errors may be present.

## 2022-11-22 NOTE — PROGRESS NOTES
OT BEDSIDE TREATMENT NOTE      Date:2022  Patient Name: Dutch Sargent  MRN: [id number]  : 1935  Room: [address] [place] [address]     Elmer Brianalvaro Cartwright OTR/L #[id number]      Referring Provider and Specific Provider Orders/Date:      11/15/22 1500   OT eval and treat  Start:  11/15/22 1500,   End:  11/15/22 1500,   ONE TIME,   Standing Count:  1 Occurrences,   R         Faye Nelson,        Placement Recommendation: Home with Home Health OT    Comment: pt has heart catheterization scheduled at [facility] tomorrow    Diagnosis:   1. Bilateral pleural effusion    2. COPD exacerbation ([address]    3. Urinary retention    4. Cardiomegaly    5. Acute on chronic congestive heart failure, unspecified heart failure type ([address]    6.  Postprocedural pneumothorax            Surgery: None        Pertinent Medical History:           Past Medical History            Past Medical History:   Diagnosis Date    COPD (chronic obstructive pulmonary disease) [address]      Hyperlipidemia      Hypertension      Osteoarthritis      Type II or unspecified type diabetes mellitus without mention of complication, not stated as uncontrolled              Past Surgical History             Past Surgical History:   Procedure Laterality Date    [address] [facility]    COLONOSCOPY       TUMOR REMOVAL        under lt arm             Precautions:  Fall Risk, up as tolerated, 2L O2 via nasal canula with activities and at night time      Assessment of current deficits    [x] Functional mobility         [x]ADLs           [x] Strength                   []Cognition    [x] Functional transfers       [x] IADLs          [x] Safety Awareness   [x]Endurance    [] Fine Coordination                       [x] Balance      [] Vision/perception    []Sensation      []Gross Motor Coordination           [] ROM           [] Delirium                   [] Motor Control      OT PLAN OF CARE   OT POC based on physician orders, patient diagnosis and results of clinical assessment     Frequency/Duration 1-3 days/wk for 2 weeks PRN      Specific OT Treatment Interventions to include:   * Instruction/training on adapted ADL techniques and AE recommendations to increase functional independence within precautions       * Training on energy conservation strategies, correct breathing pattern and techniques to improve independence/tolerance for self-care routine  * Functional transfer/mobility training/DME recommendations for increased independence, safety, and fall prevention  * Patient/Family education to increase follow through with safety techniques and functional independence  * Recommendation of environmental modifications for increased safety with functional transfers/mobility and ADLs  * Therapeutic exercise to improve motor endurance, ROM, and functional strength for ADLs/functional transfers  * Therapeutic activities to facilitate/challenge dynamic balance, stand tolerance for increased safety and independence with ADLs  * Therapeutic activities to facilitate gross/fine motor skills for increased independence with ADLs     Recommended Adaptive Equipment: TBD       Home Living: Lives with spouse, single family home, 2 story but resides on 1st, 4 steps to enter with rail. Bathroom set-up: Tub/shower with grab bars, shower chair, standard commode with grab bars surrounding. Equipment owned: cane, wheeled walker, bedside commode     Prior Level of Function: Independent with ADLs , daughter assists with IADLs; ambulated independently using cane.       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;      [facility]   AM-PAC Daily Activity Inpatient   How much help for putting on and taking off regular lower body clothing?: A Little  How much help for Bathing?: A Little  How much help for Toileting?: A Little  How much help for putting on and taking off regular upper body clothing?: A Little  How much help for taking care of personal grooming?: A Little  How much help for eating meals?: A Little  AM-PAC Inpatient Daily Activity Raw Score: 18             Functional Assessment:     Initial Eval Status  Date: 11/16/22    Treatment Status  Date:11/22/22 STGs = LTGs  Time frame: 10-14 days   Feeding Supervision     Supervision; pt able to reach for cup on tray and take drink when seated in chair Independent    Grooming Supervision   Set-up for face wash seated in chair     SBA when pt stood sinkside to wash hands after toileting ; pt had completed other grooming tasks prior to session Moderate [place]    UB Dressing Minimal Assist   For gown mgmt to donning robe around back-side     SBA, as pt able to manage gown during toileting Moderate [place]    LB Dressing Maximal Assist   To thread LEs through undergarments and don over hips upon standing supported with walker. Dependent to don socks and shoes while seated in chair. Pt able to flex anteriorly when seated in chair to doff/don socks, shoes and ankle braces; min seated RB's d/t slight SOB and fatigue; pt able to complete clothing management over B hips when toileting at SBA/Min A for balance Moderate [place]    Bathing Moderate Assist   For UB/LB sponge bathing seated in chair. N/T; pt had completed bathing activities prior to session; pt has shower seat and sprayer at home  Moderate [place]    Toileting Maximal Assist   Pt has sapp catheter. Max A for rear hygiene.      Min A for transfer to/from low commode in bathroom; cuing for use of hand rail, as well as safety/walker management in bathroom d/t visual deficits Moderate [place]    Bed Mobility  Supine to sit: Minimal Assist  Sit to supine: Not Assessed     Supine to sit:N/T; pt seated in chair at beginning and end of session Supine to sit: Independent   Sit to supine: Independent    Functional Transfers Sit to stand: Minimal Assist   Stand to sit: Minimal Assist      Transfer training with verbal cues for hand placement throughout session to improve safety. SBA for transfers to/from chair and min A for transfers to/from low commode with FWW. Cuing for safety Independent   Functional Mobility Minimal Assist with wheeled walker to improve balance less than household distances, verbal cues for walker sequence and safety. Min A with ww for short distance functional mobility throughout the room to/from bathroom to simulate house hold distances. 2* to visual deficits, pt ran into end of bed and doorway of bathroom, however self corrected walker; cuing for visual scanning    Moderate [place] with use of wheeled walker    Balance Sitting:     Static: fair plus    Dynamic: fair plus  Standing: fair with walker     Sitting:     Static: fair+    Dynamic: fair   Standing: fair with walker   Sitting:     Static: good    Dynamic: good  Standing: good    Activity Tolerance fair   Fair/fair minus; pt demo's SOB with LE dressing  and UE ex's; cuing for ECT's; min rest breaks provided Increase standing tolerance >3  minutes for improved engagement with functional transfers and indep in ADLs      Visual/  Perceptual Glasses: yes      L eye blind     L eye blind ; cuing for visual scanning  NA       Hand Dominance: left        AROM (PROM) Strength Additional Info:  Goal:   RUE  WFL 3+/5 good  and wfl FMC/dexterity noted during ADL tasks    Improve overall RUE strength  for participation in functional tasks   LUE WFL 3+/5 good  and wfl FMC/dexterity noted during ADL tasks    Improve overall LUE strength  for participation in functional tasks    - BUE AROM exercises: 10-15 reps in all planes of movement to increase ROM/endurance required for functional transfers/ADL participation. Exercises completed in shoulder and elbow flexion/extension, internal/external rotation, abduction/adduction, supination/pronation, digit and wrist flexion/extension and digit opposition. B UE  ROM appears to be [facility] with min rest breaks d/t decreased endurance/fatigue. Hearing:  WFL   Sensation:   No c/o numbness or tingling  Tone:  WFL   Edema: none noted    Comments: Patient cleared by nursing staff. Upon arrival pt seated in chair.  and pt's daughter present in room. Pt agreeable to OT tx session. Pt educated with regards to  hand placement, safety awareness, static sitting balance,  standing balance, transfer training, functional mobility, grooming tasks, LE dressing, B UE ROM ex's, toileting/hygiene, ECT's. At end of session pt seated in chair   with all needs including call light within reach. Overall, pt demonstrated decreased independence and safety during completion of ADL/functional transfers/mobility tasks. Pt would benefit from continued skilled OT to increase safety and independence with completion of ADL/IADL tasks for functional independence and quality of life. Pt required cues and education as noted above for safe facilitation and completion of tasks. Therapist provided skilled monitoring of patient's response during treatment session. Prior to and at the end of session, environmental modifications completed for patients safety and efficiency of treatment session. Overall, patient demonstrates minimal difficulties with completion of BADLs and IADLs. Factors contributing to these difficulties include  decreased endurance, SOB with activities and generalized weakness. As noted above, patient likely to benefit from further OT intervention to increase independence, safety, and overall quality of life. Treatment:     Functional transfers: Facilitated transfers to/from chair and to/from low commode with cues for body alignment, safety and hand placement.   ADL completion: Self-care retraining for the above-mentioned ADLs; training on proper hand placement, safety technique, sequencing, and energy conservation techniques. Postural Balance: Sitting/standing balance retraining to improve righting reactions with postural changes during ADLs. Therapeutic Ex's: To increase B UE ROM/endurance/strength required for functional transfers and ADL participation. Pt has made fair progress towards set goals     OT 1-3 days/wk for 2 weeks PRN     Treatment Time also includes thorough review of current medical information, gathering information on past medical history/social history and prior level of function, informal observation of tasks, assessment of data and education on plan of care and goals.     Treatment Time In: 3:15 PM    Treatment Time Out: 3:42 PM           Treatment Charges: Mins Units   ADL/Home Mgt     00780 19 1   Thera Activities     [id number]     Ther Ex                 81289 8 1   Manual Therapy    63582     Neuro Re-ed         75570     Orthotic manage/training                               82305     Non Billable Time     Total Timed Treatment 27 [address] [facility], MICHAUD/ #[id number]

## 2022-11-22 NOTE — CARE COORDINATION
11/22/2022 1420 CM for transition of care needs at d/c.  Plan is for pt to go to [facility] 11/23 for a heart cath. Pt continues to wear 2 liters of O2 and needs a walk test to qualify, nursing is to do one. If pt qualifies pt wants to use [employer]. Pt has been accepted by [facility] and an update was given to [facility] there and she will follow at [facility]. Per cardiologies note pt is to receive a Life Vest and order needs placed in Epic per Dr Nathen Manley. Luke Caldwell made aware of need for Life Vest and will bring it to the pt and educate her and her family on it's use. CM will follow.  Electronically signed by Mery Hollis RN on 11/22/2022 at 2:42 PM

## 2022-11-22 NOTE — PLAN OF CARE
Problem: Discharge Planning  Goal: Discharge to home or other facility with appropriate resources  Outcome: Progressing  Flowsheets (Taken 11/22/2022 0800)  Discharge to home or other facility with appropriate resources:   Identify barriers to discharge with patient and caregiver   Arrange for needed discharge resources and transportation as appropriate   Identify discharge learning needs (meds, wound care, etc)   Refer to discharge planning if patient needs post-hospital services based on physician order or complex needs related to functional status, cognitive ability or social support system     Problem: Pain  Goal: Verbalizes/displays adequate comfort level or baseline comfort level  11/22/2022 1642 by Dexter Bernard RN  Outcome: Progressing  11/22/2022 0358 by Narinder Chavez RN  Outcome: Progressing     Problem: ABCDS Injury Assessment  Goal: Absence of physical injury  11/22/2022 1642 by Dexter Bernard RN  Outcome: Progressing  Flowsheets (Taken 11/22/2022 1637)  Absence of Physical Injury: Implement safety measures based on patient assessment  11/22/2022 0358 by Narinder Chavez RN  Outcome: Progressing     Problem: Chronic Conditions and Co-morbidities  Goal: Patient's chronic conditions and co-morbidity symptoms are monitored and maintained or improved  11/22/2022 1642 by Dexter Bernard RN  Outcome: Progressing  Flowsheets (Taken 11/22/2022 0800)  Care Plan - Patient's Chronic Conditions and Co-Morbidity Symptoms are Monitored and Maintained or Improved:   Monitor and assess patient's chronic conditions and comorbid symptoms for stability, deterioration, or improvement   Collaborate with multidisciplinary team to address chronic and comorbid conditions and prevent exacerbation or deterioration   Update acute care plan with appropriate goals if chronic or comorbid symptoms are exacerbated and prevent overall improvement and discharge  11/22/2022 0358 by Narinder Chavez RN  Outcome: Progressing     Problem: Respiratory - Adult  Goal: Achieves optimal ventilation and oxygenation  11/22/2022 1642 by Zandra Nyhan, RN  Outcome: Progressing  Flowsheets (Taken 11/22/2022 0800)  Achieves optimal ventilation and oxygenation:   Assess for changes in respiratory status   Assess for changes in mentation and behavior   Position to facilitate oxygenation and minimize respiratory effort   Oxygen supplementation based on oxygen saturation or arterial blood gases  11/22/2022 0358 by Yamilet Garcia RN  Outcome: Progressing     Problem: Cardiovascular - Adult  Goal: Absence of cardiac dysrhythmias or at baseline  Outcome: Progressing  Flowsheets (Taken 11/22/2022 0800)  Absence of cardiac dysrhythmias or at baseline:   Monitor cardiac rate and rhythm   Assess for signs of decreased cardiac output   Administer antiarrhythmia medication and electrolyte replacement as ordered     Problem: Skin/Tissue Integrity - Adult  Goal: Skin integrity remains intact  Outcome: Progressing  Flowsheets  Taken 11/22/2022 1637  Skin Integrity Remains Intact:   Monitor for areas of redness and/or skin breakdown   Assess vascular access sites hourly   Every 4-6 hours minimum: Change oxygen saturation probe site  Taken 11/22/2022 0800  Skin Integrity Remains Intact:   Monitor for areas of redness and/or skin breakdown   Assess vascular access sites hourly   Every 4-6 hours minimum: Change oxygen saturation probe site   Every 4-6 hours: If on nasal continuous positive airway pressure, respiratory therapy assesses nares and determine need for appliance change or resting period     Problem: Musculoskeletal - Adult  Goal: Return mobility to safest level of function  Outcome: Progressing  Flowsheets (Taken 11/22/2022 0800)  Return Mobility to Safest Level of Function:   Assess patient stability and activity tolerance for standing, transferring and ambulating with or without assistive devices   Assist with transfers and ambulation using safe patient handling equipment as needed   Ensure adequate protection for wounds/incisions during mobilization   Obtain physical therapy/occupational therapy consults as needed   Apply continuous passive motion per provider or physical therapy orders to increase flexion toward goal     Problem: Gastrointestinal - Adult  Goal: Maintains adequate nutritional intake  Outcome: Progressing  Flowsheets (Taken 11/22/2022 0800)  Maintains adequate nutritional intake:   Monitor percentage of each meal consumed   Identify factors contributing to decreased intake, treat as appropriate   Monitor intake and output, weight and lab values     Problem: Genitourinary - Adult  Goal: Absence of urinary retention  Outcome: Progressing     Problem: Infection - Adult  Goal: Absence of infection at discharge  Outcome: Progressing  Flowsheets (Taken 11/22/2022 0800)  Absence of infection at discharge:   Assess and monitor for signs and symptoms of infection   Monitor lab/diagnostic results   Monitor all insertion sites i.e., indwelling lines, tubes and drains   Administer medications as ordered   Instruct and encourage patient and family to use good hand hygiene technique     Problem: Metabolic/Fluid and Electrolytes - Adult  Goal: Hemodynamic stability and optimal renal function maintained  Outcome: Progressing  Flowsheets (Taken 11/22/2022 0800)  Hemodynamic stability and optimal renal function maintained:   Monitor labs and assess for signs and symptoms of volume excess or deficit   Monitor intake, output and patient weight   Monitor response to interventions for patient's volume status, including labs, urine output, blood pressure (other measures as available)   Encourage oral intake as appropriate   Instruct patient on fluid and nutrition restrictions as appropriate     Problem: Safety - Adult  Goal: Free from fall injury  Outcome: Progressing  Flowsheets (Taken 11/22/2022 1637)  Free From Fall Injury: Instruct family/caregiver on patient safety

## 2022-11-22 NOTE — PROGRESS NOTES
INPATIENT CARDIOLOGY FOLLOW-UP    Name: Kristina Hood    Age: 81 y.o. Date of Admission: 11/15/2022  9:23 AM    Date of Service: 11/22/2022    Primary Cardiologist: New to Dr Dalia Lantigua this admission    Chief Complaint: Follow-up for new CHF/cardiomyopathy    Interim History:    Feels well. Still on nasal cannula at 3 L. Denies chest pain or shortness of breath.     Net -11 L    Review of Systems:   Negative except as described above    Problem List:  Patient Active Problem List   Diagnosis    Type 2 diabetes mellitus without complication, without long-term current use of insulin ([facility])    Mixed hyperlipidemia    Essential hypertension    Osteoarthritis    RLS (restless legs syndrome)    Chronic obstructive pulmonary disease ([facility])    Diabetic polyneuropathy associated with diabetes mellitus due to underlying condition ([facility])    Bilateral pleural effusion    Acute on chronic congestive heart failure ([address]    Urinary retention    Abnormal cardiovascular stress test    Hypotension    COPD exacerbation ([facility])       Current Medications:    Current Facility-Administered Medications:     furosemide (LASIX) tablet 20 mg, 20 mg, Oral, Daily, Erendira Collins MD, 20 mg at 11/22/22 8133    ferrous sulfate (IRON 325) tablet 325 mg, 325 mg, Oral, BID WC, Arnulfo Avila MD, 325 mg at 11/22/22 5611    losartan (COZAAR) tablet 25 mg, 25 mg, Oral, Daily, Muncie Dryer, DO, 25 mg at 11/22/22 8346    metoprolol succinate (TOPROL XL) extended release tablet 25 mg, 25 mg, Oral, Daily, [place]uncie Dryer, DO, 25 mg at 11/22/22 0666    atorvastatin (LIPITOR) tablet 40 mg, 40 mg, Oral, Daily, [place]uncie Dryer, DO, 40 mg at 11/22/22 4802    isosorbide mononitrate (IMDUR) extended release tablet 30 mg, 30 mg, Oral, Daily, [place]uncie Dryer, DO, 30 mg at 11/22/22 0817    latanoprost (XALATAN) 0.005 % ophthalmic solution 1 drop, 1 drop, Both Eyes, Nightly, Olegario Garcia MD, 1 drop at 11/21/22 2036    timolol (TIMOPTIC) 0.5 % ophthalmic solution 1 drop, 1 drop, Both Eyes, BID, Olegario Garcia MD, 1 drop at 11/22/22 0814    brimonidine (ALPHAGAN) 0.2 % ophthalmic solution 1 drop, 1 drop, Left Eye, BID, Olegario Gracia MD, 1 drop at 11/22/22 0815    sodium chloride flush 0.9 % injection 10 mL, 10 mL, IntraVENous, PRN, Moira Perezon, DO    aspirin EC tablet 81 mg, 81 mg, Oral, Daily, Moira Perezon, DO, 81 mg at 11/22/22 0807    pregabalin (LYRICA) capsule 25 mg, 25 mg, Oral, BID, Moira Perezon, DO, 25 mg at 11/22/22 1049    sodium chloride flush 0.9 % injection 5-40 mL, 5-40 mL, IntraVENous, 2 times per day, Moira Perezon, DO, 10 mL at 11/22/22 5051    sodium chloride flush 0.9 % injection 5-40 mL, 5-40 mL, IntraVENous, PRN, Moira Mcallister, DO    0.9 % sodium chloride infusion, , IntraVENous, PRN, Moira Perezon, DO    enoxaparin (LOVENOX) injection 40 mg, 40 mg, SubCUTAneous, Daily, Moira Mcallister, DO, 40 mg at 11/22/22 0806    ondansetron (ZOFRAN-ODT) disintegrating tablet 4 mg, 4 mg, Oral, Q8H PRN **OR** ondansetron (ZOFRAN) injection 4 mg, 4 mg, IntraVENous, Q6H PRN, Moira Perezon, DO    polyethylene glycol (GLYCOLAX) packet 17 g, 17 g, Oral, Daily PRN, Moira Perezon, DO    acetaminophen (TYLENOL) tablet 650 mg, 650 mg, Oral, Q6H PRN, 650 mg at 11/19/22 0852 **OR** acetaminophen (TYLENOL) suppository 650 mg, 650 mg, Rectal, Q6H PRN, Moira Perezon, DO    glucose chewable tablet 16 g, 4 tablet, Oral, PRN, Moira Perezon, DO    dextrose bolus 10% 125 mL, 125 mL, IntraVENous, PRN **OR** dextrose bolus 10% 250 mL, 250 mL, IntraVENous, PRN, Moira Mcallister, DO    glucagon (rDNA) injection 1 mg, 1 mg, SubCUTAneous, PRN, Moira Mcallister, DO    dextrose 10 % infusion, , IntraVENous, Continuous PRN, Moira Mcallister, DO    insulin lispro (HUMALOG) injection vial 0-4 Units, 0-4 Units, SubCUTAneous, TID WC, Moira Mcallister,     insulin lispro (HUMALOG) injection vial 0-4 Units, 0-4 Units, SubCUTAneous, Nightly, Moira Mcallister,     ipratropium-albuterol (DUONEB) nebulizer solution 1 ampule, 1 ampule, Inhalation, BID, Moira Mcallister, DO, 1 ampule at 11/22/22 0604    Physical Exam:  BP (!) 109/55   Pulse 68   Temp 98.8 °F (37.1 °C) (Oral)   Resp 26   Ht 5' 6\" (1.676 m)   Wt 160 lb 9 oz (72.8 kg)   LMP  (LMP Unknown)   SpO2 96%   BMI 25.92 kg/m²   Wt Readings from Last 3 Encounters:   11/22/22 160 lb 9 oz (72.8 kg)   11/12/22 151 lb (68.5 kg)   11/08/22 161 lb (73 kg)     Appearance: Elderly female, awake, alert, nasal cannula  Skin: Intact, no rash  Head: Normocephalic, atraumatic  Eyes: EOMI, no conjunctival erythema  ENMT: No pharyngeal erythema, MMM, no rhinorrhea  Neck: Supple, no elevated JVP, no carotid bruits  Lungs: Clear to auscultation bilaterally. No wheezes, rales, or rhonchi. Cardiac: PMI nondisplaced, Regular rhythm with a normal rate, S1 & S2 normal, no murmurs  Abdomen: Soft, nontender, +bowel sounds  Extremities: Moves all extremities x 4, no lower extremity edema  Neurologic: No focal motor deficits apparent, normal mood and affect  Peripheral Pulses: Intact posterior tibial pulses bilaterally    Intake/Output:    Intake/Output Summary (Last 24 hours) at 11/22/2022 0848  Last data filed at 11/22/2022 4292  Gross per 24 hour   Intake 620 ml   Output 2300 ml   Net -1680 ml     I/O this shift:  In: 120 [P.O.:120]  Out: -     Laboratory Tests:  Recent Labs     11/20/22  1009 11/21/22  0750    139   K 4.1 4.1    104   CO2 24 25   BUN 30* 24*   CREATININE 1.0 0.8   GLUCOSE 192* 106*   CALCIUM 8.7 9.0     No results found for: MG  No results for input(s): ALKPHOS, ALT, AST, PROT, BILITOT, BILIDIR, LABALBU in the last 72 hours.   Recent Labs     11/21/22  0750   WBC 5.3   RBC 3.53   HGB 10.8*   HCT 34.5   MCV 97.7   MCH 30.6   MCHC 31.3*   RDW 13.6      MPV 10.6     Lab Results   Component Value Date    TROPONINI <0.01 04/02/2017     Lab Results   Component Value Date    INR 1.1 11/16/2022    PROTIME 12.9 (H) 11/16/2022     Lab Results Component Value Date    TSH 2.830 2015     Lab Results   Component Value Date    LABA1C 5.8 2022     No results found for: EAG  Lab Results   Component Value Date    CHOL 159 2022    CHOL 123 2021    CHOL 151 06/10/2020     Lab Results   Component Value Date    TRIG 178 (H) 2022    TRIG 151 (H) 2021    TRIG 152 (H) 06/10/2020     Lab Results   Component Value Date    HDL 40 2022    HDL 36 2021    HDL 45 06/10/2020     Lab Results   Component Value Date    LDLCALC 83 2022    LDLCALC 57 2021    LDLCALC 76 06/10/2020     Lab Results   Component Value Date    LABVLDL 36 2022    LABVLDL 30 2021    LABVLDL 30 06/10/2020     No results found for: CHOLHDLRATIO  Recent Labs     22  0750   PROBNP 9,030*       Cardiac Tests:    EK/15/2022: Sinus rhythm with occasional PAC and PVC. 84 beats minute. Normal axis. Normal anterior infarct. Nonspecific T wave changes    Telemetry: Sinus rhythm 80s, PACs, nonsustained PSVT    Chest X-ray:   22    Impression   Reduction in the volume of right pleural fluid following thoracentesis. No   evidence of a pneumothorax. Small left pleural effusion remains. CTA chest 11/15/22  Impression   1. There is no pulmonary embolus. 2. Moderate size right pleural effusion with compression atelectasis of the   right lower lobe   3. Small left pleural effusion   4. Emphysematous changes. 5. Cardiomegaly with findings of mild vascular congestion     Echocardiogram:   TTE 11/15/22 Mirta Burkitt   Summary   Ejection fraction is visually estimated at 20%. Overall ejection fraction severely decreased. Left ventricle is moderately enlarged. Normal right ventricle structure and function. Left atrial volume index of 68 ml per meters squared BSA. Physiologic and/or trace aortic regurgitation is noted. Mild to moderate mitral regurgitation is present. Mild tricuspid regurgitation.    No evidence for hemodynamically significant pericardial effusion. Stress test:    Pharm stress 11/17/22  Impression   1: The stress EKG report is provided separately. 2  This is an abnormal myocardial perfusion scan. Findings suggestive   of an infarction with mild-to-moderate casi-infarct ischemia in the   LAD/diagonal territory. 3: Left ventricle is moderately dilated with severe global   hypokinesis. EF 30%. There appears to be akinesis of the apical segments. 4: Prognostically, this is an intermediate to high risk study. Cardiac catheterization: NA    ----------------------------------------------------------------------------------------------------------------------------------------------------------------  IMPRESSION:  Acute heart failure with reduced ejection fraction. proBNP 13,000-> 9000 pg/mL. -11 L. Improving  Ischemic cardiomyopathy newly diagnosed. EF 20%  Mild to moderate MR, mild TR  Minimally elevated hs-cTnT: Flat pattern, not consistent with ACS. Likely chronic myocardial injury  Nonsustained PSVT  Pleural effusion s/p right thoracentesis 11/16/2022 950 mL yellow fluid  Acute hypoxic respiratory failure  JAI creatinine 1.4 -> 0.8 improved  Mild anemia Hgb 10.8  Hypertension, running hypotensive  Hyperlipidemia  Type 2 diabetes reported, A1c 5.8%  COPD/emphysema    RECOMMENDATIONS:  Improved from cardiac standpoint.     Patient agreeable to left heart catheterization tomorrow Mio [facility] to assess for ischemic etiology of cardiomyopathy;   Also agreeable to wearable cardioverter defibrillator for primary prevention of sudden cardiac death  Continue low-dose oral loop diuretic  Continue GDMT for cardiomyopathy -up titration limited by hypotension in fact doses have been reduced already, further reduction may be necessary  Metoprolol succinate 25 mg daily  Losartan 25 mg daily, consider transition to sacubitril/valsartan  Discontinue spironolactone for now  Consider SGLT2 inhibitor  Continue aspirin and statin for medical treatment of CAD; isosorbide mononitrate if BP tolerates; currently no anginal symptoms  Aggressive risk factor modification  Further care per primary service    Risks and benefits of left heart catheterization explained to patient, including risk of MI, CVA, death, bleeding complications, vascular injury, renal failure requiring dialysis, and requiring emergency surgery. Possible outcomes including need for medical management, PCI, bypass surgery explained to patient. Patient voiced understanding and agrees to proceed. AUC HF 7    Jed De Santiago MD, [facility]    NOTE: This report was transcribed using voice recognition software. Every effort was made to ensure accuracy; however, inadvertent computerized transcription errors may be present.

## 2022-11-22 NOTE — PROGRESS NOTES
Pulse ox was 94% on RA at rest.  Oxygen saturation was 86% on RA while ambulating. Recovery pulse ox was 93% on 2 liters of oxygen while ambulating. Recovery O2 was 94% on 2 L.

## 2022-11-22 NOTE — PROGRESS NOTES
Physical Therapy Treatment Note/Plan of Care    Room #:  0191/9521-73  Patient Name: Gabriella Villareal  YOB: 1935  MRN: [id number]    Date of Service: 11/22/2022     Tentative placement recommendation: Home Health Physical Therapy   Equipment recommendation: [facility]       Evaluating Physical Therapist: Kenya Polk, [address] [facility] #[id number]      Specific Provider Orders/Date/Referring Provider :     11/15/22 1500    PT eval and treat  Start:  11/15/22 1500,   End:  11/15/22 1500,   ONE TIME,   Standing Count:  1 Occurrences,   R         Moira Du DO     Admitting Diagnosis:   Cardiomegaly [I51.7]  Urinary retention [R33.9]  COPD exacerbation ([address] [J44.1]  Bilateral pleural effusion [J90]  Pleural effusion, right [J90]  Acute on chronic congestive heart failure, unspecified heart failure type ([address] [I50.9]      Surgery: none  Visit Diagnoses         Codes    Bilateral pleural effusion    -  Primary J90    COPD exacerbation ([address]     J44.1    Urinary retention     R33.9    Cardiomegaly     I51.7    Acute on chronic congestive heart failure, unspecified heart failure type ([address]     I50.9    Postprocedural pneumothorax     J95.811            Patient Active Problem List   Diagnosis    Type 2 diabetes mellitus without complication, without long-term current use of insulin (HCC)    Mixed hyperlipidemia    Essential hypertension    Osteoarthritis    RLS (restless legs syndrome)    Chronic obstructive pulmonary disease (HCC)    Diabetic polyneuropathy associated with diabetes mellitus due to underlying condition (HCC)    Bilateral pleural effusion    Acute on chronic congestive heart failure (HCC)    Urinary retention    Abnormal cardiovascular stress test    Hypotension    COPD exacerbation (HCC)        ASSESSMENT of Current Deficits Patient able to ambulate on 2L and her SPO2 was 95- 96% throughout tx session using a wheeled walker.  Patient needing a seated rest period due to impaired endurance and her c/o left ankle pain. The patient will benefit from continued skilled therapy to increase strength and improve balance for safe functional mobility, to decrease risk of falls, and to meet goals at discharge. PHYSICAL THERAPY  PLAN OF CARE       Physical therapy plan of care is established based on physician order,  patient diagnosis and clinical assessment    Current Treatment Recommendations:    -Bed Mobility: Lower extremity exercises   -Sitting Balance: Incorporate reaching activities to activate trunk muscles   -Standing Balance: Perform strengthening exercises in standing to promote motor control with or without upper extremity support   -Transfers: Provide instruction on proper hand and foot position for adequate transfer of weight onto lower extremities and use of gait device if needed and Cues for hand placement, technique and safety. Provide stabilization to prevent fall   -Gait: Gait training and Standing activities to improve: base of support, weight shift, weight bearing    -Endurance: Utilize Supervised activities to increase level of endurance to allow for safe functional mobility including transfers and gait   -Stairs: Stair training with instruction on proper technique and hand placement on rail    PT long term treatment goals are located in below grid    Patient and or family understand(s) diagnosis, prognosis, and plan of care. Frequency of treatments: Patient will be seen  daily.          Prior Level of Function: Patient ambulated with cane   Rehab Potential: good  for baseline    Past medical history:   Past Medical History:   Diagnosis Date    COPD (chronic obstructive pulmonary disease) ([facility]     H/O cardiovascular stress test 11/17/2022    Lexiscan    Hyperlipidemia     Hypertension     Osteoarthritis     Type II or unspecified type diabetes mellitus without mention of complication, not stated as uncontrolled      Past Surgical History:   Procedure Laterality Date    APPENDECTOMY  1953 [address] 47  2003    TUMOR REMOVAL  1954    under lt arm       SUBJECTIVE:    Precautions: Up as tolerated, falls and O2 ,  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    Social history: Patient lives with spouse in a two story home resides first  with 3 steps  to enter with Rail  Tub shower     Equipment owned: Cane, [address], Bedside commode, and Shower chair,      [facility]   How much difficulty turning over in bed?: A Little  How much difficulty sitting down on / standing up from a chair with arms?: A Little  How much difficulty moving from lying on back to sitting on side of bed?: A Little  How much help from another person moving to and from a bed to a chair?: A Little  How much help from another person needed to walk in hospital room?: A Little  How much help from another person for climbing 3-5 steps with a railing?: A Little  AM-PAC Inpatient Mobility Raw Score : 18  AM-PAC Inpatient T-Scale Score : 43.63  Mobility Inpatient CMS 0-100% Score: 46.58  Mobility Inpatient CMS G-Code Modifier : CK    Nursing cleared patient for PT treatment. 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 OBJECTIVE;   Initial Evaluation  Date: 11/16/2022 Treatment Date:  11/22/2022       Short Term/ Long Term   Goals   Was pt agreeable to Eval/treatment? Yes yes To be met in 4 days   Pain level   0/10   No number given  Left ankle pain    Bed Mobility    Rolling: Not assessed patient in chair    Supine to sit: Not assessed patient in chair    Sit to supine: Not assessed patient in chair    Scooting: Not assessed patient in chair   Rolling: Not assessed patient in chair   Supine to sit: Not assessed patient in chair   Sit to supine: Not assessed patient in chair   Scooting: Not assessed patient in chair    Rolling: Independent    Supine to sit:  Independent    Sit to supine: Independent    Scooting: Independent     Transfers Sit to stand: Minimal assist of 1 cues for hand placement  Sit to stand: Minimal assist of 1 Cues for hand placement and safety     Sit to stand: Independent    Ambulation     75, 25 feet using  wheeled walker with Minimal assist of 1   for walker control, balance, upright, and O2 line management  and cues for upright posture and safety 2 x 115 feet; from the restroom and back to the chair 20 feet using  wheeled walker with Minimal assist of 1   cues for upright posture, safety, pacing, and pursed lip breathing     150 feet using  wheeled walker with Modified Independent    Stair negotiation: ascended and descended   Not assessed     3 steps with hr and supervision    ROM Within functional limits    Increase range of motion 10% of affected joints    Strength BUE:  refer to OT eval  RLE:  4-/5  LLE:  4-/5  Increase strength in affected mm groups by 1/3 grade   Balance Sitting EOB:  not assessed   Dynamic Standing:  fair wheeled walker   Sitting EOB/chair: good   Dynamic Standing: fair wheeled walker   Sitting EOB:  good   Dynamic Standing: good      Patient is Alert & Oriented x person, place, time, and situation and follows directions    Sensation:  Patient  denies numbness/tingling   Edema:  no   Endurance: fair      Vitals:  2 liters nasal cannula   Blood Pressure at rest  Blood Pressure during session    Heart Rate at rest  Heart Rate during session    SPO2 at rest 95%  SPO2 during session 95-96%      Patient education  Patient educated on role of Physical Therapy, risks of immobility, safety and plan of care,  importance of mobility while in hospital , purse lip breathing, ankle pumps, quad set and glut set for edema control, blood clot prevention, importance and purpose of adaptive device and adjusted to proper height for the patient. , and safety      Patient response to education:   Pt verbalized understanding Pt demonstrated skill Pt requires further education in this area   Yes Partial Yes      Treatment:  Patient practiced and was instructed/facilitated in the following treatment: Patient  sitting in a chair at start of tx session. Assisted pt with donning her ankle braces, socks, and shoes. Pt stood, ambulated in the hallway, needed a seated rest period in the waiting room, instructed on pursed lip breathing and pacing, she stood, ambulated back to the restroom in her room. Pt ambulated from the restroom and back to the chair. Pt performed seated exercises. Therapeutic Exercises:  ankle pumps, heel raises, hip abduction/adduction, long arc quad, and seated marching,  x 20 reps. AROM     At end of session, patient in chair with  .  call light and phone within reach,  all lines and tubes intact, nursing notified. Patient would benefit from continued skilled Physical Therapy to improve functional independence and quality of life. Patient's/ family goals   home    Time in 09:01  Time out 09:39    Total Treatment Time  38 minutes        CPT codes:    Therapeutic activities (61517)   28 minutes  2 unit(s)  Therapeutic exercises (11148)   10 minutes  1 unit(s)    Tyler Wang  [facility]  LIC # [id number]

## 2022-11-22 NOTE — PROGRESS NOTES
[facility] for transport to [facility] ([facility]-) cath lab on 11/23/22. Pick-up time arranged for 6am per request of [facility] cath lab.

## 2022-11-22 NOTE — PLAN OF CARE
Problem: Pain  Goal: Verbalizes/displays adequate comfort level or baseline comfort level  Outcome: Progressing     Problem: ABCDS Injury Assessment  Goal: Absence of physical injury  Outcome: Progressing     Problem: Chronic Conditions and Co-morbidities  Goal: Patient's chronic conditions and co-morbidity symptoms are monitored and maintained or improved  Outcome: Progressing     Problem: Respiratory - Adult  Goal: Achieves optimal ventilation and oxygenation  Outcome: Progressing

## 2022-11-23 ENCOUNTER — HOSPITAL ENCOUNTER (INPATIENT)
Dept: CARDIAC CATH/INVASIVE PROCEDURES | Age: 87
LOS: 1 days | Discharge: ANOTHER HOSPITAL | DRG: 286 | End: 2022-11-24
Attending: INTERNAL MEDICINE | Admitting: FAMILY MEDICINE
Payer: MEDICARE

## 2022-11-23 VITALS
DIASTOLIC BLOOD PRESSURE: 51 MMHG | OXYGEN SATURATION: 93 % | RESPIRATION RATE: 16 BRPM | WEIGHT: 160 LBS | TEMPERATURE: 98 F | BODY MASS INDEX: 25.71 KG/M2 | HEART RATE: 89 BPM | SYSTOLIC BLOOD PRESSURE: 105 MMHG | HEIGHT: 66 IN

## 2022-11-23 PROBLEM — I50.21 ACUTE SYSTOLIC HF (HEART FAILURE) (HCC): Status: ACTIVE | Noted: 2022-11-23

## 2022-11-23 PROBLEM — Z03.89 CORONARY ARTERY DISEASE (CAD) EXCLUDED: Status: ACTIVE | Noted: 2022-11-23

## 2022-11-23 PROBLEM — I25.10 CAD IN NATIVE ARTERY: Status: ACTIVE | Noted: 2022-11-23

## 2022-11-23 LAB
ABO/RH: NORMAL
ADENOVIRUS BY PCR: NOT DETECTED
ANTIBODY SCREEN: NORMAL
BORDETELLA PARAPERTUSSIS BY PCR: NOT DETECTED
BORDETELLA PERTUSSIS BY PCR: NOT DETECTED
CHLAMYDOPHILIA PNEUMONIAE BY PCR: NOT DETECTED
CORONAVIRUS 229E BY PCR: NOT DETECTED
CORONAVIRUS HKU1 BY PCR: NOT DETECTED
CORONAVIRUS NL63 BY PCR: NOT DETECTED
CORONAVIRUS OC43 BY PCR: NOT DETECTED
EKG ATRIAL RATE: 80 BPM
EKG P AXIS: 87 DEGREES
EKG P-R INTERVAL: 182 MS
EKG Q-T INTERVAL: 402 MS
EKG QRS DURATION: 104 MS
EKG QTC CALCULATION (BAZETT): 463 MS
EKG R AXIS: 66 DEGREES
EKG T AXIS: 54 DEGREES
EKG VENTRICULAR RATE: 80 BPM
HUMAN METAPNEUMOVIRUS BY PCR: NOT DETECTED
HUMAN RHINOVIRUS/ENTEROVIRUS BY PCR: NOT DETECTED
INFLUENZA A BY PCR: NOT DETECTED
INFLUENZA B BY PCR: NOT DETECTED
METER GLUCOSE: 112 MG/DL (ref 74–99)
MYCOPLASMA PNEUMONIAE BY PCR: NOT DETECTED
PARAINFLUENZA VIRUS 1 BY PCR: NOT DETECTED
PARAINFLUENZA VIRUS 2 BY PCR: NOT DETECTED
PARAINFLUENZA VIRUS 3 BY PCR: NOT DETECTED
PARAINFLUENZA VIRUS 4 BY PCR: NOT DETECTED
RESPIRATORY SYNCYTIAL VIRUS BY PCR: NOT DETECTED
SARS-COV-2, PCR: NOT DETECTED

## 2022-11-23 PROCEDURE — 93458 L HRT ARTERY/VENTRICLE ANGIO: CPT | Performed by: INTERNAL MEDICINE

## 2022-11-23 PROCEDURE — 99222 1ST HOSP IP/OBS MODERATE 55: CPT | Performed by: THORACIC SURGERY (CARDIOTHORACIC VASCULAR SURGERY)

## 2022-11-23 PROCEDURE — 86850 RBC ANTIBODY SCREEN: CPT

## 2022-11-23 PROCEDURE — 0202U NFCT DS 22 TRGT SARS-COV-2: CPT

## 2022-11-23 PROCEDURE — 6360000002 HC RX W HCPCS

## 2022-11-23 PROCEDURE — 4A023N7 MEASUREMENT OF CARDIAC SAMPLING AND PRESSURE, LEFT HEART, PERCUTANEOUS APPROACH: ICD-10-PCS | Performed by: INTERNAL MEDICINE

## 2022-11-23 PROCEDURE — 86900 BLOOD TYPING SEROLOGIC ABO: CPT

## 2022-11-23 PROCEDURE — 36415 COLL VENOUS BLD VENIPUNCTURE: CPT

## 2022-11-23 PROCEDURE — 6370000000 HC RX 637 (ALT 250 FOR IP): Performed by: INTERNAL MEDICINE

## 2022-11-23 PROCEDURE — B2111ZZ FLUOROSCOPY OF MULTIPLE CORONARY ARTERIES USING LOW OSMOLAR CONTRAST: ICD-10-PCS | Performed by: INTERNAL MEDICINE

## 2022-11-23 PROCEDURE — 93010 ELECTROCARDIOGRAM REPORT: CPT | Performed by: INTERNAL MEDICINE

## 2022-11-23 PROCEDURE — 93005 ELECTROCARDIOGRAM TRACING: CPT | Performed by: INTERNAL MEDICINE

## 2022-11-23 PROCEDURE — 6360000002 HC RX W HCPCS: Performed by: INTERNAL MEDICINE

## 2022-11-23 PROCEDURE — 82962 GLUCOSE BLOOD TEST: CPT

## 2022-11-23 PROCEDURE — C1894 INTRO/SHEATH, NON-LASER: HCPCS

## 2022-11-23 PROCEDURE — B2151ZZ FLUOROSCOPY OF LEFT HEART USING LOW OSMOLAR CONTRAST: ICD-10-PCS | Performed by: INTERNAL MEDICINE

## 2022-11-23 PROCEDURE — 2709999900 HC NON-CHARGEABLE SUPPLY

## 2022-11-23 PROCEDURE — 2500000003 HC RX 250 WO HCPCS

## 2022-11-23 PROCEDURE — 2140000000 HC CCU INTERMEDIATE R&B

## 2022-11-23 PROCEDURE — C1769 GUIDE WIRE: HCPCS

## 2022-11-23 PROCEDURE — 2580000003 HC RX 258: Performed by: INTERNAL MEDICINE

## 2022-11-23 PROCEDURE — 86901 BLOOD TYPING SEROLOGIC RH(D): CPT

## 2022-11-23 PROCEDURE — 93458 L HRT ARTERY/VENTRICLE ANGIO: CPT

## 2022-11-23 PROCEDURE — 99223 1ST HOSP IP/OBS HIGH 75: CPT | Performed by: INTERNAL MEDICINE

## 2022-11-23 RX ORDER — LATANOPROST 50 UG/ML
1 SOLUTION/ DROPS OPHTHALMIC NIGHTLY
Status: DISCONTINUED | OUTPATIENT
Start: 2022-11-23 | End: 2022-11-24 | Stop reason: HOSPADM

## 2022-11-23 RX ORDER — ONDANSETRON 2 MG/ML
4 INJECTION INTRAMUSCULAR; INTRAVENOUS EVERY 6 HOURS PRN
Status: DISCONTINUED | OUTPATIENT
Start: 2022-11-23 | End: 2022-11-24 | Stop reason: HOSPADM

## 2022-11-23 RX ORDER — FERROUS SULFATE 325(65) MG
325 TABLET ORAL 2 TIMES DAILY WITH MEALS
Status: DISCONTINUED | OUTPATIENT
Start: 2022-11-23 | End: 2022-11-24 | Stop reason: HOSPADM

## 2022-11-23 RX ORDER — BRIMONIDINE TARTRATE 2 MG/ML
1 SOLUTION/ DROPS OPHTHALMIC 2 TIMES DAILY
Status: DISCONTINUED | OUTPATIENT
Start: 2022-11-23 | End: 2022-11-24 | Stop reason: HOSPADM

## 2022-11-23 RX ORDER — ISOSORBIDE MONONITRATE 30 MG/1
30 TABLET, EXTENDED RELEASE ORAL DAILY
Status: DISCONTINUED | OUTPATIENT
Start: 2022-11-23 | End: 2022-11-24 | Stop reason: HOSPADM

## 2022-11-23 RX ORDER — LANOLIN ALCOHOL/MO/W.PET/CERES
1000 CREAM (GRAM) TOPICAL DAILY
Status: DISCONTINUED | OUTPATIENT
Start: 2022-11-23 | End: 2022-11-24 | Stop reason: HOSPADM

## 2022-11-23 RX ORDER — LOSARTAN POTASSIUM 50 MG/1
25 TABLET ORAL DAILY
Status: DISCONTINUED | OUTPATIENT
Start: 2022-11-23 | End: 2022-11-24 | Stop reason: HOSPADM

## 2022-11-23 RX ORDER — SODIUM CHLORIDE 9 MG/ML
INJECTION, SOLUTION INTRAVENOUS PRN
Status: DISCONTINUED | OUTPATIENT
Start: 2022-11-23 | End: 2022-11-24

## 2022-11-23 RX ORDER — METOPROLOL SUCCINATE 25 MG/1
25 TABLET, EXTENDED RELEASE ORAL DAILY
Status: DISCONTINUED | OUTPATIENT
Start: 2022-11-23 | End: 2022-11-24 | Stop reason: HOSPADM

## 2022-11-23 RX ORDER — FUROSEMIDE 10 MG/ML
20 INJECTION INTRAMUSCULAR; INTRAVENOUS ONCE
Status: COMPLETED | OUTPATIENT
Start: 2022-11-23 | End: 2022-11-23

## 2022-11-23 RX ORDER — VITAMIN B COMPLEX
2000 TABLET ORAL DAILY
Status: DISCONTINUED | OUTPATIENT
Start: 2022-11-23 | End: 2022-11-24 | Stop reason: HOSPADM

## 2022-11-23 RX ORDER — HYDROCHLOROTHIAZIDE 25 MG/1
25 TABLET ORAL DAILY
Status: ON HOLD | COMMUNITY
End: 2022-11-24 | Stop reason: HOSPADM

## 2022-11-23 RX ORDER — SODIUM CHLORIDE 0.9 % (FLUSH) 0.9 %
5-40 SYRINGE (ML) INJECTION EVERY 12 HOURS SCHEDULED
Status: DISCONTINUED | OUTPATIENT
Start: 2022-11-23 | End: 2022-11-24 | Stop reason: HOSPADM

## 2022-11-23 RX ORDER — SPIRONOLACTONE 25 MG/1
25 TABLET ORAL DAILY
Status: DISCONTINUED | OUTPATIENT
Start: 2022-11-23 | End: 2022-11-24 | Stop reason: HOSPADM

## 2022-11-23 RX ORDER — TIMOLOL MALEATE 5 MG/ML
1 SOLUTION/ DROPS OPHTHALMIC 2 TIMES DAILY
Status: DISCONTINUED | OUTPATIENT
Start: 2022-11-23 | End: 2022-11-24 | Stop reason: HOSPADM

## 2022-11-23 RX ORDER — ATORVASTATIN CALCIUM 40 MG/1
40 TABLET, FILM COATED ORAL DAILY
Status: DISCONTINUED | OUTPATIENT
Start: 2022-11-23 | End: 2022-11-24 | Stop reason: HOSPADM

## 2022-11-23 RX ORDER — IPRATROPIUM BROMIDE AND ALBUTEROL SULFATE 2.5; .5 MG/3ML; MG/3ML
1 SOLUTION RESPIRATORY (INHALATION) EVERY 6 HOURS PRN
Status: DISCONTINUED | OUTPATIENT
Start: 2022-11-23 | End: 2022-11-24 | Stop reason: HOSPADM

## 2022-11-23 RX ORDER — SODIUM CHLORIDE 0.9 % (FLUSH) 0.9 %
5-40 SYRINGE (ML) INJECTION PRN
Status: DISCONTINUED | OUTPATIENT
Start: 2022-11-23 | End: 2022-11-24 | Stop reason: HOSPADM

## 2022-11-23 RX ORDER — MULTIVIT WITH MINERALS/LUTEIN
250 TABLET ORAL DAILY
Status: DISCONTINUED | OUTPATIENT
Start: 2022-11-23 | End: 2022-11-24 | Stop reason: HOSPADM

## 2022-11-23 RX ORDER — FUROSEMIDE 40 MG/1
20 TABLET ORAL DAILY
Status: DISCONTINUED | OUTPATIENT
Start: 2022-11-23 | End: 2022-11-24 | Stop reason: HOSPADM

## 2022-11-23 RX ORDER — NITROGLYCERIN 0.4 MG/1
0.4 TABLET SUBLINGUAL EVERY 5 MIN PRN
Status: DISCONTINUED | OUTPATIENT
Start: 2022-11-23 | End: 2022-11-24 | Stop reason: HOSPADM

## 2022-11-23 RX ORDER — ASPIRIN 325 MG
325 TABLET ORAL ONCE
Status: COMPLETED | OUTPATIENT
Start: 2022-11-23 | End: 2022-11-23

## 2022-11-23 RX ORDER — LANOLIN ALCOHOL/MO/W.PET/CERES
200 CREAM (GRAM) TOPICAL DAILY
Status: DISCONTINUED | OUTPATIENT
Start: 2022-11-23 | End: 2022-11-24 | Stop reason: HOSPADM

## 2022-11-23 RX ORDER — PREGABALIN 25 MG/1
25 CAPSULE ORAL 2 TIMES DAILY
Status: DISCONTINUED | OUTPATIENT
Start: 2022-11-23 | End: 2022-11-24 | Stop reason: HOSPADM

## 2022-11-23 RX ORDER — ACETAMINOPHEN 325 MG/1
650 TABLET ORAL EVERY 4 HOURS PRN
Status: DISCONTINUED | OUTPATIENT
Start: 2022-11-23 | End: 2022-11-24

## 2022-11-23 RX ORDER — ASPIRIN 81 MG/1
81 TABLET ORAL DAILY
Status: DISCONTINUED | OUTPATIENT
Start: 2022-11-24 | End: 2022-11-24 | Stop reason: HOSPADM

## 2022-11-23 RX ADMIN — PREGABALIN 25 MG: 25 CAPSULE ORAL at 13:14

## 2022-11-23 RX ADMIN — TIMOLOL MALEATE 1 DROP: 5 SOLUTION OPHTHALMIC at 21:15

## 2022-11-23 RX ADMIN — Medication 200 MG: at 13:15

## 2022-11-23 RX ADMIN — CYANOCOBALAMIN TAB 1000 MCG 1000 MCG: 1000 TAB at 13:15

## 2022-11-23 RX ADMIN — Medication 2000 UNITS: at 13:14

## 2022-11-23 RX ADMIN — ASPIRIN 325 MG ORAL TABLET 325 MG: 325 PILL ORAL at 06:55

## 2022-11-23 RX ADMIN — FUROSEMIDE 20 MG: 10 INJECTION, SOLUTION INTRAMUSCULAR; INTRAVENOUS at 09:03

## 2022-11-23 RX ADMIN — ATORVASTATIN CALCIUM 40 MG: 40 TABLET, FILM COATED ORAL at 13:15

## 2022-11-23 RX ADMIN — Medication 250 MG: at 13:15

## 2022-11-23 RX ADMIN — LATANOPROST 1 DROP: 50 SOLUTION OPHTHALMIC at 20:34

## 2022-11-23 RX ADMIN — SODIUM CHLORIDE, PRESERVATIVE FREE 10 ML: 5 INJECTION INTRAVENOUS at 13:15

## 2022-11-23 RX ADMIN — SODIUM CHLORIDE, PRESERVATIVE FREE 10 ML: 5 INJECTION INTRAVENOUS at 20:35

## 2022-11-23 RX ADMIN — BRIMONIDINE TARTRATE 1 DROP: 2 SOLUTION/ DROPS OPHTHALMIC at 20:35

## 2022-11-23 RX ADMIN — BRIMONIDINE TARTRATE 1 DROP: 2 SOLUTION/ DROPS OPHTHALMIC at 13:14

## 2022-11-23 RX ADMIN — PREGABALIN 25 MG: 25 CAPSULE ORAL at 20:35

## 2022-11-23 RX ADMIN — SODIUM CHLORIDE, PRESERVATIVE FREE 10 ML: 5 INJECTION INTRAVENOUS at 20:38

## 2022-11-23 RX ADMIN — CALCIUM CARBONATE-VITAMIN D TAB 500 MG-200 UNIT 1 TABLET: 500-200 TAB at 13:14

## 2022-11-23 RX ADMIN — FERROUS SULFATE TAB 325 MG (65 MG ELEMENTAL FE) 325 MG: 325 (65 FE) TAB at 18:20

## 2022-11-23 ASSESSMENT — PAIN SCALES - GENERAL
PAINLEVEL_OUTOF10: 0

## 2022-11-23 NOTE — PLAN OF CARE
Problem: Discharge Planning  Goal: Discharge to home or other facility with appropriate resources  11/23/2022 0434 by Allen Carty RN  Outcome: Progressing     Problem: Pain  Goal: Verbalizes/displays adequate comfort level or baseline comfort level  11/23/2022 0434 by Allen Carty RN  Outcome: Progressing     Problem: ABCDS Injury Assessment  Goal: Absence of physical injury  11/23/2022 0434 by Allen Carty RN  Outcome: Progressing     Problem: Cardiovascular - Adult  Goal: Absence of cardiac dysrhythmias or at baseline  11/23/2022 0434 by Allen Carty RN  Outcome: Progressing     Problem: Skin/Tissue Integrity - Adult  Goal: Skin integrity remains intact  11/23/2022 0434 by Allen Carty RN  Outcome: Progressing     Problem: Musculoskeletal - Adult  Goal: Return mobility to safest level of function  11/23/2022 0434 by Allen Carty RN  Outcome: Progressing

## 2022-11-23 NOTE — PROCEDURES
[address]                  [address] [facility]  [facility]                            CARDIAC CATHETERIZATION    PATIENT NAME: Velma Quevedo                     :        1935  MED REC NO:   [id number]                            ROOM:       1194  ACCOUNT NO:   [de-identified]                           ADMIT DATE: 2022  PROVIDER:     Samantha Tabares MD    DATE OF PROCEDURE:  2022    PROCEDURE:  Left heart catheterization, selective coronary angiography,  right subclavian/innominate artery angiography. The procedure was done through right radial approach using ultrasound  guidance. The patient received intravenous Versed and intravenous fentanyl for  sedation. INDICATION:  Newly diagnosed cardiomyopathy with severe LV systolic  dysfunction and acute-onset heart failure, reduced EF. AUC HF - 7. PRESSURES:  Aorta 136/62 with a mean of 88. Left ventricular systolic pressure 113. Left ventricular end-diastolic  pressure 34. There was no significant gradient across the aortic valve. The left subclavian/innominate artery appeared very heavily calcified on  fluoroscopy. It was difficult to advance the J wire through the site of  heavy calcification. A Wholey wire was used. Initially, the 5-Kazakh  JL-3.5 diagnostic catheter was successfully delivered to the aortic root  and angiography of the left coronary system was performed. Attempts to  advance a 5-Kazakh JR-4 diagnostic catheter and a 4-Kazakh JR-4  diagnostic catheter to the aortic root proved difficult. The 6-Kazakh  sheath was exchanged to a 45 cm destination sheath through which the  diagnostic JR-4 5-Kazakh catheter was successfully delivered to the  aortic root and angiography of the right coronary artery was performed. Angiography of the right subclavian/innominate artery revealed severe  heavily calcified up to 99% stenosis.     CORONARY ANGIOGRAPHY:  Left main: The left main artery is severely calcified and there was 80%  eccentric ostial left main stenosis. LAD:  The left anterior descending artery appeared heavily calcified in  its proximal segment. It was subtotally occluded in its proximal and  middle segments. The distal LAD and what appeared to be a large  diagonal branch (both of which were underfilled) opacified late upon the  injection of the left main artery. Both the distal LAD and the diagonal  branch after the proximal and mid vessel subtotal occlusions did not  appear to have any discrete lesions. LCX:  The left circumflex had luminal irregularities in its proximal  segment, but without any significant angiographic luminal narrowing. RCA:  The right coronary artery is a very large dominant vessel. There  was 99% calcified mid RCA stenosis. There was PAMELA-1 flow in the  superior subdivision of the posterolateral branch. The posterior  descending artery branch and the posterolateral branch of the right  coronary artery also received left-to-right collaterals. An acute  marginal branch of the right coronary artery also provided faint  collaterals to the LAD. The right radial arterial sheath was removed at the end of the  procedure, and a TR band was applied with adequate hemostasis and with  preservation of pulse. The patient tolerated the procedure well and left the cardiac  catheterization laboratory in stable condition. ESTIMATED BLOOD LOSS:  10 mL. CONTRAST USED:  60 mL. CONCLUSIONS:  1. Severe calcific multivessel coronary arteries as described above. 2.  Markedly elevated left ventricular end-diastolic pressure. 3.  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         Jailene Vega MD    D: 11/23/2022 10:40:51       T: 11/23/2022 10:43:47     [email or web address]  Job#: [id number]     Doc#: [id number]    CC:

## 2022-11-23 NOTE — CONSULTS
CTS Consult    Patient name: Morales Gardner    Reason for consult:  MVCAD     Primary Care Physician: Gisela Minor MD    Date of service: 11/23/2022    Chief Complaint:  SOB    HPI:  80 yo female with a past medical history of copd, former smoker, HTN, HLD, DM-II, and urinary retention presents with shortness of breath. Patient states that she called her daughter as she could not breath. EMS were called. She denies wheezing, coughing, or increased sputum production, however does states her lower extremities have been swollen as well. 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 Allergies: Allergies   Allergen Reactions    Pcn [Penicillins] Hives    Sulfa Antibiotics Hives       Home medications:    Current Facility-Administered Medications   Medication Dose Route Frequency Provider Last Rate Last Admin    [START ON 11/24/2022] aspirin EC tablet 81 mg  81 mg Oral Daily Rivka Coates MD        atorvastatin (LIPITOR) tablet 40 mg  40 mg Oral Daily Rivka Coates MD        brimonidine (ALPHAGAN) 0.2 % ophthalmic solution 1 drop  1 drop Left Eye BID Rivka Coates MD        oyster shell calcium w/D 500-200 MG-UNIT tablet 1 tablet  1 tablet Oral Daily Rivka Coates MD        vitamin C tablet 250 mg  250 mg Oral Daily Rivka Coates MD        Vitamin D (CHOLECALCIFEROL) tablet 2,000 Units  2,000 Units Oral Daily Rivka Coates MD        ferrous sulfate (IRON 325) tablet 325 mg  325 mg Oral BID WC Rivka Coates MD        furosemide (LASIX) tablet 20 mg  20 mg Oral Daily Rivka Coates MD        isosorbide mononitrate (IMDUR) extended release tablet 30 mg  30 mg Oral Daily Susu Edmonds. YONIS Segura        latanoprost (XALATAN) 0.005 % ophthalmic solution 1 drop  1 drop Both Eyes Nightly Rivka Coates MD        losartan (COZAAR) tablet 25 mg  25 mg Oral Daily YONIS Koo        magnesium oxide (MAG-OX) tablet 200 mg  200 mg Oral Daily Rivka Coates MD        metoprolol succinate (TOPROL XL) extended release tablet 25 mg  25 mg Oral Daily Rejeana Laundry. YOINS Coyne        [Held by provider] metFORMIN (GLUCOPHAGE) tablet 500 mg  500 mg Oral BID  Cheyenne Elizalde MD        pregabalin (LYRICA) capsule 25 mg  25 mg Oral BID Cheyenne Elizalde MD        timolol (BETIMOL) 0.5 % ophthalmic solution 1 drop  1 drop Both Eyes Daily Cheyenne Elizalde MD        vitamin B-12 (CYANOCOBALAMIN) tablet 1,000 mcg  1,000 mcg Oral Daily Cheyenne Elizalde MD        sodium chloride flush 0.9 % injection 5-40 mL  5-40 mL IntraVENous 2 times per day Cheyenne Elizalde MD        sodium chloride flush 0.9 % injection 5-40 mL  5-40 mL IntraVENous PRN Cheyenne Elizalde MD        0.9 % sodium chloride infusion   IntraVENous PRN Cheyenne Elizalde MD        acetaminophen (TYLENOL) tablet 650 mg  650 mg Oral Q4H PRN Cheyenne Elizalde MD        ondansetron [facility]) injection 4 mg  4 mg IntraVENous Q6H PRN Cheyenne Elizalde MD        nitroGLYCERIN (NITROSTAT) SL tablet 0.4 mg  0.4 mg SubLINGual Q5 Min PRN Cheyenne Elizalde MD        spironolactone (ALDACTONE) tablet 25 mg  25 mg Oral Daily Rejeana Laundry.  YONIS Coyne        ipratropium-albuterol (DUONEB) nebulizer solution 1 ampule  1 ampule Inhalation Q6H PRN Cheyenne Elizalde MD           Past Medical History:  Past Medical History:   Diagnosis Date    CAD (coronary artery disease)     COPD (chronic obstructive pulmonary disease) ([facility] [address]     H/O cardiovascular stress test 11/17/2022    Lexiscan    Hyperlipidemia     Hypertension     Osteoarthritis     Type II or unspecified type diabetes mellitus without mention of complication, not stated as uncontrolled        Past Surgical History:  Past Surgical History:   Procedure Laterality Date    APPENDECTOMY  01/01/1953    CARDIAC CATHETERIZATION  11/23/2022    Dr Carlos Eduardo Soliz ,1971    COLONOSCOPY  01/01/2003    TUMOR REMOVAL  01/01/1954    under lt arm       Social History:  Social History     Socioeconomic History    Marital status:      Spouse name: Not on file    Number of children: Not on file    Years of education: Not on file    Highest education level: Not on file   Occupational History    Not on file   Tobacco Use    Smoking status: Former     Packs/day: 1.00     Years: 9.00     Pack years: 9.00     Types: Cigarettes     Quit date: 2000     Years since quittin.1    Smokeless tobacco: Never   Vaping Use    Vaping Use: Never used   Substance and Sexual Activity    Alcohol use: No     Alcohol/week: 0.0 standard drinks    Drug use: No    Sexual activity: Never     Partners: Male   Other Topics Concern    Not on file   Social History Narrative    Not on file     Social Determinants of Health     Financial Resource Strain: Low Risk     Difficulty of Paying Living Expenses: Not hard at all   Food Insecurity: No Food Insecurity    Worried About Running Out of Food in the Last Year: Never true    Ran Out of Food in the Last Year: Never true   Transportation Needs: Not on file   Physical Activity: Not on file   Stress: Not on file   Social Connections: Not on file   Intimate Partner Violence: Not on file   Housing Stability: Not on file       Family History:  Family History   Problem Relation Age of Onset    Heart Disease Mother     High Blood Pressure Mother     Stroke Mother     Diabetes Mother     Heart Disease Father     High Blood Pressure Father        Review of Systems:  Constitutional: Denies fevers, chills, or weight loss. HEENT: Denies visual changes or hearing loss. Heart: As per HPI. Lungs: Denies shortness of breath, cough, or wheezing. Gastrointestinal: Denies nausea, vomiting, constipation, or diarrhea. Genitourinary: Denies dysuria or hematuria. Psychiatric: Patient denies anxiety or depression. Neurologic: Patient denies weakness of the extremities, dizziness, or headaches. All other ROS checked and found to be negative.     Labs:  Recent Labs     22  0750 22  0800   WBC 5.3 5.3   HGB 10.8* 10.7*   HCT 34.5 33.8*    172      Recent Labs     11/21/22  0750 11/22/22  0800   BUN 24* 24*   CREATININE 0.8 0.9       Objective:  Vitals BP (!) 94/49   Pulse 80   Temp 97.2 °F (36.2 °C) (Temporal)   Resp 20   Ht 5' 6\" (1.676 m)   Wt 160 lb (72.6 kg)   LMP  (LMP Unknown)   SpO2 99%   BMI 25.82 kg/m²   General Appearance: Pleasant 81y.o. year old female who appears stated age. Communicates well, no acute distress. HEENT: Head is normocephalic, atraumatic. EOMs intact, PERRL. Trachea midline. Lungs: Normal respiratory rate and normal effort. She is not in respiratory distress. Breath sounds clear to auscultation. No wheezes. Heart: Normal rate. Regular rhythm. S1 normal and S2 normal.   Chest: Symmetric chest wall expansion. Extremities: Normal range of motion. Neurological: Patient is alert and oriented to person, place and time. Patient has normal reflexes. Skin: Warm and dry. Abdomen: Abdomen is soft and non-distended. Bowel sounds are normal. There is no abdominal tenderness tenderness. There is no guarding. There is no mass. Pulses: Distal pulses are intact. Skin: Warm and dry without lesions. TTE procedure:Echo Complete W/Doppler & Color Flow. Procedure Date  Date: 11/15/2022 Start: 03:05 PM     Study Location: Echo Lab  Technical Quality: Poor visualization due to poor acoustical window. Indications:LV function. Patient Status: Routine     Height: 66 inches Weight: 151 pounds BSA: 1.77 m^2 BMI: 24.37 kg/m^2     BP: 81/52 mmHg      Findings      Left Ventricle   Ejection fraction is visually estimated at 20%. Overall ejection fraction   severely decreased. Left ventricle is moderately enlarged. Normal left   ventricular wall thickness. Indeterminate diastolic function. Right Ventricle   Normal right ventricle structure and function. Left Atrium   Left atrial volume index of 68 ml per meters squared BSA. Right Atrium   Normal right atrium.       Mitral Valve Mild mitral annular calcification. No evidence of mitral valve stenosis. Mild to moderate mitral regurgitation is present. Tricuspid Valve   The tricuspid valve appears structurally normal.   Mild tricuspid regurgitation. RVSP is 37 mmHg. Aortic Valve   Individual aortic valve leaflets are not clearly visualized. No   hemodynamically significant aortic stenosis is present. Physiologic and/or   trace aortic regurgitation is noted. Pulmonic Valve   Pulmonic valve is structurally normal. Physiologic and/or trace pulmonic   regurgitation present. Pericardial Effusion   No evidence for hemodynamically significant pericardial effusion. Aorta   Normal aortic root and ascending aorta. Miscellaneous   The inferior vena cava diameter is normal with normal respiratory   variation. Conclusions      Summary   Ejection fraction is visually estimated at 20%. Overall ejection fraction severely decreased. Left ventricle is moderately enlarged. Normal right ventricle structure and function. Left atrial volume index of 68 ml per meters squared BSA. Physiologic and/or trace aortic regurgitation is noted. Mild to moderate mitral regurgitation is present. Mild tricuspid regurgitation. No evidence for hemodynamically significant pericardial effusion. CORONARY ANGIOGRAPHY:  Left main:  The left main artery is severely calcified and there was 80%  eccentric ostial left main stenosis. LAD:  The left anterior descending artery appeared heavily calcified in  its proximal segment. It was subtotally occluded in its proximal and  middle segments. The distal LAD and what appeared to be a large  diagonal branch (both of which were underfilled) opacified late upon the  injection of the left main artery. Both the distal LAD and the diagonal  branch after the proximal and mid vessel subtotal occlusions did not  appear to have any discrete lesions.      LCX:  The left circumflex had luminal irregularities in its proximal  segment, but without any significant angiographic luminal narrowing. RCA:  The right coronary artery is a very large dominant vessel. There  was 99% calcified mid RCA stenosis. There was PAMELA-1 flow in the  superior subdivision of the posterolateral branch. The posterior  descending artery branch and the posterolateral branch of the right  coronary artery also received left-to-right collaterals. An acute  marginal branch of the right coronary artery also provided faint  collaterals to the LAD. The right radial arterial sheath was removed at the end of the  procedure, and a TR band was applied with adequate hemostasis and with  preservation of pulse. The patient tolerated the procedure well and left the cardiac  catheterization laboratory in stable condition. ESTIMATED BLOOD LOSS:  10 mL. CONTRAST USED:  60 mL. CONCLUSIONS:  1. Severe calcific multivessel coronary arteries as described above. 2.  Markedly elevated left ventricular end-diastolic pressure. 3.  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             Assessment/Plan  82 yo female admitted with SOB found to have severe MVCAD, ICM, EF 20%   Surgery was discussed with the patient and her family at her bedside at length   She is not a suitable candidate for CABG secondary to her advanced age, frailty, poor LV function, and almost completely calcified ascending aorta  This was explained and discussed with the patient and her family as well    Electronically signed by Helder Valle DO on 11/23/2022 at 12:44 PM

## 2022-11-23 NOTE — PLAN OF CARE
Problem: Chronic Conditions and Co-morbidities  Goal: Patient's chronic conditions and co-morbidity symptoms are monitored and maintained or improved  11/23/2022 1807 by Tarsha Calderon RN  Outcome: Progressing  11/23/2022 1122 by Tarsha Calderon RN  Outcome: Progressing  Flowsheets (Taken 11/23/2022 1036)  Care Plan - Patient's Chronic Conditions and Co-Morbidity Symptoms are Monitored and Maintained or Improved:   Collaborate with multidisciplinary team to address chronic and comorbid conditions and prevent exacerbation or deterioration   Monitor and assess patient's chronic conditions and comorbid symptoms for stability, deterioration, or improvement     Problem: Discharge Planning  Goal: Discharge to home or other facility with appropriate resources  11/23/2022 1807 by Tarsha Calderon RN  Outcome: Progressing  11/23/2022 1122 by Tarsha Calderon RN  Outcome: Progressing  Flowsheets (Taken 11/23/2022 1036)  Discharge to home or other facility with appropriate resources:   Identify barriers to discharge with patient and caregiver   Arrange for needed discharge resources and transportation as appropriate     Problem: Safety - Adult  Goal: Free from fall injury  11/23/2022 1807 by Tarsha Calderon RN  Outcome: Progressing  11/23/2022 1122 by Tarsha Calderon RN  Outcome: Progressing  Flowsheets (Taken 11/23/2022 1046)  Free From Fall Injury: Instruct family/caregiver on patient safety     Problem: ABCDS Injury Assessment  Goal: Absence of physical injury  11/23/2022 1807 by Tarsha Calderon RN  Outcome: Progressing  11/23/2022 1122 by Tarsha Calderon RN  Outcome: Progressing  Flowsheets (Taken 11/23/2022 1046)  Absence of Physical Injury: Implement safety measures based on patient assessment Referral # [id number] is on OPEN status.

## 2022-11-23 NOTE — CONSULTS
Interventional H&P/Inpatient Progress note     PATIENT IS BEING FOLLOWED FOR: New CMP    Liudmila Aceves is a 81 y. o. female seen in initial consultation by Dr Ramo Zuleta 11/16/2022    PMH: HTN, HLD, T2DM, COPD, and ex smoker. Presented to [facility] ED 11/15/2022 with worsening SOB, echo done read as EF 20% with mild to moderate MR and Mild TR. Sent to [facility] 11/23/2022 for [facility] to assess for CAD     SUBJECTIVE:  SOB but denies CP  OBJECTIVE: Increase work of breathing, with mouth breathing and using accessory muscles. ROS:  Consist: Denies fevers, chills or night sweats  Heart: Denies chest pain, palpitations, lightheadedness, dizziness or syncope  Lungs: Denies cough, wheezing, orthopnea or PND  GI: Denies abdominal pain, vomiting or diarrhea    PHYSICAL EXAM:   /63   Pulse 94   Temp 98.8 °F (37.1 °C)   Resp 20   Ht 5' 6\" (1.676 m)   Wt 160 lb (72.6 kg)   LMP  (LMP Unknown)   SpO2 95%   BMI 25.82 kg/m²    B/P Range last 24 hours: Systolic (50FNY), EMD:729 , Min:98 , VID:477    Diastolic (74XOU), CZO:09, Min:47, Max:63    CONST: Well developed, well nourished elderly  female who appears her stated age. Awake, alert and cooperative. Appears in mild respiratory distress wearing 3 liters NC O2  HEENT:   Head- Normocephalic, atraumatic   Eyes- Conjunctivae pink, anicteric  Throat- Oral mucosa pink and moist  Neck-  No stridor, trachea midline, no jugular venous distention. No carotid bruit  CHEST: Chest symmetrical and non-tender to palpation. No accessory muscle use or intercostal retractions  RESPIRATORY:  Lung sounds - diminished breath sounds with bibasilar crackles. CARDIOVASCULAR:     Heart Ausculation- Regular rate and rhythm, no murmur. No s3, s4 or rub   PV: Trace BLE edema. No varicosities. Pedal pulses palpable, no clubbing or cyanosis   ABDOMEN: Soft, non-tender to light palpation. Bowel sounds present.  No palpable masses no organomegaly; no abdominal bruit  MS: Good muscle strength and tone. No atrophy or abnormal movements. : Deferred  SKIN: Warm and dry no statis dermatitis or ulcers   NEURO / PSYCH: Oriented to person, place and time. Speech clear and appropriate. Follows all commands. Flat affect         Weight:   Wt Readings from Last 3 Encounters:   11/23/22 160 lb (72.6 kg)   11/22/22 160 lb (72.6 kg)   11/12/22 151 lb (68.5 kg)     Current Inpatient Medications:      IV Infusions (if any):      DIAGNOSTIC/ LABORATORY DATA:  Labs:   CBC:   Recent Labs     11/21/22  0750 11/22/22  0800   WBC 5.3 5.3   HGB 10.8* 10.7*   HCT 34.5 33.8*    172     BMP:   Recent Labs     11/21/22  0750 11/22/22  0800    140   K 4.1 4.4   CO2 25 29   BUN 24* 24*   CREATININE 0.8 0.9   LABGLOM >60 >60   CALCIUM 9.0 9.4     TFT:   Lab Results   Component Value Date    TSH 2.830 11/11/2015      HgA1c:   Lab Results   Component Value Date    LABA1C 5.8 01/18/2022     FASTING LIPID PANEL:  Lab Results   Component Value Date/Time    CHOL 159 01/04/2022 09:48 AM    HDL 40 01/04/2022 09:48 AM    LDLCALC 83 01/04/2022 09:48 AM    TRIG 178 01/04/2022 09:48 AM       CTA Chest W 11/15/2022: There is no pulmonary embolus. Moderate size right pleural effusion with compression atelectasis of the right lower lobe. Small left pleural effusion. Emphysematous changes. Cardiomegaly with findings of mild vascular congestion    CXR 11/16/2022:  Reduction in the volume of right pleural fluid following thoracentesis. No  evidence of a pneumothorax. Small left pleural effusion remains. Telemetry:    TTE 11/15/2022 Dr Jessica Zhao: EF estimated at 20%. Left ventricle is moderately enlarged. Normal right ventricle structure and function. Left atrial volume index of 68 ml per meters squared BSA. Physiologic and/or trace aortic regurgitation is noted. Mild to moderate MR. Mild TR. No evidence for hemodynamically significant pericardial effusion.     ASSESSMENT:   Acute heart failure with reduced ejection fraction. proBNP 13,000-> 9000 pg/mL. -11 L. Improving  Ischemic cardiomyopathy newly diagnosed. EF 20%  Mild to moderate MR, mild TR  Minimally elevated hs-cTnT: Flat pattern, not consistent with ACS. Likely chronic myocardial injury  Nonsustained PSVT  Pleural effusion s/p right thoracentesis 11/16/2022 950 mL yellow fluid  Acute hypoxic respiratory failure  JAI creatinine 1.4 -> 0.8 improved  Mild anemia Hgb 10.8  Hx Hypertension  HLD  Type 2 diabetes reported, A1c 5.8%  COPD/emphysema           PLAN:  Will insert sapp catheter and give 20 mg IV Lasix prior to [facility]. [facility] (AUC HF-7) +/- PCI. Risks, benefits, and alternative therapies discussed with patient she understood and consented to proceed.   Further recommendations to follow    Electronically signed by Kyle Rojas MD on 11/23/2022 at 8:39 AM

## 2022-11-23 NOTE — PROGRESS NOTES
Per access center, patient accepted at [facility], no beds available at this time.      Clinical nurse supervisor, Mayela Fisher, notified that [facility] requested a disc of cardiac cath, echo, etc.

## 2022-11-23 NOTE — H&P
[facility]  History and Physical      CHIEF COMPLAINT: Shortness of breath    Reason for Admission: Transfer from outside hospital for heart cath, coronary artery disease    History Obtained From: Patient    PCP :  Kriss Morelos MD    [address] / [address] [place], [place] [id number]      HISTORY OF PRESENT ILLNESS:      The patient is a 81 y.o. female presented to the our institution from an outside institution for left heart cath to assess for coronary artery disease. She was admitted there with shortness of breath. She was noted to have new onset LV systolic dysfunction on echocardiogram.  Heart cath revealed severe coronary artery disease. Patient is admitted for cardiothoracic surgery assessment. Patient at the time of questioning is having shortness of breath. She denies any chest pain. No heart palpitations. No fevers or chills.     Past Medical History:        Diagnosis Date    CAD (coronary artery disease)     COPD (chronic obstructive pulmonary disease) ([facility] [address]     H/O cardiovascular stress test 11/17/2022    Lexiscan    Hyperlipidemia     Hypertension     Osteoarthritis     Type II or unspecified type diabetes mellitus without mention of complication, not stated as uncontrolled      Past Surgical History:        Procedure Laterality Date    APPENDECTOMY  01/01/1953    CARDIAC CATHETERIZATION  11/23/2022    Dr Sara Cruz ,1971    COLONOSCOPY  01/01/2003    TUMOR REMOVAL  01/01/1954    under lt arm         Medications Prior to Admission:    Medications Prior to Admission: hydroCHLOROthiazide (HYDRODIURIL) 25 MG tablet, Take 25 mg by mouth daily  isosorbide mononitrate (IMDUR) 30 MG extended release tablet, Take 1 tablet by mouth daily  atorvastatin (LIPITOR) 40 MG tablet, Take 1 tablet by mouth daily  losartan (COZAAR) 25 MG tablet, Take 1 tablet by mouth daily HOLD IF SBP < 100  metoprolol succinate (TOPROL XL) 25 MG extended release tablet, Take 1 tablet by mouth daily Do not crush or chew.-Hold SBP less 90 or HR < 60  ferrous sulfate (IRON 325) 325 (65 Fe) MG tablet, Take 1 tablet by mouth 2 times daily (with meals)  furosemide (LASIX) 20 MG tablet, Take 1 tablet by mouth daily  albuterol-ipratropium (COMBIVENT RESPIMAT)  MCG/ACT AERS inhaler, Inhale 1 puff into the lungs 2 times daily  metFORMIN (GLUCOPHAGE) 500 MG tablet, Take 1 tablet by mouth 2 times daily (with meals)  pregabalin (LYRICA) 25 MG capsule, Take 1 capsule by mouth 2 times daily for 60 days. blood glucose test strips (ASCENSIA AUTODISC VI;ONE TOUCH ULTRA TEST VI) strip, 1 each by Does not apply route 2 times daily  Cholecalciferol (VITAMIN D3) 50 MCG (2000 UT) CAPS, Take by mouth daily  ascorbic acid (VITAMIN C) 250 MG tablet, Take 250 mg by mouth daily  vitamin B-12 (CYANOCOBALAMIN) 1000 MCG tablet, Take 1,000 mcg by mouth daily  Lancets MISC, 1 each by Does not apply route 2 times daily  timolol (TIMOPTIC) 0.5 % ophthalmic solution, INSTILL 1 DROP INTO EACH EYE TWICE DAILY  latanoprost (XALATAN) 0.005 % ophthalmic solution, INSTILL 1 DROP INTO EACH EYE ONCE DAILY AT BEDTIME  brimonidine (ALPHAGAN) 0.2 % ophthalmic solution, INSTILL 1 DROP INTO LEFT EYE TWICE DAILY  Blood Glucose Monitoring Suppl DELTA, 1 Units by Does not apply route 2 times daily Please give whatever insurance covers  magnesium (MAGNESIUM-OXIDE) 250 MG TABS tablet, Take 250 mg by mouth daily  Multiple Vitamin (ONE-A-DAY ESSENTIAL PO), Take 1 tablet by mouth.    calcium carbonate-vitamin D 600-200 MG-UNIT TABS, Take 1 tablet by mouth daily. Misc Natural Products (OSTEO BI-FLEX ADV JOINT SHIELD PO), Take 2 tablets by mouth daily. [DISCONTINUED] timolol (BETIMOL) 0.5 % ophthalmic solution, Place 1 drop into both eyes daily. aspirin 81 MG EC tablet, Take 81 mg by mouth daily.       Allergies:  Pcn [penicillins] and Sulfa antibiotics    Social History:   Social History     Socioeconomic History    Marital status:  Spouse name: Not on file    Number of children: Not on file    Years of education: Not on file    Highest education level: Not on file   Occupational History    Not on file   Tobacco Use    Smoking status: Former     Packs/day: 1.00     Years: 9.00     Pack years: 9.00     Types: Cigarettes     Quit date: 2000     Years since quittin.1    Smokeless tobacco: Never   Vaping Use    Vaping Use: Never used   Substance and Sexual Activity    Alcohol use: No     Alcohol/week: 0.0 standard drinks    Drug use: No    Sexual activity: Never     Partners: Male   Other Topics Concern    Not on file   Social History Narrative    Not on file     Social Determinants of Health     Financial Resource Strain: Low Risk     Difficulty of Paying Living Expenses: Not hard at all   Food Insecurity: No Food Insecurity    Worried About Running Out of Food in the Last Year: Never true    Ran Out of Food in the Last Year: Never true   Transportation Needs: Not on file   Physical Activity: Not on file   Stress: Not on file   Social Connections: Not on file   Intimate Partner Violence: Not on file   Housing Stability: Not on file         Family History:       Problem Relation Age of Onset    Heart Disease Mother     High Blood Pressure Mother     Stroke Mother     Diabetes Mother     Heart Disease Father     High Blood Pressure Father        REVIEW OF SYSTEMS:    General ROS: negative  Hematological and Lymphatic ROS: negative  Endocrine ROS: negative  Respiratory ROS: Positive for shortness of breath  Cardiovascular ROS: no chest pain or dyspnea on exertion  Gastrointestinal ROS: no abdominal pain, change in bowel habits, or black or bloody stools  Genito-Urinary ROS: no dysuria, trouble voiding, or hematuria  Neurological ROS: no TIA or stroke symptoms  negative    Vitals:  BP (!) 103/49   Pulse 80   Temp 97.2 °F (36.2 °C) (Temporal)   Resp 20   Ht 5' 6\" (1.676 m)   Wt 160 lb (72.6 kg)   LMP  (LMP Unknown)   SpO2 99%   BMI 25.82 kg/m²     PHYSICAL EXAM:  General:  Awake, alert, oriented X 3. Well developed, well nourished, well groomed. No apparent distress. HEENT:  Normocephalic, atraumatic. Pupils equal, round, reactive to light. No scleral icterus. No conjunctival injection. Neck:  Supple, no carotid bruits  Heart:  RRR,   Lungs:  CTA bilaterally, bilat symmetrical expansion, no wheeze, rales, or rhonchi  Abdomen: Bowel sounds present, soft, nontender, no masses, no organomegaly, no peritoneal signs  Extremities:  No clubbing, cyanosis, or edema  Skin:  Warm and dry, no open lesions or rash  Neuro:  Cranial nerves 2-12 intact, no focal deficits      DATA:     Recent Results (from the past 24 hour(s))   POCT Glucose    Collection Time: 11/22/22  5:15 PM   Result Value Ref Range    Meter Glucose 199 (H) 74 - 99 mg/dL   POCT Glucose    Collection Time: 11/22/22  8:29 PM   Result Value Ref Range    Meter Glucose 103 (H) 74 - 99 mg/dL   POCT Glucose    Collection Time: 11/23/22  5:32 AM   Result Value Ref Range    Meter Glucose 112 (H) 74 - 99 mg/dL   TYPE AND SCREEN    Collection Time: 11/23/22  6:45 AM   Result Value Ref Range    ABO/Rh O POS     Antibody Screen NEG    EKG 12 lead    Collection Time: 11/23/22 11:10 AM   Result Value Ref Range    Ventricular Rate 80 BPM    Atrial Rate 80 BPM    P-R Interval 182 ms    QRS Duration 104 ms    Q-T Interval 402 ms    QTc Calculation (Bazett) 463 ms    P Axis 87 degrees    R Axis 66 degrees    T Axis 54 degrees       No orders to display           ASSESSMENT :      Active Problems:    Acute systolic HF (heart failure) (HCC)  Resolved Problems:    * No resolved hospital problems.  *  Coronary artery disease with calcified arteries  Hypertension  Hyperlipidemia  Diabetes mellitus type 2  COPD  History of tobacco abuse    Plan :  Discussed with cardiology  Await cardiothoracic surgery input  Continue aspirin  Continue intensity statin  Currently on diuretics for LV systolic dysfunction        Electronically signed by Bobo Wong MD on 11/23/2022 at 11:47 AM    NOTE: This report was transcribed using voice recognition software.  Every effort was made to ensure accuracy; however, inadvertent transcription errors may be present

## 2022-11-23 NOTE — PROGRESS NOTES
Dr. Madeline Reinoso notified of [address] requesting transfer to [facility].

## 2022-11-24 VITALS
DIASTOLIC BLOOD PRESSURE: 57 MMHG | OXYGEN SATURATION: 97 % | RESPIRATION RATE: 18 BRPM | HEIGHT: 66 IN | TEMPERATURE: 98.2 F | WEIGHT: 153 LBS | SYSTOLIC BLOOD PRESSURE: 118 MMHG | HEART RATE: 84 BPM | BODY MASS INDEX: 24.59 KG/M2

## 2022-11-24 LAB
ANION GAP SERPL CALCULATED.3IONS-SCNC: 12 MMOL/L (ref 7–16)
BUN BLDV-MCNC: 26 MG/DL (ref 6–23)
CALCIUM SERPL-MCNC: 9.3 MG/DL (ref 8.6–10.2)
CHLORIDE BLD-SCNC: 102 MMOL/L (ref 98–107)
CO2: 23 MMOL/L (ref 22–29)
CREAT SERPL-MCNC: 0.7 MG/DL (ref 0.5–1)
GFR SERPL CREATININE-BSD FRML MDRD: >60 ML/MIN/1.73
GLUCOSE BLD-MCNC: 111 MG/DL (ref 74–99)
POTASSIUM REFLEX MAGNESIUM: 5.2 MMOL/L (ref 3.5–5)
POTASSIUM SERPL-SCNC: 4.3 MMOL/L (ref 3.5–5)
SODIUM BLD-SCNC: 137 MMOL/L (ref 132–146)

## 2022-11-24 PROCEDURE — 6370000000 HC RX 637 (ALT 250 FOR IP): Performed by: NURSE PRACTITIONER

## 2022-11-24 PROCEDURE — 6370000000 HC RX 637 (ALT 250 FOR IP): Performed by: INTERNAL MEDICINE

## 2022-11-24 PROCEDURE — 36415 COLL VENOUS BLD VENIPUNCTURE: CPT

## 2022-11-24 PROCEDURE — 2580000003 HC RX 258: Performed by: INTERNAL MEDICINE

## 2022-11-24 PROCEDURE — 84132 ASSAY OF SERUM POTASSIUM: CPT

## 2022-11-24 PROCEDURE — 80048 BASIC METABOLIC PNL TOTAL CA: CPT

## 2022-11-24 RX ORDER — SPIRONOLACTONE 25 MG/1
25 TABLET ORAL DAILY
Qty: 30 TABLET | Refills: 3 | Status: SHIPPED | OUTPATIENT
Start: 2022-11-24

## 2022-11-24 RX ORDER — NITROGLYCERIN 0.4 MG/1
0.4 TABLET SUBLINGUAL EVERY 5 MIN PRN
Qty: 25 TABLET | Refills: 3 | Status: SHIPPED | OUTPATIENT
Start: 2022-11-24

## 2022-11-24 RX ADMIN — CALCIUM CARBONATE-VITAMIN D TAB 500 MG-200 UNIT 1 TABLET: 500-200 TAB at 07:47

## 2022-11-24 RX ADMIN — METOPROLOL SUCCINATE 25 MG: 25 TABLET, EXTENDED RELEASE ORAL at 07:52

## 2022-11-24 RX ADMIN — FUROSEMIDE 20 MG: 40 TABLET ORAL at 07:44

## 2022-11-24 RX ADMIN — Medication 250 MG: at 07:46

## 2022-11-24 RX ADMIN — BRIMONIDINE TARTRATE 1 DROP: 2 SOLUTION/ DROPS OPHTHALMIC at 07:47

## 2022-11-24 RX ADMIN — PREGABALIN 25 MG: 25 CAPSULE ORAL at 07:45

## 2022-11-24 RX ADMIN — Medication 2000 UNITS: at 07:46

## 2022-11-24 RX ADMIN — FERROUS SULFATE TAB 325 MG (65 MG ELEMENTAL FE) 325 MG: 325 (65 FE) TAB at 07:22

## 2022-11-24 RX ADMIN — ATORVASTATIN CALCIUM 40 MG: 40 TABLET, FILM COATED ORAL at 07:43

## 2022-11-24 RX ADMIN — Medication 200 MG: at 07:44

## 2022-11-24 RX ADMIN — CYANOCOBALAMIN TAB 1000 MCG 1000 MCG: 1000 TAB at 07:48

## 2022-11-24 RX ADMIN — ASPIRIN 81 MG: 81 TABLET, COATED ORAL at 07:51

## 2022-11-24 RX ADMIN — SPIRONOLACTONE 25 MG: 25 TABLET ORAL at 07:45

## 2022-11-24 RX ADMIN — SODIUM CHLORIDE, PRESERVATIVE FREE 10 ML: 5 INJECTION INTRAVENOUS at 07:46

## 2022-11-24 RX ADMIN — ISOSORBIDE MONONITRATE 30 MG: 30 TABLET, EXTENDED RELEASE ORAL at 07:45

## 2022-11-24 RX ADMIN — TIMOLOL MALEATE 1 DROP: 5 SOLUTION OPHTHALMIC at 07:47

## 2022-11-24 ASSESSMENT — PAIN SCALES - GENERAL
PAINLEVEL_OUTOF10: 0

## 2022-11-24 NOTE — PROGRESS NOTES
Patient and Daughter, Marinus Gosselin, notified of 8 am  time for transfer to [facility]. Daughter has been notified to  patient's life vest.   Maritza Mcdermott RN delivered life vest to patient and provided  patient with preliminary life vest education. Further education will need to be provided at time of discharge from [facility]. Daughter was provided with contact number for life vest company.  Sabina Chinchilla RN

## 2022-11-24 NOTE — DISCHARGE INSTR - COC
Continuity of Care Form    Patient Name: Cheyenne Haq   :  1935  MRN:  [id number]    Admit date:  2022  Discharge date: 2022      Code Status Order: Full Code   Advance Directives:     Admitting Physician:  Mireille Santos DO  PCP: Magui Montgomery MD    Discharging Nurse: Rebekah Lopez [facility] Unit/Room#: 3869/3028-W  Discharging Unit Phone Number: [phone]    Emergency Contact:   Extended Emergency Contact Information  Primary Emergency Contact: DestinTe  Address: [address] [facility] [address]           [facility], [address] [facility]  Home Phone: [phone]  Relation: Spouse  Hard of hearing? Yes  Hearing or visual needs: Other   needed?  No  Secondary Emergency Contact: Jacklyn Barakat  [address] [address] [facility], [address] Phone: [phone]  Relation: Child    Past Surgical History:  Past Surgical History:   Procedure Laterality Date    APPENDECTOMY  1953    CARDIAC CATHETERIZATION  2022    Dr Ivette Ingram ,1971    COLONOSCOPY  2003    TUMOR REMOVAL  1954    under lt arm       Immunization History:   Immunization History   Administered Date(s) Administered    COVID-19, MODERNA BLUE border, Primary or Immunocompromised, (age 12y+), IM, 100 mcg/0.5mL 2021, 2021    COVID-19, MODERNA Booster BLUE border, (age 18y+), IM, 50mcg/0.25mL 2021    DTaP 2006    Influenza 10/28/2013    Influenza Virus Vaccine 2012, 2014    Influenza, FLUAD, (age 72 y+), Adjuvanted, 0.5mL 10/12/2021, 2022    Influenza, FLUZONE (age 72 y+), High Dose, 0.7mL 10/26/2020, 10/26/2020    Influenza, High Dose (Fluzone 65 yrs and older) 2012, 2016, 10/25/2017, 10/09/2018    Influenza, Triv, inactivated, subunit, adjuvanted, IM (Fluad 65 yrs and older) 2019    Pneumococcal Conjugate 13-valent (Kcjpyre44) 2016    Pneumococcal Polysaccharide (Aameihzxq58) 2011 Active Problems:  Patient Active Problem List   Diagnosis Code    Type 2 diabetes mellitus without complication, without long-term current use of insulin ([facility] [address] E11.9    Mixed hyperlipidemia E78.2    Essential hypertension I10    Osteoarthritis M19.90    RLS (restless legs syndrome) G25.81    Chronic obstructive pulmonary disease (HCC) J44.9    Diabetic polyneuropathy associated with diabetes mellitus due to underlying condition ([facility]) E08.42    Bilateral pleural effusion J90    Acute on chronic congestive heart failure (HCC) I50.9    Urinary retention R33.9    Abnormal cardiovascular stress test R94.39    Hypotension I95.9    COPD exacerbation ([facility]) J44.1    CAD in native artery M53.37    Acute systolic HF (heart failure) ([facility]) I50.21    Coronary artery disease (CAD) excluded Z03.89       Isolation/Infection:   Isolation            No Isolation          Patient Infection Status       Infection Onset Added Last Indicated Last Indicated By Review Planned Expiration Resolved Resolved By    None active    Resolved    COVID-19 (Rule Out) 11/23/22 11/23/22 11/23/22 Respiratory Panel, Molecular, with COVID-19 (Restricted: peds pts or suitable admitted adults) (Ordered)   11/23/22 Rule-Out Test Resulted    COVID-19 (Rule Out) 11/15/22 11/15/22 11/15/22 COVID-19, Rapid (Ordered)   11/15/22 Rule-Out Test Resulted            Nurse Assessment:  Last Vital Signs: BP (!) 105/53   Pulse 71   Temp 97.5 °F (36.4 °C) (Temporal)   Resp 18   Ht 5' 6\" (1.676 m)   Wt 160 lb (72.6 kg)   LMP  (LMP Unknown)   SpO2 99%   BMI 25.82 kg/m²     Last documented pain score (0-10 scale): Pain Level: 0  Last Weight:   Wt Readings from Last 1 Encounters:   11/23/22 160 lb (72.6 kg)     Mental Status:  oriented, alert, coherent, logical, thought processes intact, and able to concentrate and follow conversation    IV Access: Left Antecubital 20 Gauge inserted 11/23/22   Left anterior arm 20 G inserted 11/22/22    Nursing Mobility/ADLs:  Walking   Assisted  Transfer  Assisted  Bathing  Assisted  Dressing  Assisted  Toileting  Assisted  Feeding  Assisted  Med Admin  Dependent  Med Delivery   whole    Wound Care Documentation and Therapy:        Elimination:  Continence: Bowel: Yes  Bladder: Yes  Urinary Catheter: Insertion Date: 11/23/22    Colostomy/Ileostomy/Ileal Conduit: No       Date of Last BM: 11/23/22 1900    Intake/Output Summary (Last 24 hours) at 11/24/2022 0022  Last data filed at 11/23/2022 2322  Gross per 24 hour   Intake 290 ml   Output 1700 ml   Net -1410 ml     I/O last 3 completed shifts: In: 180 [P.O.:180]  Out: 1400 [Urine:1400]    Safety Concerns: At Risk for Falls    Impairments/Disabilities:      Vision  Patient is blind in Left eye. History of glaucoma    Nutrition Therapy:  Current Nutrition Therapy:   - Oral Diet:  Carb Control 4 carbs/meal (1800kcals/day), Low Fat, and high fiber  low cholesterol low sodium    Routes of Feeding: Oral  Liquids: No Restrictions  Daily Fluid Restriction: no  Last Modified Barium Swallow with Video (Video Swallowing Test): not done    Treatments at the Time of Hospital Discharge:   Respiratory Treatments:  oxygen 3L    Will need home oxygen therapy set up at discharge   Oxygen Therapy:  is not on home oxygen therapy.   Ventilator:    - No ventilator support    Rehab Therapies: Physical Therapy and Occupational Therapy  Weight Bearing Status/Restrictions: No weight bearing restrictions  Other Medical Equipment (for information only, NOT a DME order):  Patient uses a wheeled walker, glasses, upper and lower dentures, glucometer (not with patient), has a cane (not with patient) and has been ordered a life vest through [employer](daughter has the life vest but patient will need further education at discharge)   [facility] Life Vest [phone] (Otoniel)]  Had chosen  [facility] for oxygen supplier outpatient will need set up at discharge    Other Treatments: Patient had selected [place] choice for home PT OT referral had been made. Patient's personal belongings (please select all that are sent with patient):  Glasses ,Dentures upper and Lower ,  & Ring       RN SIGNATURE:  Electronically signed by Ilir Woodall RN on 11/24/22 at 8:22 AM EST    CASE MANAGEMENT/SOCIAL WORK SECTION    Inpatient Status Date: ***    Readmission Risk Assessment Score:  Readmission Risk              Risk of Unplanned Readmission:  17           Discharging to Facility/ Agency   Name: [facility]  Address:  Phone:[phone]  [address]  Fax:    Dialysis Facility (if applicable)   Name:  Address:  Dialysis Schedule:  Phone:  Fax:    / signature: {Esignature:[id number]}    PHYSICIAN SECTION    Prognosis:     Condition at Discharge Stable      Rehab Potential (if transferring to Rehab):    Recommended Labs or Other Treatments After Discharge: ***    Physician Certification: I certify the above information and transfer of Janet Martin  is necessary for the continuing treatment of the diagnosis listed and that she requires {Admit to Appropriate Level of Care:98063} for {GREATER/LESS:158721290} 30 days.      Update Admission H&P: {CHP DME Changes in ZOFDX:[id number]}    PHYSICIAN SIGNATURE:  {Esignature:[id number]}

## 2022-11-24 NOTE — PROGRESS NOTES
Notified per shelia anderson supervisor, that disc was unable to be made for [facility] transfer due to machine being broke

## 2022-11-24 NOTE — PROGRESS NOTES
Repeat serum potassium ordered at this time due to laboratory serum specimen being hemolyzed per Dr. Tameka Hobson.  Jose Luis Gonzalez, CHRISSY  11/24/2022

## 2022-11-24 NOTE — PROGRESS NOTES
Nurse to Nurse called to [facility] at [facility]. She has asked that unit be recalled with update at time of transfer from this facility.    Luis Call RN  11/24/2022

## 2022-11-24 NOTE — DISCHARGE INSTRUCTIONS
POST-CATH  RADIAL DISCHARGE INSTRUCTIONS:  Please follow instructions closely for prevention of complications. INSTRUCTIONS FOR CARE OF CATHETERIZATION SITE: RADIAL (WRIST) APPROACH:    DO NOT BEND wrist for 48 hours  DO NOT PUSH with affected wrist for 48 hrs. DO NOT submerge wrist in water for 3 days  NO blood pressure, IV or blood work in affected arm for 3- 5 days      Remove dressing from wrist tomorrow morning. Gently cleanse, pat dry, apply band aid for 24 hours. Call your physician if you notice:      *Increase in pain at catheterization site      *Increase in swelling at catheterization site      *Redness or drainage at the catheterization site      *Numbness, tingling or cramping in the catheterization arm at rest or with activity    CALL 911 if you have active bleeding from your catheterization site. DO NOT DRIVE YOURSELF TO THE HOSPITAL    Drink 3-4 extra glasses of water today    No driving, or working for 48 hrs    Continue low fat diet.

## 2022-11-24 NOTE — PLAN OF CARE
Problem: Chronic Conditions and Co-morbidities  Goal: Patient's chronic conditions and co-morbidity symptoms are monitored and maintained or improved  11/24/2022 0525 by Miranda Lara RN  Outcome: Progressing  Flowsheets (Taken 11/23/2022 2000)  Care Plan - Patient's Chronic Conditions and Co-Morbidity Symptoms are Monitored and Maintained or Improved: Monitor and assess patient's chronic conditions and comorbid symptoms for stability, deterioration, or improvement     Problem: Discharge Planning  Goal: Discharge to home or other facility with appropriate resources  11/24/2022 0525 by Miranda Lara RN  Outcome: Progressing  Flowsheets (Taken 11/23/2022 2000)  Discharge to home or other facility with appropriate resources: Identify barriers to discharge with patient and caregiver     Problem: Safety - Adult  Goal: Free from fall injury  11/24/2022 0525 by Miranda Lara RN  Outcome: Progressing     Problem: ABCDS Injury Assessment  Goal: Absence of physical injury  11/24/2022 0525 by Miranda Lara RN  Outcome: Progressing  Flowsheets (Taken 11/23/2022 2157)  Absence of Physical Injury: Implement safety measures based on patient assessment     Problem: Cardiovascular - Adult  Goal: Maintains optimal cardiac output and hemodynamic stability  Outcome: Progressing     Problem: Genitourinary - Adult  Goal: Absence of urinary retention  Outcome: Adequate for Discharge  Flowsheets (Taken 11/23/2022 2000)  Absence of urinary retention:   Monitor intake/output and perform bladder scan as needed   Assess patients ability to void and empty bladder     Problem: Genitourinary - Adult  Goal: Urinary catheter remains patent  Outcome: Progressing     Problem: Metabolic/Fluid and Electrolytes - Adult  Goal: Electrolytes maintained within normal limits  Outcome: Progressing     Problem: Metabolic/Fluid and Electrolytes - Adult  Goal: Glucose maintained within prescribed range  Outcome: Progressing

## 2022-11-24 NOTE — DISCHARGE INSTR - DIET

## 2022-11-24 NOTE — DISCHARGE SUMMARY
Hospitalist Discharge Summary    Patient ID: Janet Martin   Patient : 1935  Patient's PCP: Annemarie Santos MD    Admit Date: 2022   Admitting Physician: Jumana Ferrara DO    Discharge Date:  2022   Discharge Physician: Tone Haynes MD   Discharge Condition: Stable  Discharge Disposition: Transfer to [facility] Alireza Mcknight course in brief:  (Please refer to daily progress notes for a comprehensive review of the hospitalization by requesting medical records)  The patient is a 81 y.o. female presented to the our institution from an outside institution for left heart cath to assess for coronary artery disease. She was admitted there with shortness of breath. She was noted to have new onset LV systolic dysfunction on echocardiogram.  Heart cath revealed severe coronary artery disease. Patient is admitted for cardiothoracic surgery assessment. Cardiothoracic surgery was consulted and due to her risk factors, recommend [facility] for further management. 2022-cardiac catheterization-CONCLUSIONS:  1. Severe calcific multivessel coronary arteries as described above. 2.  Markedly elevated left ventricular end-diastolic pressure. 3.  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 Consults:   IP CONSULT TO CARDIOTHORACIC SURGERY  IP CONSULT TO DIETITIAN  IP CONSULT TO DIETITIAN    Discharge Diagnoses:  Severe multivessel coronary artery disease  Hypertension  Hyperlipidemia  Diabetes mellitus type 2  COPD  History of tobacco use      Discharge Instructions / Follow up: Follow-up with PCP within 1 week of discharge. Follow-up with consultants as indicated by them. Compliance with medications as prescribed on discharge.     Future Appointments   Date Time Provider Lili Contreras   2022 11:45 AM Star Avila MD [facility] AND [facility]   2022  8:15 AM Jaz Ferrari MD [facility] 2/15/2023  9:00 AM Christie Renee MD [facility] AND [facility]       The patient's condition is stable. At this time the patient is without objective evidence of an acute process requiring continuing hospitalization or inpatient management. They are stable for discharge with outpatient follow-up. I have spoken with the patient and discussed the results of the current hospitalization, in addition to providing specific details for the plan of care and counseling regarding the diagnosis and prognosis. The plan has been discussed in detail and they are aware of the specific conditions for emergent return, as well as the importance of follow-up. Their questions are answered at this time and they are agreeable with the plan for discharge to [facility]. Continued appropriate risk factor modification of blood pressure, diabetes and serum lipids will remain essential to reducing risk of future atherosclerotic development    Activity: activity as tolerated    Physical exam:  General appearance: No apparent distress, appears stated age and cooperative. HEENT: Conjunctivae/corneas clear. Mucous membranes moist.  Neck: Supple. No JVD. Respiratory:  Clear to auscultation bilaterally. Normal respiratory effort. Cardiovascular:  RRR. S1, S2 without MRG. PV: Pulses palpable. No edema. Abdomen: Soft, non-tender, non-distended. +BS  Musculoskeletal: No obvious deformities. Skin: Normal skin color. No rashes or lesions. Good turgor. Neurologic:  Grossly non-focal. Awake, alert, following commands.    Psychiatric: Alert and oriented, thought content appropriate, normal insight and judgement    Significant labs:  CBC:   Recent Labs     11/21/22  0750 11/22/22  0800   WBC 5.3 5.3   RBC 3.53 3.43*   HGB 10.8* 10.7*   HCT 34.5 33.8*   MCV 97.7 98.5   RDW 13.6 13.7    172     BMP:   Recent Labs     11/21/22  0750 11/22/22  0800 11/24/22  0521    140 137   K 4.1 4.4 5.2*    103 102   CO2 25 29 23   BUN 24* 24* 26*   CREATININE 0.8 0.9 0.7     LFT:  No results for input(s): PROT, ALB, ALKPHOS, ALT, AST, BILITOT, AMYLASE, LIPASE in the last 72 hours. PT/INR: No results for input(s): INR, APTT in the last 72 hours. BNP: No results for input(s): BNP in the last 72 hours. Hgb A1C:   Lab Results   Component Value Date    LABA1C 5.8 01/18/2022     Folate and B12: No results found for: CQPVUDDL01, No results found for: FOLATE  Thyroid Studies:   Lab Results   Component Value Date    TSH 2.830 11/11/2015       Urinalysis:    Lab Results   Component Value Date/Time    NITRU Negative 11/12/2022 02:00 PM    WBCUA NONE 11/12/2022 02:00 PM    WBCUA 0-1 01/09/2012 09:30 AM    BACTERIA NONE SEEN 11/12/2022 02:00 PM    RBCUA NONE 11/12/2022 02:00 PM    RBCUA NONE 01/17/2014 10:30 AM    BLOODU Negative 11/12/2022 02:00 PM    SPECGRAV <=1.005 11/12/2022 02:00 PM    GLUCOSEU Negative 11/12/2022 02:00 PM    GLUCOSEU NEGATIVE 01/09/2012 09:30 AM       Imaging:  XR CHEST (2 VW)    Result Date: 11/15/2022  EXAMINATION: TWO XRAY VIEWS OF THE CHEST 11/15/2022 10:46 am COMPARISON: None. HISTORY: ORDERING SYSTEM PROVIDED HISTORY: SOB TECHNOLOGIST PROVIDED HISTORY: Reason for exam:->SOB FINDINGS: The heart is enlarged. There are findings of very mild vascular congestion. There is a small right pleural effusion trace left pleural effusion. 1. Cardiomegaly with findings of mild vascular congestion 2. Small right pleural effusion and trace left pleural effusion     XR SHOULDER RIGHT (MIN 2 VIEWS)    Result Date: 11/8/2022  EXAMINATION: THREE XRAY VIEWS OF THE RIGHT SHOULDER 11/8/2022 10:57 am COMPARISON: None. HISTORY: ORDERING SYSTEM PROVIDED HISTORY: Acute pain of right shoulder FINDINGS: No acute fracture or dislocation is identified. Mild degenerative changes are seen at the glenohumeral and acromioclavicular joints. No bony erosion or destruction is visualized. Soft tissue calcifications are noted.      Mild degenerative changes. No acute bony abnormality. XR CHEST PORTABLE    Result Date: 11/16/2022  EXAMINATION: ONE XRAY VIEW OF THE CHEST 11/16/2022 1:15 pm COMPARISON: Chest radiograph from earlier today HISTORY: ORDERING SYSTEM PROVIDED HISTORY: Postprocedural pneumothorax TECHNOLOGIST PROVIDED HISTORY: Reason for exam:->right thora FINDINGS: There has been reduction in volume of right pleural fluid following thoracentesis. No evidence of a pneumothorax. A small left pleural effusion remains. Heart size is unable to be accurately assessed on this single portable view of the chest, but appears to be stable. Bones are diffusely osteopenic. Reduction in the volume of right pleural fluid following thoracentesis. No evidence of a pneumothorax. Small left pleural effusion remains. XR CHEST PORTABLE    Result Date: 11/16/2022  EXAMINATION: ONE XRAY VIEW OF THE CHEST 11/16/2022 5:32 am COMPARISON: 15 November 2022 HISTORY: ORDERING SYSTEM PROVIDED HISTORY: Thoracentesis TECHNOLOGIST PROVIDED HISTORY: Reason for exam:->Thoracentesis FINDINGS: Bilateral pleural effusions right greater than left with likely adjacent atelectasis. Heart is enlarged. Pulmonary vascularity is normal.     Pleural effusions right greater than left with a adjacent likely atelectasis. Cardiomegaly. CTA PULMONARY W CONTRAST    Result Date: 11/15/2022  EXAMINATION: CTA OF THE CHEST 11/15/2022 11:45 am TECHNIQUE: CTA of the chest was performed after the administration of intravenous contrast.  Multiplanar reformatted images are provided for review. MIP images are provided for review. Automated exposure control, iterative reconstruction, and/or weight based adjustment of the mA/kV was utilized to reduce the radiation dose to as low as reasonably achievable. COMPARISON: None.  HISTORY: ORDERING SYSTEM PROVIDED HISTORY: SOB, elevated d-dimer TECHNOLOGIST PROVIDED HISTORY: Reason for exam:->SOB, elevated d-dimer Decision Support Exception - unselect if not a suspected or confirmed emergency medical condition->Emergency Medical Condition (MA) FINDINGS: Pulmonary Arteries: Pulmonary arteries are adequately opacified for evaluation. No evidence of intraluminal filling defect to suggest pulmonary embolism. Main pulmonary artery is normal in caliber. Mediastinum: The heart is enlarged. There is no pericardial effusion. No mediastinal or hilar lymphadenopathy is identified. There is significant calcified plaque within the coronary arteries. . Lungs/pleura: There are emphysematous changes. There is a moderate size right pleural effusion. There is compression atelectasis of the right lower lobe. There is a small left pleural effusion. There is minimal left lung base atelectasis. There are findings of mild vascular congestion. Upper Abdomen: Limited images of the upper abdomen are unremarkable. Soft Tissues/Bones:  Age related degenerative changes of the visualized osseous structures without focal destructive lesion. 1. There is no pulmonary embolus. 2. Moderate size right pleural effusion with compression atelectasis of the right lower lobe 3. Small left pleural effusion 4. Emphysematous changes. 5. Cardiomegaly with findings of mild vascular congestion     NM Cardiac Stress Test Nuclear Imaging    Result Date: 11/17/2022  Pharmacologic myocardial perfusion stress test: Patient was injected with 8 mCi 99 technetium sestamibi at rest.  Patient was then injected with 0.4 mg Lexiscan following which 23.8 mCi 99 technetium sestamibi was administered. Raw spin images: Mild motion artifact present. Anterior as well as inferior attenuation artifact. PERFUSION IMAGES REVEALED: There is a partially reversible defect of moderate size and moderate intensity in the mid to apical anterior and anteroseptal walls. This is suggestive of an infarction with mild-to-moderate casi-infarct ischemia in the LAD/diagonal territory.  Cannot rule out an underlying attenuation artifact. Left ventricular end-diastolic volume 193 ml Left ventricular end-systolic volume 059 ml EJECTION FRACTION: 30%     1: The stress EKG report is provided separately. 2  This is an abnormal myocardial perfusion scan. Findings suggestive of an infarction with mild-to-moderate casi-infarct ischemia in the LAD/diagonal territory. 3: Left ventricle is moderately dilated with severe global hypokinesis. There appears to be akinesis of the apical segments. 4: Prognostically, this is an intermediate to high risk study. IR GUIDED THORACENTESIS PLEURAL    Result Date: 11/16/2022  PROCEDURE: ULTRASOUNDGUIDED RIGHT THORACENTESIS 11/16/2022 HISTORY: ORDERING SYSTEM PROVIDED HISTORY: IR guided thoracentesis TECHNOLOGIST PROVIDED HISTORY: Reason for exam:->IR guided thoracentesis Which side should the procedure be performed?->Right TECHNIQUE: Informed consent was obtained after a detailed explanation of the procedure including risks, benefits, and alternatives. Universal protocol was performed. The right chest was prepped and draped in sterile fashion and local anesthesia was achieved with lidocaine. An 8 French needle sheath was advanced under ultrasound guidance into pleural effusion and thoracentesis was performed. The patient tolerated the procedure well. FINDINGS: A total of 900 cc was removed. Successful ultrasound guided thoracentesis. Discharge Medications:      Medication List        START taking these medications      nitroGLYCERIN 0.4 MG SL tablet  Commonly known as: NITROSTAT  Place 1 tablet under the tongue every 5 minutes as needed for Chest pain up to max of 3 total doses.  If no relief after 1 dose, call 911.     spironolactone 25 MG tablet  Commonly known as: ALDACTONE  Take 1 tablet by mouth daily            CONTINUE taking these medications      aspirin 81 MG EC tablet     atorvastatin 40 MG tablet  Commonly known as: LIPITOR  Take 1 tablet by mouth daily     blood glucose monitor kit and supplies  1 Units by Does not apply route 2 times daily Please give whatever insurance covers     furosemide 20 MG tablet  Commonly known as: LASIX  Take 1 tablet by mouth daily     isosorbide mononitrate 30 MG extended release tablet  Commonly known as: IMDUR  Take 1 tablet by mouth daily     Lancets Misc  1 each by Does not apply route 2 times daily     losartan 25 MG tablet  Commonly known as: COZAAR  Take 1 tablet by mouth daily HOLD IF SBP < 100     metoprolol succinate 25 MG extended release tablet  Commonly known as: TOPROL XL  Take 1 tablet by mouth daily Do not crush or chew.-Hold SBP less 90 or HR < 60            STOP taking these medications      hydroCHLOROthiazide 25 MG tablet  Commonly known as: HYDRODIURIL            ASK your doctor about these medications      albuterol-ipratropium  MCG/ACT Aers inhaler  Commonly known as: Combivent Respimat  Inhale 1 puff into the lungs 2 times daily     ascorbic acid 250 MG tablet  Commonly known as: VITAMIN C     blood glucose test strips strip  Commonly known as: ASCENSIA AUTODISC VI;ONE TOUCH ULTRA TEST VI  1 each by Does not apply route 2 times daily     brimonidine 0.2 % ophthalmic solution  Commonly known as: ALPHAGAN     calcium carbonate-vitamin D 600-200 MG-UNIT Tabs     ferrous sulfate 325 (65 Fe) MG tablet  Commonly known as: IRON 325  Take 1 tablet by mouth 2 times daily (with meals)     latanoprost 0.005 % ophthalmic solution  Commonly known as: XALATAN     magnesium 250 MG Tabs tablet  Commonly known as: MAGNESIUM-OXIDE     metFORMIN 500 MG tablet  Commonly known as: GLUCOPHAGE  Take 1 tablet by mouth 2 times daily (with meals)     ONE-A-DAY ESSENTIAL PO     OSTEO BI-FLEX ADV JOINT SHIELD PO     pregabalin 25 MG capsule  Commonly known as: Lyrica  Take 1 capsule by mouth 2 times daily for 60 days.      timolol 0.5 % ophthalmic solution  Commonly known as: TIMOPTIC     vitamin B-12 1000 MCG tablet  Commonly known as: CYANOCOBALAMIN     Vitamin D3 50 MCG (2000 UT) Caps               Where to Get Your Medications        These medications were sent to [address], [place] - [address] [facility] [place] [phone] Ian Lees [phone]  [address] [facility] [place] [facility] [address]      Phone: [phone]   nitroGLYCERIN 0.4 MG SL tablet  spironolactone 25 MG tablet         Time Spent on discharge is more than 45 minutes in the examination, evaluation, counseling and review of medications and discharge plan.    +++++++++++++++++++++++++++++++++++++++++++++++++  Yaima Castillo MD  [address], [place]  +++++++++++++++++++++++++++++++++++++++++++++++++  NOTE: This report was transcribed using voice recognition software. Every effort was made to ensure accuracy; however, inadvertent computerized transcription errors may be present.

## 2022-11-24 NOTE — PROGRESS NOTES
Patient to [facility] via [facility]. Report and discharge summary with EN given to transporters. Patient remains on 3L NC, IV sites and sapp catheter intact. Patient stable at time of discharge. Patient's son took belongings including [address] home. Telemetry pack removed from patient, cleaned, and returned to nurses station. Update called to Ardyce Cooks, RN at [facility].

## 2022-11-24 NOTE — PROGRESS NOTES
Per Nurse access [facility] has assigned a bed   J71  Bed 15  Nurse report alvino back [phone]. Nurse access will call back with transport arrangements.  Deloris Dobbs RN  11/23/2022

## 2022-11-24 NOTE — CARE COORDINATION
Telephone call made to [facility] to discuss possible transfer to the [facility]. The patient and family were not aware that the physician determined this was the best option for her and made a referral and she has been accepted. [facility] called and spoke to her father & her brother and called back and spoke to her mother again. All agree with the transfer to [place]. Telephone call made to the Access center and [facility]. Patient will be picked up at [facility] for transport.

## 2022-12-06 ENCOUNTER — OFFICE VISIT (OUTPATIENT)
Dept: FAMILY MEDICINE CLINIC | Age: 87
End: 2022-12-06

## 2022-12-06 VITALS
BODY MASS INDEX: 24.69 KG/M2 | DIASTOLIC BLOOD PRESSURE: 84 MMHG | WEIGHT: 153 LBS | HEART RATE: 78 BPM | OXYGEN SATURATION: 99 % | SYSTOLIC BLOOD PRESSURE: 122 MMHG

## 2022-12-06 DIAGNOSIS — E08.42 DIABETIC POLYNEUROPATHY ASSOCIATED WITH DIABETES MELLITUS DUE TO UNDERLYING CONDITION (HCC): ICD-10-CM

## 2022-12-06 DIAGNOSIS — R54 FRAIL ELDERLY: ICD-10-CM

## 2022-12-06 DIAGNOSIS — I21.4 NON-ST ELEVATION MI (NSTEMI) (HCC): Primary | ICD-10-CM

## 2022-12-06 DIAGNOSIS — E78.2 MIXED HYPERLIPIDEMIA: ICD-10-CM

## 2022-12-06 DIAGNOSIS — J44.9 CHRONIC OBSTRUCTIVE PULMONARY DISEASE, UNSPECIFIED COPD TYPE (HCC): ICD-10-CM

## 2022-12-06 DIAGNOSIS — I25.10 STENOSIS OF LEFT ANTERIOR DESCENDING (LAD) ARTERY: ICD-10-CM

## 2022-12-06 DIAGNOSIS — E11.9 TYPE 2 DIABETES MELLITUS WITHOUT COMPLICATION, WITHOUT LONG-TERM CURRENT USE OF INSULIN (HCC): ICD-10-CM

## 2022-12-06 RX ORDER — ATORVASTATIN CALCIUM 80 MG/1
80 TABLET, FILM COATED ORAL DAILY
Qty: 90 TABLET | Refills: 1 | Status: SHIPPED | DISCHARGE
Start: 2022-12-06

## 2022-12-06 NOTE — PROGRESS NOTES
Post-Discharge Transitional Care Management Services      Kayla Guerrero   YOB: 1935    Date of Visit:  12/6/2022  30 Day Post-Discharge Date: 12/30/2022    Allergies   Allergen Reactions    Pcn [Penicillins] Hives    Sulfa Antibiotics Hives     Outpatient Medications Marked as Taking for the 12/6/22 encounter (Office Visit) with Jf Salazar MD   Medication Sig Dispense Refill    atorvastatin (LIPITOR) 80 MG tablet Take 1 tablet by mouth daily 90 tablet 1    blood glucose test strips (ASCENSIA AUTODISC VI;ONE TOUCH ULTRA TEST VI) strip 1 each by Does not apply route 2 times daily 100 each 3    nitroGLYCERIN (NITROSTAT) 0.4 MG SL tablet Place 1 tablet under the tongue every 5 minutes as needed for Chest pain up to max of 3 total doses. If no relief after 1 dose, call 911. 25 tablet 3    spironolactone (ALDACTONE) 25 MG tablet Take 1 tablet by mouth daily 30 tablet 3    isosorbide mononitrate (IMDUR) 30 MG extended release tablet Take 1 tablet by mouth daily 30 tablet 0    losartan (COZAAR) 25 MG tablet Take 1 tablet by mouth daily HOLD IF SBP < 100 30 tablet 0    metoprolol succinate (TOPROL XL) 25 MG extended release tablet Take 1 tablet by mouth daily Do not crush or chew.-Hold SBP less 90 or HR < 60 30 tablet 0    ferrous sulfate (IRON 325) 325 (65 Fe) MG tablet Take 1 tablet by mouth 2 times daily (with meals) 30 tablet 0    furosemide (LASIX) 20 MG tablet Take 1 tablet by mouth daily 60 tablet 0    albuterol-ipratropium (COMBIVENT RESPIMAT)  MCG/ACT AERS inhaler Inhale 1 puff into the lungs 2 times daily 1 each 3    metFORMIN (GLUCOPHAGE) 500 MG tablet Take 1 tablet by mouth 2 times daily (with meals) 180 tablet 1    pregabalin (LYRICA) 25 MG capsule Take 1 capsule by mouth 2 times daily for 60 days.  120 capsule 0    Cholecalciferol (VITAMIN D3) 50 MCG (2000 UT) CAPS Take by mouth daily      ascorbic acid (VITAMIN C) 250 MG tablet Take 250 mg by mouth daily      vitamin B-12 (CYANOCOBALAMIN) 1000 MCG tablet Take 1,000 mcg by mouth daily      Lancets MISC 1 each by Does not apply route 2 times daily 100 each 5    timolol (TIMOPTIC) 0.5 % ophthalmic solution INSTILL 1 DROP INTO EACH EYE TWICE DAILY  3    latanoprost (XALATAN) 0.005 % ophthalmic solution INSTILL 1 DROP INTO EACH EYE ONCE DAILY AT BEDTIME  3    brimonidine (ALPHAGAN) 0.2 % ophthalmic solution INSTILL 1 DROP INTO LEFT EYE TWICE DAILY  3    Blood Glucose Monitoring Suppl DELTA 1 Units by Does not apply route 2 times daily Please give whatever insurance covers 1 Device 0    magnesium (MAGNESIUM-OXIDE) 250 MG TABS tablet Take 250 mg by mouth daily      Multiple Vitamin (ONE-A-DAY ESSENTIAL PO) Take 1 tablet by mouth.        calcium carbonate-vitamin D 600-200 MG-UNIT TABS Take 1 tablet by mouth daily. Misc Natural Products (OSTEO BI-FLEX ADV JOINT SHIELD PO) Take 2 tablets by mouth daily. aspirin 81 MG EC tablet Take 81 mg by mouth daily. Vitals:    12/06/22 1136   BP: 122/84   Pulse: 78   SpO2: 99%   Weight: 153 lb (69.4 kg)     Body mass index is 24.69 kg/m². Wt Readings from Last 3 Encounters:   12/06/22 153 lb (69.4 kg)   11/24/22 153 lb (69.4 kg)   11/22/22 160 lb (72.6 kg)     BP Readings from Last 3 Encounters:   12/06/22 122/84   11/24/22 (!) 118/57   11/23/22 (!) 105/51        Patient was admitted to [facility] from 11/24/2022 to 11/30/2022 for CHEST PAIN. WAS EVALUATED AND FOUND TO HAVE SEVERE LAD BLOCKAGE. NO SURGICAL INTERVENTION BECAUSE OF AGE AND MULTIPLE MEDICAL CONDITIONS. Inpatient course: Discharge summary reviewed- see chart. Current status: FEELS GOOD. NO CHEST PAIN OR SHORTNESS OF BREATH. SEEING CARDIOLOGIST ON 12/22/2022. Review of Systems:  A comprehensive review of systems was negative except for what was noted in the HPI.     Physical Exam:  General Appearance: alert and oriented to person, place and time, well developed and well- nourished, in no acute distress  Skin: warm and dry, no rash or erythema  Head: normocephalic and atraumatic  Eyes: pupils equal, round, and reactive to light, extraocular eye movements intact, conjunctivae normal  ENT: tympanic membrane, external ear and ear canal normal bilaterally, nose without deformity, nasal mucosa and turbinates normal without polyps  Neck: supple and non-tender without mass, no thyromegaly or thyroid nodules, no cervical lymphadenopathy  Pulmonary/Chest: clear to auscultation bilaterally- no wheezes, rales or rhonchi, normal air movement, no respiratory distress  Cardiovascular: normal rate, regular rhythm, normal S1 and S2, no murmurs, rubs, clicks, or gallops, distal pulses intact, no carotid bruits  Abdomen: soft, non-tender, non-distended, normal bowel sounds, no masses or organomegaly  Extremities: no cyanosis, clubbing or edema  Musculoskeletal: normal range of motion, no joint swelling, deformity or tenderness  Neurologic: reflexes normal and symmetric, no cranial nerve deficit, gait, coordination and speech normal    Initial post-discharge communication occurred between nurse and caregiver- DAUGHTER   on 12/1/2022- see documentation in chart: telephone encounter. Assessment/Plan:  Carine Danielle was seen today for follow-up from hospital.    Diagnoses and all orders for this visit:    Non-ST elevation MI (NSTEMI) ([address]  Comments:  STABLE    Stenosis of left anterior descending (LAD) artery  Comments:  STABLE    Type 2 diabetes mellitus without complication, without long-term current use of insulin (HCC)  Comments:  CONTROLLED  Orders:  -     blood glucose test strips (ASCENSIA AUTODISC VI;ONE TOUCH ULTRA TEST VI) strip; 1 each by Does not apply route 2 times daily  -     CBC with Auto Differential; Future    Mixed hyperlipidemia  Comments:  CONTROLLED  Orders:  -     Comprehensive Metabolic Panel; Future  -     Lipid Panel;  Future    Diabetic polyneuropathy associated with diabetes mellitus due to underlying condition ([facility]  Comments:  SATBLE    Chronic obstructive pulmonary disease, unspecified COPD type ([facility]  Comments:  STABLE  Orders:  -     CBC with Auto Differential; Future    Frail elderly  -     DME Order for Walker as OP    Other orders  -     atorvastatin (LIPITOR) 80 MG tablet; Take 1 tablet by mouth daily        Diagnostic test results reviewed: inpatient labs, chest x-ray, and CARDIOLOGY NOTE    Patient risk of morbidity and mortality: high    Medical Decision Making: high complexity    I have reviewed my findings and recommendations with Marilee Gusman. Patient Instructions   LOW SALT FOR BLOOD PRESSURE CONTROL. LOW CARBOHYDRATE FOR BLOOD SUGAR AND WEIGHT CONTROL. LOW FAT DIET FOR CHOLESTEROL CONTROL. DRINK ENOUGH FLUIDS FOR BETTER KIDNEY FUNCTION. TAKE COZAAR 50 MG. TOPROL 50 MG AND DYAZIDE 37.5-25 MG. DAILY FOR BLOOD PRESSURE AND LEG EDEMA CONTROL. TAKE METFORMIN 500 MG. 2 TIMES A DAY  FOR BLOOD SUGAR CONTROL. TAKE LIPITOR 80 MG. NIGHTLY FOR CHOLESTEROL CONTROL AS PER [facility]. INHALE COMBIVENT INHALER 1 PUFF 2 TIMES A DAY FOR BREATHING CONTROL  REGULAR WALKING IN HOUSE ADVISED. OK FOR WHEELED WALKER. FASTING FOR LAB WORK PRIOR TO NEXT VISIT. NEXT APPOINTMENT IN 1 MONTH. Return for FOLLOW UP VISIT.      Electronically signed by Sharan Osborne MD on 12/6/22 at 11:58 AM EST

## 2022-12-14 ENCOUNTER — TELEPHONE (OUTPATIENT)
Dept: FAMILY MEDICINE CLINIC | Age: 87
End: 2022-12-14

## 2022-12-14 NOTE — TELEPHONE ENCOUNTER
Patient's daughter, Monse Aparicio, called at 4:28 pm 12/13/22 requesting to have DME order for wheeled walker faxed to [address] [place] [address]. Order faxed, as requested. I called Monse Aparicio and [facility] informing.

## 2022-12-16 ENCOUNTER — TELEPHONE (OUTPATIENT)
Dept: FAMILY MEDICINE CLINIC | Age: 87
End: 2022-12-16

## 2022-12-16 NOTE — TELEPHONE ENCOUNTER
Home care called to report patient's BP 80/50. States she is asymptomatic and has not taken her medication yet this morning.    Last seen 12/6/2022  Next appt 1/12/2023

## 2022-12-16 NOTE — TELEPHONE ENCOUNTER
Dr Rafal Gonzalez stated to not take losartan or lasix. Spoke to Nurse Mica Howell she states patient does not take losartan and she already took her lasix. She advised patient not to take her metoprolol.

## 2022-12-22 ENCOUNTER — OFFICE VISIT (OUTPATIENT)
Dept: CARDIOLOGY CLINIC | Age: 87
End: 2022-12-22
Payer: MEDICARE

## 2022-12-22 VITALS
WEIGHT: 146 LBS | SYSTOLIC BLOOD PRESSURE: 102 MMHG | RESPIRATION RATE: 16 BRPM | DIASTOLIC BLOOD PRESSURE: 58 MMHG | BODY MASS INDEX: 23.46 KG/M2 | HEIGHT: 66 IN | HEART RATE: 96 BPM

## 2022-12-22 DIAGNOSIS — I25.10 CORONARY ARTERY DISEASE INVOLVING NATIVE CORONARY ARTERY OF NATIVE HEART WITHOUT ANGINA PECTORIS: ICD-10-CM

## 2022-12-22 DIAGNOSIS — I10 ESSENTIAL HYPERTENSION: Primary | ICD-10-CM

## 2022-12-22 DIAGNOSIS — I25.5 ISCHEMIC CARDIOMYOPATHY: ICD-10-CM

## 2022-12-22 PROCEDURE — G8420 CALC BMI NORM PARAMETERS: HCPCS | Performed by: INTERNAL MEDICINE

## 2022-12-22 PROCEDURE — G8427 DOCREV CUR MEDS BY ELIG CLIN: HCPCS | Performed by: INTERNAL MEDICINE

## 2022-12-22 PROCEDURE — 1036F TOBACCO NON-USER: CPT | Performed by: INTERNAL MEDICINE

## 2022-12-22 PROCEDURE — 99214 OFFICE O/P EST MOD 30 MIN: CPT | Performed by: INTERNAL MEDICINE

## 2022-12-22 PROCEDURE — G8484 FLU IMMUNIZE NO ADMIN: HCPCS | Performed by: INTERNAL MEDICINE

## 2022-12-22 PROCEDURE — 1090F PRES/ABSN URINE INCON ASSESS: CPT | Performed by: INTERNAL MEDICINE

## 2022-12-22 PROCEDURE — 93000 ELECTROCARDIOGRAM COMPLETE: CPT | Performed by: INTERNAL MEDICINE

## 2022-12-22 PROCEDURE — 1111F DSCHRG MED/CURRENT MED MERGE: CPT | Performed by: INTERNAL MEDICINE

## 2022-12-22 PROCEDURE — 1123F ACP DISCUSS/DSCN MKR DOCD: CPT | Performed by: INTERNAL MEDICINE

## 2022-12-22 RX ORDER — SPIRONOLACTONE 25 MG/1
12.5 TABLET ORAL DAILY
Qty: 45 TABLET | Refills: 3 | Status: SHIPPED
Start: 2022-12-22

## 2022-12-22 RX ORDER — METOPROLOL SUCCINATE 25 MG/1
12.5 TABLET, EXTENDED RELEASE ORAL DAILY
Qty: 45 TABLET | Refills: 0 | Status: SHIPPED
Start: 2022-12-22

## 2022-12-22 NOTE — PROGRESS NOTES
OFFICE VISIT     PRIMARY CARE PHYSICIAN:      Abelina Sever, MD       ALLERGIES / SENSITIVITIES:        Allergies   Allergen Reactions    Pcn [Penicillins] Hives    Sulfa Antibiotics Hives          REVIEWED MEDICATIONS:        Current Outpatient Medications:     metoprolol succinate (TOPROL XL) 25 MG extended release tablet, Take 0.5 tablets by mouth daily Do not crush or chew.-Hold SBP less 90 or HR < 60, Disp: 45 tablet, Rfl: 0    spironolactone (ALDACTONE) 25 MG tablet, Take 0.5 tablets by mouth daily, Disp: 45 tablet, Rfl: 3    atorvastatin (LIPITOR) 80 MG tablet, Take 1 tablet by mouth daily, Disp: 90 tablet, Rfl: 1    blood glucose test strips (ASCENSIA AUTODISC VI;ONE TOUCH ULTRA TEST VI) strip, 1 each by Does not apply route 2 times daily, Disp: 100 each, Rfl: 3    nitroGLYCERIN (NITROSTAT) 0.4 MG SL tablet, Place 1 tablet under the tongue every 5 minutes as needed for Chest pain up to max of 3 total doses. If no relief after 1 dose, call 911., Disp: 25 tablet, Rfl: 3    ferrous sulfate (IRON 325) 325 (65 Fe) MG tablet, Take 1 tablet by mouth 2 times daily (with meals), Disp: 30 tablet, Rfl: 0    furosemide (LASIX) 20 MG tablet, Take 1 tablet by mouth daily, Disp: 60 tablet, Rfl: 0    albuterol-ipratropium (COMBIVENT RESPIMAT)  MCG/ACT AERS inhaler, Inhale 1 puff into the lungs 2 times daily, Disp: 1 each, Rfl: 3    metFORMIN (GLUCOPHAGE) 500 MG tablet, Take 1 tablet by mouth 2 times daily (with meals), Disp: 180 tablet, Rfl: 1    pregabalin (LYRICA) 25 MG capsule, Take 1 capsule by mouth 2 times daily for 60 days. , Disp: 120 capsule, Rfl: 0    ascorbic acid (VITAMIN C) 250 MG tablet, Take 250 mg by mouth daily, Disp: , Rfl:     Lancets MISC, 1 each by Does not apply route 2 times daily, Disp: 100 each, Rfl: 5    timolol (TIMOPTIC) 0.5 % ophthalmic solution, INSTILL 1 DROP INTO EACH EYE TWICE DAILY, Disp: , Rfl: 3    latanoprost (XALATAN) 0.005 % ophthalmic solution, INSTILL 1 DROP INTO EACH EYE ONCE DAILY AT BEDTIME, Disp: , Rfl: 3    brimonidine (ALPHAGAN) 0.2 % ophthalmic solution, INSTILL 1 DROP INTO LEFT EYE TWICE DAILY, Disp: , Rfl: 3    Blood Glucose Monitoring Suppl DELTA, 1 Units by Does not apply route 2 times daily Please give whatever insurance covers, Disp: 1 Device, Rfl: 0    magnesium (MAGNESIUM-OXIDE) 250 MG TABS tablet, Take 250 mg by mouth daily, Disp: , Rfl:     Multiple Vitamin (ONE-A-DAY ESSENTIAL PO), Take 1 tablet by mouth.  , Disp: , Rfl:     calcium carbonate-vitamin D 600-200 MG-UNIT TABS, Take 1 tablet by mouth daily. , Disp: , Rfl:     Misc Natural Products (OSTEO BI-FLEX ADV JOINT SHIELD PO), Take 2 tablets by mouth daily. , Disp: , Rfl:     aspirin 81 MG EC tablet, Take 81 mg by mouth daily. , Disp: , Rfl:     Cholecalciferol (VITAMIN D3) 50 MCG (2000 UT) CAPS, Take by mouth daily (Patient not taking: Reported on 12/22/2022), Disp: , Rfl:     vitamin B-12 (CYANOCOBALAMIN) 1000 MCG tablet, Take 1,000 mcg by mouth daily (Patient not taking: Reported on 12/22/2022), Disp: , Rfl:       S: REASON FOR VISIT:       Chief Complaint   Patient presents with    Coronary Artery Disease     Hospital follow up, Dr. Edmundo Escudero           History of Present Illness:       Office Visit for follow up of CAD, CMP, post-hospital f/u visit   81year old with recent Dx of CAD, Ischemic CMP. HTN, diabetes came for f/u visit   Seen by Dr Edmundo Escudero at [facility], sent to [facility] for heart cath -11/23/22 - 3V CAD   Seen by CT surgery Dr Irina Urbano 11/23/22 - Not CABG candidate - Transferred to [facility]   At [facility] [place] - there for 6 days; Meds decreased due to low BP; Med Rx recommend-No CABG/No PCI - Wearing Life Vest   No hospitalizations or surgeries since last visit   Patient is compliant with all medications   Clint any exertional chest pain or short of breath   No palpitations, dizzy or syncope.    Fairly active at home   Try to watch diet          Past Medical History:   Diagnosis Date    CAD (coronary artery disease) COPD (chronic obstructive pulmonary disease) ([facility] [address]     H/O cardiovascular stress test 2022    Lexiscan    Hyperlipidemia     Hypertension     Osteoarthritis     Type II or unspecified type diabetes mellitus without mention of complication, not stated as uncontrolled             Past Surgical History:   Procedure Laterality Date    APPENDECTOMY  1953    CARDIAC CATHETERIZATION  2022    Dr Andrea Caraballo ,1971    COLONOSCOPY  2003    TUMOR REMOVAL  1954    under lt arm          Family History   Problem Relation Age of Onset    Heart Disease Mother     High Blood Pressure Mother     Stroke Mother     Diabetes Mother     Heart Disease Father     High Blood Pressure Father           Social History     Tobacco Use    Smoking status: Former     Packs/day: 1.00     Years: 9.00     Pack years: 9.00     Types: Cigarettes     Quit date: 2000     Years since quittin.2    Smokeless tobacco: Never   Substance Use Topics    Alcohol use: No     Alcohol/week: 0.0 standard drinks         Review of Systems:    Constitutional: negative for fever and chills, or significant weight loss  HEENT: negative for acute visual symptoms or auditory problems, no dysphagia  Respiratory: negative for cough, wheezing, or hemoptysis  Cardiovascular: negative for chest pain, palpitations, and dyspnea  Gastrointestinal: negative for abdominal pain, diarrhea, melena, nausea and vomiting  Endocrine: Negative for polyuria and polydyspsia  Genitourinary: negative for dysuria and hematuria  Derm: negative for rash and skin lesion(s)  Neurological: negative for tingling, numbness, weakness, seizures  Endocrine: negative for polydipsia and polyuria  Musculoskeletal: negative for pain or tenderness  Psychiatric: negative for anxiety, depression, or suicidal ideations         O:  COMPLETE PHYSICAL EXAM:       BP (!) 102/58   Pulse 96   Resp 16   Ht 5' 6\" (1.676 m)   Wt 146 lb (66.2 kg)   LMP  (LMP Unknown)   BMI 23.57 kg/m²       General:   Patient alert, comfortable, no distress. Appears stated age. HEENT:    Pupils equal, no icterus; Tongue moist.   Neck:              No masses, Thyroid not palpable. No elevated JVD, No carotid bruit. Chest:   Normal configuration, non tender. Wearing life Vest   Lungs:   Clear to auscultation bilaterally except few scattered rhonchi. Cardiovascular:  Regular rhythm, 2/6 systolic murmur, No S3, no palpable thrills. Abdomen:  Soft, Bowel sounds normal, no pulsatile abdominal aorta, no palpable masses. Extremities:  No edema. Distal pulses palpable. No cyanosis, no clubbing. Skin:   Good turgor, warm and dry, no cyanosis. Musculoskeletal: No joint swelling or deformity. Neuro:   Cranial nerves grossly intact; No focal neurologic deficit. Psych:   Alert, good mood and effect. REVIEW OF DIAGNOSTIC TESTS:        Electrocardiogram: Reviewed           [facility]: 11/23/22  - Dr Gracie Guerrier    CTA chest 11/15/22  Impression   1. There is no pulmonary embolus. 2. Moderate size right pleural effusion with compression atelectasis of the   right lower lobe   3. Small left pleural effusion   4. Emphysematous changes. 5. Cardiomegaly with findings of mild vascular congestion      Echocardiogram:   TTE 11/15/22 [facility]   Summary   Ejection fraction is visually estimated at 20%. Overall ejection fraction severely decreased. Left ventricle is moderately enlarged. Normal right ventricle structure and function. Left atrial volume index of 68 ml per meters squared BSA. Physiologic and/or trace aortic regurgitation is noted. Mild to moderate mitral regurgitation is present. Mild tricuspid regurgitation. No evidence for hemodynamically significant pericardial effusion. Stress test:    Pharm stress 11/17/22  Impression   1: The stress EKG report is provided separately. 2  This is an abnormal myocardial perfusion scan.  Findings suggestive   of an infarction with mild-to-moderate casi-infarct ischemia in the   LAD/diagonal territory. 3: Left ventricle is moderately dilated with severe global   hypokinesis. EF 30%. There appears to be akinesis of the apical segments. 4: Prognostically, this is an intermediate to high risk study. ASSESSMENT / PLAN:    Jalil Bedoya was seen today for coronary artery disease. Diagnoses and all orders for this visit:    Essential hypertension  -     EKG 12 Lead    Coronary artery disease involving native coronary artery of native heart without angina pectoris - MV CAD by [facility] 11/2022 - Medical therapy only due to heavily calcified vessels and calcified Aorta; seen at [facility] - Continue ASA, BB, Statin, Farxiga    Ischemic cardiomyopathy - EF 20% - GDMT limited due to low BP - Wearing Life Vest, Repeat Echo after 90 days - If EF <35%, EP referral to discuss ICD. Low salt diet discussed  -     Echo limited; Future    VHD  - MR, TR    Low BP - Monitor BP    Diabetes - Per Dr Surya Leach    Preventive Cardiology: Low salt, low cholesterol diet, regular exercise as tolerate discussed. Other orders  -     metoprolol succinate (TOPROL XL) 25 MG extended release tablet; Take 0.5 tablets by mouth daily Do not crush or chew. - Hold SBP less 90 or HR < 60  -     spironolactone (ALDACTONE) 25 MG tablet; Take 0.5 tablets by mouth daily      Above recommendations discussed with her and her daughter. The patient's current medication list, allergies, problem list and results of prior tests (as available) were reviewed at today's visit   All questions answered about cardiac diagnoses and cardiac medications. Continue current medications. Monitor BP and heart rates. Compliance with medications and f/u with all physicians discussed. Risk factor modification based on risk profile discussed. Call if any exertional chest pain, short of breath, dizzy or palpitations. Follow up in 3 months or earlier if needed.          [facility]  [address], [place], [address] [facility]  [phone]

## 2022-12-29 DIAGNOSIS — E78.2 MIXED HYPERLIPIDEMIA: ICD-10-CM

## 2022-12-29 DIAGNOSIS — J44.9 CHRONIC OBSTRUCTIVE PULMONARY DISEASE, UNSPECIFIED COPD TYPE (HCC): ICD-10-CM

## 2022-12-29 DIAGNOSIS — E11.9 TYPE 2 DIABETES MELLITUS WITHOUT COMPLICATION, WITHOUT LONG-TERM CURRENT USE OF INSULIN (HCC): ICD-10-CM

## 2022-12-29 LAB
ALBUMIN SERPL-MCNC: 3.9 G/DL (ref 3.5–5.2)
ALP BLD-CCNC: 89 U/L (ref 35–104)
ALT SERPL-CCNC: 14 U/L (ref 0–32)
ANION GAP SERPL CALCULATED.3IONS-SCNC: 13 MMOL/L (ref 7–16)
AST SERPL-CCNC: 25 U/L (ref 0–31)
BASOPHILS ABSOLUTE: 0.01 E9/L (ref 0–0.2)
BASOPHILS RELATIVE PERCENT: 0.2 % (ref 0–2)
BILIRUB SERPL-MCNC: 0.9 MG/DL (ref 0–1.2)
BUN BLDV-MCNC: 26 MG/DL (ref 6–23)
CALCIUM SERPL-MCNC: 10.1 MG/DL (ref 8.6–10.2)
CHLORIDE BLD-SCNC: 99 MMOL/L (ref 98–107)
CHOLESTEROL, TOTAL: 118 MG/DL (ref 0–199)
CO2: 26 MMOL/L (ref 22–29)
CREAT SERPL-MCNC: 1 MG/DL (ref 0.5–1)
EOSINOPHILS ABSOLUTE: 0.23 E9/L (ref 0.05–0.5)
EOSINOPHILS RELATIVE PERCENT: 4.3 % (ref 0–6)
GFR SERPL CREATININE-BSD FRML MDRD: 55 ML/MIN/1.73
GLUCOSE BLD-MCNC: 109 MG/DL (ref 74–99)
HCT VFR BLD CALC: 33.4 % (ref 34–48)
HDLC SERPL-MCNC: 35 MG/DL
HEMOGLOBIN: 10.8 G/DL (ref 11.5–15.5)
IMMATURE GRANULOCYTES #: 0.02 E9/L
IMMATURE GRANULOCYTES %: 0.4 % (ref 0–5)
LDL CHOLESTEROL CALCULATED: 58 MG/DL (ref 0–99)
LYMPHOCYTES ABSOLUTE: 1.2 E9/L (ref 1.5–4)
LYMPHOCYTES RELATIVE PERCENT: 22.2 % (ref 20–42)
MCH RBC QN AUTO: 30.7 PG (ref 26–35)
MCHC RBC AUTO-ENTMCNC: 32.3 % (ref 32–34.5)
MCV RBC AUTO: 94.9 FL (ref 80–99.9)
MONOCYTES ABSOLUTE: 0.52 E9/L (ref 0.1–0.95)
MONOCYTES RELATIVE PERCENT: 9.6 % (ref 2–12)
NEUTROPHILS ABSOLUTE: 3.43 E9/L (ref 1.8–7.3)
NEUTROPHILS RELATIVE PERCENT: 63.3 % (ref 43–80)
PDW BLD-RTO: 13.7 FL (ref 11.5–15)
PLATELET # BLD: 156 E9/L (ref 130–450)
PMV BLD AUTO: 11.6 FL (ref 7–12)
POTASSIUM SERPL-SCNC: 4.4 MMOL/L (ref 3.5–5)
RBC # BLD: 3.52 E12/L (ref 3.5–5.5)
SODIUM BLD-SCNC: 138 MMOL/L (ref 132–146)
TOTAL PROTEIN: 6.7 G/DL (ref 6.4–8.3)
TRIGL SERPL-MCNC: 123 MG/DL (ref 0–149)
VLDLC SERPL CALC-MCNC: 25 MG/DL
WBC # BLD: 5.4 E9/L (ref 4.5–11.5)

## 2022-12-31 NOTE — RESULT ENCOUNTER NOTE
KIDNEY FUNCTION BORDERLINE LOW. WILL MONITOR. BLOOD COUNT LOW BUT IMPROVING. CONTINUE CURRENT TREATMENT.

## 2023-01-12 ENCOUNTER — OFFICE VISIT (OUTPATIENT)
Dept: FAMILY MEDICINE CLINIC | Age: 88
End: 2023-01-12

## 2023-01-12 VITALS
BODY MASS INDEX: 22.92 KG/M2 | HEART RATE: 79 BPM | WEIGHT: 142 LBS | DIASTOLIC BLOOD PRESSURE: 60 MMHG | SYSTOLIC BLOOD PRESSURE: 94 MMHG | OXYGEN SATURATION: 98 %

## 2023-01-12 DIAGNOSIS — M25.511 CHRONIC RIGHT SHOULDER PAIN: ICD-10-CM

## 2023-01-12 DIAGNOSIS — G89.29 CHRONIC RIGHT SHOULDER PAIN: ICD-10-CM

## 2023-01-12 DIAGNOSIS — E11.9 TYPE 2 DIABETES MELLITUS WITHOUT COMPLICATION, WITHOUT LONG-TERM CURRENT USE OF INSULIN (HCC): Primary | ICD-10-CM

## 2023-01-12 DIAGNOSIS — I50.22 CHRONIC SYSTOLIC (CONGESTIVE) HEART FAILURE (HCC): ICD-10-CM

## 2023-01-12 DIAGNOSIS — E08.42 DIABETIC POLYNEUROPATHY ASSOCIATED WITH DIABETES MELLITUS DUE TO UNDERLYING CONDITION (HCC): ICD-10-CM

## 2023-01-12 DIAGNOSIS — J44.9 CHRONIC OBSTRUCTIVE PULMONARY DISEASE, UNSPECIFIED COPD TYPE (HCC): ICD-10-CM

## 2023-01-12 DIAGNOSIS — E78.2 MIXED HYPERLIPIDEMIA: ICD-10-CM

## 2023-01-12 PROBLEM — I50.9 ACUTE ON CHRONIC CONGESTIVE HEART FAILURE (HCC): Status: RESOLVED | Noted: 2022-11-17 | Resolved: 2023-01-12

## 2023-01-12 PROBLEM — I50.21 ACUTE SYSTOLIC HF (HEART FAILURE) (HCC): Status: RESOLVED | Noted: 2022-11-23 | Resolved: 2023-01-12

## 2023-01-12 RX ORDER — PREGABALIN 25 MG/1
25 CAPSULE ORAL 2 TIMES DAILY
Qty: 120 CAPSULE | Refills: 0 | Status: SHIPPED | OUTPATIENT
Start: 2023-01-12 | End: 2023-03-13

## 2023-01-12 ASSESSMENT — PATIENT HEALTH QUESTIONNAIRE - PHQ9
SUM OF ALL RESPONSES TO PHQ QUESTIONS 1-9: 0
SUM OF ALL RESPONSES TO PHQ QUESTIONS 1-9: 0
2. FEELING DOWN, DEPRESSED OR HOPELESS: 0
SUM OF ALL RESPONSES TO PHQ9 QUESTIONS 1 & 2: 0
SUM OF ALL RESPONSES TO PHQ QUESTIONS 1-9: 0
SUM OF ALL RESPONSES TO PHQ QUESTIONS 1-9: 0
1. LITTLE INTEREST OR PLEASURE IN DOING THINGS: 0

## 2023-01-12 NOTE — PROGRESS NOTES
OFFICE PROGRESS NOTE      SUBJECTIVE:        Patient ID:   Stephanie Rizzo is a 81 y.o. female who presents for   Chief Complaint   Patient presents with    Shoulder Pain     right           HPI:     RECHECK BP, CHOLESTEROL AND DIABETES. HAS RIGHT SHOULDER PAINS FOR LONG TIME GETTING WORSE.WANTS TO SEE ORTHOPEDIST FOR RELIEF. MEDICATION REFILL. FEELS GOOD. NO SYMPTOMS RELATED TO LOW BP TODAY. WATCHING DIET GOOD. WALKING SOME IN HOUSE FOR EXERCISE. TAKING MEDICATIONS REGULARLY. Prior to Admission medications    Medication Sig Start Date End Date Taking? Authorizing Provider   metoprolol succinate (TOPROL XL) 25 MG extended release tablet Take 0.5 tablets by mouth daily Do not crush or chew.-Hold SBP less 90 or HR < 60 12/22/22  Yes Shruthi Clark MD   spironolactone (ALDACTONE) 25 MG tablet Take 0.5 tablets by mouth daily 12/22/22  Yes Shruthi Clark MD   atorvastatin (LIPITOR) 80 MG tablet Take 1 tablet by mouth daily 12/6/22  Yes Nya Roldan MD   blood glucose test strips (ASCENSIA AUTODISC VI;ONE TOUCH ULTRA TEST VI) strip 1 each by Does not apply route 2 times daily 12/6/22  Yes Nya Roldan MD   nitroGLYCERIN (NITROSTAT) 0.4 MG SL tablet Place 1 tablet under the tongue every 5 minutes as needed for Chest pain up to max of 3 total doses.  If no relief after 1 dose, call 911. 11/24/22  Yes Shruthi Clark MD   ferrous sulfate (IRON 325) 325 (65 Fe) MG tablet Take 1 tablet by mouth 2 times daily (with meals) 11/22/22  Yes Sri Vela MD   furosemide (LASIX) 20 MG tablet Take 1 tablet by mouth daily 11/23/22  Yes Sri Vela MD   albuterol-ipratropium (COMBIVENT RESPIMAT)  MCG/ACT AERS inhaler Inhale 1 puff into the lungs 2 times daily 11/8/22  Yes Nya Roldan MD   metFORMIN (GLUCOPHAGE) 500 MG tablet Take 1 tablet by mouth 2 times daily (with meals) 11/8/22  Yes Nya Roldan MD   pregabalin (LYRICA) 25 MG capsule Take 1 capsule by mouth 2 times daily for 60 days. 22 Yes Elmo Greene MD   ascorbic acid (VITAMIN C) 250 MG tablet Take 250 mg by mouth daily   Yes Historical Provider, MD   timolol (TIMOPTIC) 0.5 % ophthalmic solution INSTILL 1 DROP INTO EACH EYE TWICE DAILY 19  Yes Historical Provider, MD   latanoprost (XALATAN) 0.005 % ophthalmic solution INSTILL 1 DROP INTO EACH EYE ONCE DAILY AT BEDTIME 19  Yes Historical Provider, MD   brimonidine (ALPHAGAN) 0.2 % ophthalmic solution INSTILL 1 DROP INTO LEFT EYE TWICE DAILY 19  Yes Historical Provider, MD   Blood Glucose Monitoring Suppl DELTA 1 Units by Does not apply route 2 times daily Please give whatever insurance covers 17  Yes Elmo Greene MD   magnesium (MAGNESIUM-OXIDE) 250 MG TABS tablet Take 250 mg by mouth daily   Yes Historical Provider, MD   Multiple Vitamin (ONE-A-DAY ESSENTIAL PO) Take 1 tablet by mouth. Yes Historical Provider, MD   calcium carbonate-vitamin D 600-200 MG-UNIT TABS Take 1 tablet by mouth daily. Yes Historical Provider, MD   Misc Natural Products (OSTEO BI-FLEX ADV JOINT SHIELD PO) Take 2 tablets by mouth daily. Yes Historical Provider, MD   aspirin 81 MG EC tablet Take 81 mg by mouth daily.      Yes Historical Provider, MD   Cholecalciferol (VITAMIN D3) 50 MCG (2000 UT) CAPS Take by mouth daily  Patient not taking: Reported on 2022    Historical Provider, MD   vitamin B-12 (CYANOCOBALAMIN) 1000 MCG tablet Take 1,000 mcg by mouth daily  Patient not taking: Reported on 2022    Historical Provider, MD   Lancets MISC 1 each by Does not apply route 2 times daily 19   Elmo Greene MD     Social History     Socioeconomic History    Marital status:    Tobacco Use    Smoking status: Former     Packs/day: 1.00     Years: 9.00     Pack years: 9.00     Types: Cigarettes     Quit date: 2000     Years since quittin.3    Smokeless tobacco: Never   Vaping Use    Vaping Use: Never used   Substance and Sexual Activity    Alcohol use: No     Alcohol/week: 0.0 standard drinks    Drug use: No    Sexual activity: Never     Partners: Male     Social Determinants of Health     Financial Resource Strain: Low Risk     Difficulty of Paying Living Expenses: Not hard at all   Food Insecurity: No Food Insecurity    Worried About Running Out of Food in the Last Year: Never true    Ran Out of Food in the Last Year: Never true       I have reviewed Mira's allergies, medications, problem list, medical, social and family history and have updated as needed in the electronic medical record”  Review Of Systems:    Skin: no abnormal pigmentation, rash, scaling, itching, masses, hair or nail changes  Eyes: no blurring, diplopia, or eye pain  Ears/Nose/Throat: no hearing loss, tinnitus, vertigo, nosebleed, nasal congestion, rhinorrhea, sore throat  Respiratory: no cough, pleuritic chest pain, dyspnea, or wheezing  Cardiovascular: no chest pain, angina, dyspnea on exertion, orthopnea, PND, palpitations, or claudication  Gastrointestinal: no nausea, vomiting, heartburn, diarrhea, constipation, bloating,  abdominal pain, or blood per rectum.Appetite is good  Genitourinary: no urinary urgency, frequency, dysuria, nocturia, hesitancy, or incontinence  Musculoskeletal: joint pains off and on. Morning stiffness. Ambulating well  Neurologic: no paralysis, paresis, paresthesia, seizures, tremors, or headaches  Hematologic/Lymphatic/Immunologic: no anemia, abnormal bleeding/bruising, fever, chills, night sweats, swollen glands, or unexplained weight loss  Endocrine: no heat or cold intolerance and no polyphagia, polydipsia, or polyuria        OBJECTIVE:     VS:  Wt Readings from Last 3 Encounters:   01/12/23 142 lb (64.4 kg)   12/22/22 146 lb (66.2 kg)   12/06/22 153 lb (69.4 kg)     Temp Readings from Last 3 Encounters:   11/24/22 98.2 °F (36.8 °C) (Temporal)   11/23/22 98 °F (36.7 °C) (Oral)   11/12/22 98.6 °F (37 °C)  (Infrared)     BP Readings from Last 3 Encounters:   01/12/23 94/60   12/22/22 (!) 102/58   12/06/22 122/84        General appearance: Alert, Awake, Oriented times 3, no distress  Skin: Warm and dry  Head: Normocephalic. No masses, lesions or tenderness noted  Eyes: Conjunctivae appear normal. PERLE  Ears: External ears normal  Nose/Sinuses: Nares normal. Septum midline. Mucosa normal. No drainage  Oropharynx: Oropharynx clear with no exudate seen  Neck: Neck supple. No jugular venous distension, lymphadenopathy or thyromegaly Trachea midline  Chest:  Normal. Movements are Normal and Equal.  Lungs: Lungs clear to auscultation bilaterally. No ronchi, crackles or wheezes  Heart: S1 S2  Regular rate and rhythm. No rub, murmur or gallop  Abdomen: Abdomen soft, non-tender. BS normal. No masses, organomegaly. Back: Grossly Normal and Equal. DTR are Normal. SLR is Normal.  Extremities: Arthritic changes are noted. Movements are limited. Pedal pulses are normal.  Musculoskeletal: Muscular strength appears intact. No joint effusion, tenderness, swelling or warmth  Neuro:  No focal motor or sensory deficits        ASSESSMENT     Patient Active Problem List    Diagnosis Date Noted    Type 2 diabetes mellitus without complication, without long-term current use of insulin ([address]     Mixed hyperlipidemia     Essential hypertension     Chronic systolic (congestive) heart failure 01/12/2023    CAD in native artery 11/23/2022    Coronary artery disease (CAD) excluded 11/23/2022    Urinary retention 11/17/2022    Abnormal cardiovascular stress test 11/17/2022    Hypotension 11/17/2022    COPD exacerbation ([address] 11/17/2022    Bilateral pleural effusion 11/15/2022    RLS (restless legs syndrome) 06/23/2015    Osteoarthritis     Diabetic polyneuropathy associated with diabetes mellitus due to underlying condition ([address] 04/17/2019    Chronic obstructive pulmonary disease ([address] 02/09/2016        Diagnosis:     ICD-10-CM    1.  Type 2 diabetes mellitus without complication, without long-term current use of insulin (HCC)  E11.9     CONTROLLED      2. Chronic obstructive pulmonary disease, unspecified COPD type ([facility]  J44.9     STABLE      3. Diabetic polyneuropathy associated with diabetes mellitus due to underlying condition ([facility]  E08.42     STABLE      4. Mixed hyperlipidemia  E78.2 Comprehensive Metabolic Panel     Lipid Panel    CONTROLLED      5. Chronic systolic (congestive) heart failure  I50.22     STABLE      6. Chronic right shoulder pain  M25.511 Tanisha Rossi , Orthopaedics and Sports Medicine, [address]    J43.79           PLAN:           Patient Instructions   LOW SALT FOR BLOOD PRESSURE CONTROL. LOW CARBOHYDRATE FOR BLOOD SUGAR AND WEIGHT CONTROL. LOW FAT DIET FOR CHOLESTEROL CONTROL. DRINK ENOUGH FLUIDS FOR BETTER KIDNEY FUNCTION. TAKE COZAAR 50 MG. TOPROL 50 MG AND DYAZIDE 37.5-25 MG. DAILY FOR BLOOD PRESSURE AND LEG EDEMA CONTROL. TAKE METFORMIN 500 MG. 2 TIMES A DAY  FOR BLOOD SUGAR CONTROL. TAKE LIPITOR 80 MG. NIGHTLY FOR CHOLESTEROL CONTROL AS PER [facility]. INHALE COMBIVENT INHALER 1 PUFF 2 TIMES A DAY FOR BREATHING CONTROL  REGULAR WALKING IN HOUSE ADVISED. SEE ORTHOPEDIST AS SCHEDULED. FASTING FOR LAB WORK PRIOR TO NEXT VISIT. NEXT APPOINTMENT IN 2 MONTHS FOR ANNUAL PHYSICAL EXAMINATION . Return in about 2 months (around 3/12/2023) for [employer]. .         I have reviewed my findings and recommendations with Starr Garcia.     Electronically signed by Won Joyce MD on 1/12/23 at 10:56 AM EST

## 2023-01-12 NOTE — PATIENT INSTRUCTIONS
LOW SALT FOR BLOOD PRESSURE CONTROL. LOW CARBOHYDRATE FOR BLOOD SUGAR AND WEIGHT CONTROL. LOW FAT DIET FOR CHOLESTEROL CONTROL. DRINK ENOUGH FLUIDS FOR BETTER KIDNEY FUNCTION. TAKE COZAAR 50 MG. TOPROL 50 MG AND DYAZIDE 37.5-25 MG. DAILY FOR BLOOD PRESSURE AND LEG EDEMA CONTROL. TAKE METFORMIN 500 MG. 2 TIMES A DAY  FOR BLOOD SUGAR CONTROL. TAKE LIPITOR 80 MG. NIGHTLY FOR CHOLESTEROL CONTROL AS PER [facility]. INHALE COMBIVENT INHALER 1 PUFF 2 TIMES A DAY FOR BREATHING CONTROL  REGULAR WALKING IN HOUSE ADVISED. SEE ORTHOPEDIST AS SCHEDULED. FASTING FOR LAB WORK PRIOR TO NEXT VISIT. NEXT APPOINTMENT IN 2 MONTHS FOR ANNUAL PHYSICAL EXAMINATION .

## 2023-01-13 ENCOUNTER — TELEPHONE (OUTPATIENT)
Dept: FAMILY MEDICINE CLINIC | Age: 88
End: 2023-01-13

## 2023-01-13 NOTE — TELEPHONE ENCOUNTER
Patient's daughter Jessenia Mack called. She called to clarify what dosage of Metoprolol pt should be taking. The pt has been taking the 25 mg daily for a month. from back in November 2022. And at O/V on 1.12.23 patients instructions states pt should be taking 50 mg daily. please advise.   Last seen 1/12/2023  Next appt 2/23/2023  Jessenia Mack [phone]

## 2023-01-23 ENCOUNTER — OFFICE VISIT (OUTPATIENT)
Dept: ORTHOPEDIC SURGERY | Age: 88
End: 2023-01-23
Payer: MEDICARE

## 2023-01-23 VITALS — TEMPERATURE: 98 F | WEIGHT: 142 LBS | HEIGHT: 66 IN | BODY MASS INDEX: 22.82 KG/M2

## 2023-01-23 DIAGNOSIS — G89.29 CHRONIC RIGHT SHOULDER PAIN: Primary | ICD-10-CM

## 2023-01-23 DIAGNOSIS — M25.511 CHRONIC RIGHT SHOULDER PAIN: Primary | ICD-10-CM

## 2023-01-23 DIAGNOSIS — M75.21 TENDINITIS OF LONG HEAD OF BICEPS BRACHII OF RIGHT SHOULDER: ICD-10-CM

## 2023-01-23 PROCEDURE — G8420 CALC BMI NORM PARAMETERS: HCPCS | Performed by: ORTHOPAEDIC SURGERY

## 2023-01-23 PROCEDURE — 99203 OFFICE O/P NEW LOW 30 MIN: CPT | Performed by: ORTHOPAEDIC SURGERY

## 2023-01-23 PROCEDURE — 1123F ACP DISCUSS/DSCN MKR DOCD: CPT | Performed by: ORTHOPAEDIC SURGERY

## 2023-01-23 PROCEDURE — G8484 FLU IMMUNIZE NO ADMIN: HCPCS | Performed by: ORTHOPAEDIC SURGERY

## 2023-01-23 PROCEDURE — G8427 DOCREV CUR MEDS BY ELIG CLIN: HCPCS | Performed by: ORTHOPAEDIC SURGERY

## 2023-01-23 PROCEDURE — 1090F PRES/ABSN URINE INCON ASSESS: CPT | Performed by: ORTHOPAEDIC SURGERY

## 2023-01-23 PROCEDURE — 1036F TOBACCO NON-USER: CPT | Performed by: ORTHOPAEDIC SURGERY

## 2023-01-23 NOTE — PROGRESS NOTES
Shae Landeros is a 81 y.o. female, who presents   Chief Complaint   Patient presents with    Shoulder Pain     Right Shoulder x couple months, no known injury, no previous shoulder surgery. HPI[de-identified] Right shoulder pain been present for quite some time. Most of this is in the anterior and superior aspect. There are some decrease in range of motion. Mrs. Titus Gray is going to physical therapy in the not distant past which did help her but did not get rid of all of her discomfort. She is left-hand dominant. Allergies; medications; past medical, surgical, family, and social history; and problem list have been reviewed today and updated as indicated in this encounter - see below following Ortho specifics. Musculoskeletal: Skin condition gross neurovascular functions good in the right upper extremity. There is slight decreased soft tissue mass in the nondominant right shoulder. There is tenderness palpation over the long head biceps tendon area. Range of motion of the right shoulder passively is very good this morning but there is some crepitus which seems to be mostly in the long head biceps sheath area in the anterior proximal humerus. Elbow wrist and hand motion are full without pain. There is no instability of glenohumeral or AC joints. Muscle testing of the rotator cuff suggests intact muscles and tendons. Radiologic Studies: Imaging studies of the right shoulder from 11/8/2022 showed slight irregularities and some soft tissue calcifications about the humeral head more lateral and anterior. There is slight sharpening of the margins of the glenoid. There is only slight enlargement of the distal clavicle with no malalignment and no large projecting osteophytes. ASSESSMENT:  Azeb Blackburn was seen today for shoulder pain.     Diagnoses and all orders for this visit:    Chronic right shoulder pain    Tendinitis of long head of biceps brachii of right shoulder     Treatment alternatives were reviewed including medical and physical therapies, injections, and surgical options, expected risks benefits and likely outcome of each were discussed in detail, questions asked and answered and understood.  We discussed the symptom as well as physical findings and imaging results.  What is most prominently suggested is long head biceps tendinitis.  There is possibility of some impingement as well.  Rotator cuff elements seem to be grossly intact.  We discussed treatment options including oral or injectable steroids.  She is diabetic and has hypertension and I would want to avoid oral steroids.  Risks and potential benefits were discussed with parent good understanding.    PLAN: I will have our advanced practice nurse Nerissa Espana inject the long head biceps tendon sheath with ultrasound guidance.        Patient Active Problem List   Diagnosis    Type 2 diabetes mellitus without complication, without long-term current use of insulin ([facility])    Mixed hyperlipidemia    Essential hypertension    Osteoarthritis    RLS (restless legs syndrome)    Chronic obstructive pulmonary disease ([facility])    Diabetic polyneuropathy associated with diabetes mellitus due to underlying condition ([facility])    Bilateral pleural effusion    Urinary retention    Abnormal cardiovascular stress test    Hypotension    COPD exacerbation ([facility])    CAD in native artery    Coronary artery disease (CAD) excluded    Chronic systolic (congestive) heart failure       Past Medical History:   Diagnosis Date    CAD (coronary artery disease)     COPD (chronic obstructive pulmonary disease) ([facility])     H/O cardiovascular stress test 2022    Lexiscan    Hyperlipidemia     Hypertension     Osteoarthritis     Type II or unspecified type diabetes mellitus without mention of complication, not stated as uncontrolled        Past Surgical History:   Procedure Laterality Date    APPENDECTOMY  1953    CARDIAC CATHETERIZATION  2022    Dr Kerns     SECTION   [address]     COLONOSCOPY  01/01/2003    TUMOR REMOVAL  01/01/1954    under lt arm       Current Outpatient Medications   Medication Sig Dispense Refill    pregabalin (LYRICA) 25 MG capsule Take 1 capsule by mouth 2 times daily for 60 days. 120 capsule 0    metoprolol succinate (TOPROL XL) 25 MG extended release tablet Take 0.5 tablets by mouth daily Do not crush or chew.-Hold SBP less 90 or HR < 60 45 tablet 0    spironolactone (ALDACTONE) 25 MG tablet Take 0.5 tablets by mouth daily 45 tablet 3    atorvastatin (LIPITOR) 80 MG tablet Take 1 tablet by mouth daily 90 tablet 1    blood glucose test strips (ASCENSIA AUTODISC VI;ONE TOUCH ULTRA TEST VI) strip 1 each by Does not apply route 2 times daily 100 each 3    nitroGLYCERIN (NITROSTAT) 0.4 MG SL tablet Place 1 tablet under the tongue every 5 minutes as needed for Chest pain up to max of 3 total doses.  If no relief after 1 dose, call 911. 25 tablet 3    ferrous sulfate (IRON 325) 325 (65 Fe) MG tablet Take 1 tablet by mouth 2 times daily (with meals) 30 tablet 0    furosemide (LASIX) 20 MG tablet Take 1 tablet by mouth daily 60 tablet 0    albuterol-ipratropium (COMBIVENT RESPIMAT)  MCG/ACT AERS inhaler Inhale 1 puff into the lungs 2 times daily 1 each 3    metFORMIN (GLUCOPHAGE) 500 MG tablet Take 1 tablet by mouth 2 times daily (with meals) 180 tablet 1    Cholecalciferol (VITAMIN D3) 50 MCG (2000 UT) CAPS Take by mouth daily      ascorbic acid (VITAMIN C) 250 MG tablet Take 250 mg by mouth daily      vitamin B-12 (CYANOCOBALAMIN) 1000 MCG tablet Take 1,000 mcg by mouth daily      Lancets MISC 1 each by Does not apply route 2 times daily 100 each 5    timolol (TIMOPTIC) 0.5 % ophthalmic solution INSTILL 1 DROP INTO EACH EYE TWICE DAILY  3    latanoprost (XALATAN) 0.005 % ophthalmic solution INSTILL 1 DROP INTO EACH EYE ONCE DAILY AT BEDTIME  3    brimonidine (ALPHAGAN) 0.2 % ophthalmic solution INSTILL 1 DROP INTO LEFT EYE TWICE DAILY  3    Blood Glucose Monitoring Suppl DELTA 1 Units by Does not apply route 2 times daily Please give whatever insurance covers 1 Device 0    magnesium (MAGNESIUM-OXIDE) 250 MG TABS tablet Take 250 mg by mouth daily      Multiple Vitamin (ONE-A-DAY ESSENTIAL PO) Take 1 tablet by mouth.        calcium carbonate-vitamin D 600-200 MG-UNIT TABS Take 1 tablet by mouth daily. Misc Natural Products (OSTEO BI-FLEX ADV JOINT SHIELD PO) Take 2 tablets by mouth daily. aspirin 81 MG EC tablet Take 81 mg by mouth daily. No current facility-administered medications for this visit. Allergies   Allergen Reactions    Pcn [Penicillins] Hives    Sulfa Antibiotics Hives       Social History     Socioeconomic History    Marital status:      Spouse name: None    Number of children: None    Years of education: None    Highest education level: None   Tobacco Use    Smoking status: Former     Packs/day: 1.00     Years: 9.00     Pack years: 9.00     Types: Cigarettes     Quit date: 2000     Years since quittin.3    Smokeless tobacco: Never   Vaping Use    Vaping Use: Never used   Substance and Sexual Activity    Alcohol use: No     Alcohol/week: 0.0 standard drinks    Drug use: No    Sexual activity: Never     Partners: Male     Social Determinants of Health     Financial Resource Strain: Low Risk     Difficulty of Paying Living Expenses: Not hard at all   Food Insecurity: No Food Insecurity    Worried About Running Out of Food in the Last Year: Never true    Ran Out of Food in the Last Year: Never true       Family History   Problem Relation Age of Onset    Heart Disease Mother     High Blood Pressure Mother     Stroke Mother     Diabetes Mother     Heart Disease Father     High Blood Pressure Father          Review of Systems:   As follows except as previously noted in HPI:  Constitutional: Negative for chills, diaphoresis,  fever   Respiratory: Negative for cough, shortness of breath and wheezing.     Cardiovascular: Negative for chest pain and palpitations.   Neurological: Negative for dizziness, syncope,   GI / : abdominal pain or cramping  Musculoskeletal: see HPI       Objective:   Physical Exam   Constitutional: Oriented to person, place, and time. and appears well-developed and well-nourished. :   Head: Normocephalic and atraumatic.   Neck: Neck supple.  Eyes: EOM are normal.   Pulmonary/Chest: Effort normal.  No respiratory distress, no wheezes.   Neurological: Alert and oriented to person  Skin: Skin is warm and dry.               DELANO Mohr,     1/23/23  10:37 AM    All reasonable efforts have been made to minimize the risk of errors that may occur in the use of voice recognition and other electronic means of charting.

## 2023-01-24 ENCOUNTER — HOSPITAL ENCOUNTER (INPATIENT)
Age: 88
LOS: 1 days | Discharge: HOME | DRG: 193 | End: 2023-01-27
Attending: EMERGENCY MEDICINE | Admitting: STUDENT IN AN ORGANIZED HEALTH CARE EDUCATION/TRAINING PROGRAM
Payer: MEDICARE

## 2023-01-24 ENCOUNTER — APPOINTMENT (OUTPATIENT)
Dept: GENERAL RADIOLOGY | Age: 88
DRG: 193 | End: 2023-01-24
Payer: MEDICARE

## 2023-01-24 ENCOUNTER — APPOINTMENT (OUTPATIENT)
Dept: CT IMAGING | Age: 88
DRG: 193 | End: 2023-01-24
Payer: MEDICARE

## 2023-01-24 ENCOUNTER — APPOINTMENT (OUTPATIENT)
Dept: ULTRASOUND IMAGING | Age: 88
DRG: 193 | End: 2023-01-24
Payer: MEDICARE

## 2023-01-24 DIAGNOSIS — R77.8 ELEVATED TROPONIN: ICD-10-CM

## 2023-01-24 DIAGNOSIS — I74.09 ABDOMINAL AORTA THROMBOSIS (HCC): ICD-10-CM

## 2023-01-24 DIAGNOSIS — J96.01 ACUTE RESPIRATORY FAILURE WITH HYPOXIA (HCC): ICD-10-CM

## 2023-01-24 DIAGNOSIS — K81.0 ACUTE CHOLECYSTITIS: ICD-10-CM

## 2023-01-24 DIAGNOSIS — I50.43 ACUTE ON CHRONIC COMBINED SYSTOLIC AND DIASTOLIC CONGESTIVE HEART FAILURE (HCC): ICD-10-CM

## 2023-01-24 DIAGNOSIS — R79.89 ELEVATED LFTS: ICD-10-CM

## 2023-01-24 DIAGNOSIS — R53.1 GENERALIZED WEAKNESS: Primary | ICD-10-CM

## 2023-01-24 DIAGNOSIS — Z66 DNR (DO NOT RESUSCITATE): ICD-10-CM

## 2023-01-24 DIAGNOSIS — I51.3 LEFT VENTRICULAR THROMBUS: ICD-10-CM

## 2023-01-24 PROBLEM — J44.1 COPD EXACERBATION (HCC): Status: RESOLVED | Noted: 2022-11-17 | Resolved: 2023-01-24

## 2023-01-24 PROBLEM — I50.22 CHRONIC SYSTOLIC (CONGESTIVE) HEART FAILURE (HCC): Status: RESOLVED | Noted: 2023-01-12 | Resolved: 2023-01-24

## 2023-01-24 PROBLEM — R94.39 ABNORMAL CARDIOVASCULAR STRESS TEST: Status: RESOLVED | Noted: 2022-11-17 | Resolved: 2023-01-24

## 2023-01-24 PROBLEM — I21.4 NSTEMI (NON-ST ELEVATED MYOCARDIAL INFARCTION) (HCC): Status: ACTIVE | Noted: 2023-01-24

## 2023-01-24 PROBLEM — J18.9 COMMUNITY ACQUIRED PNEUMONIA OF RIGHT LOWER LOBE OF LUNG: Status: ACTIVE | Noted: 2023-01-24

## 2023-01-24 PROBLEM — I70.0 STENOSIS OF INFRARENAL ABDOMINAL AORTA DUE TO ARTERIOSCLEROSIS (HCC): Status: ACTIVE | Noted: 2023-01-24

## 2023-01-24 PROBLEM — R33.9 URINARY RETENTION: Status: RESOLVED | Noted: 2022-11-17 | Resolved: 2023-01-24

## 2023-01-24 PROBLEM — R09.02 HYPOXIA: Status: ACTIVE | Noted: 2023-01-24

## 2023-01-24 PROBLEM — I13.10 CARDIORENAL SYNDROME: Status: ACTIVE | Noted: 2023-01-24

## 2023-01-24 PROBLEM — I95.9 HYPOTENSION: Status: RESOLVED | Noted: 2022-11-17 | Resolved: 2023-01-24

## 2023-01-24 LAB
ALBUMIN SERPL-MCNC: 3.6 G/DL (ref 3.5–5.2)
ALP BLD-CCNC: 106 U/L (ref 35–104)
ALT SERPL-CCNC: 144 U/L (ref 0–32)
ANION GAP SERPL CALCULATED.3IONS-SCNC: 14 MMOL/L (ref 7–16)
APTT: <20 SEC (ref 24.5–35.1)
AST SERPL-CCNC: 197 U/L (ref 0–31)
BACTERIA: NORMAL /HPF
BASOPHILS ABSOLUTE: 0 E9/L (ref 0–0.2)
BASOPHILS RELATIVE PERCENT: 0 % (ref 0–2)
BILIRUB SERPL-MCNC: 0.6 MG/DL (ref 0–1.2)
BILIRUBIN URINE: NEGATIVE
BLOOD, URINE: NEGATIVE
BUN BLDV-MCNC: 45 MG/DL (ref 6–23)
CALCIUM SERPL-MCNC: 8.4 MG/DL (ref 8.6–10.2)
CHLORIDE BLD-SCNC: 99 MMOL/L (ref 98–107)
CLARITY: CLEAR
CO2: 20 MMOL/L (ref 22–29)
COLOR: YELLOW
CREAT SERPL-MCNC: 1.2 MG/DL (ref 0.5–1)
EOSINOPHILS ABSOLUTE: 0 E9/L (ref 0.05–0.5)
EOSINOPHILS RELATIVE PERCENT: 0 % (ref 0–6)
EPITHELIAL CELLS, UA: NORMAL /HPF
GFR SERPL CREATININE-BSD FRML MDRD: 44 ML/MIN/1.73
GLUCOSE BLD-MCNC: 153 MG/DL (ref 74–99)
GLUCOSE URINE: 500 MG/DL
HCT VFR BLD CALC: 38.2 % (ref 34–48)
HEMOGLOBIN: 11.8 G/DL (ref 11.5–15.5)
IMMATURE GRANULOCYTES #: 0.04 E9/L
IMMATURE GRANULOCYTES %: 0.4 % (ref 0–5)
INR BLD: 1
KETONES, URINE: NEGATIVE MG/DL
LEUKOCYTE ESTERASE, URINE: ABNORMAL
LIPASE: 45 U/L (ref 13–60)
LYMPHOCYTES ABSOLUTE: 0.62 E9/L (ref 1.5–4)
LYMPHOCYTES RELATIVE PERCENT: 6 % (ref 20–42)
MAGNESIUM: 2.2 MG/DL (ref 1.6–2.6)
MCH RBC QN AUTO: 30 PG (ref 26–35)
MCHC RBC AUTO-ENTMCNC: 30.9 % (ref 32–34.5)
MCV RBC AUTO: 97.2 FL (ref 80–99.9)
METER GLUCOSE: 149 MG/DL (ref 74–99)
MONOCYTES ABSOLUTE: 0.58 E9/L (ref 0.1–0.95)
MONOCYTES RELATIVE PERCENT: 5.7 % (ref 2–12)
NEUTROPHILS ABSOLUTE: 9.01 E9/L (ref 1.8–7.3)
NEUTROPHILS RELATIVE PERCENT: 87.9 % (ref 43–80)
NITRITE, URINE: NEGATIVE
PDW BLD-RTO: 13.2 FL (ref 11.5–15)
PH UA: 5.5 (ref 5–9)
PLATELET # BLD: 187 E9/L (ref 130–450)
PMV BLD AUTO: 11.3 FL (ref 7–12)
POTASSIUM SERPL-SCNC: 4.5 MMOL/L (ref 3.5–5)
PRO-BNP: ABNORMAL PG/ML (ref 0–450)
PROTEIN UA: ABNORMAL MG/DL
PROTHROMBIN TIME: 11.3 SEC (ref 9.3–12.4)
RBC # BLD: 3.93 E12/L (ref 3.5–5.5)
RBC UA: NORMAL /HPF (ref 0–2)
REASON FOR REJECTION: NORMAL
REJECTED TEST: NORMAL
SARS-COV-2, NAAT: NOT DETECTED
SODIUM BLD-SCNC: 133 MMOL/L (ref 132–146)
SPECIFIC GRAVITY UA: 1.01 (ref 1–1.03)
TOTAL PROTEIN: 6.2 G/DL (ref 6.4–8.3)
TROPONIN, HIGH SENSITIVITY: 177 NG/L (ref 0–9)
TROPONIN, HIGH SENSITIVITY: 269 NG/L (ref 0–9)
TROPONIN, HIGH SENSITIVITY: 415 NG/L (ref 0–9)
UROBILINOGEN, URINE: 0.2 E.U./DL
WBC # BLD: 10.3 E9/L (ref 4.5–11.5)
WBC UA: NORMAL /HPF (ref 0–5)

## 2023-01-24 PROCEDURE — 71045 X-RAY EXAM CHEST 1 VIEW: CPT

## 2023-01-24 PROCEDURE — 87449 NOS EACH ORGANISM AG IA: CPT

## 2023-01-24 PROCEDURE — 85730 THROMBOPLASTIN TIME PARTIAL: CPT

## 2023-01-24 PROCEDURE — 83690 ASSAY OF LIPASE: CPT

## 2023-01-24 PROCEDURE — 6360000002 HC RX W HCPCS: Performed by: STUDENT IN AN ORGANIZED HEALTH CARE EDUCATION/TRAINING PROGRAM

## 2023-01-24 PROCEDURE — 99285 EMERGENCY DEPT VISIT HI MDM: CPT

## 2023-01-24 PROCEDURE — 2500000003 HC RX 250 WO HCPCS: Performed by: STUDENT IN AN ORGANIZED HEALTH CARE EDUCATION/TRAINING PROGRAM

## 2023-01-24 PROCEDURE — 85610 PROTHROMBIN TIME: CPT

## 2023-01-24 PROCEDURE — 84484 ASSAY OF TROPONIN QUANT: CPT

## 2023-01-24 PROCEDURE — 6370000000 HC RX 637 (ALT 250 FOR IP): Performed by: STUDENT IN AN ORGANIZED HEALTH CARE EDUCATION/TRAINING PROGRAM

## 2023-01-24 PROCEDURE — 94664 DEMO&/EVAL PT USE INHALER: CPT

## 2023-01-24 PROCEDURE — 83880 ASSAY OF NATRIURETIC PEPTIDE: CPT

## 2023-01-24 PROCEDURE — 86738 MYCOPLASMA ANTIBODY: CPT

## 2023-01-24 PROCEDURE — 87635 SARS-COV-2 COVID-19 AMP PRB: CPT

## 2023-01-24 PROCEDURE — 80053 COMPREHEN METABOLIC PANEL: CPT

## 2023-01-24 PROCEDURE — 94640 AIRWAY INHALATION TREATMENT: CPT

## 2023-01-24 PROCEDURE — 71275 CT ANGIOGRAPHY CHEST: CPT

## 2023-01-24 PROCEDURE — 81001 URINALYSIS AUTO W/SCOPE: CPT

## 2023-01-24 PROCEDURE — 93005 ELECTROCARDIOGRAM TRACING: CPT | Performed by: FAMILY MEDICINE

## 2023-01-24 PROCEDURE — 96365 THER/PROPH/DIAG IV INF INIT: CPT

## 2023-01-24 PROCEDURE — G0378 HOSPITAL OBSERVATION PER HR: HCPCS

## 2023-01-24 PROCEDURE — 96372 THER/PROPH/DIAG INJ SC/IM: CPT

## 2023-01-24 PROCEDURE — 83735 ASSAY OF MAGNESIUM: CPT

## 2023-01-24 PROCEDURE — 99223 1ST HOSP IP/OBS HIGH 75: CPT | Performed by: FAMILY MEDICINE

## 2023-01-24 PROCEDURE — 96366 THER/PROPH/DIAG IV INF ADDON: CPT

## 2023-01-24 PROCEDURE — 87088 URINE BACTERIA CULTURE: CPT

## 2023-01-24 PROCEDURE — 93005 ELECTROCARDIOGRAM TRACING: CPT | Performed by: STUDENT IN AN ORGANIZED HEALTH CARE EDUCATION/TRAINING PROGRAM

## 2023-01-24 PROCEDURE — 6370000000 HC RX 637 (ALT 250 FOR IP): Performed by: FAMILY MEDICINE

## 2023-01-24 PROCEDURE — 36415 COLL VENOUS BLD VENIPUNCTURE: CPT

## 2023-01-24 PROCEDURE — 74177 CT ABD & PELVIS W/CONTRAST: CPT

## 2023-01-24 PROCEDURE — 85025 COMPLETE CBC W/AUTO DIFF WBC: CPT

## 2023-01-24 PROCEDURE — 96375 TX/PRO/DX INJ NEW DRUG ADDON: CPT

## 2023-01-24 PROCEDURE — 6360000004 HC RX CONTRAST MEDICATION: Performed by: RADIOLOGY

## 2023-01-24 PROCEDURE — 6360000002 HC RX W HCPCS: Performed by: FAMILY MEDICINE

## 2023-01-24 PROCEDURE — 82962 GLUCOSE BLOOD TEST: CPT

## 2023-01-24 PROCEDURE — 2580000003 HC RX 258: Performed by: STUDENT IN AN ORGANIZED HEALTH CARE EDUCATION/TRAINING PROGRAM

## 2023-01-24 RX ORDER — INSULIN LISPRO 100 [IU]/ML
0-4 INJECTION, SOLUTION INTRAVENOUS; SUBCUTANEOUS
Status: DISCONTINUED | OUTPATIENT
Start: 2023-01-25 | End: 2023-01-27 | Stop reason: HOSPADM

## 2023-01-24 RX ORDER — ASPIRIN 81 MG/1
81 TABLET ORAL DAILY
Status: DISCONTINUED | OUTPATIENT
Start: 2023-01-25 | End: 2023-01-27 | Stop reason: HOSPADM

## 2023-01-24 RX ORDER — MULTIVIT WITH MINERALS/LUTEIN
250 TABLET ORAL DAILY
Status: DISCONTINUED | OUTPATIENT
Start: 2023-01-25 | End: 2023-01-27 | Stop reason: HOSPADM

## 2023-01-24 RX ORDER — MAGNESIUM OXIDE 400 MG/1
200 TABLET ORAL DAILY
Status: DISCONTINUED | OUTPATIENT
Start: 2023-01-25 | End: 2023-01-27 | Stop reason: HOSPADM

## 2023-01-24 RX ORDER — METOPROLOL SUCCINATE 25 MG/1
12.5 TABLET, EXTENDED RELEASE ORAL DAILY
Status: DISCONTINUED | OUTPATIENT
Start: 2023-01-25 | End: 2023-01-25

## 2023-01-24 RX ORDER — TIMOLOL MALEATE 5 MG/ML
1 SOLUTION/ DROPS OPHTHALMIC 2 TIMES DAILY
Status: DISCONTINUED | OUTPATIENT
Start: 2023-01-25 | End: 2023-01-27 | Stop reason: HOSPADM

## 2023-01-24 RX ORDER — VITAMIN B COMPLEX
2 TABLET ORAL DAILY
Status: DISCONTINUED | OUTPATIENT
Start: 2023-01-25 | End: 2023-01-27 | Stop reason: HOSPADM

## 2023-01-24 RX ORDER — BRIMONIDINE TARTRATE 2 MG/ML
1 SOLUTION/ DROPS OPHTHALMIC 2 TIMES DAILY
Status: DISCONTINUED | OUTPATIENT
Start: 2023-01-25 | End: 2023-01-27 | Stop reason: HOSPADM

## 2023-01-24 RX ORDER — HEPARIN SODIUM 1000 [USP'U]/ML
40 INJECTION, SOLUTION INTRAVENOUS; SUBCUTANEOUS PRN
Status: DISCONTINUED | OUTPATIENT
Start: 2023-01-24 | End: 2023-01-24

## 2023-01-24 RX ORDER — SPIRONOLACTONE 25 MG/1
12.5 TABLET ORAL DAILY
Status: DISCONTINUED | OUTPATIENT
Start: 2023-01-25 | End: 2023-01-27 | Stop reason: HOSPADM

## 2023-01-24 RX ORDER — FUROSEMIDE 40 MG/1
40 TABLET ORAL DAILY
Status: DISCONTINUED | OUTPATIENT
Start: 2023-01-24 | End: 2023-01-27 | Stop reason: HOSPADM

## 2023-01-24 RX ORDER — IPRATROPIUM BROMIDE AND ALBUTEROL SULFATE 2.5; .5 MG/3ML; MG/3ML
3 SOLUTION RESPIRATORY (INHALATION) ONCE
Status: COMPLETED | OUTPATIENT
Start: 2023-01-24 | End: 2023-01-24

## 2023-01-24 RX ORDER — M-VIT,TX,IRON,MINS/CALC/FOLIC 27MG-0.4MG
1 TABLET ORAL DAILY
Status: DISCONTINUED | OUTPATIENT
Start: 2023-01-24 | End: 2023-01-27 | Stop reason: HOSPADM

## 2023-01-24 RX ORDER — METRONIDAZOLE 500 MG/100ML
500 INJECTION, SOLUTION INTRAVENOUS ONCE
Status: DISCONTINUED | OUTPATIENT
Start: 2023-01-24 | End: 2023-01-24

## 2023-01-24 RX ORDER — OYSTER SHELL CALCIUM WITH VITAMIN D 500; 200 MG/1; [IU]/1
1 TABLET, FILM COATED ORAL DAILY
Status: DISCONTINUED | OUTPATIENT
Start: 2023-01-25 | End: 2023-01-25 | Stop reason: CLARIF

## 2023-01-24 RX ORDER — DEXTROSE MONOHYDRATE 100 MG/ML
INJECTION, SOLUTION INTRAVENOUS CONTINUOUS PRN
Status: DISCONTINUED | OUTPATIENT
Start: 2023-01-24 | End: 2023-01-27 | Stop reason: HOSPADM

## 2023-01-24 RX ORDER — PREGABALIN 25 MG/1
25 CAPSULE ORAL 2 TIMES DAILY
Status: DISCONTINUED | OUTPATIENT
Start: 2023-01-24 | End: 2023-01-27 | Stop reason: HOSPADM

## 2023-01-24 RX ORDER — ATORVASTATIN CALCIUM 40 MG/1
80 TABLET, FILM COATED ORAL NIGHTLY
Status: DISCONTINUED | OUTPATIENT
Start: 2023-01-25 | End: 2023-01-27 | Stop reason: HOSPADM

## 2023-01-24 RX ORDER — HEPARIN SODIUM 1000 [USP'U]/ML
80 INJECTION, SOLUTION INTRAVENOUS; SUBCUTANEOUS PRN
Status: DISCONTINUED | OUTPATIENT
Start: 2023-01-24 | End: 2023-01-24

## 2023-01-24 RX ORDER — HEPARIN SODIUM 10000 [USP'U]/100ML
5-30 INJECTION, SOLUTION INTRAVENOUS CONTINUOUS
Status: DISCONTINUED | OUTPATIENT
Start: 2023-01-24 | End: 2023-01-24

## 2023-01-24 RX ORDER — IPRATROPIUM BROMIDE AND ALBUTEROL SULFATE 2.5; .5 MG/3ML; MG/3ML
1 SOLUTION RESPIRATORY (INHALATION) EVERY 4 HOURS PRN
Status: DISCONTINUED | OUTPATIENT
Start: 2023-01-24 | End: 2023-01-27 | Stop reason: HOSPADM

## 2023-01-24 RX ORDER — LATANOPROST 50 UG/ML
1 SOLUTION/ DROPS OPHTHALMIC NIGHTLY
Status: DISCONTINUED | OUTPATIENT
Start: 2023-01-25 | End: 2023-01-27 | Stop reason: HOSPADM

## 2023-01-24 RX ORDER — ASPIRIN 81 MG/1
243 TABLET, CHEWABLE ORAL ONCE
Status: COMPLETED | OUTPATIENT
Start: 2023-01-24 | End: 2023-01-24

## 2023-01-24 RX ORDER — HEPARIN SODIUM 1000 [USP'U]/ML
80 INJECTION, SOLUTION INTRAVENOUS; SUBCUTANEOUS ONCE
Status: DISCONTINUED | OUTPATIENT
Start: 2023-01-24 | End: 2023-01-24

## 2023-01-24 RX ORDER — FERROUS SULFATE 325(65) MG
325 TABLET ORAL 2 TIMES DAILY WITH MEALS
Status: DISCONTINUED | OUTPATIENT
Start: 2023-01-25 | End: 2023-01-27 | Stop reason: HOSPADM

## 2023-01-24 RX ORDER — INSULIN LISPRO 100 [IU]/ML
0-4 INJECTION, SOLUTION INTRAVENOUS; SUBCUTANEOUS NIGHTLY
Status: DISCONTINUED | OUTPATIENT
Start: 2023-01-24 | End: 2023-01-27 | Stop reason: HOSPADM

## 2023-01-24 RX ORDER — ENOXAPARIN SODIUM 100 MG/ML
1 INJECTION SUBCUTANEOUS 2 TIMES DAILY
Status: DISCONTINUED | OUTPATIENT
Start: 2023-01-24 | End: 2023-01-27 | Stop reason: HOSPADM

## 2023-01-24 RX ORDER — ASPIRIN 81 MG/1
324 TABLET, CHEWABLE ORAL ONCE
Status: DISCONTINUED | OUTPATIENT
Start: 2023-01-24 | End: 2023-01-24

## 2023-01-24 RX ADMIN — IOPAMIDOL 75 ML: 755 INJECTION, SOLUTION INTRAVENOUS at 15:25

## 2023-01-24 RX ADMIN — Medication 1 TABLET: at 22:14

## 2023-01-24 RX ADMIN — CEFTRIAXONE 2000 MG: 2 INJECTION, POWDER, FOR SOLUTION INTRAMUSCULAR; INTRAVENOUS at 16:13

## 2023-01-24 RX ADMIN — IPRATROPIUM BROMIDE AND ALBUTEROL SULFATE 3 AMPULE: .5; 3 SOLUTION RESPIRATORY (INHALATION) at 17:25

## 2023-01-24 RX ADMIN — ENOXAPARIN SODIUM 60 MG: 100 INJECTION SUBCUTANEOUS at 22:18

## 2023-01-24 RX ADMIN — PREGABALIN 25 MG: 25 CAPSULE ORAL at 22:14

## 2023-01-24 RX ADMIN — DOXYCYCLINE 100 MG: 100 INJECTION, POWDER, LYOPHILIZED, FOR SOLUTION INTRAVENOUS at 16:48

## 2023-01-24 RX ADMIN — ASPIRIN 81 MG CHEWABLE TABLET 243 MG: 81 TABLET CHEWABLE at 16:26

## 2023-01-24 RX ADMIN — FUROSEMIDE 40 MG: 40 TABLET ORAL at 22:14

## 2023-01-24 NOTE — ED PROVIDER NOTES
[address]    Pt Name: Alayna Rock  MRN: [id number]  [place] 1935  Date of evaluation: 1/24/2023  Provider: Leydi Nixon MD  PCP: Margi Gutierrez MD  Note Started: 10:38 AM EST 1/24/23    HPI     Patient is a 81 y.o. female presents with a chief complaint of   Chief Complaint   Patient presents with    Fatigue     \"Not feeling well\" after cortisone shot right shoulder yesterday-cardiac hx wearing life vest     .    Patient states that she has developed \"feeling off. \"  Patient is unable to specify why. Patient did get a steroid injection in her right shoulder yesterday. Patient does have a history of a LifeVest.  Patient was seen at the [facility] clinic. Patient was reportedly not a candidate for a internal defibrillator. LifeVest did not go well. Patient denies any fevers, chills, vomiting, abdominal pain, changes in urinary or bowel habits. Nursing Notes were all reviewed and agreed with or any disagreements were addressed in the HPI. History From: Patient, daughter, chart review    Review of Systems   Positives and Pertinent negatives as per HPI. Physical Exam  Vitals and nursing note reviewed. Constitutional:       Appearance: She is well-developed. HENT:      Head: Normocephalic and atraumatic. Eyes:      Conjunctiva/sclera: Conjunctivae normal.   Cardiovascular:      Rate and Rhythm: Normal rate and regular rhythm. Heart sounds: Murmur heard. Pulmonary:      Effort: Pulmonary effort is normal. No respiratory distress. Breath sounds: Normal breath sounds. No wheezing or rales. Abdominal:      General: Bowel sounds are normal.      Palpations: Abdomen is soft. Tenderness: There is no abdominal tenderness. There is no guarding or rebound. Musculoskeletal:      Cervical back: Normal range of motion and neck supple. Skin:     General: Skin is warm and dry.    Neurological: Mental Status: She is alert and oriented to person, place, and time. Cranial Nerves: No cranial nerve deficit. Coordination: Coordination normal.        Procedures       MDM     Amount and/or Complexity of Data Reviewed  Clinical lab tests: reviewed  Tests in the radiology section of CPT®: reviewed  Decide to obtain previous medical records or to obtain history from someone other than the patient: yes         ED Course as of 01/24/23 1911 Tue Jan 24, 2023   1047 EKG read interpreted by me. Rate 88 bpm.  Normal axis. Normal IA interval.  Normal QRS. Nonspecific T wave abnormality. Compared to prior EKG on 12/22/2022 with no significant changes. [JM]   1750 Extensive conversation was had with patient and patient's daughter regarding goals of care. Discussed options of full code, DNR CC versus DNR CCA. Patient stated that she would like to be DNR CC. Patient will be provided DNR CC. Patient does not want to discuss hospice at this time. [JM]   6587 Discussed case with hospitalist.  Patient will be admitted for oxygen. [JM]      ED Course User Index  [JM] Toni Smith MD      Patient is a 81 y.o. female presenting with feelings of fatigue. Differential includes but is not limited to hypoxia, sepsis, pneumonia, acute cholecystitis. Patient had a EKG. EKG was similar to previous. Patient did have a LifeVest on. Patient did not have her LifeVest deployed. Patient had troponins drawn. Patient's troponins are continually increasing. Patient was given aspirin. Patient is not a candidate for a pacemaker. Patient did have elevated LFTs. CTs were ordered. Patient CTs were concerning for thrombus, acute cholecystitis, intra-abdominal clot. Patient also had possible pneumonia and was given antibiotics. Patient was mildly hypoxic and required 2 L of oxygen. Patient was given breathing treatments. Patient's lab work was also interpreted by me. Patient BNP is also significantly elevated. Patient shows no pulmonary embolism. Extensive conversation was had with the patient and patient's daughter regarding goals of care. Patient stated that she would like to be a DNR comfort care. And discussed getting antibiotics for the pneumonia given the fact that patient is having some fatigue and this could be related to the pneumonia. Will not give patient metronidazole because patient is having no abdominal pain. Patient will be admitted for the hypoxia. Patient had DNR comfort care paperwork signed. Patient was seen and evaluated by myself and my attending Kevan Rios MD. Assessment and Plan discussed with attending provider, please see attestation for final plan of care. This note was done using dictation software and there may be some grammatical errors associated with this. CONSULTS:   None  Internal medicine who agreed to admit patient. Medications given in the emergency room:  Medications - No data to display     LABS:    Labs Reviewed   COVID-19, RAPID   CULTURE, URINE   CBC WITH AUTO DIFFERENTIAL   COMPREHENSIVE METABOLIC PANEL   TROPONIN   URINALYSIS   MAGNESIUM       As interpreted by me, the above displayed labs are abnormal. All other labs obtained during this visit were within normal range or not returned as of this dictation. RADIOLOGY:   Non-plain film images such as CT, Ultrasound and MRI are read by the radiologist. Plain radiographic images are visualized and preliminarily interpreted by the ED Provider with the below findings:        Interpretation per the Radiologist below, if available at the time of this note:    XR CHEST PORTABLE    (Results Pending)     No results found. No results found. Lety Silva MD      ED Course as of 01/24/23 1925 Tue Jan 24, 2023   1047 EKG read interpreted by me. Rate 88 bpm.  Normal axis. Normal PA interval.  Normal QRS. Nonspecific T wave abnormality. Compared to prior EKG on 12/22/2022 with no significant changes. [JM]   7486 Extensive conversation was had with patient and patient's daughter regarding goals of care. Discussed options of full code, DNR CC versus DNR CCA. Patient stated that she would like to be DNR CC. Patient will be provided DNR CC. Patient does not want to discuss hospice at this time. [JM]   9760 Discussed case with hospitalist.  Patient will be admitted for oxygen. [JM]      ED Course User Index  [JM] Corrina Bearden MD       --------------------------------------------- PAST HISTORY ---------------------------------------------  Past Medical History:  has a past medical history of CAD (coronary artery disease), COPD (chronic obstructive pulmonary disease) ([facility] [address] H/O cardiovascular stress test, Hyperlipidemia, Hypertension, Osteoarthritis, and Type II or unspecified type diabetes mellitus without mention of complication, not stated as uncontrolled. Past Surgical History:  has a past surgical history that includes Appendectomy (1953); tumor removal (1954); Colonoscopy (2003);  section ( ,); and Cardiac catheterization (2022). Social History:  reports that she quit smoking about 22 years ago. Her smoking use included cigarettes. She has a 9.00 pack-year smoking history. She has never used smokeless tobacco. She reports that she does not drink alcohol and does not use drugs. Family History: family history includes Diabetes in her mother; Heart Disease in her father and mother; High Blood Pressure in her father and mother; Stroke in her mother. The patients home medications have been reviewed.     Allergies: Pcn [penicillins] and Sulfa antibiotics    -------------------------------------------------- RESULTS -------------------------------------------------    LABS:  Results for orders placed or performed during the hospital encounter of 23   COVID-19, Rapid    Specimen: Nasopharyngeal Swab   Result Value Ref Range    SARS-CoV-2, NAAT Not Detected Not Detected   CBC with Auto Differential   Result Value Ref Range    WBC 10.3 4.5 - 11.5 E9/L    RBC 3.93 3.50 - 5.50 E12/L    Hemoglobin 11.8 11.5 - 15.5 g/dL    Hematocrit 38.2 34.0 - 48.0 %    MCV 97.2 80.0 - 99.9 fL    MCH 30.0 26.0 - 35.0 pg    MCHC 30.9 (L) 32.0 - 34.5 %    RDW 13.2 11.5 - 15.0 fL    Platelets 398 562 - 154 E9/L    MPV 11.3 7.0 - 12.0 fL    Neutrophils % 87.9 (H) 43.0 - 80.0 %    Immature Granulocytes % 0.4 0.0 - 5.0 %    Lymphocytes % 6.0 (L) 20.0 - 42.0 %    Monocytes % 5.7 2.0 - 12.0 %    Eosinophils % 0.0 0.0 - 6.0 %    Basophils % 0.0 0.0 - 2.0 %    Neutrophils Absolute 9.01 (H) 1.80 - 7.30 E9/L    Immature Granulocytes # 0.04 E9/L    Lymphocytes Absolute 0.62 (L) 1.50 - 4.00 E9/L    Monocytes Absolute 0.58 0.10 - 0.95 E9/L    Eosinophils Absolute 0.00 (L) 0.05 - 0.50 E9/L    Basophils Absolute 0.00 0.00 - 0.20 E9/L   CMP   Result Value Ref Range    Sodium 133 132 - 146 mmol/L    Potassium 4.5 3.5 - 5.0 mmol/L    Chloride 99 98 - 107 mmol/L    CO2 20 (L) 22 - 29 mmol/L    Anion Gap 14 7 - 16 mmol/L    Glucose 153 (H) 74 - 99 mg/dL    BUN 45 (H) 6 - 23 mg/dL    Creatinine 1.2 (H) 0.5 - 1.0 mg/dL    Est, Glom Filt Rate 44 >=60 mL/min/1.73    Calcium 8.4 (L) 8.6 - 10.2 mg/dL    Total Protein 6.2 (L) 6.4 - 8.3 g/dL    Albumin 3.6 3.5 - 5.2 g/dL    Total Bilirubin 0.6 0.0 - 1.2 mg/dL    Alkaline Phosphatase 106 (H) 35 - 104 U/L     (H) 0 - 32 U/L     (H) 0 - 31 U/L   Urinalysis   Result Value Ref Range    Color, UA Yellow Straw/Yellow    Clarity, UA Clear Clear    Glucose, Ur 500 (A) Negative mg/dL    Bilirubin Urine Negative Negative    Ketones, Urine Negative Negative mg/dL    Specific Gravity, UA 1.010 1.005 - 1.030    Blood, Urine Negative Negative    pH, UA 5.5 5.0 - 9.0    Protein, UA TRACE Negative mg/dL    Urobilinogen, Urine 0.2 <2.0 E.U./dL    Nitrite, Urine Negative Negative    Leukocyte Esterase, Urine SMALL (A) Negative   Magnesium   Result Value Ref Range Magnesium 2.2 1.6 - 2.6 mg/dL   Troponin   Result Value Ref Range    Troponin, High Sensitivity 177 (H) 0 - 9 ng/L   SPECIMEN REJECTION   Result Value Ref Range    Rejected Test TRP5     Reason for Rejection see below    Troponin   Result Value Ref Range    Troponin, High Sensitivity 269 (H) 0 - 9 ng/L   Lipase   Result Value Ref Range    Lipase 45 13 - 60 U/L   Troponin   Result Value Ref Range    Troponin, High Sensitivity 415 (H) 0 - 9 ng/L   Brain Natriuretic Peptide   Result Value Ref Range    Pro-BNP 44,363 (H) 0 - 450 pg/mL   Microscopic Urinalysis   Result Value Ref Range    WBC, UA 1-3 0 - 5 /HPF    RBC, UA 0-1 0 - 2 /HPF    Epithelial Cells, UA MODERATE /HPF    Bacteria, UA NONE SEEN None Seen /HPF       RADIOLOGY:  CTA PULMONARY W CONTRAST   Final Result   No evidence of pulmonary embolism. Right lower lobe airspace opacity consistent with pneumonia. Small bilateral pleural effusions. Cardiomegaly. CT ABDOMEN PELVIS W IV CONTRAST Additional Contrast? None   Final Result   1. DILATED GALLBLADDER WITH SUGGESTION OF MILD PERICHOLECYSTIC EDEMA. No   cholelithiasis is evident by CT. Clinical correlation and sonographic   evaluation recommended. 2. 10 mm filling defect along the left ventricular apex concerning for a   THROMBUS. Correlation with echocardiogram recommended. 3. Severe atherosclerosis, with HIGH-GRADE STENOSES OR POSSIBLE COMPLETE   OCCLUSIONS in the distal abdominal aorta, common iliac and external iliac   arteries. Probable occlusion of the stent in the left common iliac artery. Correlation with dedicated pre and post contrast enhanced CTA recommended. 4. Severe distal colonic diverticulosis without diverticulitis. XR CHEST PORTABLE   Final Result   Mild CHF. Superimposed pneumonia cannot be excluded.                  ------------------------- NURSING NOTES AND VITALS REVIEWED ---------------------------  Date / Time Roomed:  1/24/2023 10:28 AM  ED Bed Assignment:  06/06    The nursing notes within the ED encounter and vital signs as below have been reviewed. Patient Vitals for the past 24 hrs:   BP Temp Temp src Pulse Resp SpO2 Weight   01/24/23 1538 118/63 -- -- 79 21 91 % --   01/24/23 1515 -- -- -- 86 21 93 % --   01/24/23 1405 -- -- -- 77 21 95 % --   01/24/23 1404 -- -- -- 79 19 94 % --   01/24/23 1403 -- -- -- 79 19 94 % --   01/24/23 1402 -- -- -- 74 18 94 % --   01/24/23 1401 -- -- -- 76 19 94 % --   01/24/23 1259 -- -- -- 83 22 (!) 87 % --   01/24/23 1258 -- -- -- 76 16 (!) 89 % --   01/24/23 1257 -- -- -- 75 17 (!) 87 % --   01/24/23 1256 -- -- -- 78 18 (!) 88 % --   01/24/23 1255 -- -- -- 78 18 (!) 88 % --   01/24/23 1254 -- -- -- 77 17 (!) 89 % --   01/24/23 1247 -- -- -- 82 21 90 % --   01/24/23 1103 -- -- -- 85 23 92 % --   01/24/23 1048 -- -- -- 87 21 93 % --   01/24/23 1047 120/63 97.6 °F (36.4 °C) Oral 92 20 97 % 142 lb (64.4 kg)       Oxygen Saturation Interpretation: Abnormal and Improved after treatment    ------------------------------------------ PROGRESS NOTES ------------------------------------------  Re-evaluation(s):   Patients symptoms are improving  Repeat physical examination is improved    Counseling:  I have spoken with the patient and discussed todays results, in addition to providing specific details for the plan of care and counseling regarding the diagnosis and prognosis. Their questions are answered at this time and they are agreeable with the plan of admission.    --------------------------------- ADDITIONAL PROVIDER NOTES ---------------------------------  Consultations:  Spoke with Dr. Carli Ospina Discussed case. They will admit the patient.   This patient's ED course included: a personal history and physicial examination, re-evaluation prior to disposition, multiple bedside re-evaluations, IV medications, cardiac monitoring, and continuous pulse oximetry    This patient has remained hemodynamically stable during their ED course. Diagnosis:  1. Generalized weakness    2. Elevated troponin    3. DNR (do not resuscitate)    4. Acute cholecystitis    5. Acute respiratory failure with hypoxia (HCC)    6. Left ventricular thrombus    7. 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[facility] [address]    8.  Elevated LFTs        Disposition:  Patient's disposition: Admit to med/surg  Patient's condition is Stable                                               Yasmin Latham MD  Resident  01/24/23 9604

## 2023-01-24 NOTE — ED NOTES
Pt pulse was dropping to as low as 87% on RA.  notified and pt placed on o2 1 liter.  Pt now 94%     Juhi De Guzman RN  01/24/23 0921

## 2023-01-24 NOTE — ED NOTES
Ultrasound called and asked if they could take of the pt life vest. Dr Lorena Whipple talked to ultrasound and stated to have pt get Ct first and then will will revisit the idea.      Lizzeth Almendarez RN  01/24/23 1108

## 2023-01-25 PROBLEM — Z51.5 PALLIATIVE CARE ENCOUNTER: Status: ACTIVE | Noted: 2023-01-25

## 2023-01-25 LAB
ADENOVIRUS BY PCR: NOT DETECTED
BORDETELLA PARAPERTUSSIS BY PCR: NOT DETECTED
BORDETELLA PERTUSSIS BY PCR: NOT DETECTED
CHLAMYDOPHILIA PNEUMONIAE BY PCR: NOT DETECTED
CORONAVIRUS 229E BY PCR: NOT DETECTED
CORONAVIRUS HKU1 BY PCR: NOT DETECTED
CORONAVIRUS NL63 BY PCR: NOT DETECTED
CORONAVIRUS OC43 BY PCR: NOT DETECTED
EKG ATRIAL RATE: 86 BPM
EKG ATRIAL RATE: 87 BPM
EKG P AXIS: 76 DEGREES
EKG P AXIS: 78 DEGREES
EKG P-R INTERVAL: 166 MS
EKG P-R INTERVAL: 182 MS
EKG Q-T INTERVAL: 380 MS
EKG Q-T INTERVAL: 394 MS
EKG QRS DURATION: 120 MS
EKG QRS DURATION: 92 MS
EKG QTC CALCULATION (BAZETT): 454 MS
EKG QTC CALCULATION (BAZETT): 474 MS
EKG R AXIS: 33 DEGREES
EKG R AXIS: 44 DEGREES
EKG T AXIS: 151 DEGREES
EKG T AXIS: 156 DEGREES
EKG VENTRICULAR RATE: 86 BPM
EKG VENTRICULAR RATE: 87 BPM
HUMAN METAPNEUMOVIRUS BY PCR: NOT DETECTED
HUMAN RHINOVIRUS/ENTEROVIRUS BY PCR: NOT DETECTED
INFLUENZA A BY PCR: NOT DETECTED
INFLUENZA B BY PCR: NOT DETECTED
L. PNEUMOPHILA SEROGP 1 UR AG: NORMAL
METER GLUCOSE: 100 MG/DL (ref 74–99)
METER GLUCOSE: 137 MG/DL (ref 74–99)
METER GLUCOSE: 158 MG/DL (ref 74–99)
METER GLUCOSE: 226 MG/DL (ref 74–99)
MYCOPLASMA PNEUMONIAE BY PCR: NOT DETECTED
PARAINFLUENZA VIRUS 1 BY PCR: NOT DETECTED
PARAINFLUENZA VIRUS 2 BY PCR: NOT DETECTED
PARAINFLUENZA VIRUS 3 BY PCR: NOT DETECTED
PARAINFLUENZA VIRUS 4 BY PCR: NOT DETECTED
PROCALCITONIN: 0.05 NG/ML (ref 0–0.08)
RESPIRATORY SYNCYTIAL VIRUS BY PCR: NOT DETECTED
SARS-COV-2, PCR: NOT DETECTED
STREP PNEUMONIAE ANTIGEN, URINE: NORMAL
TROPONIN, HIGH SENSITIVITY: 1029 NG/L (ref 0–9)

## 2023-01-25 PROCEDURE — 36415 COLL VENOUS BLD VENIPUNCTURE: CPT

## 2023-01-25 PROCEDURE — 6370000000 HC RX 637 (ALT 250 FOR IP): Performed by: FAMILY MEDICINE

## 2023-01-25 PROCEDURE — 99232 SBSQ HOSP IP/OBS MODERATE 35: CPT | Performed by: FAMILY MEDICINE

## 2023-01-25 PROCEDURE — APPSS180 APP SPLIT SHARED TIME > 60 MINUTES: Performed by: NURSE PRACTITIONER

## 2023-01-25 PROCEDURE — 0202U NFCT DS 22 TRGT SARS-COV-2: CPT

## 2023-01-25 PROCEDURE — 84484 ASSAY OF TROPONIN QUANT: CPT

## 2023-01-25 PROCEDURE — 82962 GLUCOSE BLOOD TEST: CPT

## 2023-01-25 PROCEDURE — 97161 PT EVAL LOW COMPLEX 20 MIN: CPT

## 2023-01-25 PROCEDURE — 99222 1ST HOSP IP/OBS MODERATE 55: CPT | Performed by: NURSE PRACTITIONER

## 2023-01-25 PROCEDURE — 96366 THER/PROPH/DIAG IV INF ADDON: CPT

## 2023-01-25 PROCEDURE — 96376 TX/PRO/DX INJ SAME DRUG ADON: CPT

## 2023-01-25 PROCEDURE — 84145 PROCALCITONIN (PCT): CPT

## 2023-01-25 PROCEDURE — 2580000003 HC RX 258: Performed by: FAMILY MEDICINE

## 2023-01-25 PROCEDURE — 97165 OT EVAL LOW COMPLEX 30 MIN: CPT | Performed by: OCCUPATIONAL THERAPIST

## 2023-01-25 PROCEDURE — G0378 HOSPITAL OBSERVATION PER HR: HCPCS

## 2023-01-25 PROCEDURE — 6360000002 HC RX W HCPCS: Performed by: FAMILY MEDICINE

## 2023-01-25 PROCEDURE — 2500000003 HC RX 250 WO HCPCS: Performed by: FAMILY MEDICINE

## 2023-01-25 PROCEDURE — 96375 TX/PRO/DX INJ NEW DRUG ADDON: CPT

## 2023-01-25 PROCEDURE — 93010 ELECTROCARDIOGRAM REPORT: CPT | Performed by: INTERNAL MEDICINE

## 2023-01-25 PROCEDURE — 6370000000 HC RX 637 (ALT 250 FOR IP): Performed by: INTERNAL MEDICINE

## 2023-01-25 PROCEDURE — 6360000002 HC RX W HCPCS: Performed by: NURSE PRACTITIONER

## 2023-01-25 PROCEDURE — 99223 1ST HOSP IP/OBS HIGH 75: CPT | Performed by: INTERNAL MEDICINE

## 2023-01-25 PROCEDURE — 96372 THER/PROPH/DIAG INJ SC/IM: CPT

## 2023-01-25 PROCEDURE — 97530 THERAPEUTIC ACTIVITIES: CPT

## 2023-01-25 PROCEDURE — 2700000000 HC OXYGEN THERAPY PER DAY

## 2023-01-25 RX ORDER — CLOPIDOGREL BISULFATE 75 MG/1
300 TABLET ORAL ONCE
Status: COMPLETED | OUTPATIENT
Start: 2023-01-25 | End: 2023-01-25

## 2023-01-25 RX ORDER — METOPROLOL SUCCINATE 25 MG/1
25 TABLET, EXTENDED RELEASE ORAL DAILY
Status: DISCONTINUED | OUTPATIENT
Start: 2023-01-26 | End: 2023-01-27 | Stop reason: HOSPADM

## 2023-01-25 RX ORDER — LANOLIN ALCOHOL/MO/W.PET/CERES
1 CREAM (GRAM) TOPICAL DAILY
Status: DISCONTINUED | OUTPATIENT
Start: 2023-01-25 | End: 2023-01-27 | Stop reason: HOSPADM

## 2023-01-25 RX ORDER — FUROSEMIDE 10 MG/ML
40 INJECTION INTRAMUSCULAR; INTRAVENOUS ONCE
Status: COMPLETED | OUTPATIENT
Start: 2023-01-25 | End: 2023-01-25

## 2023-01-25 RX ORDER — FUROSEMIDE 10 MG/ML
20 INJECTION INTRAMUSCULAR; INTRAVENOUS 2 TIMES DAILY
Status: DISCONTINUED | OUTPATIENT
Start: 2023-01-25 | End: 2023-01-27 | Stop reason: HOSPADM

## 2023-01-25 RX ORDER — CLOPIDOGREL BISULFATE 75 MG/1
75 TABLET ORAL DAILY
Status: DISCONTINUED | OUTPATIENT
Start: 2023-01-26 | End: 2023-01-27 | Stop reason: HOSPADM

## 2023-01-25 RX ADMIN — DOXYCYCLINE 100 MG: 100 INJECTION, POWDER, LYOPHILIZED, FOR SOLUTION INTRAVENOUS at 09:56

## 2023-01-25 RX ADMIN — METFORMIN HYDROCHLORIDE 500 MG: 500 TABLET ORAL at 16:38

## 2023-01-25 RX ADMIN — INSULIN LISPRO 1 UNITS: 100 INJECTION, SOLUTION INTRAVENOUS; SUBCUTANEOUS at 12:00

## 2023-01-25 RX ADMIN — METFORMIN HYDROCHLORIDE 500 MG: 500 TABLET ORAL at 09:37

## 2023-01-25 RX ADMIN — FERROUS SULFATE TAB 325 MG (65 MG ELEMENTAL FE) 325 MG: 325 (65 FE) TAB at 16:38

## 2023-01-25 RX ADMIN — MAGNESIUM OXIDE 200 MG: 400 TABLET ORAL at 09:36

## 2023-01-25 RX ADMIN — TIMOLOL MALEATE 1 DROP: 5 SOLUTION OPHTHALMIC at 10:57

## 2023-01-25 RX ADMIN — BRIMONIDINE TARTRATE 1 DROP: 2 SOLUTION OPHTHALMIC at 09:44

## 2023-01-25 RX ADMIN — Medication 1 TABLET: at 09:44

## 2023-01-25 RX ADMIN — ASPIRIN 81 MG: 81 TABLET, COATED ORAL at 09:37

## 2023-01-25 RX ADMIN — Medication 250 MG: at 09:37

## 2023-01-25 RX ADMIN — ENOXAPARIN SODIUM 60 MG: 100 INJECTION SUBCUTANEOUS at 09:40

## 2023-01-25 RX ADMIN — Medication 2000 UNITS: at 09:37

## 2023-01-25 RX ADMIN — SPIRONOLACTONE 12.5 MG: 25 TABLET ORAL at 09:37

## 2023-01-25 RX ADMIN — CEFTRIAXONE 1000 MG: 1 INJECTION, POWDER, FOR SOLUTION INTRAMUSCULAR; INTRAVENOUS at 09:42

## 2023-01-25 RX ADMIN — CLOPIDOGREL BISULFATE 300 MG: 75 TABLET ORAL at 16:38

## 2023-01-25 RX ADMIN — FUROSEMIDE 40 MG: 10 INJECTION, SOLUTION INTRAMUSCULAR; INTRAVENOUS at 13:07

## 2023-01-25 RX ADMIN — FUROSEMIDE 40 MG: 40 TABLET ORAL at 09:37

## 2023-01-25 RX ADMIN — METOPROLOL SUCCINATE 12.5 MG: 25 TABLET, EXTENDED RELEASE ORAL at 09:37

## 2023-01-25 RX ADMIN — PREGABALIN 25 MG: 25 CAPSULE ORAL at 09:37

## 2023-01-25 RX ADMIN — FERROUS SULFATE TAB 325 MG (65 MG ELEMENTAL FE) 325 MG: 325 (65 FE) TAB at 09:36

## 2023-01-25 NOTE — CONSULTS
Inpatient Cardiology Consultation      Reason for Consult:  NSTEMI. CHF Exacerbation. EF 20%. Patient not deemed a surgical candidate @ [facility]. Code Status change. 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 After getting up on Tuesday morning didn't feel right, felt weak. Denies CP or SOB. Sleeps in bed with 1 pillow. Some ankle edema. Eating and drinking okay. Psychiatric-ED 1/24/2023 /63 HR 80-90's, afebrile, and O2 saturation 95184% on RA. Episode's on hypoxia yesterday requiring 2 L NC.    CXR read as RLL pneumonia    Na 133, K+ 4.5, Bun/Cr 45/1.2(26/1.0 on 12/29/2022) , magnesium 2.2, p-BNP 50427(4079 on 11/21/2022), troponin 177-->269-->415, , , CBC WNL. COVID NEGATIVE, UA small amount LE's. Duoneb, ATB', and  mg given in ED  ASA 81 mg, Lipitor 80 mg, Lasix 40 mg po QD, Toprol XL 12.5 mg QD, Aldactone 12.5 mg QD re-ordered (home medications). Currently /60 HR 80's SR, remains afebrile, and O2 saturation 99% on 2 liters NC    Please note: past medical records were reviewed per electronic medical record (EMR) - see detailed reports under Past Medical/ Surgical History. Past Medical History:    Dilated ICMP/HFrEF  TTE 11/15/2022 Dr Argueta Or: EF estimated at 20%. Left ventricle is moderately enlarged. Normal right ventricle structure and function. Left atrial volume index of 68 ml per meters squared BSA. Physiologic and/or trace aortic regurgitation is noted. Mild to moderate MR. Mild TR. No evidence for hemodynamically significant. pericardial effusion.   11/17/2022 Lexiscan MPS: EF 30% with partially reversible defect of moderate size and moderate intensity in the mid to apical anterior and anteroseptal walls. 11/23/2022 [facility] Dr Hakeem Gross: Severe calcific multivessel coronary arteries. Markedly elevated left ventricular end-diastolic pressure. Severe heavily calcified stenosis of the right innominate/subclavian artery. 11/24/2022 Life-Vest placed  11/24/2022 Transfer to [facility]   11/25/2022 TTE [facility]: left ventricle is severely dilated. Left ventricular systolic function is severely decreased. EF = 25 ± 5% (2D biplane). The right ventricle is normal in size. Right ventricular systolic function is normal. The left atrial cavity is severely dilated. There is moderate (2+) holosystolic MR due to apical tethering of normal mitral leaflet caused by LV enlargement. Regurgitant orifice area (PISA) is 0.19 cm². Multiple MR jets visualized. [facility] Cardiology-->Patient seen by Dr. Shirley Ascencio and deemed not a surgical canidatae with heart failure with chronic total occlusion of the LAD and reduced EF to 20% along with multiple comorbidities and advanced age. Patient high risk for PCI. Team discussion with patient and family. Plan medical management with close follow up. Chronic HFrEF  NYHA FC II  11/15/2022 p-BNP 51521  11/21/2022 p-BNP 9030  PAD  11/26/2022 bilateral carotid US: bilateral carotid disease with bilateral subclavian artery stenosis  HTN  HLD  COPD  Ex smoker  T2DM with neuropathy  RLS  11/16/2022 R thoracentesis   11/2023 Urinary retention requiring sapp placement  DNR-CC      Past Surgical History:  Appendectomy, LHC, excision of L arm tumor (benign), colonoscopy. Medications Prior to admit:  Prior to Admission medications    Medication Sig Start Date End Date Taking? Authorizing Provider   pregabalin (LYRICA) 25 MG capsule Take 1 capsule by mouth 2 times daily for 60 days.  1/12/23 3/13/23  Cameron Issa MD   metoprolol succinate (TOPROL XL) 25 MG extended release tablet Take 0.5 tablets by mouth daily Do not crush or chew.-Hold SBP less 90 or HR < 60 12/22/22   Jazmín Barnes MD   spironolactone (ALDACTONE) 25 MG tablet Take 0.5 tablets by mouth daily 12/22/22   Jazmín Barnes MD   atorvastatin (LIPITOR) 80 MG tablet Take 1 tablet by mouth daily 12/6/22   Marylou Beavers MD   blood glucose test strips (ASCENSIA AUTODISC VI;ONE TOUCH ULTRA TEST VI) strip 1 each by Does not apply route 2 times daily 12/6/22   Marylou Beavers MD   nitroGLYCERIN (NITROSTAT) 0.4 MG SL tablet Place 1 tablet under the tongue every 5 minutes as needed for Chest pain up to max of 3 total doses.  If no relief after 1 dose, call 911. 11/24/22   Jazmín Barnes MD   ferrous sulfate (IRON 325) 325 (65 Fe) MG tablet Take 1 tablet by mouth 2 times daily (with meals) 11/22/22   Scott Roca MD   furosemide (LASIX) 20 MG tablet Take 1 tablet by mouth daily 11/23/22   Scott Roca MD   albuterol-ipratropium (COMBIVENT RESPIMAT)  MCG/ACT AERS inhaler Inhale 1 puff into the lungs 2 times daily 11/8/22   Marylou Beavers MD   metFORMIN (GLUCOPHAGE) 500 MG tablet Take 1 tablet by mouth 2 times daily (with meals) 11/8/22   Marylou Beavers MD   Cholecalciferol (VITAMIN D3) 50 MCG (2000 UT) CAPS Take by mouth daily    Historical Provider, MD   ascorbic acid (VITAMIN C) 250 MG tablet Take 250 mg by mouth daily    Historical Provider, MD   vitamin B-12 (CYANOCOBALAMIN) 1000 MCG tablet Take 1,000 mcg by mouth daily    Historical Provider, MD   Lancets MISC 1 each by Does not apply route 2 times daily 11/13/19   Marylou Beavers MD   timolol (TIMOPTIC) 0.5 % ophthalmic solution INSTILL 1 DROP INTO EACH EYE TWICE DAILY 7/30/19   Historical Provider, MD   latanoprost (XALATAN) 0.005 % ophthalmic solution INSTILL 1 DROP INTO EACH EYE ONCE DAILY AT BEDTIME 7/30/19   Historical Provider, MD   brimonidine (ALPHAGAN) 0.2 % ophthalmic solution INSTILL 1 DROP INTO LEFT EYE TWICE DAILY 7/30/19   Historical Provider, MD   Blood Glucose Monitoring Suppl DELTA 1 Units by Does not apply route 2 times daily Please give whatever insurance covers 8/8/17   Wan Stout MD   magnesium (MAGNESIUM-OXIDE) 250 MG TABS tablet Take 250 mg by mouth daily    Historical Provider, MD   Multiple Vitamin (ONE-A-DAY ESSENTIAL PO) Take 1 tablet by mouth. Historical Provider, MD   calcium carbonate-vitamin D 600-200 MG-UNIT TABS Take 1 tablet by mouth daily. Historical Provider, MD   [facility] Natural Products (OSTEO BI-FLEX ADV JOINT SHIELD PO) Take 2 tablets by mouth daily. Historical Provider, MD   aspirin 81 MG EC tablet Take 81 mg by mouth daily.       Historical Provider, MD       Current Medications:    Current Facility-Administered Medications: calcium-cholecalciferol 500-5 MG-MCG per tablet 1 tablet, 1 tablet, Oral, Daily  cefTRIAXone (ROCEPHIN) 1,000 mg in sterile water 10 mL IV syringe, 1,000 mg, IntraVENous, Q24H  doxycycline (VIBRAMYCIN) 100 mg in sodium chloride 0.9 % 100 mL IVPB ([id number]), 100 mg, IntraVENous, Q12H  enoxaparin (LOVENOX) injection 60 mg, 1 mg/kg, SubCUTAneous, BID  ipratropium-albuterol (DUONEB) nebulizer solution 1 ampule, 1 ampule, Inhalation, Q4H PRN  vitamin C tablet 250 mg, 250 mg, Oral, Daily  aspirin EC tablet 81 mg, 81 mg, Oral, Daily  atorvastatin (LIPITOR) tablet 80 mg, 80 mg, Oral, Nightly  brimonidine (ALPHAGAN) 0.2 % ophthalmic solution 1 drop, 1 drop, Left Eye, BID  ferrous sulfate (IRON 325) tablet 325 mg, 325 mg, Oral, BID WC  furosemide (LASIX) tablet 40 mg, 40 mg, Oral, Daily  latanoprost (XALATAN) 0.005 % ophthalmic solution 1 drop, 1 drop, Both Eyes, Nightly  magnesium oxide (MAG-OX) tablet 200 mg, 200 mg, Oral, Daily  Vitamin D (CHOLECALCIFEROL) tablet 2,000 Units, 2 tablet, Oral, Daily  metFORMIN (GLUCOPHAGE) tablet 500 mg, 500 mg, Oral, BID WC  metoprolol succinate (TOPROL XL) extended release tablet 12.5 mg, 12.5 mg, Oral, Daily  therapeutic multivitamin-minerals 1 tablet, 1 tablet, Oral, Daily  spironolactone (ALDACTONE) tablet 12.5 mg, 12.5 mg, Oral, Daily  timolol (TIMOPTIC) 0.5 % ophthalmic solution 1 drop, 1 drop, Both Eyes, BID  pregabalin (LYRICA) capsule 25 mg, 25 mg, Oral, BID  insulin lispro (HUMALOG) injection vial 0-4 Units, 0-4 Units, SubCUTAneous, TID WC  insulin lispro (HUMALOG) injection vial 0-4 Units, 0-4 Units, SubCUTAneous, Nightly  glucose chewable tablet 16 g, 4 tablet, Oral, PRN  dextrose bolus 10% 125 mL, 125 mL, IntraVENous, PRN **OR** dextrose bolus 10% 250 mL, 250 mL, IntraVENous, PRN  glucagon (rDNA) injection 1 mg, 1 mg, SubCUTAneous, PRN  dextrose 10 % infusion, , IntraVENous, Continuous PRN    Allergies:  Pcn [penicillins] and Sulfa antibiotics    Social History:    9 pack year smoker quit in 7319  Denies ETOH/Illicit Drugs  Activity: Lives with  in 2 story with basement. Right now daughter doing laundry, normally patient does it @ [id number] [place] [address]. [address] drive. Daughter has stepped in doing shopping and laundry. Still cooking. Uses a cane to ambulate. Code Status: DNR-CC        Family History: Non contributory secondary to age      REVIEW OF SYSTEMS:   See HPI    PHYSICAL EXAM:   BP (!) 128/59   Pulse 83   Temp 97.7 °F (36.5 °C) (Oral)   Resp 16   Ht 5' 6\" (1.676 m)   Wt 143 lb 1.6 oz (64.9 kg)   LMP  (LMP Unknown)   SpO2 94%   BMI 23.10 kg/m²   CONST:  Well developed, well nourished  elderly  female who appears of stated age. Awake, alert and cooperative. No apparent distress. HEENT:   Head- Normocephalic, atraumatic   Eyes- Conjunctivae pink, anicteric  Throat- Oral mucosa pink and moist  Neck-  No stridor, trachea midline, no jugular venous distention. No carotid bruit. CHEST: Chest symmetrical and non-tender to palpation. No accessory muscle use or intercostal retractions  RESPIRATORY: Lung sounds - clear throughout fields   CARDIOVASCULAR:     Heart Ausculation- Regular rate and rhythm, no murmur. No s3, s4 or rub   PV: No lower extremity edema. No varicosities.  Pedal pulses palpable, no clubbing or cyanosis   ABDOMEN: Soft, non-tender to light palpation. Bowel sounds present. No palpable masses; no abdominal bruit  MS: Good muscle strength and tone. No atrophy or abnormal movements. : Deferred  SKIN: Warm and dry no statis dermatitis or ulcers   NEURO / PSYCH: Oriented to person, place and time. Speech clear and appropriate. Follows all commands. Pleasant affect     DATA:    ECG SR rate 86. Old anterior infarct pattern  Tele strips: SR    Diagnostic:    CXR 1/24/2023: Mild CHF. Superimposed pneumonia cannot be excluded. CTA Pulmonary W 1/24/2023: No evidence of pulmonary embolism. Right lower lobe airspace opacity consistent with pneumonia. Small bilateral pleural effusions. Cardiomegaly. CT Abdomen/Pelvis W IV 1/24/2023: DILATED GALLBLADDER WITH SUGGESTION OF MILD PERICHOLECYSTIC EDEMA. No cholelithiasis is evident by CT. Clinical correlation and sonographic evaluation recommended. 10 mm filling defect along the left ventricular apex concerning for a THROMBUS. Correlation with echocardiogram recommended. Severe atherosclerosis, with HIGH-GRADE STENOSES OR POSSIBLE COMPLETE OCCLUSIONS in the distal abdominal aorta, common iliac and external iliac arteries. Probable occlusion of the stent in the left common iliac artery. Correlation with dedicated pre and post contrast enhanced CTA recommended. Severe distal colonic diverticulosis without diverticulitis.        Intake/Output Summary (Last 24 hours) at 1/25/2023 0758  Last data filed at 1/25/2023 0245  Gross per 24 hour   Intake --   Output 900 ml   Net -900 ml       Labs:   CBC:   Recent Labs     01/24/23  1103   WBC 10.3   HGB 11.8   HCT 38.2        BMP:   Recent Labs     01/24/23  1103      K 4.5   CO2 20*   BUN 45*   CREATININE 1.2*   LABGLOM 44   CALCIUM 8.4*     Mag:   Recent Labs     01/24/23  1103   MG 2.2       TFT:   Lab Results   Component Value Date    TSH 2.830 11/11/2015      HgA1c:   Lab Results   Component Value Date/Time    LABA1C 5.8 01/18/2022 09:35 AM    LABA1C 6.0 10/12/2021 09:08 AM    LABA1C 6.3 06/09/2021 09:20 AM     No results found for: EAG  proBNP:   Lab Results   Component Value Date/Time    PROBNP 44,363 01/24/2023 04:49 PM    PROBNP 9,030 11/21/2022 07:50 AM    PROBNP 13,379 11/15/2022 12:44 PM    PROBNP 251 04/02/2017 03:45 AM     PT/INR:   Recent Labs     01/24/23 2054   PROTIME 11.3   INR 1.0     APTT:  Recent Labs     01/24/23 2054   APTT <20.0*     TROPONIN:  Lab Results   Component Value Date/Time    TROPHS 415 01/24/2023 04:49 PM    TROPHS 269 01/24/2023 02:20 PM    TROPHS 177 01/24/2023 01:40 PM    TROPHS 44 11/15/2022 12:44 PM    TROPHS 47 11/15/2022 10:21 AM     FASTING LIPID PANEL:  Lab Results   Component Value Date/Time    CHOL 118 12/29/2022 10:11 AM    HDL 35 12/29/2022 10:11 AM    LDLCALC 58 12/29/2022 10:11 AM    TRIG 123 12/29/2022 10:11 AM     LIVER PROFILE:  Recent Labs     01/24/23  1103   *   *   LABALBU 3.6     COVID19:   Recent Labs     01/24/23  1103   COVID19 Not Detected     ASSESSMENT  NSTEMI in setting hypoxic respiratory failure/pneumonia and  known inoperable CAD. Denies CP. Dilated ICMP not a candidate for revascularization per [facility] 11/2022  Acute on chronic Chronic HFrEF   VHD moderate MR due to apical tethering of normal mitral leaflet caused by LV  enlargement. Bilateral carotid disease with bilateral carotid stenosis  RLL pneumonia on TAB's  HTN  HLD  T2DM with neuropathy  Ex smoker  COPD  PAD with CT Abdomen/Pelvis read as high grade stenosis r possible complete occlusions in the distal abdominal aorta, common iliac and external iliac arteries. Probable occlusion of the stent in the left common iliac artery  DNR-CC.  Palliative Medicine consult for possible Hospice    PLAN:  Load with Plavis today then PLavix 75 mg QD  Give dose of IV Lasix: monitor renal function  Increase BB  Limited TTE to rule out thrombus  Would recommend discontinuing Life Vest Further recommendations pending results of TTE      Discussed with Dr Laci Macedo  Electronically signed by Viridiana Ulloa. YONIS Barber on 1/25/2023 at 7:58 AM    ______________________________________________________________________  Cardiology attending attestation:  I have independently interviewed and examined the patient. I personally reviewed pertinent laboratory values and diagnostic testing (including, if applicable, chest xray, electrocardiogram, telemetry, echocardiogram, stress testing, and coronary angiography). I have reviewed the above documentation completed by VAN Inman including past medical, social, and family history and agree with the findings, assessment and plan except where noted. Plan formulated by me. I participated in all aspects of the medical decision making and performed the substantive portion of the visit by contributing >50% of the total visit time. Please see my additional contributions to the HPI, physical exam, and assessment / medical decision making:  _______________________________________________________________________    61-year-old female known to Dr. Hamlet Mccall, with severe multivessel CAD including ostial left main not amenable to PCI or surgery, presenting feeling unwell and found to have uptrending troponin. Denies chest pain or shortness of breath. CT of the abdomen suggested an LV thrombus. She has known severe LV dysfunction. hs-cTnT went from 177-1000. Physical Exam:  /60   Pulse 81   Temp 97.7 °F (36.5 °C) (Oral)   Resp 18   Ht 5' 6\" (1.676 m)   Wt 143 lb 9.6 oz (65.1 kg)   LMP  (LMP Unknown)   SpO2 99%   BMI 23.18 kg/m²   General appearance: Elderly female, awake, alert, no acute respiratory distress  Skin: Intact, no rash  ENMT: Moist mucous membranes  Neck: Supple, no carotid bruits. Normal jugular venous pressure  Lungs: Clear to auscultation bilaterally.  No wheezes, rales, or rhonchi  Cardiac: Regular rhythm with a normal rate, normal S1&S2, no murmurs apparent. Wearable cardioverter defibrillator present  Abdomen: Soft, positive bowel sounds, nontender  Extremities: Moves all extremities x 4, no lower extremity edema  Neurologic: No focal motor deficits apparent, normal mood and affect    Telemetry: Sinus rhythm 80s, PVCs and couplets  [id number]: Sinus rhythm 86 beats minute. Normal axis and intervals. Anterior infarct. Impression:   NSTEMI. 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 Not surgical or PCI candidate  Ischemic cardiomyopathy  Porcelain aorta    Recommendations: Only option is medical management  Clopidogrel 300 mg today then 75 daily  Continue aspirin  Limited echo with contrast to assess for LV thrombus  If present would need therapeutic anticoagulation with warfarin; continue enoxaparin for now also for treatment of MI  Would not discharge on triple antibiotic therapy  If transitioning to comfort measures would discontinue WCD    Discussed with patient and daughter    Thank you for the consultation. Please do not hesitate to call with questions.     Sarah Vinson MD, [facility] [facility]

## 2023-01-25 NOTE — H&P
[address] [place], [place] [address]ist Group   History and Physical      CHIEF COMPLAINT:  malaise    History of Present Illness:  81 y.o. female with a history of CAD, HFrEF (EF 20%), COPD, HTN, HLD, DM type 2 with polyneuropathy presents with not feeling quite right today. Denies SOB, chest pain, does have a cough productive of white sputum. Was admitted at [facility] in November with severe left main and RCA CAD disease found on heart cath 11/23/22 as well as decompensated systolic/diastolic heart failure with EF 20%. Has lifevest.  Workup in ED significant for BUN 45, creatinine 1.2, glucose 153, pro-BNP 51183, troponin [phone], alk phos 106, , . CXR showed RLL pneumonia. CTA chest negative for PE, CT abd/pelvis showed multiple vessel stenosis/occlusion, pericholestatic fluid. Started on Rocephin/doxy in ED as well as Duoneb. Given aspirin 243mg. Informant(s) for H&P: patient, , chart    REVIEW OF SYSTEMS:  no fevers, chills, cp, sob, n/v, ha, vision/hearing changes, wt changes, hot/cold flashes, other open skin lesions, diarrhea, constipation, dysuria/hematuria unless noted in HPI. Complete ROS performed with the patient and is otherwise negative. PMH:  Past Medical History:   Diagnosis Date    CAD (coronary artery disease)     COPD (chronic obstructive pulmonary disease) ([address]     H/O cardiovascular stress test 11/17/2022    Lexiscan    Hyperlipidemia     Hypertension     Osteoarthritis     Type II or unspecified type diabetes mellitus without mention of complication, not stated as uncontrolled        Surgical History:  Past Surgical History:   Procedure Laterality Date    APPENDECTOMY  01/01/1953    CARDIAC CATHETERIZATION  11/23/2022    Dr Devora Ribeiro ,1971    COLONOSCOPY  01/01/2003    TUMOR REMOVAL  01/01/1954    under lt arm       Medications Prior to Admission:    Prior to Admission medications    Medication Sig Start Date End Date Taking? Authorizing Provider   pregabalin (LYRICA) 25 MG capsule Take 1 capsule by mouth 2 times daily for 60 days. 1/12/23 3/13/23  Maritza Hernandez MD   metoprolol succinate (TOPROL XL) 25 MG extended release tablet Take 0.5 tablets by mouth daily Do not crush or chew.-Hold SBP less 90 or HR < 60 12/22/22   Kris Singh MD   spironolactone (ALDACTONE) 25 MG tablet Take 0.5 tablets by mouth daily 12/22/22   Kris Singh MD   atorvastatin (LIPITOR) 80 MG tablet Take 1 tablet by mouth daily 12/6/22   Maritza Hernandez MD   blood glucose test strips (ASCENSIA AUTODISC VI;ONE TOUCH ULTRA TEST VI) strip 1 each by Does not apply route 2 times daily 12/6/22   Maritza Hernandez MD   nitroGLYCERIN (NITROSTAT) 0.4 MG SL tablet Place 1 tablet under the tongue every 5 minutes as needed for Chest pain up to max of 3 total doses.  If no relief after 1 dose, call 911. 11/24/22   Kris Singh MD   ferrous sulfate (IRON 325) 325 (65 Fe) MG tablet Take 1 tablet by mouth 2 times daily (with meals) 11/22/22   Zahra Barber MD   furosemide (LASIX) 20 MG tablet Take 1 tablet by mouth daily 11/23/22   Zahra Barber MD   albuterol-ipratropium (COMBIVENT RESPIMAT)  MCG/ACT AERS inhaler Inhale 1 puff into the lungs 2 times daily 11/8/22   Maritza Hernandez MD   metFORMIN (GLUCOPHAGE) 500 MG tablet Take 1 tablet by mouth 2 times daily (with meals) 11/8/22   Maritza Hernandez MD   Cholecalciferol (VITAMIN D3) 50 MCG (2000 UT) CAPS Take by mouth daily    Historical Provider, MD   ascorbic acid (VITAMIN C) 250 MG tablet Take 250 mg by mouth daily    Historical Provider, MD   vitamin B-12 (CYANOCOBALAMIN) 1000 MCG tablet Take 1,000 mcg by mouth daily    Historical Provider, MD   Lancets MISC 1 each by Does not apply route 2 times daily 11/13/19   Maritza Hernandez MD   timolol (TIMOPTIC) 0.5 % ophthalmic solution INSTILL 1 DROP INTO EACH EYE TWICE DAILY 7/30/19   Historical Provider, MD   latanoprost (XALATAN) 0.005 % ophthalmic solution INSTILL 1 DROP INTO EACH EYE ONCE DAILY AT BEDTIME 7/30/19   Historical Provider, MD   brimonidine (ALPHAGAN) 0.2 % ophthalmic solution INSTILL 1 DROP INTO LEFT EYE TWICE DAILY 7/30/19   Historical Provider, MD   Blood Glucose Monitoring Suppl DELTA 1 Units by Does not apply route 2 times daily Please give whatever insurance covers 8/8/17   Armond Cates MD   magnesium (MAGNESIUM-OXIDE) 250 MG TABS tablet Take 250 mg by mouth daily    Historical Provider, MD   Multiple Vitamin (ONE-A-DAY ESSENTIAL PO) Take 1 tablet by mouth. Historical Provider, MD   calcium carbonate-vitamin D 600-200 MG-UNIT TABS Take 1 tablet by mouth daily. Historical Provider, MD   Misc Natural Products (OSTEO BI-FLEX ADV JOINT SHIELD PO) Take 2 tablets by mouth daily. Historical Provider, MD   aspirin 81 MG EC tablet Take 81 mg by mouth daily. Historical Provider, MD       Allergies:    Pcn [penicillins] and Sulfa antibiotics    Social History:    reports that she quit smoking about 22 years ago. Her smoking use included cigarettes. She has a 9.00 pack-year smoking history. She has never used smokeless tobacco. She reports that she does not drink alcohol and does not use drugs. Family History:   family history includes Diabetes in her mother; Heart Disease in her father and mother; High Blood Pressure in her father and mother; Stroke in her mother.      PHYSICAL EXAM:  Vitals:  BP (!) 106/49   Pulse 82   Temp 97.6 °F (36.4 °C) (Oral)   Resp 21   Wt 142 lb (64.4 kg)   LMP  (LMP Unknown)   SpO2 94%   BMI 22.92 kg/m²     Constitutional:  Elderly, frail, NAD, awake, alert  Eyes: no scleral icterus, normal lids, no discharge  ENMT:  Normocephalic, atraumatic, mucosa moist, EOMI  Neck:  trachea midline, no JVD  Lungs:  CTA bilaterally upper lobes, lower lobes diminished, no audible rhonchi or wheezes noted, respirations unlabored, no retractions, moist cough  Heart[de-identified]  RRR, no murmur, rub, or gallop noted during exam  Abd:  Soft, non tender, non distended, bowel sounds present  :  deferred  MSK: sarcopenia present  Ext:  Moving all extremities, no edema, pulses present  Skin:  Warm and dry, no rashes on visible skin  Psych: non-anxious affect  Neuro:  PERRL, Alert, grossly nonfocal; following commands    LABS:  Recent Labs     01/24/23  1103      K 4.5   CL 99   CO2 20*   BUN 45*   CREATININE 1.2*   GLUCOSE 153*   CALCIUM 8.4*       Recent Labs     01/24/23  1103   WBC 10.3   RBC 3.93   HGB 11.8   HCT 38.2   MCV 97.2   MCH 30.0   MCHC 30.9*   RDW 13.2      MPV 11.3       No results for input(s): POCGLU in the last 72 hours. Radiology: CT ABDOMEN PELVIS W IV CONTRAST Additional Contrast? None    Result Date: 1/24/2023  EXAMINATION: CT OF THE ABDOMEN AND PELVIS WITH CONTRAST 1/24/2023 3:10 pm TECHNIQUE: CT of the abdomen and pelvis was performed with the administration of intravenous contrast. Multiplanar reformatted images are provided for review. Automated exposure control, iterative reconstruction, and/or weight based adjustment of the mA/kV was utilized to reduce the radiation dose to as low as reasonably achievable. COMPARISON: CT of the abdomen and pelvis, 05/12/2008 HISTORY: ORDERING SYSTEM PROVIDED HISTORY: elevated lfts TECHNOLOGIST PROVIDED HISTORY: Reason for exam:->elevated lfts Additional Contrast?->None Decision Support Exception - unselect if not a suspected or confirmed emergency medical condition->Emergency Medical Condition (MA) FINDINGS: Lower Chest: The heart is enlarged. Small bilateral pleural effusions are noted. Interstitial edema is seen in the lower lungs likely due to CHF exacerbation. 10 mm hypodense filling defect along the left ventricular apex (series 7, image 16) may represent a thrombus. Further evaluation with echocardiography recommended. Organs: Liver: Unremarkable. Gallbladder: Moderately distended. Suggestion of mild pericholecystic edema.  No cholelithiasis is evident by CT. Digna Raddle Pancreas: Unremarkable. Spleen:  Unremarkable. Adrenals: Unremarkable. Kidneys: Bilateral renal cysts. Symmetric nephrograms. GI/Bowel: Severe distal colonic diverticulosis without diverticulitis. No bowel wall thickening or obstruction is seen. Pelvis: The urinary bladder is unremarkable. The uterus is normal in size for age. Prominence of the myometrial and parametrial veins, as well as the ovarian veins raise the possibility of pelvic congestion syndrome. Clinical correlation is needed. Peritoneum/Retroperitoneum: Prominent calcified atherosclerosis is seen in the aorta. No aneurysm. Lumen of the distal abdominal aorta and common iliac arteries is suboptimally evaluated due to the density of the calcified plaque and the relatively poor opacification of the vessels. Within this limitation, high-grade stenoses are suspected in the distal abdominal aorta in the common iliac arteries. The stent in the left common iliac artery is likely occluded. Dedicated CTA recommended. No lymphadenopathy. No free air or free fluid is seen. Knee is Bones/Soft Tissues: The visualized bones are intact without fracture or focal lesion. 1. DILATED GALLBLADDER WITH SUGGESTION OF MILD PERICHOLECYSTIC EDEMA. No cholelithiasis is evident by CT. Clinical correlation and sonographic evaluation recommended. 2. 10 mm filling defect along the left ventricular apex concerning for a THROMBUS. Correlation with echocardiogram recommended. 3. Severe atherosclerosis, with HIGH-GRADE STENOSES OR POSSIBLE COMPLETE OCCLUSIONS in the distal abdominal aorta, common iliac and external iliac arteries. Probable occlusion of the stent in the left common iliac artery. Correlation with dedicated pre and post contrast enhanced CTA recommended. 4. Severe distal colonic diverticulosis without diverticulitis.      XR CHEST PORTABLE    Result Date: 1/24/2023  EXAMINATION: ONE XRAY VIEW OF THE CHEST 1/24/2023 11:00 am COMPARISON: None. HISTORY: ORDERING SYSTEM PROVIDED HISTORY: life vest, short of breath TECHNOLOGIST PROVIDED HISTORY: Reason for exam:->life vest, short of breath FINDINGS: The heart is enlarged. Pulmonary vessels are congested. Mild opacity in the lower lungs. No pneumothorax. Mild blunting of the costophrenic angles. Mild CHF. Superimposed pneumonia cannot be excluded. CTA PULMONARY W CONTRAST    Result Date: 1/24/2023  EXAMINATION: CTA OF THE CHEST 1/24/2023 3:10 pm TECHNIQUE: CTA of the chest was performed after the administration of intravenous contrast.  Multiplanar reformatted images are provided for review. MIP images are provided for review. Automated exposure control, iterative reconstruction, and/or weight based adjustment of the mA/kV was utilized to reduce the radiation dose to as low as reasonably achievable. COMPARISON: None. HISTORY: ORDERING SYSTEM PROVIDED HISTORY: shortness of breath TECHNOLOGIST PROVIDED HISTORY: Reason for exam:->shortness of breath Decision Support Exception - unselect if not a suspected or confirmed emergency medical condition->Emergency Medical Condition (MA) FINDINGS: Pulmonary Arteries: Pulmonary arteries are adequately opacified for evaluation. No evidence of intraluminal filling defect to suggest pulmonary embolism. Main pulmonary artery is normal in caliber. Mediastinum: No evidence of mediastinal lymphadenopathy. Cardiomegaly. There is no acute abnormality of the thoracic aorta. Calcified plaque involving the thoracic aorta. Lungs/pleura: Right lower lobe airspace opacity consistent with pneumonia. . No focal consolidation or pulmonary edema. Small bilateral pleural effusions. No pneumothorax. Upper Abdomen: Limited images of the upper abdomen are unremarkable. Soft Tissues/Bones: No acute bone or soft tissue abnormality. Degenerative changes thoracic spine. No evidence of pulmonary embolism. Right lower lobe airspace opacity consistent with pneumonia. Small bilateral pleural effusions. Cardiomegaly. 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  *      PLAN:    RLL Pneumonia  Acute hypoxic respiratory failure  Bilateral pleural effusions  COPD  - started on Rocephin/Doxy in ED  - requiring O2 for hypoxia, may need O2 at home given COPD plus CHF  - WBC 10.3 (up from mid 5's last month) with 87.9% neutrophils  - covid negative    NSTEMI  Left ventricular thrombus  - EKG ordered  - no chest pain, does have malaise and not feeling quite right  - therapeutic Lovenox, check PT/PTT  - troponin [phone]  - consult cardiology    Acute exacerbation of HFrEF/diastolic heart failure  Severe CAD- severe stenosis left main and RCA  - pro-BNP 44,363  - pulmonary vessels congested on imaging  - hypoxia requiring O2  - has life vest  - EF 20% on echo 11/15/22  - recently admitted end of November for same  - seen by [facility] at [facility], not a surgical candidate, high risk for PCI, plan for medical management and life vest  - increase home lasix from 20 to 40mg, diurese cautiously    AAA high grade stenosis vs distal occlusion  Common and external iliac high grade stenosis vs occlusions  - therapeutic Lovenox  - no abdominal pain, likely chronic    Rule out acute cholecystitis  - no abdominal pain  - patient not surgical candidate, choosing DNR-CC with limited workup/treatment  - unable to rule out    JAI vs CKD vs cardiorenal  Cardiohepatic syndrome  - elevated LFTs compared to 1 month ago  - creatinine increased from 1.0 to 1.2 in past month  - volume overloaded  - trend creatinine/LFTs    DM type 2  - hold home metformin due to CT scan with contrast, repeat creatinine in AM  - SSI low dose  - hypoglycemia protocol    Code Status: DNR-CC vs DNR-CCA/DNI- patient wants to pursue limited treatment at this time  DVT prophylaxis: Lovenox    NOTE: This report was transcribed using voice recognition software. Every effort was made to ensure accuracy; however, inadvertent computerized transcription errors may be present.      Electronically signed by Lanette Addison DO on 1/24/2023 at 8:17 PM

## 2023-01-25 NOTE — CONSULTS
Awaiting palliative/ hospice care consult and recommendations. Pt was not interested in establishing with CHF clinic last admission. Will follow throughout this admission.     Dekalb CHRISSY Pope

## 2023-01-25 NOTE — ED NOTES
Tram Martinez called from 4th floor.  Pts room is being terminally cleaned due to previous covid + pt     Al Herrera RN  01/24/23 1588

## 2023-01-25 NOTE — CARE COORDINATION
Case Management Assessment  Initial Evaluation    Date/Time of Evaluation: 1/25/2023 10:37 AM  Assessment Completed by: REUBEN Menon    If patient is discharged prior to next notation, then this note serves as note for discharge by case management. Patient Name: Nichelle Guo                   YOB: 1935  Diagnosis: Acute cholecystitis [K81.0]  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]  Hypoxia [R09.02]  Generalized weakness [R53.1]  DNR (do not resuscitate) Christopher Bowels  Elevated troponin [R77.8]  Elevated LFTs [R79.89]  Left ventricular thrombus [I51.3]  Acute respiratory failure with hypoxia ([address] [J96.01]                   Date / Time: 1/24/2023 10:28 AM    Patient Admission Status: Observation   Readmission Risk (Low < 19, Mod (19-27), High > 27): Readmission Risk Score: 17.8    Current PCP: Miryam Miranda MD  PCP verified by CM? Yes    Chart Reviewed: Yes      History Provided by: Patient (call placed to [facility] with pt permission regarding possible 02 at discharge for DME preference.)  Patient Orientation: Alert and Oriented, Person, Place, Situation    Patient Cognition: Alert      Advance Directives:      Code Status: Limited   Patient's Primary Decision Maker is: Legal Next of Kin    Primary Decision Maker: Te Weir - Spouse - [phone]    Secondary Decision Maker: Brittani Reyes - Child - [phone]    Secondary Decision Maker: Wero Balderas - Child - [phone]    Discharge Planning:    Patient lives with: Spouse/Significant Other (son and dtr for support.) Type of Home: House  Primary Care Giver: Self (, has son and dtr for support.)  Patient Support Systems include: Children   Current Financial resources: Other (Comment), Medicare  Current community resources:    Current services prior to admission: Home Care ([facility] [place], checking if active.)                ADLS  Prior functional level:  Other (see comment) (uses a cane, does cooking at home, family transports,)  Current functional level:      PT AM-PAC:   /24  OT AM-PAC:   /24    Family can provide assistance at DC: Yes    Would you like Case Management to discuss the discharge plan with any other family members/significant others, and if so, who? Yes (permission to speak with dtr regarding DME choices, possible 02.)  Plans to Return to Present Housing: Yes  Other Identified Issues/Barriers to RETURNING to current housing: YES  Potential Assistance needed at discharge: [facility] (possible 02, dtr requesting nebulizer at discharge, pt cannot press down on ihaler per the dtr.)            Potential DME:    Patient expects to discharge to: [address] [place] [address] for transportation at discharge: family will transport. Financial    Payor: MEDICARE / Plan: MEDICARE PART A AND B / Product Type: *No Product type* /         Potential assistance Purchasing Medications: No  Meds-to-Beds request:        Sue Hudson [address], [place] - [address] [employer] Marva Margaret [phone] Jeromy Roberson [phone]  [address] [employer]  Dennis Jefferson) [facility]  Phone: [phone] Fax: [phone]      Notes:    Ss note: 1/25/202310:39 AM Neg covid test on 1-24-23. Pt is currently on 2 liters, no home 02. Consult noted for possible likely home 02 at discharge. Met with pt, she is alert/oriented times 3, resides with spouse in 2 story home 3 steps to enter, has first floor set up, has son & dtr close by for support. PTA pt uses a cane, has ww -NO HOME O2. Pt relays to follow up with her dtr for DME choices, called placed and dtr Jacklyn to come to hospital shortly. Pt has [facility] of [place] and pt/family want this agency again, call out to liaison Meagan Hill to determine if pt is active or if will need NEW hhc orders. Pt does not drive, family transports. Dtr relays pt has an inhaler and due to pts arthritis, DTR IS REQUESTING A NEBULIZER AT DISCHARGE, will need order if appropriate along with dx please. PCP is Dr. Ernestine Zabala. Plan is home at discharge with [address], sw following for 02 need and nebulizer.  REUBEN Lobo    The Plan for Transition of Care is related to the following treatment goals of Acute cholecystitis [K81.0]  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]  Hypoxia [R09.02]  Generalized weakness [R53.1]  DNR (do not resuscitate) Adela   Elevated troponin [R77.8]  Elevated LFTs [R79.89]  Left ventricular thrombus [I51.3]  Acute respiratory failure with hypoxia ([address] [J96.01]        REUBEN Hall  Case Management Department

## 2023-01-25 NOTE — ED NOTES
Report called to Christopher Nowak RN on the 4th floor     [facility]  01/24/23 5518

## 2023-01-25 NOTE — PROGRESS NOTES
Occupational Therapy  OCCUPATIONAL THERAPY INITIAL EVALUATION     [facility]  [address], [address]         Date:2023                                                   Patient Name: Nichelle Guo     MRN: [id number]     : 1935     Room: [address] [place] [address]       Evaluating OT: Miriam Dawson OTR/JARED; [id number]       Referring Provider and Orders/Date:    OT eval and treat  Start:  23,   End:  23,   ONE TIME,   Standing Count:  1 Occurrences,   [address], DO        Diagnosis:   1. Generalized weakness    2. Elevated troponin    3. DNR (do not resuscitate)    4. Acute cholecystitis    5. Acute respiratory failure with hypoxia (HCC)    6. Left ventricular thrombus    7. 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[address]    8.  Elevated LFTs         Surgery: none      Pertinent Medical History:       Past Medical History:   Diagnosis Date    Abnormal cardiovascular stress test 2022    CAD (coronary artery disease)     Chronic systolic (congestive) heart failure 2023    COPD (chronic obstructive pulmonary disease) (HCC)     COPD exacerbation ([address] 2022    H/O cardiovascular stress test 2022    Lexiscan    Hyperlipidemia     Hypertension     Osteoarthritis     Osteoarthritis     Type II or unspecified type diabetes mellitus without mention of complication, not stated as uncontrolled     Urinary retention 2022          Past Surgical History:   Procedure Laterality Date    APPENDECTOMY  1953    CARDIAC CATHETERIZATION  2022    Dr Bhakti Garcia ,1971    COLONOSCOPY  2003    TUMOR REMOVAL  1954    under lt arm       Precautions:  Fall Risk, covid rule out, 2L    Recommended placement: home with     Assessment of current deficits     [x] Functional mobility  [x]ADLs  [x] Strength               []Cognition     [x] Functional transfers   [x] IADLs         [x] Safety Awareness [x]Endurance     [] Fine Coordination              [x] Balance      [] Vision/perception   []Sensation      [x]Gross Motor Coordination  [] ROM  [] Delirium                   [] Motor Control     OT PLAN OF CARE   OT POC based on physician orders, patient diagnosis and results of clinical assessment    Frequency/Duration 1-3 days/wk for 2 weeks PRN   Specific OT Treatment Interventions to include:   * Instruction/training on adapted ADL techniques and AE recommendations to increase functional independence within precautions       * Training on energy conservation strategies, correct breathing pattern and techniques to improve independence/tolerance for self-care routine  * Functional transfer/mobility training/DME recommendations for increased independence, safety, and fall prevention  * Patient/Family education to increase follow through with safety techniques and functional independence  * Recommendation of environmental modifications for increased safety with functional transfers/mobility and ADLs  * Therapeutic exercise to improve motor endurance, ROM, and functional strength for ADLs/functional transfers  * Therapeutic activities to facilitate/challenge dynamic balance, stand tolerance for increased safety and independence with ADLs  * Therapeutic activities to facilitate gross/fine motor skills for increased independence with ADLs  * Neuro-muscular re-education: facilitation of righting/equilibrium reactions, midline orientation, scapular stability/mobility, normalization of muscle tone, and facilitation of volitional active controled movement     Recommended Adaptive Equipment/DME: TBD      Home Living: Lives with spouse, single family home, 2 story but resides on 1st, 4 steps to enter with rail. Children do live local and can assist if needed. Spouse is also able to assist at DC. Bathroom set-up: Tub/shower with grab bars, shower chair, standard commode with grab bars surrounding.           Equipment owned: cane, wheeled walker, bedside commode     Prior Level of Function: Independent with ADLs , daughter assists with IADLs; ambulated independently using cane. Driving: No     Occupation: retired   Enjoys: cooking, puzzles, TV    Pain Level: none  Cognition: A&O: 4/4; Follows 2 step directions   Memory:  Intact   Sequencing:  Intact   Problem solving:  fair   Judgement/safety:  fair    [facility] Daily Activity Inpatient   How much help for putting on and taking off regular lower body clothing?: A Lot  How much help for Bathing?: A Lot  How much help for Toileting?: A Lot  How much help for putting on and taking off regular upper body clothing?: A Little  How much help for taking care of personal grooming?: A Little  How much help for eating meals?: None  [facility] Inpatient Daily Activity Raw Score: 16  AM[facility] Inpatient ADL T-Scale Score : 35.96  ADL Inpatient CMS 0-100% Score: 53.32  ADL Inpatient CMS G-Code Modifier : CK     Functional Assessment:     Initial Eval Status  Date: 1/25/2023   Treatment Status  Date: STGs = LTGs  Time frame: 10-14 days   Feeding Independent  NA-PLOF   Grooming Stand by Assist sitting EOB for oral care, face wash and hand wash  Independent    UB Dressing Minimal Assist with gown management from sitting EOB  Independent    LB Dressing Moderate Assist with socks and brief from sitting/standing due to IV  Independent    Bathing Moderate Assist suspected for safety, but pt declining on eval  Modified [place]    Toileting Moderate Assist for clothing management following use. Pt requesting to use 3:1 due to urgency. One was present  Modified [place]    Bed Mobility  Supine to sit: Stand by Assist   Sit to supine: Stand by Assist     Supine to sit:  Independent   Sit to supine: Independent    Functional Transfers Minimal Assist from elevated surface of bed and 3:1 with walker  Modified [place]    Functional Mobility Minimal Assist with walker  Modified [place]    Balance Sitting:     Static:  fair+    Dynamic:fair  Standing: fair-  Sitting:     Static:  good    Dynamic: fair+  Standing: fair   Activity Tolerance Vitals with activity:   2L with 96% Hr 81; sitting EOB for 6min period and standing 1min  Increase standing tolerance for >3min with stable vital signs for carry over into toileting, functional tranfers and indep in ADLs   Visual/  Perceptual Glasses: Present; blind L eye    Reports change in vision since admission: No     NA     Hand Dominance  [x] Right  [] Left    AROM (PROM) Strength Additional Info:  Goal:   RUE  WFL 4/5 good  and  FMC/dexterity noted during ADL tasks  Opposition [x] Intact [] Impaired  Finger to nose [x] Intact [] Impaired 5/5MMT generally for carry over into self care, functional transfers and functional mobility with AD. LUE WFL 4/5 good  and  FMC/dexterity noted during ADL tasks  Opposition [x] Intact [] Impaired  Finger to nose [x] Intact [] Impaired 5/5MMT generally for carry over into self care, functional transfers and functional mobility with AD. Hearing: WFL   Sensation:  No c/o numbness or tingling   Tone: WFL   Edema: eric LE    Comments: Upon arrival patient supine with IV complications with leaking. Pt required min A for most UB ADLs and mod A LB ADLs tasks. Limited with min A for standing during LB ADLs and functional transfers. The biggest barriers reflect that of functional transfers, functional mobility, UB/LB ADLs, activity tolerance, balance, safety and strengthening. At end of session, patient supine with call light and phone within reach, all lines and tubes intact. Overall patient demonstrated decreased independence and safety during completion of ADL/functional transfer/mobility tasks compared to PLOF.  Nursing updated on pt position and status following OT eval. Pt would benefit from continued skilled OT to increase safety and independence with completion of ADL/IADL tasks for functional independence and quality of life. Rehab Potential: Good for established goals     Patient / Family Goal: O2 management      Patient and/or family were instructed on functional diagnosis, prognosis/goals and OT plan of care. Demonstrated good understanding. Eval Complexity: Low  History: Brief review of medical records and additional review of physical, cognitive, or psychosocial history related to current functional performance  Exam: 3+ performance deficits  Assistance/Modification: Mod assistance or modifications required to perform tasks. May have comorbidities that affect occupational performance. Time In: 1012  Time Out: 1032  Total Treatment Time: 0    Min Units   OT Eval Low 97165  x  1   OT Eval Medium 61849      OT Eval High 22775      OT Re-Eval [id number]       Therapeutic Ex 27672       Therapeutic Activities 51874       ADL/Self Care 11644       Orthotic Management 07133       Manual 32125     Neuro Re-Ed 14883       Non-Billable Time          Evaluation Time additionally includes thorough review of current medical information, gathering information on past medical history/social history and prior level of function, interpretation of standardized testing/informal observation of tasks, assessment of data and development of plan of care and goals.             Marce Yemi OTR/L; [id number]

## 2023-01-25 NOTE — DISCHARGE INSTRUCTIONS
Your information:  Name: Roly Warren  : 1935    Your instructions: You are being discharged home with [facility]. Please follow up with your physician at discharge and take your medications as prescribed. IF YOU EXPERIENCE ANY OF THE FOLLOWING SYMPTOMS, CHEST PAIN, SHORTNESS OF BREATH, COUGHING UP BLOOD OR BLOODY SPUTUM, STOMACH PAIN OR CRAMPING, DARK, TARRY STOOLS, LOSS OF APPETITE, GENERAL NOT FEELING WELL, SIGNS AND SYMPTOMS OF INFECTION LIKE FEVER AND OR CHILLS, PLEASE CALL DR GOODRICH OR RETURN TO THE EMERGENCY ROOM. What to do after you leave the hospital:    Recommended diet: regular diet and low sodium    Recommended activity: activity as tolerated    The following personal items were collected during your admission and were returned to you:    Belongings  Dental Appliances: Uppers, Lowers  Vision - Corrective Lenses: Eyeglasses  Hearing Aid: None  Clothing: Footwear, Socks, Undergarments, At bedside  Jewelry: Ring  Body Piercings Removed: No  Electronic Devices: None  Weapons (Notify Protective Services/Security): None  Other Valuables: At home  Home Medications: None  Valuables Given To: Patient  Provide Name(s) of Who Valuable(s) Were Given To: Mira  Patient approves for provider to speak to responsible person post operatively: Yes    Information obtained by:  By signing below, I understand that if any problems occur once I leave the hospital I am to contact PCP. I understand and acknowledge receipt of the instructions indicated above. HEART FAILURE  / CONGESTIVE HEART FAILURE  DISCHARGE INSTRUCTIONS:  GUIDELINES TO FOLLOW AT HOME    Self- Managed Care:     MEDICATIONS:  Take your medication as directed. If you are experiencing any side effects, inform your doctor, Do not stop taking any of your medications without letting your doctor know.    Check with your doctor before taking any over-the-counter medications / herbal / or dietary supplements. They may interfere with your other medications. Do not take ibuprofen (Advil or Motrin) and naproxen (Aleve) without talking to your doctor first. They could make your heart failure worse. WEIGHT MONITORING:   Weigh yourself everyday (with the same scale) around the same time of the day and write it down. (you can chart them on a calendar or keep track of them on paper. Notify your doctor of a weight gain of 3 pounds or more in 1 day   OR a total of 5 pounds or more in 1 week    Take your weight record to your doctor visits  Also, the same goes if you loose more than 3# in one day, let your heart doctor know. DIET:   Cardiac heart healthy diet- Low saturated / low trans fat, no added salt, caffeine restricted, Low sodium diet-   No more than 2,000mg (2 grams) of salt / sodium per day (which equals to a little less than  a teaspoon of salt)  If your doctor wants you on a fluid restriction. ..it is usually recommended a fluid limit of 2,000cc -  Fluid restriction- 2,000 ml (milliliters) = 64 ounces = you can have 8 glasses of fluid per day (each glass 8 ounces)    Follow a low salt diet - avoid using salt at the table, avoid / limit use of canned soups, processed / packaged foods, salted snacks, olives and pickles. Do not use a salt substitute without checking with your doctor, they may contain a high amount of potassioum. (Mrs. Arnulfo Henriquez is safe to use).     Limit the use of alcohol       CALL YOUR DOCTOR THE FIRST DAY YOU NOTICE ANY OF THESE   SYMPTOMS:  You have a weight gain of 3 pounds or more in 1 day         OR 5 pounds or more in one week  More shortness of breath  More swelling of your stomach, legs, ankles or feet  Feeling more tired, No energy  Dry hacky cough  Dizziness  More chest pain / discomfort       (CALL 911 IF ANY OF THE FOLLOWING OCCURS  Chest pain (not relieved with nitroglycerine, if you have been prescribed this medication)  Severe shortness of breath  Faint / Pass out  Confusion / cannot think clearly  If symptoms get worse           SMOKING - TOBACCO USE:  * IF YOU SMOKE - STOP! Kick the habit. [address] Program is offered at [facility] and [address] [facility]. Call [phone] extension [phone] for more information. ACTIVITY:   (Ask your doctor when you will be able to return to work and before starting any exercise program.  Do not drive unless unless your doctor has given you permission to do so). Start light exercise. Even if you can only do a small amount, exercise will help you get stronger, have more energy, help manage your weight and decrease  stress. Walking is an easy way to get exercise. Start out slowly and  increase the amount you walk as tolerated  If you become short of breath, dizzy or have chest pain; stop, sit down, and rest.  If you feel \"wiped out\" the day after you exercise, walk at a slower pace or for a shorter distance. You can gradually increase the pace or amount of time. (Do not exercise right after a meal or in extreme temperatures, such as above 85 degrees, if the air is really humid, or wind chill is less than 20 degrees)                                             ADDITIONAL INFORMATION:  Avoid getting sick from colds and the flu. Stay away from friends or family that you know may have a contagious illness  Get plenty of rest   Get a flu shot each year. Get a pneumococcal vaccine shot. If you have had one before, ask your doctor whether you need another dose. My Goal for Self-management of Heart Failure Includes 5 steps :    1. Notice a change in symptoms ( weight gain, short of breath, leg swelling, decreased activity level, bloating. ...)    2. Evaluate the change: (use the Heart Failure Zones )     3.  Decide to take action: decide what your options are, such as: (call your doctor for an extra visit, take a prescribed medication, such as your water pill if your doctor has given you directions to do so, [address])    4. Come up with a strategy:  (now you call the doctor for advice / appointment. This is where you take action!!! Do not wait, catch the symptom early and treat it before it worsens. 5. Evaluate the response: The next day, check your Heart Failure Zones: are you in the GREEN ZONE (safe zone)? Worsening symptoms of YELLOW ZONE? Or have you moved to the RED ZONE and need to call 911 or go to the Emergency Room for evaluation? Call your doctor's office to update them on your symptoms of heart failure.

## 2023-01-25 NOTE — CONSULTS
Palliative Care Department  [phone]  Palliative Care Initial Consult  Provider Gavino Panchal, APRN - [facility]  [id number]  Hospital Day: 2  Date of Initial Consult: 1/24/2023  Referring Provider: Dalton Govea DO   Palliative Medicine was consulted for assistance with: Symptom management, goals of care, Multiple comorbidities, looks like ACS plus pneumonia. New O2 requirement. Wants to be DNR-CC, but still wearing Life Vest.  Not ready for hospice. Trying to support patient as she decides how aggressively she wants to be managed and when she might consider transition to hospice    HPI:   Tara Birch is a 81 y.o. with a medical history of CAD, CHF, COPD, hyperlipidemia, hypertension, osteoarthritis, diabetes who was admitted on 1/24/2023 from home with a CHIEF COMPLAINT of fatigue. Patient was admitted at [facility] in November with severe left main and RCA CAD disease found on heart cath 11/23/2022 as well as decompensated systolic/diastolic heart failure with a EF 20%. Patient has a LifeVest.  In ED, significant for BUN 45, creatinine 1.2, glucose 153, proBNP 44,363, troponin elevated. Chest x-ray showed RLL pneumonia. CTA chest negative for PE.  CT abdomen pelvis showed multiple vessel stenosis/occlusion, pericholestatic fluid. Palliative medicine consulted for further assistance. ASSESSMENT/PLAN:     Pertinent Hospital Diagnoses     Acute hypoxic respiratory failure/pneumonia  Bilateral pleural effusions  COPD  NSTEMI  Acute exacerbation of CHF  Severe CAD      Palliative Care Encounter / Counseling Regarding Goals of Care  Please see detailed goals of care discussion as below  At this time, Tara Birch, Does have capacity for medical decision-making.   Capacity is time limited and situation/question specific  During encounter Hipolito Duy was surrogate medical decision-maker  Outcome of goals of care meeting:   Continue current medical management  Change code status to limited   Code status Limited defibrillation/cardioversion only  Advanced Directives: no POA or living will in [facility]  Surrogate/Legal NOK:  Chris Fuentes (spouse) [phone]  Yanique Friedman (child) [phone]    Spiritual assessment: no spiritual distress identified  Bereavement and grief: to be determined  Referrals to: none today  SUBJECTIVE:     Current medical issues leading to Palliative Medicine involvement include   Active Hospital Problems    Diagnosis Date Noted    Essential hypertension [I10]      Priority: High     Class: Chronic    Mixed hyperlipidemia [E78.2]      Priority: High     Class: Chronic    Type 2 diabetes mellitus with diabetic polyneuropathy, without long-term current use of insulin ([facility] [E11.42]      Priority: High     Class: Chronic    Hypoxia [R09.02] 01/24/2023     Priority: Medium    NSTEMI (non-ST elevated myocardial infarction) ([address] [I21.4] 01/24/2023     Priority: Medium    Acute on chronic combined systolic and diastolic congestive heart failure ([address] [I50.43] 01/24/2023     Priority: Medium    Community acquired pneumonia of right lower lobe of lung [J18.9] 01/24/2023     Priority: Medium    Left ventricular thrombus [I51.3] 01/24/2023     Priority: Medium    Acute respiratory failure with hypoxia ([address] [J96.01] 01/24/2023     Priority: Medium    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 ([address] [I70.0] 01/24/2023     Priority: Medium    Cardiorenal syndrome [I13.10] 01/24/2023     Priority: Medium    Generalized weakness [R53.1] 01/24/2023     Priority: Medium    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[address] [I74.09] 01/24/2023     Priority: Medium    Acute cholecystitis [K81.0] 01/24/2023     Priority: Medium    DNR (do not resuscitate) [place] Dose 01/24/2023     Priority: Medium    Elevated LFTs [R79.89] 01/24/2023     Priority: Medium    Elevated troponin [R77.8] 01/24/2023     Priority: Medium    CAD in native artery [I25.10] 11/23/2022     Priority: Medium    Bilateral pleural effusion [J90] 11/15/2022     Priority: Medium    Diabetic polyneuropathy associated with diabetes mellitus due to underlying condition ([facility] [E08.42] 04/17/2019    Chronic obstructive pulmonary disease ([facility] [J44.9] 02/09/2016       Details of Conversation:    Chart reviewed. Patient seen at bedside, A&Ox4. Patient able to partake in meaningful conversation and able to make decisions for herself. Patients daughter, Crys Sequeira, present at the bedside. Introduced self and role of palliative medicine. Discussed current condition and comorbidities. Discussed code status and goals of care. Patient understands that she has CHF and COPD and her EF is 20%. She is currently wearing her LifeVest.  She is now requiring O2 via nasal cannula. Discussed the differences between DNR CCA versus DNR CC. At this time, patient states she wishes to remain a limited-defibrillation/cardioversion only as she wants to continue wearing the LifeVest.  Patient would like to continue with conservative medical management. She would like to see how she progresses the next couple days. Patient states that she continues to decline, she will consider comfort measures and the removal of the LifeVest.  We will change CODE STATUS to reflect patient's wishes. Patient plans on discharging home when medically stable. All questions and concerns addressed. Palliative medicine to follow. Goals of care and CODE STATUS established. There are no further PM needs at this time. PM will now sign off. If new PM needs arise, please re-consult. Thank you.     OBJECTIVE:   Prognosis: Poor    Physical Exam:  /60   Pulse 81   Temp 97.7 °F (36.5 °C) (Oral)   Resp 18   Ht 5' 6\" (1.676 m)   Wt 143 lb 1.6 oz (64.9 kg)   LMP  (LMP Unknown)   SpO2 99%   BMI 23.10 kg/m²   Constitutional:  Elderly, thin, NAD, awake, alert  Eyes: no scleral icterus, normal lids, no discharge  ENMT:  Normocephalic, atraumatic, mucosa moist, EOMI  Neck:  trachea midline, no JVD  Lungs: Diminished  Heart[de-identified]  RRR  Abd:  Soft, non tender, non distended, bowel sounds present  Ext:  Moving all extremities, trace edema, pulses present  Skin:  Warm and dry  Psych: non-anxious affect  Neuro:  PERRL, Alert, grossly nonfocal; following commands    Objective data reviewed: labs, images, records, medication use, vitals, and chart    Discussed patient and the plan of care with the other IDT members: Palliative Medicine IDT Team, Floor Nurse, Patient, and Family    Time/Communication  Greater than 50% of time spent, total 55 minutes in counseling and coordination of care at the bedside regarding goals of care and diagnosis and prognosis. Thank you for allowing Palliative Medicine to participate in the care of Sary Leary.

## 2023-01-25 NOTE — PROGRESS NOTES
[facility] Progress Note    Admitting Date and Time: 1/24/2023 10:28 AM  Admit Dx: Acute cholecystitis [K81.0]  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]  Hypoxia [R09.02]  Generalized weakness [R53.1]  DNR (do not resuscitate) Finesse Herbert  Elevated troponin [R77.8]  Elevated LFTs [R79.89]  Left ventricular thrombus [I51.3]  Acute respiratory failure with hypoxia (HCC) [J96.01]    Subjective:  Patient is being followed for Acute cholecystitis [K81.0]  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[address] [I74.09]  Hypoxia [R09.02]  Generalized weakness [R53.1]  DNR (do not resuscitate) Theduncan Dings  Elevated troponin [R77.8]  Elevated LFTs [R79.89]  Left ventricular thrombus [I51.3]  Acute respiratory failure with hypoxia ([address] [J96.01]     Pt feels a bit better c/w admission. No events overnight. No fevers. No chest pain. Ate some breakfast.  Breathing better. Per RN:  nothing to report    ROS: denies fever, chills, cp, sob, n/v, HA unless stated above.       calcium-cholecalciferol  1 tablet Oral Daily    [START ON 1/26/2023] metoprolol succinate  25 mg Oral Daily    cefTRIAXone (ROCEPHIN) IV  1,000 mg IntraVENous Q24H    doxycycline (VIBRAMYCIN) IV  100 mg IntraVENous Q12H    enoxaparin  1 mg/kg SubCUTAneous BID    ascorbic acid  250 mg Oral Daily    aspirin  81 mg Oral Daily    atorvastatin  80 mg Oral Nightly    brimonidine  1 drop Left Eye BID    ferrous sulfate  325 mg Oral BID WC    furosemide  40 mg Oral Daily    latanoprost  1 drop Both Eyes Nightly    magnesium oxide  200 mg Oral Daily    Vitamin D  2 tablet Oral Daily    metFORMIN  500 mg Oral BID WC    therapeutic multivitamin-minerals  1 tablet Oral Daily    spironolactone  12.5 mg Oral Daily    timolol  1 drop Both Eyes BID    pregabalin  25 mg Oral BID    insulin lispro  0-4 Units SubCUTAneous TID WC    insulin lispro  0-4 Units SubCUTAneous Nightly     perflutren lipid microspheres, 1.5 mL, ONCE PRN  ipratropium-albuterol, 1 ampule, Q4H PRN  glucose, 4 tablet, PRN  dextrose bolus, 125 mL, PRN   Or  dextrose bolus, 250 mL, PRN  glucagon (rDNA), 1 mg, PRN  dextrose, , Continuous PRN         Objective:    /60   Pulse 81   Temp 97.7 °F (36.5 °C) (Oral)   Resp 18   Ht 5' 6\" (1.676 m)   Wt 143 lb 1.6 oz (64.9 kg)   LMP  (LMP Unknown)   SpO2 99%   BMI 23.10 kg/m²     General Appearance: alert and oriented to person, place and time and in no acute distress  Skin: warm and dry, good turgor, no rashes, no jaundice  Head: normocephalic and atraumatic  Eyes: pupils equal, round, and reactive to light, extraocular eye movements intact, conjunctivae normal  Neck: neck supple and non tender without mass   Pulmonary/Chest: no wheezes, no rales or rhonchi, air movement diminished in the bases, no respiratory distress on 2 liters NC  Cardiovascular: normal rate, normal S1 and S2 and no carotid bruits  Abdomen: soft, non-tender, non-distended, normal bowel sounds, no masses or organomegaly  Extremities: no cyanosis, no clubbing and no edema; pos right pedal pulse; left pedal pulse difficult to feel  Neurologic: no cranial nerve deficit and speech normal        Recent Labs     01/24/23  1103      K 4.5   CL 99   CO2 20*   BUN 45*   CREATININE 1.2*   GLUCOSE 153*   CALCIUM 8.4*       Recent Labs     01/24/23  1103   WBC 10.3   RBC 3.93   HGB 11.8   HCT 38.2   MCV 97.2   MCH 30.0   MCHC 30.9*   RDW 13.2      MPV 11.3       Radiology:   None new    Assessment:    Principal Problem:    NSTEMI (non-ST elevated myocardial infarction) ([facility])  Active Problems:    Type 2 diabetes mellitus with diabetic polyneuropathy, without long-term current use of insulin ([facility])    Mixed hyperlipidemia    Essential hypertension    Bilateral pleural effusion    CAD in native artery    Hypoxia    Acute on chronic combined systolic and diastolic congestive heart failure ([facility] [address]    Community acquired pneumonia of right lower lobe of lung    Left ventricular thrombus    Acute respiratory failure with hypoxia (HCC)    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)    Cardiorenal syndrome    Generalized weakness    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HCC)    Acute cholecystitis    DNR (do not resuscitate)    Elevated LFTs    Elevated troponin    Chronic obstructive pulmonary disease (HCC)    Diabetic polyneuropathy associated with diabetes mellitus due to underlying condition ([facility]  Resolved Problems:    * No resolved hospital problems.  *      Plan:  1) RLL Pneumonia  - started on Rocephin/Doxy in ED  - WBC 10.3 (up from mid 5's last month) with 87.9% neutrophils  -covid negative  -Intensive RT  -O2 support and wean as able    2) Acute hypoxic respiratory failure  - requiring O2 for hypoxia, may need O2 at home given COPD plus CHF  -O2 support -- wean as able, intensive RT    3) Bilateral pleural effusions  -Due to CHF exacerbation  -Diuresis -- IV Lasix 20 mg BID for now  -Intakes/outputs/daily weights  -cardiology seeing -- I appreciate their help    4) COPD -- no wheezing, not much productive cough, not likely in exacerbation  -Intensive RT  -O2 support -- wean as able  -If wheezing -- consider steroids       5) NSTEMI  - troponin [phone]  - consult cardiology    6) Probable Left ventricular thrombus  -therapeutic Lovenox, check PT/PTT  -ECHO ordered    7) Acute exacerbation of HFrEF/diastolic heart failure  -IV Lasix -- 20 mg IV BID for now  -Cardiology consult  -f/u CXR in a few days  -Trend BNP  -Trend lytes/renal function    8) Severe CAD- severe stenosis left main and RCA  - pro-BNP 44,363  - pulmonary vessels congested on imaging  - hypoxia requiring O2  - has life vest  - EF 20% on echo 11/15/22  - recently admitted end of November for same  - seen by CTS at [facility], not a surgical candidate, high risk for PCI, plan for medical management and life vest       9) AAA high grade stenosis vs distal occlusion  -Statin  -Cardiology on board    10) Common and external iliac high grade stenosis vs occlusions  - therapeutic Lovenox     11) Likely acute cholecystitis -- GB wall edema seen on CT  - no abdominal pain -- watch for symptoms for now  - patient not surgical candidate, choosing DNR-CC with limited workup/treatment       12) JAI vs CKD vs cardiorenal  Cardiohepatic syndrome  -elevated LFTs compared to 1 month ago  -creatinine increased from 1.0 to 1.2 in past month  - trend creatinine/LFTs  -Try IV Lasix diuresis to see if this improves renal function  -Intakes/outputs/daily weights  -Low sodium diet     13) DM type 2  - hold home metformin due to CT scan with contrast, repeat creatinine in AM  - SSI low dose  - hypoglycemia protocol  -Watch sugars intensively 4 times/day     Code Status: DNR-CCA/DNI- patient wants to pursue limited treatment at this time; considering changing to CC only status  DVT prophylaxis: Lovenox      NOTE: This report was transcribed using voice recognition software. Every effort was made to ensure accuracy; however, inadvertent computerized transcription errors may be present.   Electronically signed by Kei Ruiz DO on 1/25/2023 at 2:08 PM

## 2023-01-25 NOTE — CARE COORDINATION
Ss note: 1/25/2023.12:28 PM Neg covid testing. Pt is on 2 liters, will need qualifying pulse ox testing and home 02 order if required at discharge. Pt/dtr requesting oxygen conserving device (portable concentrator) if pt qualified. DME list reviewed prefers [employer], dtr requesting NEBULIZER at discharge if appropriate, will need order. Pt active with [facility], needs TAYLOR.  REUBEN Herbert

## 2023-01-25 NOTE — PROGRESS NOTES
Physical Therapy Initial Evaluation/Plan of Care    Room #:  5693/1457-49  Patient Name: Shayla Velez  YOB: 1935  MRN: [id number]    Date of Service: 1/25/2023     Tentative placement recommendation: Home with Home Health Physical Therapy  Equipment recommendation:  To be determined      Evaluating Physical Therapist: [facility] #[id number]      Specific Provider Orders/Date/Referring Provider :   01/24/23 2015    PT evaluation and treat  Start:  01/24/23 2015,   End:  01/24/23 2015,   ONE TIME,   Standing Count:  1 Occurrences,   R         Huey Bagley, DO       Admitting Diagnosis:   Acute cholecystitis [K81.0]  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]  Hypoxia [R09.02]  Generalized weakness [R53.1]  DNR (do not resuscitate) Juancarlos [place]  Elevated troponin [R77.8]  Elevated LFTs [R79.89]  Left ventricular thrombus [I51.3]  Acute respiratory failure with hypoxia ([address] [J96.01]      Surgery: none  Visit Diagnoses         Codes    Generalized weakness    -  Primary R53.1    Elevated troponin     R77.8    DNR (do not resuscitate)     Z66    Acute cholecystitis     K81.0    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HCC)     I74.09    Elevated LFTs     R79.89            Patient Active Problem List   Diagnosis    Type 2 diabetes mellitus with diabetic polyneuropathy, without long-term current use of insulin (HCC)    Mixed hyperlipidemia    Essential hypertension    RLS (restless legs syndrome)    Chronic obstructive pulmonary disease (HCC)    Diabetic polyneuropathy associated with diabetes mellitus due to underlying condition (HCC)    Bilateral pleural effusion    CAD in native artery    Hypoxia    NSTEMI (non-ST elevated myocardial infarction) ([address]    Acute on chronic combined systolic and diastolic congestive heart failure ([address]    Community acquired pneumonia of right lower lobe of lung    Left ventricular thrombus    Acute respiratory failure with hypoxia (HCC)    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 ([facility]    Cardiorenal syndrome    Generalized weakness    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HCC)    Acute cholecystitis    DNR (do not resuscitate)    Elevated LFTs    Elevated troponin        ASSESSMENT of Current Deficits Patient exhibits decreased strength, balance, and endurance impairing functional mobility, transfers, gait distance, and tolerance to activity. Pt presents with decreased zachery. Decreased processing time. Pt does have lifevest.  The patient will benefit from continued skilled therapy to increase strength and improve balance for safe functional mobility, to decrease risk of falls, and to meet goals at discharge. PHYSICAL THERAPY  PLAN OF CARE       Physical therapy plan of care is established based on physician order,  patient diagnosis and clinical assessment    Current Treatment Recommendations:    -Bed Mobility: Lower extremity exercises , Upper extremity exercises , and Trunk control activities   -Sitting Balance: Incorporate reaching activities to activate trunk muscles , Hands on support to maintain midline , and Facilitate active trunk muscle engagement   -Standing Balance: Perform strengthening exercises in standing to promote motor control with or without upper extremity support  and Instruct patient on adequate base of support to maintain balance  -Transfers: Provide instruction on proper hand and foot position for adequate transfer of weight onto lower extremities and use of gait device if needed and Cues for hand placement, technique and safety.  Provide stabilization to prevent fall   -Gait: Gait training and Standing activities to improve: base of support, weight shift, weight bearing    -Endurance: Utilize Supervised activities to increase level of endurance to allow for safe functional mobility including transfers and gait   -Stairs: Stair training with instruction on proper technique and hand placement on rail    PT long term treatment goals are located in below grid    Patient and or family understand(s) diagnosis, prognosis, and plan of care. Frequency of treatments: Patient will be seen  daily. Prior Level of Function: Patient ambulated with cane   Rehab Potential: good  for baseline    Past medical history:   Past Medical History:   Diagnosis Date    Abnormal cardiovascular stress test 11/17/2022    CAD (coronary artery disease)     Chronic systolic (congestive) heart failure 1/12/2023    COPD (chronic obstructive pulmonary disease) (HCC)     COPD exacerbation ([facility] 11/17/2022    H/O cardiovascular stress test 11/17/2022    Lexiscan    Hyperlipidemia     Hypertension     Osteoarthritis     Osteoarthritis     Type II or unspecified type diabetes mellitus without mention of complication, not stated as uncontrolled     Urinary retention 11/17/2022     Past Surgical History:   Procedure Laterality Date    APPENDECTOMY  01/01/1953    CARDIAC CATHETERIZATION  11/23/2022    Dr Brianne Santos ,1971    COLONOSCOPY  01/01/2003    TUMOR REMOVAL  01/01/1954    under lt arm       SUBJECTIVE:    Precautions:  Up with assistance, falls and O2 (no O2) , steroid injection in right shoulder, history of a LifeVest    Social history: Patient lives with spouse in a two story home resides first  with 3 steps  to enter with Rail  Tub shower     Equipment owned: Cane, [address], Bedside commode, and Shower chair,      [facility]   How much difficulty turning over in bed?: A Little  How much difficulty sitting down on / standing up from a chair with arms?: A Little  How much difficulty moving from lying on back to sitting on side of bed?: A Little  How much help from another person moving to and from a bed to a chair?: A Little  How much help from another person needed to walk in hospital room?: A Little  How much help from another person for climbing 3-5 steps with a railing?: A Little  AM-PAC Inpatient Mobility Raw Score : 18  AM-PAC Inpatient T-Scale Score : 43.63  Mobility Inpatient CMS 0-100% Score: 46.58  Mobility Inpatient CMS G-Code Modifier : CK    Nursing cleared patient for PT evaluation. The admitting diagnosis and active problem list as listed above have been reviewed prior to the initiation of this evaluation. OBJECTIVE;   Initial Evaluation  Date: 1/25/2023 Treatment Date:     Short Term/ Long Term   Goals   Was pt agreeable to Eval/treatment? Yes  To be met in 5 days   Pain level   0/10       Bed Mobility    Rolling: Supervision     Supine to sit: Supervision     Sit to supine: Supervision     Scooting: Supervision     Rolling: Independent    Supine to sit:  Independent    Sit to supine: Independent    Scooting: Independent     Transfers Sit to stand: Minimal assist of 1 cues for safety   Sit to stand: Independent    Ambulation     2x50 feet using  wheeled walker with Minimal assist of 1   for walker control, balance, upright, and safety    100 feet using  cane with Modified Independent    Stair negotiation: ascended and descended   Not assessed d/t fatigued    3 steps, 1 rail, Supervision       ROM Within functional limits    Increase range of motion 10% of affected joints    Strength BUE:  refer to OT eval  RLE:  4-/5  LLE:  4-/5  Increase strength in affected mm groups by 1/3 grade   Balance Sitting EOB:  good -  Dynamic Standing:  fair + wheeled walker    Sitting EOB:  good   Dynamic Standing: good      Patient is Alert & Oriented x person, place, time, and situation and follows directions    Sensation:  Patient  denies numbness/tingling   Edema:  no   Endurance: fair      Vitals:  2 liters nasal cannula   Blood Pressure at rest  Blood Pressure during session    Heart Rate at rest 80 Heart Rate during session    SPO2 at rest 99%  SPO2 during session %     Patient education  Patient educated on role of Physical Therapy, risks of immobility, safety and plan of care,  importance of mobility while in hospital , purse lip breathing, ankle pumps, quad set and glut set for edema control, blood clot prevention, importance and purpose of adaptive device and adjusted to proper height for the patient. , and safety      Patient response to education:   Pt verbalized understanding Pt demonstrated skill Pt requires further education in this area   Yes Partial Yes      Treatment:  Patient practiced and was instructed/facilitated in the following treatment: Patient assisted to EOB. Sat edge of bed 15 minutes with Supervision  to increase dynamic sitting balance and activity tolerance. Pt stood, ambulated as above and assisted up in chair. Educated on exercises but did not perform. Therapeutic Exercises:  not performed  x  reps. At end of session, patient in chair with  call light and phone within reach,  all lines and tubes intact, nursing notified. Patient would benefit from continued skilled Physical Therapy to improve functional independence and quality of life. Patient's/ family goals   home    Time in  12  Time out  0916    Total Treatment Time  10 minutes    Evaluation time includes thorough review of current medical information, gathering information on past medical history/social history and prior level of function, completion of standardized testing/informal observation of tasks, assessment of data, and development of Plan of care and goals.      CPT codes:  Low Complexity PT evaluation (75911)  Therapeutic activities (41080)   10 minutes  1 unit(s)    Melody Liu PT

## 2023-01-25 NOTE — PROGRESS NOTES
Patient's chart updated to reflect:      . - HF care plan, HF education points and HF discharge instructions.  -Orders: 2 gram sodium diet, daily weights, I/O. -Goal to schedule HFU: PCP and cardiology follow up appointments to be scheduled within 7 days of hospital discharge. Goal to provide CHF education session to the patient prior to hospital discharge.     Chidi Paz RN RN, BSN  Heart Failure Navigator

## 2023-01-26 PROBLEM — E44.0 MODERATE PROTEIN-CALORIE MALNUTRITION (HCC): Chronic | Status: ACTIVE | Noted: 2023-01-26

## 2023-01-26 LAB
ALBUMIN SERPL-MCNC: 3.9 G/DL (ref 3.5–5.2)
ALP BLD-CCNC: 100 U/L (ref 35–104)
ALT SERPL-CCNC: 75 U/L (ref 0–32)
ANION GAP SERPL CALCULATED.3IONS-SCNC: 11 MMOL/L (ref 7–16)
AST SERPL-CCNC: 55 U/L (ref 0–31)
BASOPHILS ABSOLUTE: 0.01 E9/L (ref 0–0.2)
BASOPHILS RELATIVE PERCENT: 0.2 % (ref 0–2)
BILIRUB SERPL-MCNC: 0.6 MG/DL (ref 0–1.2)
BILIRUBIN DIRECT: <0.2 MG/DL (ref 0–0.3)
BILIRUBIN, INDIRECT: ABNORMAL MG/DL (ref 0–1)
BUN BLDV-MCNC: 46 MG/DL (ref 6–23)
CALCIUM SERPL-MCNC: 9.8 MG/DL (ref 8.6–10.2)
CHLORIDE BLD-SCNC: 99 MMOL/L (ref 98–107)
CO2: 28 MMOL/L (ref 22–29)
CREAT SERPL-MCNC: 1.1 MG/DL (ref 0.5–1)
EOSINOPHILS ABSOLUTE: 0.04 E9/L (ref 0.05–0.5)
EOSINOPHILS RELATIVE PERCENT: 0.6 % (ref 0–6)
GFR SERPL CREATININE-BSD FRML MDRD: 49 ML/MIN/1.73
GLUCOSE BLD-MCNC: 105 MG/DL (ref 74–99)
HCT VFR BLD CALC: 35.1 % (ref 34–48)
HEMOGLOBIN: 11.7 G/DL (ref 11.5–15.5)
IMMATURE GRANULOCYTES #: 0.02 E9/L
IMMATURE GRANULOCYTES %: 0.3 % (ref 0–5)
LV EF: 23 %
LVEF MODALITY: NORMAL
LYMPHOCYTES ABSOLUTE: 1.22 E9/L (ref 1.5–4)
LYMPHOCYTES RELATIVE PERCENT: 19.1 % (ref 20–42)
MCH RBC QN AUTO: 30.9 PG (ref 26–35)
MCHC RBC AUTO-ENTMCNC: 33.3 % (ref 32–34.5)
MCV RBC AUTO: 92.6 FL (ref 80–99.9)
METER GLUCOSE: 128 MG/DL (ref 74–99)
METER GLUCOSE: 141 MG/DL (ref 74–99)
METER GLUCOSE: 143 MG/DL (ref 74–99)
METER GLUCOSE: 145 MG/DL (ref 74–99)
METER GLUCOSE: 185 MG/DL (ref 74–99)
MONOCYTES ABSOLUTE: 0.69 E9/L (ref 0.1–0.95)
MONOCYTES RELATIVE PERCENT: 10.8 % (ref 2–12)
MYCOPLASMA PNEUMONIAE IGM: NORMAL
NEUTROPHILS ABSOLUTE: 4.41 E9/L (ref 1.8–7.3)
NEUTROPHILS RELATIVE PERCENT: 69 % (ref 43–80)
PDW BLD-RTO: 13.6 FL (ref 11.5–15)
PLATELET # BLD: 153 E9/L (ref 130–450)
PMV BLD AUTO: 11.7 FL (ref 7–12)
POTASSIUM SERPL-SCNC: 3.7 MMOL/L (ref 3.5–5)
RBC # BLD: 3.79 E12/L (ref 3.5–5.5)
SODIUM BLD-SCNC: 138 MMOL/L (ref 132–146)
TOTAL PROTEIN: 6.5 G/DL (ref 6.4–8.3)
TROPONIN, HIGH SENSITIVITY: 835 NG/L (ref 0–9)
WBC # BLD: 6.4 E9/L (ref 4.5–11.5)

## 2023-01-26 PROCEDURE — 2500000003 HC RX 250 WO HCPCS: Performed by: FAMILY MEDICINE

## 2023-01-26 PROCEDURE — 97530 THERAPEUTIC ACTIVITIES: CPT

## 2023-01-26 PROCEDURE — 6370000000 HC RX 637 (ALT 250 FOR IP): Performed by: FAMILY MEDICINE

## 2023-01-26 PROCEDURE — 6360000002 HC RX W HCPCS: Performed by: FAMILY MEDICINE

## 2023-01-26 PROCEDURE — 1200000000 HC SEMI PRIVATE

## 2023-01-26 PROCEDURE — G0378 HOSPITAL OBSERVATION PER HR: HCPCS

## 2023-01-26 PROCEDURE — 80048 BASIC METABOLIC PNL TOTAL CA: CPT

## 2023-01-26 PROCEDURE — 96372 THER/PROPH/DIAG INJ SC/IM: CPT

## 2023-01-26 PROCEDURE — 96376 TX/PRO/DX INJ SAME DRUG ADON: CPT

## 2023-01-26 PROCEDURE — 85025 COMPLETE CBC W/AUTO DIFF WBC: CPT

## 2023-01-26 PROCEDURE — 2580000003 HC RX 258: Performed by: FAMILY MEDICINE

## 2023-01-26 PROCEDURE — 82962 GLUCOSE BLOOD TEST: CPT

## 2023-01-26 PROCEDURE — 99233 SBSQ HOSP IP/OBS HIGH 50: CPT | Performed by: INTERNAL MEDICINE

## 2023-01-26 PROCEDURE — 80076 HEPATIC FUNCTION PANEL: CPT

## 2023-01-26 PROCEDURE — 93308 TTE F-UP OR LMTD: CPT

## 2023-01-26 PROCEDURE — 36415 COLL VENOUS BLD VENIPUNCTURE: CPT

## 2023-01-26 PROCEDURE — 6370000000 HC RX 637 (ALT 250 FOR IP): Performed by: INTERNAL MEDICINE

## 2023-01-26 PROCEDURE — 6360000004 HC RX CONTRAST MEDICATION: Performed by: FAMILY MEDICINE

## 2023-01-26 PROCEDURE — 99232 SBSQ HOSP IP/OBS MODERATE 35: CPT | Performed by: STUDENT IN AN ORGANIZED HEALTH CARE EDUCATION/TRAINING PROGRAM

## 2023-01-26 PROCEDURE — 96366 THER/PROPH/DIAG IV INF ADDON: CPT

## 2023-01-26 PROCEDURE — 6370000000 HC RX 637 (ALT 250 FOR IP): Performed by: NURSE PRACTITIONER

## 2023-01-26 PROCEDURE — 84484 ASSAY OF TROPONIN QUANT: CPT

## 2023-01-26 PROCEDURE — 2700000000 HC OXYGEN THERAPY PER DAY

## 2023-01-26 RX ADMIN — FERROUS SULFATE TAB 325 MG (65 MG ELEMENTAL FE) 325 MG: 325 (65 FE) TAB at 08:23

## 2023-01-26 RX ADMIN — LATANOPROST 1 DROP: 50 SOLUTION OPHTHALMIC at 00:24

## 2023-01-26 RX ADMIN — PREGABALIN 25 MG: 25 CAPSULE ORAL at 20:09

## 2023-01-26 RX ADMIN — MAGNESIUM OXIDE 200 MG: 400 TABLET ORAL at 08:22

## 2023-01-26 RX ADMIN — DOXYCYCLINE 100 MG: 100 INJECTION, POWDER, LYOPHILIZED, FOR SOLUTION INTRAVENOUS at 00:28

## 2023-01-26 RX ADMIN — BRIMONIDINE TARTRATE 1 DROP: 2 SOLUTION OPHTHALMIC at 00:24

## 2023-01-26 RX ADMIN — FERROUS SULFATE TAB 325 MG (65 MG ELEMENTAL FE) 325 MG: 325 (65 FE) TAB at 17:41

## 2023-01-26 RX ADMIN — CLOPIDOGREL BISULFATE 75 MG: 75 TABLET ORAL at 08:22

## 2023-01-26 RX ADMIN — FUROSEMIDE 20 MG: 10 INJECTION, SOLUTION INTRAMUSCULAR; INTRAVENOUS at 08:24

## 2023-01-26 RX ADMIN — PREGABALIN 25 MG: 25 CAPSULE ORAL at 00:42

## 2023-01-26 RX ADMIN — FUROSEMIDE 20 MG: 10 INJECTION, SOLUTION INTRAMUSCULAR; INTRAVENOUS at 17:41

## 2023-01-26 RX ADMIN — DOXYCYCLINE 100 MG: 100 INJECTION, POWDER, LYOPHILIZED, FOR SOLUTION INTRAVENOUS at 20:12

## 2023-01-26 RX ADMIN — ATORVASTATIN CALCIUM 80 MG: 40 TABLET, FILM COATED ORAL at 20:08

## 2023-01-26 RX ADMIN — ENOXAPARIN SODIUM 60 MG: 100 INJECTION SUBCUTANEOUS at 20:08

## 2023-01-26 RX ADMIN — TIMOLOL MALEATE 1 DROP: 5 SOLUTION OPHTHALMIC at 08:26

## 2023-01-26 RX ADMIN — ASPIRIN 81 MG: 81 TABLET, COATED ORAL at 08:22

## 2023-01-26 RX ADMIN — LATANOPROST 1 DROP: 50 SOLUTION OPHTHALMIC at 20:14

## 2023-01-26 RX ADMIN — Medication 250 MG: at 08:23

## 2023-01-26 RX ADMIN — BRIMONIDINE TARTRATE 1 DROP: 2 SOLUTION OPHTHALMIC at 20:16

## 2023-01-26 RX ADMIN — PERFLUTREN 1.5 ML: 6.52 INJECTION, SUSPENSION INTRAVENOUS at 13:44

## 2023-01-26 RX ADMIN — TIMOLOL MALEATE 1 DROP: 5 SOLUTION OPHTHALMIC at 00:24

## 2023-01-26 RX ADMIN — ENOXAPARIN SODIUM 60 MG: 100 INJECTION SUBCUTANEOUS at 08:33

## 2023-01-26 RX ADMIN — Medication 1 TABLET: at 08:23

## 2023-01-26 RX ADMIN — ATORVASTATIN CALCIUM 80 MG: 40 TABLET, FILM COATED ORAL at 00:24

## 2023-01-26 RX ADMIN — DOXYCYCLINE 100 MG: 100 INJECTION, POWDER, LYOPHILIZED, FOR SOLUTION INTRAVENOUS at 11:24

## 2023-01-26 RX ADMIN — BRIMONIDINE TARTRATE 1 DROP: 2 SOLUTION OPHTHALMIC at 08:26

## 2023-01-26 RX ADMIN — TIMOLOL MALEATE 1 DROP: 5 SOLUTION OPHTHALMIC at 20:14

## 2023-01-26 RX ADMIN — METFORMIN HYDROCHLORIDE 500 MG: 500 TABLET ORAL at 08:24

## 2023-01-26 RX ADMIN — Medication 1 TABLET: at 20:08

## 2023-01-26 RX ADMIN — CEFTRIAXONE 1000 MG: 1 INJECTION, POWDER, FOR SOLUTION INTRAMUSCULAR; INTRAVENOUS at 08:25

## 2023-01-26 RX ADMIN — Medication 1 TABLET: at 00:24

## 2023-01-26 RX ADMIN — ENOXAPARIN SODIUM 60 MG: 100 INJECTION SUBCUTANEOUS at 00:24

## 2023-01-26 RX ADMIN — METOPROLOL SUCCINATE 25 MG: 25 TABLET, FILM COATED, EXTENDED RELEASE ORAL at 08:23

## 2023-01-26 RX ADMIN — METFORMIN HYDROCHLORIDE 500 MG: 500 TABLET ORAL at 17:41

## 2023-01-26 RX ADMIN — Medication 2000 UNITS: at 08:22

## 2023-01-26 RX ADMIN — FUROSEMIDE 20 MG: 10 INJECTION, SOLUTION INTRAMUSCULAR; INTRAVENOUS at 00:24

## 2023-01-26 RX ADMIN — PREGABALIN 25 MG: 25 CAPSULE ORAL at 08:22

## 2023-01-26 ASSESSMENT — PAIN SCALES - GENERAL
PAINLEVEL_OUTOF10: 0
PAINLEVEL_OUTOF10: 0

## 2023-01-26 NOTE — PROGRESS NOTES
INPATIENT CARDIOLOGY FOLLOW-UP    Name: Annie Bee    Age: 81 y.o. Date of Admission: 1/24/2023 10:28 AM    Date of Service: 1/26/2023    Primary Cardiologist: Dr Darinel Trevizo    Chief Complaint: Follow-up for CAD, NSTEMI    Interim History:  Feels well. Denies chest pain shortness of breath palpitations. Review of Systems:   Negative except as described above    Problem List:  Patient Active Problem List   Diagnosis    Type 2 diabetes mellitus with diabetic polyneuropathy, without long-term current use of insulin ([address]    Mixed hyperlipidemia    Essential hypertension    RLS (restless legs syndrome)    Chronic obstructive pulmonary disease (HCC)    Diabetic polyneuropathy associated with diabetes mellitus due to underlying condition (HCC)    Bilateral pleural effusion    CAD in native artery    Hypoxia    NSTEMI (non-ST elevated myocardial infarction) ([address]    Acute on chronic combined systolic and diastolic congestive heart failure ([address]    Community acquired pneumonia of right lower lobe of lung    Left ventricular thrombus    Acute respiratory failure with hypoxia ([address]    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 ([address]    Cardiorenal syndrome    Generalized weakness    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HCC)    Acute cholecystitis    DNR (do not resuscitate)    Elevated LFTs    Elevated troponin    Palliative care encounter       Current Medications:    Current Facility-Administered Medications:     calcium-cholecalciferol 500-5 MG-MCG per tablet 1 tablet, 1 tablet, Oral, Daily, Dajuan Pa DO, 1 tablet at 01/26/23 7149    perflutren lipid microspheres (DEFINITY) injection 1.5 mL, 1.5 mL, IntraVENous, ONCE PRN, Roxie Stone DO    metoprolol succinate (TOPROL XL) extended release tablet 25 mg, 25 mg, Oral, Daily, OYNIS Flores, 25 mg at 01/26/23 6765    clopidogrel (PLAVIX) tablet 75 mg, 75 mg, Oral, Daily, Yina Canchola MD, 75 mg at 01/26/23 8418    perflutren lipid microspheres (DEFINITY) injection 1.5 mL, 1.5 mL, IntraVENous, ONCE PRN, YONIS Harrington    furosemide (LASIX) injection 20 mg, 20 mg, IntraVENous, BID, Tamie Stone, DO, 20 mg at 01/26/23 0824    cefTRIAXone (ROCEPHIN) 1,000 mg in sterile water 10 mL IV syringe, 1,000 mg, IntraVENous, Q24H, Odette Curio, DO, 1,000 mg at 01/26/23 0825    doxycycline (VIBRAMYCIN) 100 mg in sodium chloride 0.9 % 100 mL IVPB ([id number]), 100 mg, IntraVENous, Q12H, Odette Curio, DO, Stopped at 01/26/23 0338    enoxaparin (LOVENOX) injection 60 mg, 1 mg/kg, SubCUTAneous, BID, Odette Curio, DO, 60 mg at 01/26/23 9536    ipratropium-albuterol (DUONEB) nebulizer solution 1 ampule, 1 ampule, Inhalation, Q4H PRN, Odette Curio, DO    vitamin C tablet 250 mg, 250 mg, Oral, Daily, Odette Curio, DO, 250 mg at 01/26/23 7808    aspirin EC tablet 81 mg, 81 mg, Oral, Daily, Odette Curio, DO, 81 mg at 01/26/23 4349    atorvastatin (LIPITOR) tablet 80 mg, 80 mg, Oral, Nightly, Odette Curio, DO, 80 mg at 01/26/23 0024    brimonidine (ALPHAGAN) 0.2 % ophthalmic solution 1 drop, 1 drop, Left Eye, BID, Odette Curio, DO, 1 drop at 01/26/23 1133    ferrous sulfate (IRON 325) tablet 325 mg, 325 mg, Oral, BID WC, Odette Curio, DO, 325 mg at 01/26/23 5003    [Held by provider] furosemide (LASIX) tablet 40 mg, 40 mg, Oral, Daily, Odette Curio, DO, 40 mg at 01/25/23 0937    latanoprost (XALATAN) 0.005 % ophthalmic solution 1 drop, 1 drop, Both Eyes, Nightly, Odette Curio, DO, 1 drop at 01/26/23 0024    magnesium oxide (MAG-OX) tablet 200 mg, 200 mg, Oral, Daily, Odette Curio, DO, 200 mg at 01/26/23 5409    Vitamin D (CHOLECALCIFEROL) tablet 2,000 Units, 2 tablet, Oral, Daily, Odette Curio, DO, 2,000 Units at 01/26/23 9336    metFORMIN (GLUCOPHAGE) tablet 500 mg, 500 mg, Oral, BID WC, Odette Curio, DO, 500 mg at 01/26/23 9624    therapeutic multivitamin-minerals 1 tablet, 1 tablet, Oral, Daily, Lloyd Maple, DO, 1 tablet at 01/26/23 4013    spironolactone (ALDACTONE) tablet 12.5 mg, 12.5 mg, Oral, Daily, Lloyd Maple, DO, 12.5 mg at 01/25/23 2988    timolol (TIMOPTIC) 0.5 % ophthalmic solution 1 drop, 1 drop, Both Eyes, BID, Lloyd Maple, DO, 1 drop at 01/26/23 0826    pregabalin (LYRICA) capsule 25 mg, 25 mg, Oral, BID, Lloyd Maple, DO, 25 mg at 01/26/23 0543    insulin lispro (HUMALOG) injection vial 0-4 Units, 0-4 Units, SubCUTAneous, TID WC, Lloyd Maple, DO, 1 Units at 01/25/23 1200    insulin lispro (HUMALOG) injection vial 0-4 Units, 0-4 Units, SubCUTAneous, Nightly, [place] Nas Christian, DO    glucose chewable tablet 16 g, 4 tablet, Oral, PRN, Lloyd Maple, DO    dextrose bolus 10% 125 mL, 125 mL, IntraVENous, PRN **OR** dextrose bolus 10% 250 mL, 250 mL, IntraVENous, PRN, Lloyd Maple, DO    glucagon (rDNA) injection 1 mg, 1 mg, SubCUTAneous, PRN, Lloyd Maple, DO    dextrose 10 % infusion, , IntraVENous, Continuous PRN, Lloyd Maple, DO    Physical Exam:  BP (!) 119/56   Pulse 92   Temp 97.6 °F (36.4 °C) (Oral)   Resp 16   Ht 5' 6\" (1.676 m)   Wt 143 lb 9.6 oz (65.1 kg)   LMP  (LMP Unknown)   SpO2 96%   BMI 23.18 kg/m²   Wt Readings from Last 3 Encounters:   01/25/23 143 lb 9.6 oz (65.1 kg)   01/23/23 142 lb (64.4 kg)   01/12/23 142 lb (64.4 kg)     Appearance: Well-appearing elderly female, awake, alert, on nasal cannula  Skin: Intact, no rash  Head: Normocephalic, atraumatic  Eyes: EOMI, no conjunctival erythema  ENMT: No pharyngeal erythema, MMM, no rhinorrhea  Neck: Supple, no elevated JVP, no carotid bruits  Lungs: Clear to auscultation bilaterally. No wheezes, rales, or rhonchi.   Cardiac: PMI nondisplaced, Regular rhythm with a normal rate, S1 & S2 normal, systolic murmur  Abdomen: Soft, nontender, +bowel sounds  Extremities: Moves all extremities x 4, no lower extremity edema  Neurologic: No focal motor deficits apparent, normal mood and affect  Peripheral Pulses: Intact posterior tibial pulses bilaterally    Intake/Output:    Intake/Output Summary (Last 24 hours) at 2023 0929  Last data filed at 2023 0045  Gross per 24 hour   Intake 480 ml   Output 500 ml   Net -20 ml     No intake/output data recorded. Laboratory Tests:  Recent Labs     23  1103      K 4.5   CL 99   CO2 20*   BUN 45*   CREATININE 1.2*   GLUCOSE 153*   CALCIUM 8.4*     Lab Results   Component Value Date/Time    MG 2.2 2023 11:03 AM     Recent Labs     23  1103   ALKPHOS 106*   *   *   PROT 6.2*   BILITOT 0.6   LABALBU 3.6     Recent Labs     23  1103 23  0719   WBC 10.3 6.4   RBC 3.93 3.79   HGB 11.8 11.7   HCT 38.2 35.1   MCV 97.2 92.6   MCH 30.0 30.9   MCHC 30.9* 33.3   RDW 13.2 13.6    153   MPV 11.3 11.7     Lab Results   Component Value Date    TROPONINI <0.01 2017     Lab Results   Component Value Date    INR 1.0 2023    INR 1.1 2022    PROTIME 11.3 2023    PROTIME 12.9 (H) 2022     Lab Results   Component Value Date    TSH 2.830 2015     Lab Results   Component Value Date    LABA1C 5.8 2022     No results found for: EAG  Lab Results   Component Value Date    CHOL 118 2022    CHOL 159 2022    CHOL 123 2021     Lab Results   Component Value Date    TRIG 123 2022    TRIG 178 (H) 2022    TRIG 151 (H) 2021     Lab Results   Component Value Date    HDL 35 2022    HDL 40 2022    HDL 36 2021     Lab Results   Component Value Date    LDLCALC 58 2022    LDLCALC 83 2022    LDLCALC 57 2021     Lab Results   Component Value Date    LABVLDL 25 2022    LABVLDL 36 2022    LABVLDL 30 2021     No results found for: CHOLHDLRATIO  Recent Labs     23  1649   PROBNP 44,363*       Cardiac Tests:    EK2023: Sinus rhythm 86 beats minute. Normal axis and intervals.   Anterior infarct. Telemetry: Sinus rhythm 60s, PVCs    Chest X-ray:   1/24/23    Impression   Mild CHF. Superimposed pneumonia cannot be excluded. CT abdomen  Personally reviewed probable LV apical thrombus    Impression   1. DILATED GALLBLADDER WITH SUGGESTION OF MILD PERICHOLECYSTIC EDEMA. No   cholelithiasis is evident by CT. Clinical correlation and sonographic   evaluation recommended. 2. 10 mm filling defect along the left ventricular apex concerning for a   THROMBUS. Correlation with echocardiogram recommended. 3. Severe atherosclerosis, with HIGH-GRADE STENOSES OR POSSIBLE COMPLETE   OCCLUSIONS in the distal abdominal aorta, common iliac and external iliac   arteries. Probable occlusion of the stent in the left common iliac artery. Correlation with dedicated pre and post contrast enhanced CTA recommended. 4. Severe distal colonic diverticulosis without diverticulitis. Echocardiogram:   TTE 11/15/22   Summary   Ejection fraction is visually estimated at 20%. Overall ejection fraction severely decreased. Left ventricle is moderately enlarged. Normal right ventricle structure and function. Left atrial volume index of 68 ml per meters squared BSA. Physiologic and/or trace aortic regurgitation is noted. Mild to moderate mitral regurgitation is present. Mild tricuspid regurgitation. No evidence for hemodynamically significant pericardial effusion. Stress test:    Regadenos[facility] SPECT MPI 11/17/2022  Impression   1: The stress EKG report is provided separately. 2  This is an abnormal myocardial perfusion scan. Findings suggestive   of an infarction with mild-to-moderate casi-infarct ischemia in the   LAD/diagonal territory. 3: Left ventricle is moderately dilated with severe global   hypokinesis. There appears to be akinesis of the apical segments. 4: Prognostically, this is an intermediate to high risk study.        Cardiac catheterization:   [facility] 11/23/22 [facility] ANGIOGRAPHY:  Left main:  The left main artery is severely calcified and there was 80%  eccentric ostial left main stenosis. LAD:  The left anterior descending artery appeared heavily calcified in  its proximal segment. It was subtotally occluded in its proximal and  middle segments. The distal LAD and what appeared to be a large  diagonal branch (both of which were underfilled) opacified late upon the  injection of the left main artery. Both the distal LAD and the diagonal  branch after the proximal and mid vessel subtotal occlusions did not  appear to have any discrete lesions. LCX:  The left circumflex had luminal irregularities in its proximal  segment, but without any significant angiographic luminal narrowing. RCA:  The right coronary artery is a very large dominant vessel. There  was 99% calcified mid RCA stenosis. There was PAMELA-1 flow in the  superior subdivision of the posterolateral branch. The posterior  descending artery branch and the posterolateral branch of the right  coronary artery also received left-to-right collaterals. An acute  marginal branch of the right coronary artery also provided faint  collaterals to the LAD.    ----------------------------------------------------------------------------------------------------------------------------------------------------------------  IMPRESSION:  NSTEMI. Type I versus type II from coronary embolus of LV thrombus. hs-cTnT 127 -> 1029 ng/L and downtrending  Very severe multivessel CAD including ostial left main. 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   Acute cholecystitis  Carotid disease  Hypertension  Hyperlipidemia  Type 2 diabetes neuropathy  COPD    RECOMMENDATIONS:  Has been evaluated and [facility] for revascularization, was not felt to be candidate for surgery or percutaneous intervention given comorbidities and severe high risk disease. Appears chest pain-free and relatively euvolemic at this time. Aggressive medical management of CAD  Loaded with clopidogrel yesterday, continue 75 daily  Continue aspirin, statin  Continue GDMT with metoprolol succinate, spironolactone up titration limited by hypotension  Limited contrast echo to assess LV thrombus today, if present would require warfarin, continue enoxaparin for now  However would not discharge on triple antithrombotic therapy  Gentle IV diuresis, can transition to p.o. If transitions to comfort measures, discontinue WCD  Repeat BMP and LFTs  Consider vascular imaging distal aorta/extremities and vascular evaluation, may not be a candidate for anything  Further care per primary service and consultants    Ana Laura Humphries MD, [address] [place] [address] Cardiology    NOTE: This report was transcribed using voice recognition software. Every effort was made to ensure accuracy; however, inadvertent computerized transcription errors may be present.

## 2023-01-26 NOTE — PROGRESS NOTES
[facility] Progress Note    Admitting Date and Time: 1/24/2023 10:28 AM  Admit Dx: Acute cholecystitis [K81.0]  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]  Hypoxia [R09.02]  Generalized weakness [R53.1]  DNR (do not resuscitate) Nidhi Gordon  Elevated troponin [R77.8]  Elevated LFTs [R79.89]  Left ventricular thrombus [I51.3]  Acute respiratory failure with hypoxia ([address] [J96.01]    Subjective:    Pt reports feeling better. Less SOB and less coughing. No fevers/chills. No chest pain. Per RN:  nothing to report    ROS: denies fever, chills, cp, sob, n/v, HA unless stated above.       calcium-cholecalciferol  1 tablet Oral Daily    metoprolol succinate  25 mg Oral Daily    clopidogrel  75 mg Oral Daily    furosemide  20 mg IntraVENous BID    cefTRIAXone (ROCEPHIN) IV  1,000 mg IntraVENous Q24H    doxycycline (VIBRAMYCIN) IV  100 mg IntraVENous Q12H    enoxaparin  1 mg/kg SubCUTAneous BID    ascorbic acid  250 mg Oral Daily    aspirin  81 mg Oral Daily    atorvastatin  80 mg Oral Nightly    brimonidine  1 drop Left Eye BID    ferrous sulfate  325 mg Oral BID WC    [Held by provider] furosemide  40 mg Oral Daily    latanoprost  1 drop Both Eyes Nightly    magnesium oxide  200 mg Oral Daily    Vitamin D  2 tablet Oral Daily    metFORMIN  500 mg Oral BID WC    therapeutic multivitamin-minerals  1 tablet Oral Daily    spironolactone  12.5 mg Oral Daily    timolol  1 drop Both Eyes BID    pregabalin  25 mg Oral BID    insulin lispro  0-4 Units SubCUTAneous TID WC    insulin lispro  0-4 Units SubCUTAneous Nightly     perflutren lipid microspheres, 1.5 mL, ONCE PRN  ipratropium-albuterol, 1 ampule, Q4H PRN  glucose, 4 tablet, PRN  dextrose bolus, 125 mL, PRN   Or  dextrose bolus, 250 mL, PRN  glucagon (rDNA), 1 mg, PRN  dextrose, , Continuous PRN       Objective:    BP (!) 119/56   Pulse 92   Temp 97.6 °F (36.4 °C) (Oral)   Resp 16   Ht 5' 6\" (1.676 m)   Wt 143 lb 9.6 oz (65.1 kg)   LMP  (LMP Unknown) SpO2 96%   BMI 23.18 kg/m²     General Appearance: alert and oriented to person, place and time and in no acute distress  Skin: warm and dry, good turgor, no rashes, no jaundice  Head: normocephalic and atraumatic  Eyes: pupils equal, round, and reactive to light, extraocular eye movements intact, conjunctivae normal  Neck: neck supple and non tender without mass   Pulmonary/Chest: no wheezes, no rales or rhonchi, air movement diminished at bases, no respiratory distress on 2 liters NC  Cardiovascular: normal rate, normal S1 and S2 and no carotid bruits  Abdomen: soft, non-tender, non-distended, normal bowel sounds, no masses or organomegaly  Extremities: no cyanosis, no clubbing and no edema  Neurologic: no cranial nerve deficit and speech normal        Recent Labs     01/24/23  1103 01/26/23  0719    138   K 4.5 3.7   CL 99 99   CO2 20* 28   BUN 45* 46*   CREATININE 1.2* 1.1*   GLUCOSE 153* 105*   CALCIUM 8.4* 9.8         Recent Labs     01/24/23  1103 01/26/23  0719   WBC 10.3 6.4   RBC 3.93 3.79   HGB 11.8 11.7   HCT 38.2 35.1   MCV 97.2 92.6   MCH 30.0 30.9   MCHC 30.9* 33.3   RDW 13.2 13.6    153   MPV 11.3 11.7         Radiology:   None new    Assessment:    Principal Problem:    NSTEMI (non-ST elevated myocardial infarction) ([facility])  Active Problems:    Type 2 diabetes mellitus with diabetic polyneuropathy, without long-term current use of insulin ([facility])    Mixed hyperlipidemia    Essential hypertension    Bilateral pleural effusion    CAD in native artery    Hypoxia    Acute on chronic combined systolic and diastolic congestive heart failure ([address]    Community acquired pneumonia of right lower lobe of lung    Left ventricular thrombus    Acute respiratory failure with hypoxia ([facility])    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 *      Plan:  1) RLL Pneumonia  - Continue ceftriaxone and doxy - today is day 3  - No leukocytosis, afebrile  -Intensive RT  -O2 support and wean as able    2) Acute hypoxic respiratory failure  - requiring O2 for hypoxia, may need O2 at home given COPD plus CHF  -O2 support -- wean as able, intensive RT    4) COPD -- no wheezing, not much productive cough, not likely in exacerbation  -Intensive RT  -O2 support -- wean as able  -PRN long       5) NSTEMI  - troponin [phone]  - cardio on board, patient is not surgical or PCI candidate  - Type 1 vs type 2 NSTEMI.  Possible coronary embolus of LV thrombus, however TTE did not show evidence of this    6) Probable Left ventricular thrombus  -therapeutic Lovenox, check PT/PTT  -ECHO ordered, showed no evidence for thrombus    7) Acute exacerbation of HFrEF/diastolic heart failure  -IV Lasix -- 20 mg IV BID for now  -Cardiology consulted - appreciate their input  -I/Os not being tracked accurately as patient does not have sapp    8) Severe CAD- severe stenosis left main and RCA  - pro-BNP 44,363  - pulmonary vessels congested on imaging  - hypoxia requiring O2  - has life vest  - EF 20% on echo 11/15/22  - recently admitted end of November for same  - seen by C[facility] at [facility], not a surgical candidate, high risk for PCI, plan for medical management and life vest       9) AAA high grade stenosis vs distal occlusion  -Statin  -Cardiology on board    10) Common and external iliac high grade stenosis vs occlusions  - therapeutic Lovenox     11) Likely acute cholecystitis -- GB wall edema seen on CT  - no abdominal pain -- watch for symptoms for now  - patient not surgical candidate, choosing DNR-CC with limited workup/treatment       12) JAI vs CKD vs cardiorenal  Cardiohepatic syndrome  -elevated LFTs compared to 1 month ago  -creatinine 1.1 today, continue to monitor  -Intakes/outputs/daily weights  -Low sodium diet     13) DM type 2  - hold home metformin due to CT scan with contrast, repeat creatinine in AM  - SSI low dose  - hypoglycemia protocol  -Watch sugars intensively 4 times/day     Code Status: DNR-CCA/DNI- patient wants to pursue limited treatment at this time; considering changing to CC only status  DVT prophylaxis: Lovenox      NOTE: This report was transcribed using voice recognition software. Every effort was made to ensure accuracy; however, inadvertent computerized transcription errors may be present.   Electronically signed by Won Galarza MD on 1/26/2023 at 4:59 PM

## 2023-01-26 NOTE — CONSULTS
Comprehensive Nutrition Assessment    Type and Reason for Visit:  Initial, Consult (Low Na DI)    Nutrition Recommendations/Plan:   Continue current diet  Ordered Ensure HP BID to optimize nutrition status & monitor. Malnutrition Assessment:  Malnutrition Status: Moderate malnutrition (01/26/23 1217)    Context:  Chronic Illness     Findings of the 6 clinical characteristics of malnutrition:  Energy Intake:  75% or less estimated energy requirements for 1 month or longer  Weight Loss:  Mild weight loss (specify amount and time period) (10.6% wt loss x 2 months per accurate wt hx in EMR)     Body Fat Loss:  Mild body fat loss Orbital, Buccal region   Muscle Mass Loss:   (moderate) Clavicles (pectoralis & deltoids), Temples (temporalis), Hand (interosseous)  Fluid Accumulation:  No significant fluid accumulation     Strength:  Not Performed    Nutrition Assessment:    Pt Admits w/ fatigue  Dx: NSTEMI in setting hypoxic respiratory failure/PNA. Hx: Acute on Chronic CHF, DM, CAD, HFrEF,COPD, HTN. Pt meal intake is ~50%. Pt meets criteria for Moderate Malnutrition. Pt was very receptive to DI & H/O. Will order ONS to optimize nutrition status & monitor. Nutrition Related Findings:    A/O x4, soft/nontender abd +BS, I/O -WDL, trace edema,  elevated renal labs, elevated LFT'S. Wound Type: None       Current Nutrition Intake & Therapies:    Average Meal Intake: 26-50%, %  Average Supplements Intake: None Ordered  ADULT DIET; Regular; Low Sodium (2 gm)  ADULT ORAL NUTRITION SUPPLEMENT; Lunch, Dinner; Low Calorie/High Protein Oral Supplement    Anthropometric Measures:  Height: 5' 6\" (167.6 cm)  Ideal Body Weight (IBW): 130 lbs (59 kg)    Admission Body Weight: 142 lb (64.4 kg) (01/24 actual)  Current Body Weight: 143 lb (64.9 kg) (01/25 standing scale), 110 % IBW.     Current BMI (kg/m2): 23.1  Usual Body Weight:  (142-162# wt hx per EMR x 1 yr)     BMI Categories: Normal Weight (BMI 22.0 to 24.9) age over 65    Estimated Daily Nutrient Needs:  Energy Requirements Based On: Formula     Energy (kcal/day): 5617-3693  Weight Used for Protein Requirements: Current  Protein (g/day): 70-80 (1.1-1.3 gm/kg review renal labs)  Method Used for Fluid Requirements: 1 ml/kcal  Fluid (ml/day): 9341-3510    Nutrition Diagnosis:   Moderate malnutrition related to catabolic illness (COPD/CHF) as evidenced by intake 26-50%, weight loss, Criteria as identified in malnutrition assessment (-10.6 % wt loss  x 2 months)    Nutrition Interventions:   Food and/or Nutrient Delivery: Continue Current Diet, Start Oral Nutrition Supplement  Nutrition Education/Counseling: Education completed (Low Na+ DI, Label reading)  Coordination of Nutrition Care: Continue to monitor while inpatient     Nutrition Education:  Educated on Low Na DI, Label reading, Shopping tips. All questions were answered  Learners: Patient and Family  Readiness: Acceptance  Method: Explanation and Handout  Response: Verbalizes Understanding  Contact name and number provided.    Goals:     Goals: PO intake 75% or greater     Nutrition Monitoring and Evaluation:   Behavioral-Environmental Outcomes: None Identified  Food/Nutrient Intake Outcomes: Food and Nutrient Intake  Physical Signs/Symptoms Outcomes: Biochemical Data, Chewing or Swallowing, GI Status, Fluid Status or Edema, Skin, Nutrition Focused Physical Findings, Weight    Discharge Planning:        Layne Silverman RD  Contact: [phone]

## 2023-01-26 NOTE — PROGRESS NOTES
Physical Therapy Treatment Note/Plan of Care    Room #:  6505/4360-32  Patient Name: Ginny Kwan  YOB: 1935  MRN: [id number]    Date of Service: 1/26/2023     Tentative placement recommendation: Home with Home Health Physical Therapy  Equipment recommendation:  To be determined      Evaluating Physical Therapist: Casa Chase #[id number]      Specific Provider Orders/Date/Referring Provider :   01/24/23 2015    PT evaluation and treat  Start:  01/24/23 2015,   End:  01/24/23 2015,   ONE TIME,   Standing Count:  1 Occurrences,   R         Astrid Ware DO       Admitting Diagnosis:   Acute cholecystitis [K81.0]  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]  Hypoxia [R09.02]  Generalized weakness [R53.1]  DNR (do not resuscitate) Laura Serve  Elevated troponin [R77.8]  Elevated LFTs [R79.89]  Left ventricular thrombus [I51.3]  Acute respiratory failure with hypoxia ([address] [J96.01]      Surgery: none  Visit Diagnoses         Codes    Generalized weakness    -  Primary R53.1    Elevated troponin     R77.8    DNR (do not resuscitate)     Z66    Acute cholecystitis     K81.0    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HCC)     I74.09    Elevated LFTs     R79.89            Patient Active Problem List   Diagnosis    Type 2 diabetes mellitus with diabetic polyneuropathy, without long-term current use of insulin (HCC)    Mixed hyperlipidemia    Essential hypertension    RLS (restless legs syndrome)    Chronic obstructive pulmonary disease (HCC)    Diabetic polyneuropathy associated with diabetes mellitus due to underlying condition (HCC)    Bilateral pleural effusion    CAD in native artery    Hypoxia    NSTEMI (non-ST elevated myocardial infarction) ([address]    Acute on chronic combined systolic and diastolic congestive heart failure ([address]    Community acquired pneumonia of right lower lobe of lung    Left ventricular thrombus    Acute respiratory failure with hypoxia (HCC)    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 ([facility]    Cardiorenal syndrome    Generalized weakness    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HCC)    Acute cholecystitis    DNR (do not resuscitate)    Elevated LFTs    Elevated troponin    Palliative care encounter    Moderate protein-calorie malnutrition ([facility] [address]        ASSESSMENT of Current Deficits Patient exhibits decreased strength, balance, and endurance impairing functional mobility, transfers, gait distance, and tolerance to activity. Life vest present. Patient blind in left eye, cues needed for direction during ambulation. The patient will benefit from continued skilled therapy to increase strength and improve balance for safe functional mobility, to decrease risk of falls, and to meet goals at discharge. PHYSICAL THERAPY  PLAN OF CARE       Physical therapy plan of care is established based on physician order,  patient diagnosis and clinical assessment    Current Treatment Recommendations:    -Bed Mobility: Lower extremity exercises , Upper extremity exercises , and Trunk control activities   -Sitting Balance: Incorporate reaching activities to activate trunk muscles , Hands on support to maintain midline , and Facilitate active trunk muscle engagement   -Standing Balance: Perform strengthening exercises in standing to promote motor control with or without upper extremity support  and Instruct patient on adequate base of support to maintain balance  -Transfers: Provide instruction on proper hand and foot position for adequate transfer of weight onto lower extremities and use of gait device if needed and Cues for hand placement, technique and safety.  Provide stabilization to prevent fall   -Gait: Gait training and Standing activities to improve: base of support, weight shift, weight bearing    -Endurance: Utilize Supervised activities to increase level of endurance to allow for safe functional mobility including transfers and gait   -Stairs: Stair training with instruction on proper technique and hand placement on rail    PT long term treatment goals are located in below grid    Patient and or family understand(s) diagnosis, prognosis, and plan of care. Frequency of treatments: Patient will be seen  daily. Prior Level of Function: Patient ambulated with cane   Rehab Potential: good  for baseline    Past medical history:   Past Medical History:   Diagnosis Date    Abnormal cardiovascular stress test 11/17/2022    CAD (coronary artery disease)     Chronic systolic (congestive) heart failure 1/12/2023    COPD (chronic obstructive pulmonary disease) ([facility])     COPD exacerbation ([facility] 11/17/2022    H/O cardiovascular stress test 11/17/2022    Lexiscan    Hyperlipidemia     Hypertension     Osteoarthritis     Osteoarthritis     Type II or unspecified type diabetes mellitus without mention of complication, not stated as uncontrolled     Urinary retention 11/17/2022     Past Surgical History:   Procedure Laterality Date    APPENDECTOMY  01/01/1953    CARDIAC CATHETERIZATION  11/23/2022    Dr Lala Angulo ,1971    COLONOSCOPY  01/01/2003    TUMOR REMOVAL  01/01/1954    under lt arm       SUBJECTIVE:    Precautions:  Up with assistance, falls (no O2) , steroid injection in right shoulder, history of a LifeVest    Social history: Patient lives with spouse in a two story home resides first  with 3 steps  to enter with Rail  Tub shower     Equipment owned: Cane, [address], Bedside commode, and Shower chair,      [address] [facility]   How much difficulty turning over in bed?: A Little  How much difficulty sitting down on / standing up from a chair with arms?: A Little  How much difficulty moving from lying on back to sitting on side of bed?: A Little  How much help from another person moving to and from a bed to a chair?: A Little  How much help from another person needed to walk in hospital room?: A Little  How much help from another person for climbing 3-5 steps with a railing?: A Lot  AM-PAC Inpatient Mobility Raw Score : 17  AM-PAC Inpatient T-Scale Score : 42.13  Mobility Inpatient CMS 0-100% Score: 50.57  Mobility Inpatient CMS G-Code Modifier : CK    Nursing cleared patient for PT treatment. OBJECTIVE;   Initial Evaluation  Date: 1/25/2023 Treatment Date:    1/26/2023   Short Term/ Long Term   Goals   Was pt agreeable to Eval/treatment? Yes Yes  To be met in 5 days   Pain level   0/10   0/10    Bed Mobility    Rolling: Supervision     Supine to sit: Supervision     Sit to supine: Supervision     Scooting: Supervision    Rolling: Supervision    Supine to sit: Supervision    Sit to supine: Supervision    Scooting: Supervision     Rolling: Independent    Supine to sit:  Independent    Sit to supine: Independent    Scooting: Independent     Transfers Sit to stand: Minimal assist of 1 cues for safety  Sit to stand: Minimal assist of 1    Sit to stand: Independent    Ambulation     2x50 feet using  wheeled walker with Minimal assist of 1   for walker control, balance, upright, and safety 8 feet and 2x50 feet using  wheeled walker with Minimal assist of 1   for balance     100 feet using  cane with Modified Independent    Stair negotiation: ascended and descended   Not assessed d/t fatigued    3 steps, 1 rail, Supervision       ROM Within functional limits    Increase range of motion 10% of affected joints    Strength BUE:  refer to OT eval  RLE:  4-/5  LLE:  4-/5  Increase strength in affected mm groups by 1/3 grade   Balance Sitting EOB:  good -  Dynamic Standing:  fair + wheeled walker   Sitting EOB: good minus   Dynamic Standing: fair plus wheeled walker    Sitting EOB:  good   Dynamic Standing: good      Patient is Alert & Oriented x person, place, time, and situation and follows directions    Sensation:  Patient  denies numbness/tingling   Edema:  no   Endurance: fair      Vitals: room air   Blood Pressure at rest  Blood Pressure during session    Heart Rate at rest  Heart Rate during session    SPO2 at rest %  SPO2 during session %     Patient education  Patient educated on role of Physical Therapy, risks of immobility, safety and plan of care,  importance of mobility while in hospital , purse lip breathing, ankle pumps, quad set and glut set for edema control, blood clot prevention, importance and purpose of adaptive device and adjusted to proper height for the patient. , and safety      Patient response to education:   Pt verbalized understanding Pt demonstrated skill Pt requires further education in this area   Yes Partial Yes      Treatment:  Patient practiced and was instructed/facilitated in the following treatment: Patient assisted to EOB. Ambulated to transport cart. Therapist came back in once patient was back from imaging. Sat edge of bed 5 minutes with Supervision  to increase dynamic sitting balance and activity tolerance. Pt stood, ambulated again. Seated exercise. Therapeutic Exercises:  ankle pumps, long arc quad, and seated marching  x10   reps. At end of session, patient sitting edge of bed with   present call light and phone within reach,  all lines and tubes intact, nursing notified. Patient would benefit from continued skilled Physical Therapy to improve functional independence and quality of life.          Patient's/ family goals   home    Time in  80  Time out  126  Time in 259  Time out 312    Total Treatment Time  18 minutes      CPT codes:    Therapeutic activities (61825)   10 minutes  1 unit(s)  Therapeutic exercises (48700)   8 minutes  0 unit(s)    Mary Jo Heller PTA JVO#[id number]

## 2023-01-26 NOTE — CARE COORDINATION
Ss note: 1/26/2023.  1:17 PM Neg covid on 1-24-23. Resume order for [facility] noted and liaison notified. Pt resides with spouse Currently on 2 liters, NO home 02, Discussed DME companies from list with dtr Ramon Davenport yesterday, prefers [employer]. Will need correct pulse ox testing, home 02 ORDER with diagnosis and if physician writes for \"oxygen conserving device\" on the order the DME company can have the Respiratory Therapist test pt in home to see if she would qualify for portable concentrator. Requesting NEBULIZER  at discharge, will need order and dx. Utilizes [facility] on [address].  Horris Sandifer, LSW

## 2023-01-27 VITALS
SYSTOLIC BLOOD PRESSURE: 103 MMHG | BODY MASS INDEX: 23.08 KG/M2 | RESPIRATION RATE: 18 BRPM | HEIGHT: 66 IN | OXYGEN SATURATION: 92 % | HEART RATE: 75 BPM | WEIGHT: 143.6 LBS | TEMPERATURE: 98.3 F | DIASTOLIC BLOOD PRESSURE: 52 MMHG

## 2023-01-27 LAB
ANION GAP SERPL CALCULATED.3IONS-SCNC: 13 MMOL/L (ref 7–16)
BASOPHILS ABSOLUTE: 0.02 E9/L (ref 0–0.2)
BASOPHILS RELATIVE PERCENT: 0.3 % (ref 0–2)
BUN BLDV-MCNC: 41 MG/DL (ref 6–23)
CALCIUM SERPL-MCNC: 9.7 MG/DL (ref 8.6–10.2)
CHLORIDE BLD-SCNC: 100 MMOL/L (ref 98–107)
CO2: 25 MMOL/L (ref 22–29)
CREAT SERPL-MCNC: 1 MG/DL (ref 0.5–1)
EOSINOPHILS ABSOLUTE: 0.17 E9/L (ref 0.05–0.5)
EOSINOPHILS RELATIVE PERCENT: 2.9 % (ref 0–6)
GFR SERPL CREATININE-BSD FRML MDRD: 55 ML/MIN/1.73
GLUCOSE BLD-MCNC: 100 MG/DL (ref 74–99)
HCT VFR BLD CALC: 36.6 % (ref 34–48)
HEMOGLOBIN: 12.2 G/DL (ref 11.5–15.5)
IMMATURE GRANULOCYTES #: 0.02 E9/L
IMMATURE GRANULOCYTES %: 0.3 % (ref 0–5)
LYMPHOCYTES ABSOLUTE: 1.51 E9/L (ref 1.5–4)
LYMPHOCYTES RELATIVE PERCENT: 26 % (ref 20–42)
MCH RBC QN AUTO: 30.7 PG (ref 26–35)
MCHC RBC AUTO-ENTMCNC: 33.3 % (ref 32–34.5)
MCV RBC AUTO: 92 FL (ref 80–99.9)
METER GLUCOSE: 119 MG/DL (ref 74–99)
METER GLUCOSE: 124 MG/DL (ref 74–99)
METER GLUCOSE: 138 MG/DL (ref 74–99)
MONOCYTES ABSOLUTE: 0.68 E9/L (ref 0.1–0.95)
MONOCYTES RELATIVE PERCENT: 11.7 % (ref 2–12)
NEUTROPHILS ABSOLUTE: 3.4 E9/L (ref 1.8–7.3)
NEUTROPHILS RELATIVE PERCENT: 58.8 % (ref 43–80)
PDW BLD-RTO: 13.4 FL (ref 11.5–15)
PLATELET # BLD: 172 E9/L (ref 130–450)
PMV BLD AUTO: 11.5 FL (ref 7–12)
POTASSIUM SERPL-SCNC: 3.7 MMOL/L (ref 3.5–5)
PRO-BNP: ABNORMAL PG/ML (ref 0–450)
RBC # BLD: 3.98 E12/L (ref 3.5–5.5)
SODIUM BLD-SCNC: 138 MMOL/L (ref 132–146)
URINE CULTURE, ROUTINE: NORMAL
WBC # BLD: 5.8 E9/L (ref 4.5–11.5)

## 2023-01-27 PROCEDURE — 6370000000 HC RX 637 (ALT 250 FOR IP): Performed by: FAMILY MEDICINE

## 2023-01-27 PROCEDURE — 85025 COMPLETE CBC W/AUTO DIFF WBC: CPT

## 2023-01-27 PROCEDURE — 6370000000 HC RX 637 (ALT 250 FOR IP): Performed by: NURSE PRACTITIONER

## 2023-01-27 PROCEDURE — 99233 SBSQ HOSP IP/OBS HIGH 50: CPT | Performed by: INTERNAL MEDICINE

## 2023-01-27 PROCEDURE — 80048 BASIC METABOLIC PNL TOTAL CA: CPT

## 2023-01-27 PROCEDURE — 2500000003 HC RX 250 WO HCPCS: Performed by: FAMILY MEDICINE

## 2023-01-27 PROCEDURE — 82962 GLUCOSE BLOOD TEST: CPT

## 2023-01-27 PROCEDURE — 6370000000 HC RX 637 (ALT 250 FOR IP): Performed by: INTERNAL MEDICINE

## 2023-01-27 PROCEDURE — 6360000002 HC RX W HCPCS: Performed by: FAMILY MEDICINE

## 2023-01-27 PROCEDURE — 97110 THERAPEUTIC EXERCISES: CPT

## 2023-01-27 PROCEDURE — 99239 HOSP IP/OBS DSCHRG MGMT >30: CPT | Performed by: STUDENT IN AN ORGANIZED HEALTH CARE EDUCATION/TRAINING PROGRAM

## 2023-01-27 PROCEDURE — 2580000003 HC RX 258: Performed by: FAMILY MEDICINE

## 2023-01-27 PROCEDURE — 36415 COLL VENOUS BLD VENIPUNCTURE: CPT

## 2023-01-27 PROCEDURE — 97530 THERAPEUTIC ACTIVITIES: CPT

## 2023-01-27 PROCEDURE — 83880 ASSAY OF NATRIURETIC PEPTIDE: CPT

## 2023-01-27 RX ORDER — DOXYCYCLINE HYCLATE 100 MG/1
100 CAPSULE ORAL 2 TIMES DAILY
Qty: 6 CAPSULE | Refills: 0 | Status: SHIPPED | OUTPATIENT
Start: 2023-01-27 | End: 2023-01-30

## 2023-01-27 RX ORDER — CLOPIDOGREL BISULFATE 75 MG/1
75 TABLET ORAL DAILY
Qty: 30 TABLET | Refills: 0 | Status: SHIPPED | OUTPATIENT
Start: 2023-01-28 | End: 2023-02-10 | Stop reason: SDUPTHER

## 2023-01-27 RX ORDER — CEFDINIR 300 MG/1
300 CAPSULE ORAL 2 TIMES DAILY
Qty: 6 CAPSULE | Refills: 0 | Status: SHIPPED | OUTPATIENT
Start: 2023-01-27 | End: 2023-01-30

## 2023-01-27 RX ORDER — FUROSEMIDE 40 MG/1
40 TABLET ORAL DAILY
Qty: 30 TABLET | Refills: 0 | Status: SHIPPED | OUTPATIENT
Start: 2023-01-28 | End: 2023-02-10 | Stop reason: SDUPTHER

## 2023-01-27 RX ORDER — METOPROLOL SUCCINATE 25 MG/1
25 TABLET, EXTENDED RELEASE ORAL DAILY
Qty: 30 TABLET | Refills: 0 | Status: SHIPPED | OUTPATIENT
Start: 2023-01-28 | End: 2023-02-10 | Stop reason: SDUPTHER

## 2023-01-27 RX ADMIN — SPIRONOLACTONE 12.5 MG: 25 TABLET ORAL at 08:20

## 2023-01-27 RX ADMIN — FERROUS SULFATE TAB 325 MG (65 MG ELEMENTAL FE) 325 MG: 325 (65 FE) TAB at 17:47

## 2023-01-27 RX ADMIN — ENOXAPARIN SODIUM 60 MG: 100 INJECTION SUBCUTANEOUS at 08:32

## 2023-01-27 RX ADMIN — CEFTRIAXONE 1000 MG: 1 INJECTION, POWDER, FOR SOLUTION INTRAMUSCULAR; INTRAVENOUS at 08:31

## 2023-01-27 RX ADMIN — FERROUS SULFATE TAB 325 MG (65 MG ELEMENTAL FE) 325 MG: 325 (65 FE) TAB at 08:20

## 2023-01-27 RX ADMIN — FUROSEMIDE 20 MG: 10 INJECTION, SOLUTION INTRAMUSCULAR; INTRAVENOUS at 08:32

## 2023-01-27 RX ADMIN — Medication 250 MG: at 08:20

## 2023-01-27 RX ADMIN — BRIMONIDINE TARTRATE 1 DROP: 2 SOLUTION OPHTHALMIC at 08:31

## 2023-01-27 RX ADMIN — TIMOLOL MALEATE 1 DROP: 5 SOLUTION OPHTHALMIC at 08:31

## 2023-01-27 RX ADMIN — CLOPIDOGREL BISULFATE 75 MG: 75 TABLET ORAL at 08:20

## 2023-01-27 RX ADMIN — Medication 1 TABLET: at 08:19

## 2023-01-27 RX ADMIN — Medication 2000 UNITS: at 08:19

## 2023-01-27 RX ADMIN — PREGABALIN 25 MG: 25 CAPSULE ORAL at 08:20

## 2023-01-27 RX ADMIN — METOPROLOL SUCCINATE 25 MG: 25 TABLET, FILM COATED, EXTENDED RELEASE ORAL at 08:20

## 2023-01-27 RX ADMIN — METFORMIN HYDROCHLORIDE 500 MG: 500 TABLET ORAL at 17:47

## 2023-01-27 RX ADMIN — MAGNESIUM OXIDE 200 MG: 400 TABLET ORAL at 08:20

## 2023-01-27 RX ADMIN — ASPIRIN 81 MG: 81 TABLET, COATED ORAL at 08:20

## 2023-01-27 RX ADMIN — DOXYCYCLINE 100 MG: 100 INJECTION, POWDER, LYOPHILIZED, FOR SOLUTION INTRAVENOUS at 08:33

## 2023-01-27 RX ADMIN — METFORMIN HYDROCHLORIDE 500 MG: 500 TABLET ORAL at 08:20

## 2023-01-27 ASSESSMENT — PAIN SCALES - GENERAL: PAINLEVEL_OUTOF10: 0

## 2023-01-27 NOTE — PROGRESS NOTES
Physical Therapy Treatment Note/Plan of Care    Room #:  5705/9748-88  Patient Name: Ambar Lovell  YOB: 1935  MRN: [id number]    Date of Service: 1/27/2023     Tentative placement recommendation: Home with Home Health Physical Therapy  Equipment recommendation:  To be determined      Evaluating Physical Therapist: Casa Leach #[id number]      Specific Provider Orders/Date/Referring Provider :   01/24/23 2015    PT evaluation and treat  Start:  01/24/23 2015,   End:  01/24/23 2015,   ONE TIME,   Standing Count:  1 Occurrences,   R         Scarlet Herman DO       Admitting Diagnosis:   Acute cholecystitis [K81.0]  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]  Hypoxia [R09.02]  Generalized weakness [R53.1]  DNR (do not resuscitate) Kennedy Ainsley  Elevated troponin [R77.8]  Elevated LFTs [R79.89]  Left ventricular thrombus [I51.3]  Acute respiratory failure with hypoxia ([address] [J96.01]      Surgery: none  Visit Diagnoses         Codes    Generalized weakness    -  Primary R53.1    Elevated troponin     R77.8    DNR (do not resuscitate)     Z66    Acute cholecystitis     K81.0    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HCC)     I74.09    Elevated LFTs     R79.89            Patient Active Problem List   Diagnosis    Type 2 diabetes mellitus with diabetic polyneuropathy, without long-term current use of insulin (HCC)    Mixed hyperlipidemia    Essential hypertension    RLS (restless legs syndrome)    Chronic obstructive pulmonary disease (HCC)    Diabetic polyneuropathy associated with diabetes mellitus due to underlying condition (HCC)    Bilateral pleural effusion    CAD in native artery    Hypoxia    NSTEMI (non-ST elevated myocardial infarction) ([address]    Acute on chronic combined systolic and diastolic congestive heart failure ([address]    Community acquired pneumonia of right lower lobe of lung    Left ventricular thrombus    Acute respiratory failure with hypoxia (HCC)    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 ([facility] [address]    Cardiorenal syndrome    Generalized weakness    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HCC)    Acute cholecystitis    DNR (do not resuscitate)    Elevated LFTs    Elevated troponin    Palliative care encounter    Moderate protein-calorie malnutrition ([facility] [address]        ASSESSMENT of Current Deficits Patient exhibits decreased strength, balance, and endurance impairing functional mobility, transfers, gait distance, and tolerance to activity. Life vest present. Patient blind in left eye, cues needed for direction during ambulation, therapist stayed on L side to help guide. Pt presented with left heel dragging d/t fatigue towards end of ambulation. The patient will benefit from continued skilled therapy to increase strength and improve balance for safe functional mobility, to decrease risk of falls, and to meet goals at discharge. PHYSICAL THERAPY  PLAN OF CARE       Physical therapy plan of care is established based on physician order,  patient diagnosis and clinical assessment    Current Treatment Recommendations:    -Bed Mobility: Lower extremity exercises , Upper extremity exercises , and Trunk control activities   -Sitting Balance: Incorporate reaching activities to activate trunk muscles , Hands on support to maintain midline , and Facilitate active trunk muscle engagement   -Standing Balance: Perform strengthening exercises in standing to promote motor control with or without upper extremity support  and Instruct patient on adequate base of support to maintain balance  -Transfers: Provide instruction on proper hand and foot position for adequate transfer of weight onto lower extremities and use of gait device if needed and Cues for hand placement, technique and safety.  Provide stabilization to prevent fall   -Gait: Gait training and Standing activities to improve: base of support, weight shift, weight bearing    -Endurance: Utilize Supervised activities to increase level of endurance to allow for safe functional mobility including transfers and gait   -Stairs: Stair training with instruction on proper technique and hand placement on rail    PT long term treatment goals are located in below grid    Patient and or family understand(s) diagnosis, prognosis, and plan of care. Frequency of treatments: Patient will be seen  daily. Prior Level of Function: Patient ambulated with cane   Rehab Potential: good  for baseline    Past medical history:   Past Medical History:   Diagnosis Date    Abnormal cardiovascular stress test 11/17/2022    CAD (coronary artery disease)     Chronic systolic (congestive) heart failure 1/12/2023    COPD [facility]chronic obstructive pulmonary disease) ([facility])     COPD exacerbation ([facility] [address] 11/17/2022    H/O cardiovascular stress test 11/17/2022    Lexiscan    Hyperlipidemia     Hypertension     Osteoarthritis     Osteoarthritis     Type II or unspecified type diabetes mellitus without mention of complication, not stated as uncontrolled     Urinary retention 11/17/2022     Past Surgical History:   Procedure Laterality Date    APPENDECTOMY  01/01/1953    CARDIAC CATHETERIZATION  11/23/2022    Dr Caitlyn King ,1971    COLONOSCOPY  01/01/2003    TUMOR REMOVAL  01/01/1954    under lt arm       SUBJECTIVE:    Precautions:  Up with assistance, falls (no O2) , steroid injection in right shoulder, history of a LifeVest    Social history: Patient lives with spouse in a two story home resides first  with 3 steps  to enter with Rail  Tub shower     Equipment owned: Cane, [address], Bedside commode, and Shower chair,      [address] [facility]   How much difficulty turning over in bed?: A Little  How much difficulty sitting down on / standing up from a chair with arms?: A Little  How much difficulty moving from lying on back to sitting on side of bed?: A Little  How much help from another person moving to and from a bed to a chair?: A Little  How much help from another person needed to walk in hospital room?: A Little  How much help from another person for climbing 3-5 steps with a railing?: A Lot  AM-PAC Inpatient Mobility Raw Score : 17  AM-PAC Inpatient T-Scale Score : 42.13  Mobility Inpatient CMS 0-100% Score: 50.57  Mobility Inpatient CMS G-Code Modifier : CK    Nursing cleared patient for PT treatment. OBJECTIVE;   Initial Evaluation  Date: 1/25/2023 Treatment Date:    1/27/2023   Short Term/ Long Term   Goals   Was pt agreeable to Eval/treatment? Yes Yes  To be met in 5 days   Pain level   0/10   0/10    Bed Mobility    Rolling: Supervision     Supine to sit: Supervision     Sit to supine: Supervision     Scooting: Supervision    Rolling: Supervision    Supine to sit: Supervision    Sit to supine: Supervision    Scooting: Supervision     Rolling: Independent    Supine to sit:  Independent    Sit to supine: Independent    Scooting: Independent     Transfers Sit to stand: Minimal assist of 1 cues for safety  Sit to stand: Minimal assist of 1    Sit to stand: Independent    Ambulation     2x50 feet using  wheeled walker with Minimal assist of 1   for walker control, balance, upright, and safety  2x125 feet using  wheeled walker with Minimal assist of 1   for balance and guidance d/t blind in L eye   100 feet using  cane with Modified Independent    Stair negotiation: ascended and descended   Not assessed d/t fatigued    3 steps, 1 rail, Supervision       ROM Within functional limits    Increase range of motion 10% of affected joints    Strength BUE:  refer to OT eval  RLE:  4-/5  LLE:  4-/5  Increase strength in affected mm groups by 1/3 grade   Balance Sitting EOB:  good -  Dynamic Standing:  fair + wheeled walker   Sitting EOB: good minus   Dynamic Standing: fair plus wheeled walker    Sitting EOB:  good   Dynamic Standing: good      Patient is Alert & Oriented x person, place, time, and situation and follows directions    Sensation: Patient  denies numbness/tingling   Edema:  no   Endurance: fair      Vitals: room air   Blood Pressure at rest  Blood Pressure during session    Heart Rate at rest  Heart Rate during session    SPO2 at rest %  SPO2 during session %     Patient education  Patient educated on role of Physical Therapy, risks of immobility, safety and plan of care,  importance of mobility while in hospital , ankle pumps, quad set and glut set for edema control, blood clot prevention, and safety      Patient response to education:   Pt verbalized understanding Pt demonstrated skill Pt requires further education in this area   Yes Partial Yes      Treatment:  Patient practiced and was instructed/facilitated in the following treatment: Patient assisted to EOB. Pt ambulated in hallway, to seated exercises then ambulated back to room. Pt assisted EOB and performed seated exercises          Therapeutic Exercises:  ankle pumps, hip abduction/adduction, long arc quad, seated marching, elbow flexion/extension, and shoulder flexion   x15    At end of session, patient sitting edge of bed with call light and phone within reach,  all lines and tubes intact, nursing notified. Patient would benefit from continued skilled Physical Therapy to improve functional independence and quality of life.          Patient's/ family goals   home    Time in  80  Time out  258      Total Treatment Time  26 minutes      CPT codes:    Therapeutic activities (92857)   16 minutes  1 unit(s)  Therapeutic exercises (94899)   10 minutes  1 unit(s)    Corbin Simeon , [address] [facility] [facility]#[id number]

## 2023-01-27 NOTE — PROGRESS NOTES
INPATIENT CARDIOLOGY FOLLOW-UP    Name: Nichelle Guo    Age: 81 y.o. Date of Admission: 1/24/2023 10:28 AM    Date of Service: 1/27/2023    Primary Cardiologist: Dr Lolis Inman    Chief Complaint: Follow-up for CAD, NSTEMI    Interim History:  Feels well. Denies chest pain shortness of breath palpitations. Contrast echo yesterday did not demonstrate LV thrombus, thus the CT finding may represent a prominent papillary causing an apparent filling defect. On IV furosemide.   I's and O's completely inaccurate    Review of Systems:   Negative except as described above    Problem List:  Patient Active Problem List   Diagnosis    Type 2 diabetes mellitus with diabetic polyneuropathy, without long-term current use of insulin (HCC)    Mixed hyperlipidemia    Essential hypertension    RLS (restless legs syndrome)    Chronic obstructive pulmonary disease (HCC)    Diabetic polyneuropathy associated with diabetes mellitus due to underlying condition (HCC)    Bilateral pleural effusion    CAD in native artery    Hypoxia    NSTEMI (non-ST elevated myocardial infarction) ([address]    Acute on chronic combined systolic and diastolic congestive heart failure ([address]    Community acquired pneumonia of right lower lobe of lung    Left ventricular thrombus    Acute respiratory failure with hypoxia (HCC)    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)    Cardiorenal syndrome    Generalized weakness    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HCC)    Acute cholecystitis    DNR (do not resuscitate)    Elevated LFTs    Elevated troponin    Palliative care encounter    Moderate protein-calorie malnutrition ([address]       Current Medications:    Current Facility-Administered Medications:     Benzocaine-Menthol (CEPACOL) 1 lozenge, 1 lozenge, Oral, Q2H PRN, Macy Sargent MD    calcium-cholecalciferol 500-5 MG-MCG per tablet 1 tablet, 1 tablet, Oral, Daily, Rafy Mckeon, DO, 1 tablet at 01/27/23 0819    metoprolol succinate (TOPROL XL) extended release tablet 25 mg, 25 mg, Oral, Daily, YONIS Perez, 25 mg at 01/27/23 0820    clopidogrel (PLAVIX) tablet 75 mg, 75 mg, Oral, Daily, Jesica Hunt MD, 75 mg at 01/27/23 0820    perflutren lipid microspheres (DEFINITY) injection 1.5 mL, 1.5 mL, IntraVENous, ONCE PRN, YONIS Perez    furosemide (LASIX) injection 20 mg, 20 mg, IntraVENous, BID, Tamie Stone, DO, 20 mg at 01/27/23 6470    cefTRIAXone (ROCEPHIN) 1,000 mg in sterile water 10 mL IV syringe, 1,000 mg, IntraVENous, Q24H, Rodo Ast, DO, 1,000 mg at 01/27/23 0831    doxycycline (VIBRAMYCIN) 100 mg in sodium chloride 0.9 % 100 mL IVPB ([id number]), 100 mg, IntraVENous, Q12H, Rodo Ast, DO, Stopped at 01/27/23 0950    enoxaparin (LOVENOX) injection 60 mg, 1 mg/kg, SubCUTAneous, BID, Rodo Ast, DO, 60 mg at 01/27/23 1905    ipratropium-albuterol (DUONEB) nebulizer solution 1 ampule, 1 ampule, Inhalation, Q4H PRN, Rodo Ast, DO    vitamin C tablet 250 mg, 250 mg, Oral, Daily, Rodo Ast, DO, 250 mg at 01/27/23 0820    aspirin EC tablet 81 mg, 81 mg, Oral, Daily, Rodo Ast, DO, 81 mg at 01/27/23 0820    atorvastatin (LIPITOR) tablet 80 mg, 80 mg, Oral, Nightly, Rodo Ast, DO, 80 mg at 01/26/23 2008    brimonidine (ALPHAGAN) 0.2 % ophthalmic solution 1 drop, 1 drop, Left Eye, BID, Rodo Ast, DO, 1 drop at 01/27/23 0831    ferrous sulfate (IRON 325) tablet 325 mg, 325 mg, Oral, BID WC, Rodo Ast, DO, 325 mg at 01/27/23 0820    [Held by provider] furosemide (LASIX) tablet 40 mg, 40 mg, Oral, Daily, Rodo Ast, DO, 40 mg at 01/25/23 0937    latanoprost (XALATAN) 0.005 % ophthalmic solution 1 drop, 1 drop, Both Eyes, Nightly, Rodo Ast, DO, 1 drop at 01/26/23 2014    magnesium oxide (MAG-OX) tablet 200 mg, 200 mg, Oral, Daily, Rodo Ast, DO, 200 mg at 01/27/23 0820    Vitamin D (CHOLECALCIFEROL) tablet 2,000 Units, 2 tablet, Oral, Daily, Chales Tavares, DO, 2,000 Units at 01/27/23 6163    metFORMIN (GLUCOPHAGE) tablet 500 mg, 500 mg, Oral, BID WC, Chales Tavares, DO, 500 mg at 01/27/23 0820    therapeutic multivitamin-minerals 1 tablet, 1 tablet, Oral, Daily, Chales Tavares, DO, 1 tablet at 01/26/23 2008    spironolactone (ALDACTONE) tablet 12.5 mg, 12.5 mg, Oral, Daily, Chales Tavares, DO, 12.5 mg at 01/27/23 0820    timolol (TIMOPTIC) 0.5 % ophthalmic solution 1 drop, 1 drop, Both Eyes, BID, Chales Tavares, DO, 1 drop at 01/27/23 0831    pregabalin (LYRICA) capsule 25 mg, 25 mg, Oral, BID, Chales Tavares, DO, 25 mg at 01/27/23 0820    insulin lispro (HUMALOG) injection vial 0-4 Units, 0-4 Units, SubCUTAneous, TID WC, Chales Tavares, DO, 1 Units at 01/25/23 1200    insulin lispro (HUMALOG) injection vial 0-4 Units, 0-4 Units, SubCUTAneous, Nightly, Theron Hung Christian, DO    glucose chewable tablet 16 g, 4 tablet, Oral, PRN, Chales Tavares, DO    dextrose bolus 10% 125 mL, 125 mL, IntraVENous, PRN **OR** dextrose bolus 10% 250 mL, 250 mL, IntraVENous, PRN, Chales Tavares, DO    glucagon (rDNA) injection 1 mg, 1 mg, SubCUTAneous, PRN, Chales Tavares, DO    dextrose 10 % infusion, , IntraVENous, Continuous PRN, Chales Tavares, DO    Physical Exam:  BP (!) 103/52   Pulse 75   Temp 98.3 °F (36.8 °C) (Oral)   Resp 18   Ht 5' 6\" (1.676 m)   Wt 143 lb 9.6 oz (65.1 kg)   LMP  (LMP Unknown)   SpO2 92%   BMI 23.18 kg/m²   Wt Readings from Last 3 Encounters:   01/25/23 143 lb 9.6 oz (65.1 kg)   01/23/23 142 lb (64.4 kg)   01/12/23 142 lb (64.4 kg)     Appearance: Well-appearing elderly female, awake, alert, on nasal cannula  Skin: Intact, no rash  Head: Normocephalic, atraumatic  Eyes: EOMI, no conjunctival erythema  ENMT: No pharyngeal erythema, MMM, no rhinorrhea  Neck: Supple, no elevated JVP, no carotid bruits  Lungs: Clear to auscultation bilaterally. No wheezes, rales, or rhonchi.   Cardiac: PMI nondisplaced, Regular rhythm with a normal rate, S1 & S2 normal, systolic murmur  Abdomen: Soft, nontender, +bowel sounds  Extremities: Moves all extremities x 4, no lower extremity edema  Neurologic: No focal motor deficits apparent, normal mood and affect  Peripheral Pulses: Intact posterior tibial pulses bilaterally    Intake/Output:    Intake/Output Summary (Last 24 hours) at 1/27/2023 1321  Last data filed at 1/27/2023 0820  Gross per 24 hour   Intake 880 ml   Output --   Net 880 ml     I/O this shift:  In: 340 [P.O.:340]  Out: -     Laboratory Tests:  Recent Labs     01/26/23  0719 01/27/23  0805    138   K 3.7 3.7   CL 99 100   CO2 28 25   BUN 46* 41*   CREATININE 1.1* 1.0   GLUCOSE 105* 100*   CALCIUM 9.8 9.7     Lab Results   Component Value Date/Time    MG 2.2 01/24/2023 11:03 AM     Recent Labs     01/26/23  0719   ALKPHOS 100   ALT 75*   AST 55*   PROT 6.5   BILITOT 0.6   BILIDIR <0.2   LABALBU 3.9     Recent Labs     01/26/23  0719 01/27/23  0805   WBC 6.4 5.8   RBC 3.79 3.98   HGB 11.7 12.2   HCT 35.1 36.6   MCV 92.6 92.0   MCH 30.9 30.7   MCHC 33.3 33.3   RDW 13.6 13.4    172   MPV 11.7 11.5     Lab Results   Component Value Date    TROPONINI <0.01 04/02/2017     Lab Results   Component Value Date    INR 1.0 01/24/2023    INR 1.1 11/16/2022    PROTIME 11.3 01/24/2023    PROTIME 12.9 (H) 11/16/2022     Lab Results   Component Value Date    TSH 2.830 11/11/2015     Lab Results   Component Value Date    LABA1C 5.8 01/18/2022     No results found for: EAG  Lab Results   Component Value Date    CHOL 118 12/29/2022    CHOL 159 01/04/2022    CHOL 123 02/17/2021     Lab Results   Component Value Date    TRIG 123 12/29/2022    TRIG 178 (H) 01/04/2022    TRIG 151 (H) 02/17/2021     Lab Results   Component Value Date    HDL 35 12/29/2022    HDL 40 01/04/2022    HDL 36 02/17/2021     Lab Results   Component Value Date    LDLCALC 58 12/29/2022    LDLCALC 83 01/04/2022    LDLCALC 57 02/17/2021     Lab Results   Component Value Date    LABVLDL 25 2022    LABVLDL 36 2022    LABVLDL 30 2021     No results found for: [facility]  Recent Labs     23  1649   PROBNP 44,363*       Cardiac Tests:    EK2023: Sinus rhythm 86 beats minute. Normal axis and intervals. Anterior infarct. Telemetry: Sinus rhythm 60s, PVCs    Chest X-ray:   23    Impression   Mild CHF. Superimposed pneumonia cannot be excluded. CT abdomen  Personally reviewed probable LV apical thrombus    Impression   1. DILATED GALLBLADDER WITH SUGGESTION OF MILD PERICHOLECYSTIC EDEMA. No   cholelithiasis is evident by CT. Clinical correlation and sonographic   evaluation recommended. 2. 10 mm filling defect along the left ventricular apex concerning for a   THROMBUS. Correlation with echocardiogram recommended. 3. Severe atherosclerosis, with HIGH-GRADE STENOSES OR POSSIBLE COMPLETE   OCCLUSIONS in the distal abdominal aorta, common iliac and external iliac   arteries. Probable occlusion of the stent in the left common iliac artery. Correlation with dedicated pre and post contrast enhanced CTA recommended. 4. Severe distal colonic diverticulosis without diverticulitis. Echocardiogram:   [facility] TTE 23   Summary   Limited echo to assess for LV thrombus. Ejection fraction is visually estimated at 20-25%. No definitive evidence of LV apical thrombus on contrast echo. TTE 11/15/22   Summary   Ejection fraction is visually estimated at 20%. Overall ejection fraction severely decreased. Left ventricle is moderately enlarged. Normal right ventricle structure and function. Left atrial volume index of 68 ml per meters squared BSA. Physiologic and/or trace aortic regurgitation is noted. Mild to moderate mitral regurgitation is present. Mild tricuspid regurgitation. No evidence for hemodynamically significant pericardial effusion.     Stress test:    [facility] SPECT MPI 2022  Impression   1: The stress EKG report is provided separately. 2  This is an abnormal myocardial perfusion scan. Findings suggestive   of an infarction with mild-to-moderate casi-infarct ischemia in the   LAD/diagonal territory. 3: Left ventricle is moderately dilated with severe global   hypokinesis. There appears to be akinesis of the apical segments. 4: Prognostically, this is an intermediate to high risk study. Cardiac catheterization:   [facility] 11/23/22 [place]  CORONARY ANGIOGRAPHY:  Left main:  The left main artery is severely calcified and there was 80%  eccentric ostial left main stenosis. LAD:  The left anterior descending artery appeared heavily calcified in  its proximal segment. It was subtotally occluded in its proximal and  middle segments. The distal LAD and what appeared to be a large  diagonal branch (both of which were underfilled) opacified late upon the  injection of the left main artery. Both the distal LAD and the diagonal  branch after the proximal and mid vessel subtotal occlusions did not  appear to have any discrete lesions. LCX:  The left circumflex had luminal irregularities in its proximal  segment, but without any significant angiographic luminal narrowing. RCA:  The right coronary artery is a very large dominant vessel. There  was 99% calcified mid RCA stenosis. There was PAMELA-1 flow in the  superior subdivision of the posterolateral branch. The posterior  descending artery branch and the posterolateral branch of the right  coronary artery also received left-to-right collaterals. An acute  marginal branch of the right coronary artery also provided faint  collaterals to the LAD.    ----------------------------------------------------------------------------------------------------------------------------------------------------------------  IMPRESSION:  NSTEMI. Likely type I hs-cTnT 127 -> 1029 ng/L and downtrending  Very severe multivessel CAD including ostial left main.   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 Acute cholecystitis  Carotid disease  Hypertension  Hyperlipidemia  Type 2 diabetes neuropathy  COPD    RECOMMENDATIONS:  Has been evaluated and [facility] for revascularization, was not felt to be candidate for surgery or percutaneous intervention given comorbidities and severe high risk disease. Continues to be chest pain-free and relatively euvolemic at this time. No LV thrombus seen on contrast echo. Aggressive medical management of CAD/PAD  Continue DAPT with aspirin and clopidogrel  Continue statin  Continue GDMT with metoprolol succinate, spironolactone up titration limited by hypotension, consider addition of very low-dose lisinopril  Transition IV to oral diuretic  Add on repeat proBNP  Patient asked me about continuing the [address]; she would be a candidate for ICD 40 days post MI, and I explained to her if she would consider proceeding with ICD placement then appropriate to continue WCD for now. If she would not be agreeable to ICD in the future, then it would not make much sense to continue WCD  She will continue for now and further discuss with her primary cardiologist  Consider vascular imaging distal aorta/extremities and vascular evaluation, may not be a candidate for anything  Further care per primary service and consultants  Okay for discharge cardiology standpoint  Outpatient follow-up with Dr. Brielle Ribeiro  Will be available as needed, please call with questions    Glenn Weeks MD, [facility]    NOTE: This report was transcribed using voice recognition software.  Every effort was made to ensure accuracy; however, inadvertent computerized transcription errors may be present.

## 2023-01-27 NOTE — PROGRESS NOTES
Pulse ox was_94_% on room air at rest.  Ambulated patient on room air. Oxygen saturation was __78__% on room air while ambulating. Oxygen applied. Recovery pulse ox was _93_% on __2___liters of oxygen while ambulating.

## 2023-01-27 NOTE — DISCHARGE SUMMARY
[facility] Physician Discharge Summary       MD Hanane  [address] [facility] [phone]    Go on 2/23/2023  Appointment scheduled on 02/2/2023 1000 AM, hospital follow up, Bring all pill bottles to appointment    MD Hanane  1931 [address] [facility] [address]    Follow up  [address] [facility]  home oxygen and nebulizer ordered. phone:  [phone]  fax [phone]    SUDHAKAR Del Toro CNP  [address] [facility]  [phone]    Go on 2/10/2023  Appointment scheduled on 02/10/2023 at 10 am, hospital follow up, with To ARAUJO, (for Dr. Mira Friend) in the CHF clinic (Entrance A), Bring all pill bottles to appointment    [address]  [facility]  [phone]  Go on 2/15/2023  Appointment scheduled on 02/15/2023 at 10:30am, Heart failure follow up and education, Bring all pill bottles to appointment    Yara Hyde MD  [address] [facility] [place] [place] [address]  [phone]    Schedule an appointment as soon as possible for a visit        Activity level: ***    Diet: ADULT DIET; Regular; Low Sodium (2 gm)  ADULT ORAL NUTRITION SUPPLEMENT; Lunch, Dinner; Low Calorie/High Protein Oral Supplement    Labs:***    Dispo:***    Condition at discharge: ***    Continue supplemental oxygen via nasal canula @ 2 LPM round-the-clock. Continue CPAP / BiPAP during sleep as prior to admission.     Patient ID:  Seamus Han  [id number]  40 y.o.  1935    Admit date: 1/24/2023    Discharge date and time:  1/27/2023  3:48 PM    Admission Diagnoses: Principal Problem:    NSTEMI (non-ST elevated myocardial infarction) [facility])  Active Problems:    Type 2 diabetes mellitus with diabetic polyneuropathy, without long-term current use of insulin (HCC)    Mixed hyperlipidemia    Essential hypertension    Bilateral pleural effusion    CAD in native artery    Hypoxia    Acute on chronic combined systolic and diastolic congestive heart failure ([facility]    Community acquired pneumonia of right lower lobe of lung    Left ventricular thrombus    Acute respiratory failure with hypoxia (HCC)    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 *      Discharge Diagnoses: Principal Problem:    NSTEMI (non-ST elevated myocardial infarction) ([facility]  Active Problems:    Type 2 diabetes mellitus with diabetic polyneuropathy, without long-term current use of insulin (HCC)    Mixed hyperlipidemia    Essential hypertension    Bilateral pleural effusion    CAD in native artery    Hypoxia    Acute on chronic combined systolic and diastolic congestive heart failure ([facility]    Community acquired pneumonia of right lower lobe of lung    Left ventricular thrombus    Acute respiratory failure with hypoxia (HCC)    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  *      Consults:  IP CONSULT TO PALLIATIVE CARE  IP CONSULT TO SOCIAL WORK  IP CONSULT CONSULT TO SOCIAL WORK  IP CONSULT TO HEART FAILURE NURSE/COORDINATOR  IP CONSULT TO HEART FAILURE NURSE/COORDINATOR    Procedures: None    Hospital Course:   Patient admitted for PNA, HFrEF acute on chronic and NSTEMI. She was treated with ceftriaxone and doxy for PNA. She required some O2 which was able to be weaned to RA prior to discharge. Cardiology was consulted for CHF exacerbation and treated with IV lasix. Patient was already wearing a life vest and decided that she may not want to wear it anymore and want to be DNR-CC. She stated that she would discuss with her family more and decide. She was cleared by cardiology for discharge with outpatient FU. Discharge Exam:  Vitals:    01/26/23 1213 01/26/23 1900 01/27/23 0725 01/27/23 0820   BP:  (!) 93/52 (!) 98/47 (!) 103/52   Pulse:  79 71 75   Resp:   18    Temp:  98.6 °F (37 °C) 98.3 °F (36.8 °C)    TempSrc:  Oral Oral    SpO2:  92% 92%    Weight:       Height: 5' 6\" (1.676 m)        General Appearance: alert and oriented to person, place and time and in no acute distress  Skin: warm and dry, good turgor, no rashes, no jaundice  Head: normocephalic and atraumatic  Eyes: pupils equal, round, and reactive to light, extraocular eye movements intact, conjunctivae normal  Neck: neck supple and non tender without mass   Pulmonary/Chest: no wheezes, no rales or rhonchi, air movement diminished at bases, no respiratory distress on RA  Cardiovascular: normal rate, normal S1 and S2 and no carotid bruits  Abdomen: soft, non-tender, non-distended, normal bowel sounds, no masses or organomegaly  Extremities: no cyanosis, no clubbing and no edema  Neurologic: no cranial nerve deficit and speech normal      I/O last 3 completed shifts: In: 840 [P.O.:840]  Out: 500 [Urine:500]  I/O this shift:   In: 700 [P.O.:700]  Out: -       LABS:  Recent Labs     01/26/23  0719 01/27/23  0805    138   K 3.7 3.7   CL 99 100   CO2 28 25   BUN 46* 41*   CREATININE 1.1* 1.0   GLUCOSE recommended. 2. 10 mm filling defect along the left ventricular apex concerning for a   THROMBUS. Correlation with echocardiogram recommended. 3. Severe atherosclerosis, with HIGH-GRADE STENOSES OR POSSIBLE COMPLETE   OCCLUSIONS in the distal abdominal aorta, common iliac and external iliac   arteries. Probable occlusion of the stent in the left common iliac artery. Correlation with dedicated pre and post contrast enhanced CTA recommended. 4. Severe distal colonic diverticulosis without diverticulitis. XR CHEST PORTABLE   Final Result   Mild CHF. Superimposed pneumonia cannot be excluded.              Patient Instructions:      Medication List        START taking these medications      cefdinir 300 MG capsule  Commonly known as: OMNICEF  Take 1 capsule by mouth 2 times daily for 3 days     clopidogrel 75 MG tablet  Commonly known as: PLAVIX  Take 1 tablet by mouth daily  Start taking on: January 28, 2023     doxycycline hyclate 100 MG capsule  Commonly known as: VIBRAMYCIN  Take 1 capsule by mouth 2 times daily for 3 days            CHANGE how you take these medications      furosemide 40 MG tablet  Commonly known as: LASIX  Take 1 tablet by mouth daily  Start taking on: January 28, 2023  What changed:   medication strength  how much to take     metoprolol succinate 25 MG extended release tablet  Commonly known as: TOPROL XL  Take 1 tablet by mouth daily  Start taking on: January 28, 2023  What changed:   how much to take  additional instructions            CONTINUE taking these medications      albuterol-ipratropium  MCG/ACT Aers inhaler  Commonly known as: Combivent Respimat  Inhale 1 puff into the lungs 2 times daily     ascorbic acid 250 MG tablet  Commonly known as: VITAMIN C     aspirin 81 MG EC tablet     atorvastatin 80 MG tablet  Commonly known as: LIPITOR  Take 1 tablet by mouth daily     blood glucose monitor kit and supplies  1 Units by Does not apply route 2 times daily Please give whatever insurance covers     blood glucose test strips strip  Commonly known as: ASCENSIA AUTODISC VI;ONE TOUCH ULTRA TEST VI  1 each by Does not apply route 2 times daily     brimonidine 0.2 % ophthalmic solution  Commonly known as: ALPHAGAN     calcium carbonate-vitamin D 600-200 MG-UNIT Tabs     ferrous sulfate 325 (65 Fe) MG tablet  Commonly known as: IRON 325  Take 1 tablet by mouth 2 times daily (with meals)     Lancets Misc  1 each by Does not apply route 2 times daily     latanoprost 0.005 % ophthalmic solution  Commonly known as: XALATAN     magnesium 250 MG Tabs tablet  Commonly known as: MAGNESIUM-OXIDE     metFORMIN 500 MG tablet  Commonly known as: GLUCOPHAGE  Take 1 tablet by mouth 2 times daily (with meals)     nitroGLYCERIN 0.4 MG SL tablet  Commonly known as: NITROSTAT  Place 1 tablet under the tongue every 5 minutes as needed for Chest pain up to max of 3 total doses. If no relief after 1 dose, call 911. ONE-A-DAY ESSENTIAL PO     OSTEO BI-FLEX ADV JOINT SHIELD PO     pregabalin 25 MG capsule  Commonly known as: Lyrica  Take 1 capsule by mouth 2 times daily for 60 days.      spironolactone 25 MG tablet  Commonly known as: ALDACTONE  Take 0.5 tablets by mouth daily     timolol 0.5 % ophthalmic solution  Commonly known as: TIMOPTIC     vitamin B-12 1000 MCG tablet  Commonly known as: CYANOCOBALAMIN     Vitamin D3 50 MCG (2000 UT) Caps               Where to Get Your Medications        These medications were sent to [address] [place] [address] - [id number] [place] [address]      Phone: [phone]   cefdinir 300 MG capsule  clopidogrel 75 MG tablet  doxycycline hyclate 100 MG capsule  furosemide 40 MG tablet  metoprolol succinate 25 MG extended release tablet           Note that more than 30 minutes was spent in preparing discharge papers, discussing discharge with patient, medication review, etc.    Signed:  Electronically signed by Maria E Mena MD on 1/27/2023 at 3:48 PM    NOTE: This report was transcribed using voice recognition software. Every effort was made to ensure accuracy; however, inadvertent computerized transcription errors may be present.

## 2023-01-27 NOTE — PROGRESS NOTES
Called Dr. Ramsey Zapien Re: Powers Husam and left a voicemail with Cehlsea Tejada to schedule a HFU within 7 days of discharge. Left a callback number.     Althea Zambrano RN

## 2023-01-27 NOTE — CARE COORDINATION
Ss note:1/27/20231:14 PM Plan is home with spouse. Orders noted for home 02 & nebulizer. Testing & orders faxed to Briseyda Villeda at [facility], will await delivery of portable tank & nebulizer to the room today. Dtr present and updated that [facility] will send out a Respiratory Therapist to evaluate the pt in the home to determine if pt qualifies for oxygen conserving device next week. [facility] office [phone] fax [phone]. Family will transport home.  REUBEN Ferguson

## 2023-01-27 NOTE — CONSULTS
CHF NURSE NAVIGATOR CONSULT NOTE:      Patient currently admitted with diagnosis of Systolic heart failure. Patient was awake and alert sitting in bed visiting with her daughter Mya Jeronimo during the consultation. She was engaged and asked appropriate questions throughout the education session. She is agreeable to symptom monitoring, daily weights, and following a low sodium diet. [facility] APRN and PCP and with the CHF clinic Yes. Future Appointments   Date Time Provider Lili Contreras   2/10/2023  9:00 AM SUDHAKAR Ruiz - CNP [facility]  Elias Norris   2/15/2023 10:30 AM [facility] CHF ROOM 1 [facility] . Elias Norris   2/20/2023 10:00 AM DO Hodan Paulson [facility]   2/23/2023  1:45 PM Sreekanth Lewis MD [facility]       Barriers to Care:  Contributing risk factors for Heart Failure are identified as overwhelmed by complexity of regimen and multiple comorbidities. Pt has a scale at home, cooks and shops for her self, has reliable transportation to appointments and denies difficulty obtaining or taking medications. HF information, HF zones and CHF clinic information given to patient. Follow up appointments reviewed with pt. Pt establishing with [facility] CHF clinic. The patient is ordered:  Diet: ADULT DIET; Regular; Low Sodium (2 gm)  ADULT ORAL NUTRITION SUPPLEMENT; Lunch, Dinner;  Low Calorie/High Protein Oral Supplement   Sodium controlled diet Yes  Fluid restriction daily ordered (fluid restriction recommended if patient is hyponatremic and/or diuretic is initiated or increased) No  FR:   Daily Weights: Patient Vitals for the past 96 hrs (Last 3 readings):   Weight   01/25/23 1426 143 lb 9.6 oz (65.1 kg)   01/25/23 0000 143 lb 1.6 oz (64.9 kg)   01/24/23 1047 142 lb (64.4 kg)     I/O:   Intake/Output Summary (Last 24 hours) at 1/27/2023 1431  Last data filed at 1/27/2023 1326  Gross per 24 hour   Intake 1060 ml   Output --   Net 1060 ml              We reviewed the introduction to Heart Failure, the HF zones, signs and symptoms to report on day 1 of onset, medications, medication compliance, the importance of obtaining daily weights, following a low sodium diet, reading food labels for the sodium content, keeping physician appointments, and smoking cessation. We discussed writing / tracking daily weights on a calendar / log because a 5 pound gain in 1 week can sneak up if you are not tracking it. I advised patient they can reduce the risk for Heart Failure exacerbations by modifying / controlling the risk factors. We discussed self-managed care which includes the following:  to take medications as prescribed, report any intolerable side effects of medications to the cardiologist / doctor, do not just stop taking the medication; follow a cardiac heart healthy / low sodium diet; weigh yourself daily, exercise regularly- per doctor recommendation and not to smoke or use an excess amount of alcohol. We discussed calling the cardiologist / doctor with a weight gain of 3 pounds in one day or a total of 5 pounds or more in one week. Also, if you should have a significant weight loss of 3# or more in one day to call the doctor, they may need to decrease or hold the diuretic dose. On days you feels nauseated and not eating / drinking, having emesis or diarrhea,  informed to call the cardiologist  / doctor, they may need to decrease or hold diuretic to avoid dehydration. I stressed the importance of informing their cardiologist the first day of onset of any of the signs and symptoms in the \"Yellow Zone\" of the HF Zones. Patient verbalizes understanding. Greater than 30 minutes was spent educating patient. The Heart Failure Booklet given to the patient with additional patient education addressing:  What is Heart Failure? Things You Can Do to Live Well with HF  How to Take Your Medications  How to Eat Less Salt  [place] its Salt?   Exercising Well with Heart Failure  Signs and symptoms of HF to report  Weight Yourself Each Day  Home Self Management- activity, weight tracking, taking medications as prescribed, meals /diet planning (sodium and fluid restriction), how to read food labels, keeping physician follow ups, smoking cessation, follow the Heart Failure Zones  The Heart Failure zones  Every Dose Every Day      Instructed  to call 911 if you have any of the following symptoms:       Struggling to breathe unrelieved with rest,     Having chest pain     Have confusion or cant think clearly          Avril Whitmore, RN MSN, RN  Heart Failure Navigator        CONGESTIVE HEART FAILURE (CHF) AHA GUIDELINES  (Must be completed for Primary Diagnosis CHF or History of CHF)    Discharge Plan:  I placed the Heart Failure Home Instructions in patient's discharge instructions. Per Heart Failure GWTG, the patient should have a follow-up appointment made within 7 days of discharge.     New Diagnosis No    ECHO Results most recent: 23 20-25%EF  Lab Results   Component Value Date    LVEF 23 2023                                        Social History     Tobacco Use   Smoking Status Former    Packs/day: 1.00    Years: 9.00    Pack years: 9.00    Types: Cigarettes    Quit date: 2000    Years since quittin.3   Smokeless Tobacco Never        Immunization History   Administered Date(s) Administered    COVID-19, MODERNA BLUE border, Primary or Immunocompromised, (age 12y+), IM, 100 mcg/0.5mL 2021, 2021    COVID-19, MODERNA Booster BLUE border, (age 18y+), IM, 50mcg/0.25mL 2021    DTaP 2006    Influenza 10/28/2013    Influenza Virus Vaccine 2012, 2014    Influenza, FLUAD, (age 72 y+), Adjuvanted, 0.5mL 10/12/2021, 2022    Influenza, FLUZONE (age 72 y+), High Dose, 0.7mL 10/26/2020, 10/26/2020    Influenza, High Dose (Fluzone 65 yrs and older) 2012, 2016, 10/25/2017, 10/09/2018    Influenza, Triv, inactivated, subunit, adjuvanted, IM (Fluad 65 yrs and older) 11/13/2019    Pneumococcal Conjugate 13-valent (Hivccsr18) 02/09/2016    Pneumococcal Polysaccharide ([id number]) 05/16/2011          Angiotensin-Converting-Enzyme (ACE) inhibitor ordered:  [] Yes  [x] No (specify contraindication):    [] Contraindicated  [] Hypotensive patient who was at immediate risk of cardiogenic shock  [] Hospitalized patient who experienced marked azotemia  [] Other Contraindications  [] Not Eligible  [] Not Tolerant  [] Patient Reason  [] System Reason  [x] Other Reason     Angiotensin II receptor blockers (ARB) ordered:  [] Yes  [x] No (specify contraindication):    [] Contraindicated  [] Hypotensive patient who was at immediate risk of cardiogenic shock  [] Hospitalized patient who experienced marked azotemia  [] Other Contraindications    ARNI - Angiotensin Receptor Neprilysin Inhibitor ordered:  [] Yes  [x] No (specify contraindication):    [] ACE inhibitor use within the prior 36 hours  [] Allergy  [] Hyperkalemia  [] Hypotension  [] Renal dysfunction defined as creatinine > 2.5 mg/dL in men or > 2.0 mg/dL in women  [] Other Contraindications  [] Not Eligible  [] Not Tolerant  [] Patient Reason  []System Reason  [x]Other Reason      Beta Blocker ordered:    [x] Yes Toprol XL   [] No (specify contraindication):    [] Contraindicated  [] Asthma  [] Fluid Overload  [] Low Blood Pressure  [] Patient recently treated with an intravenous positive inotropic agent  [] Other Contraindications  [] Not Eligible  [] Not Tolerant  [] Patient Reason  [] System Reason    SGLT2 Inhibitor ordered:  [] Yes  [x] No (specify contraindication):    [] Contraindicated  [] Patient currently on dialysis  [] Ketoacidosis  [] Known hypersensitivity to the medication  [] Type I diabetes (not approved for use in patients with Type I diabetes due to increased risk of ketoacidosis)  [] Other Contraindications  [] Not Eligible  [] Not Tolerant  [] Patient Reason  [] System Reason  [x] Other Reason    Aldosterone Antagonist ordered:  [x] Yes spironolactone   [] No (specify contraindication):    [] Contraindicated  [] Allergy due to aldosterone receptor antagonist  [] Hyperkalemia  [] Renal dysfunction defined as creatinine >2.5 mg/dL in men or >2.0 mg/dL in women.   [] Other contraindications  [] Not Eligible  [] Not Tolerant  [] Patient Reason  [] System Reason  [] Other Reason    As noted per Dr. Wick Mode with metoprolol succinate, spironolactone up titration limited by hypotension

## 2023-01-30 ENCOUNTER — TELEPHONE (OUTPATIENT)
Dept: FAMILY MEDICINE CLINIC | Age: 88
End: 2023-01-30

## 2023-01-31 NOTE — PROGRESS NOTES
CLINICAL PHARMACY NOTE: MEDS TO BEDS    Total # of Prescriptions Filled: 5   The following medications were delivered to the patient:  Cefdinir 300 mg  Doxycycline Hyc 100 mg  Furosemide 40 mg  Metoprolol Succ ER 25 mg  Clopidogrel 75 mg    Additional Documentation:

## 2023-02-02 ENCOUNTER — OFFICE VISIT (OUTPATIENT)
Dept: FAMILY MEDICINE CLINIC | Age: 88
End: 2023-02-02

## 2023-02-02 VITALS
HEART RATE: 76 BPM | SYSTOLIC BLOOD PRESSURE: 108 MMHG | DIASTOLIC BLOOD PRESSURE: 70 MMHG | WEIGHT: 136 LBS | OXYGEN SATURATION: 95 % | BODY MASS INDEX: 21.95 KG/M2

## 2023-02-02 DIAGNOSIS — I50.43 ACUTE ON CHRONIC COMBINED SYSTOLIC AND DIASTOLIC CONGESTIVE HEART FAILURE (HCC): ICD-10-CM

## 2023-02-02 DIAGNOSIS — I10 ESSENTIAL HYPERTENSION: ICD-10-CM

## 2023-02-02 DIAGNOSIS — E11.42 TYPE 2 DIABETES MELLITUS WITH DIABETIC POLYNEUROPATHY, WITHOUT LONG-TERM CURRENT USE OF INSULIN (HCC): ICD-10-CM

## 2023-02-02 DIAGNOSIS — Z09 HOSPITAL DISCHARGE FOLLOW-UP: Primary | ICD-10-CM

## 2023-02-02 DIAGNOSIS — R53.1 GENERALIZED WEAKNESS: ICD-10-CM

## 2023-02-02 DIAGNOSIS — J18.9 COMMUNITY ACQUIRED PNEUMONIA OF RIGHT LOWER LOBE OF LUNG: ICD-10-CM

## 2023-02-02 DIAGNOSIS — J96.01 ACUTE RESPIRATORY FAILURE WITH HYPOXIA (HCC): ICD-10-CM

## 2023-02-02 LAB — HBA1C MFR BLD: 5.6 %

## 2023-02-02 RX ORDER — IPRATROPIUM BROMIDE AND ALBUTEROL SULFATE 2.5; .5 MG/3ML; MG/3ML
1 SOLUTION RESPIRATORY (INHALATION) EVERY 4 HOURS
Qty: 360 ML | Refills: 1 | Status: CANCELLED | OUTPATIENT
Start: 2023-02-02

## 2023-02-02 SDOH — ECONOMIC STABILITY: INCOME INSECURITY: HOW HARD IS IT FOR YOU TO PAY FOR THE VERY BASICS LIKE FOOD, HOUSING, MEDICAL CARE, AND HEATING?: NOT HARD AT ALL

## 2023-02-02 SDOH — ECONOMIC STABILITY: FOOD INSECURITY: WITHIN THE PAST 12 MONTHS, THE FOOD YOU BOUGHT JUST DIDN'T LAST AND YOU DIDN'T HAVE MONEY TO GET MORE.: NEVER TRUE

## 2023-02-02 SDOH — ECONOMIC STABILITY: FOOD INSECURITY: WITHIN THE PAST 12 MONTHS, YOU WORRIED THAT YOUR FOOD WOULD RUN OUT BEFORE YOU GOT MONEY TO BUY MORE.: NEVER TRUE

## 2023-02-02 NOTE — PROGRESS NOTES
Post-Discharge Transitional Care Follow Up      Obey De Leon   YOB: 1935    Date of Office Visit:  2/2/2023  Date of Hospital Admission: 1/24/23  Date of Hospital Discharge: 1/27/23  Readmission Risk Score (high >=14%. Medium >=10%):Readmission Risk Score: 21.3      Care management risk score Rising risk (score 2-5) and Complex Care (Scores >=6): No Risk Score On File     Non face to face  following discharge, date last encounter closed (first attempt may have been earlier): 01/30/2023     Call initiated 2 business days of discharge: Yes     Acute on chronic combined systolic and diastolic congestive heart failure (HCC)  Comments:  IMPROVED  Acute respiratory failure with hypoxia (HCC)  Comments:  IMPROVING  Community acquired pneumonia of right lower lobe of lung  Comments:  IMPROVING  Type 2 diabetes mellitus with diabetic polyneuropathy, without long-term current use of insulin (HCC)  Comments:  CONTROLLED  Orders:  -     POCT glycosylated hemoglobin (Hb A1C)  Generalized weakness  Comments:  STABLE  Essential hypertension  Comments:  CONTROLLED      Medical Decision Making: high complexity  Return in about 3 weeks (around 2/23/2023) for [address] [place] [address]. [facility] On this date 2/2/2023 I have spent 40 minutes reviewing previous notes, test results and face to face with the patient discussing the diagnosis and importance of compliance with the treatment plan as well as documenting on the day of the visit. Subjective:   HPI    Inpatient course: Discharge summary reviewed- see chart. Interval history/Current status: STILL FEELS WEAK AND TIRED. STOPPED TAKING FARXIGA AS RECOMMENDED BY ME LAST WEEK FOR RECURRENT VAGINITIS.     Patient Active Problem List   Diagnosis    Type 2 diabetes mellitus with diabetic polyneuropathy, without long-term current use of insulin (HCC)    Mixed hyperlipidemia    Essential hypertension    RLS (restless legs syndrome)    Chronic obstructive pulmonary disease ([facility]    Bilateral pleural effusion    CAD in native artery    Hypoxia    NSTEMI (non-ST elevated myocardial infarction) ([facility]    Acute on chronic combined systolic and diastolic congestive heart failure ([facility]    Community acquired pneumonia of right lower lobe of lung    Left ventricular thrombus    Acute respiratory failure with hypoxia (HCC)    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 If you have any questions, ask your nurse or doctor.                 CONTINUE taking these medications      albuterol-ipratropium  MCG/ACT Aers inhaler  Commonly known as: Combivent Respimat  Inhale 1 puff into the lungs 2 times daily     ascorbic acid 250 MG tablet  Commonly known as: VITAMIN C     aspirin 81 MG EC tablet     atorvastatin 80 MG tablet  Commonly known as: LIPITOR  Take 1 tablet by mouth daily     blood glucose monitor kit and supplies  1 Units by Does not apply route 2 times daily Please give whatever insurance covers     blood glucose test strips strip  Commonly known as: ASCENSIA AUTODISC VI;ONE TOUCH ULTRA TEST VI  1 each by Does not apply route 2 times daily     brimonidine 0.2 % ophthalmic solution  Commonly known as: ALPHAGAN     calcium carbonate-vitamin D 600-200 MG-UNIT Tabs     clopidogrel 75 MG tablet  Commonly known as: PLAVIX  Take 1 tablet by mouth daily     ferrous sulfate 325 (65 Fe) MG tablet  Commonly known as: IRON 325  Take 1 tablet by mouth 2 times daily (with meals)     furosemide 40 MG tablet  Commonly known as: LASIX  Take 1 tablet by mouth daily     Lancets Misc  1 each by Does not apply route 2 times daily     latanoprost 0.005 % ophthalmic solution  Commonly known as: XALATAN     magnesium 250 MG Tabs tablet  Commonly known as: MAGNESIUM-OXIDE     metoprolol succinate 25 MG extended release tablet  Commonly known as: TOPROL XL  Take 1 tablet by mouth daily     nitroGLYCERIN 0.4 MG SL tablet  Commonly known as: NITROSTAT  Place 1 tablet under the tongue every 5 minutes as needed for Chest pain up to max of 3 total doses. If no relief after 1 dose, call 911. ONE-A-DAY ESSENTIAL PO     OSTEO BI-FLEX ADV JOINT SHIELD PO     pregabalin 25 MG capsule  Commonly known as: Lyrica  Take 1 capsule by mouth 2 times daily for 60 days. spironolactone 25 MG tablet  Commonly known as: ALDACTONE  Take 0.5 tablets by mouth daily     timolol 0.5 % ophthalmic solution  Commonly known as: TIMOPTIC     vitamin B-12 1000 MCG tablet  Commonly known as: CYANOCOBALAMIN     Vitamin D3 50 MCG (2000 UT) Caps            STOP taking these medications      Farxiga 10 MG tablet  Generic drug: dapagliflozin     metFORMIN 500 MG tablet  Commonly known as: GLUCOPHAGE               Medications marked \"taking\" at this time  Outpatient Medications Marked as Taking for the 2/2/23 encounter (Office Visit) with Celso Hoffmann MD   Medication Sig Dispense Refill    furosemide (LASIX) 40 MG tablet Take 1 tablet by mouth daily 30 tablet 0    clopidogrel (PLAVIX) 75 MG tablet Take 1 tablet by mouth daily 30 tablet 0    pregabalin (LYRICA) 25 MG capsule Take 1 capsule by mouth 2 times daily for 60 days.  120 capsule 0    spironolactone (ALDACTONE) 25 MG tablet Take 0.5 tablets by mouth daily 45 tablet 3    atorvastatin (LIPITOR) 80 MG tablet Take 1 tablet by mouth daily 90 tablet 1    blood glucose test strips (ASCENSIA AUTODISC VI;ONE TOUCH ULTRA TEST VI) strip 1 each by Does not apply route 2 times daily 100 each 3    nitroGLYCERIN (NITROSTAT) 0.4 MG SL tablet Place 1 tablet under the tongue every 5 minutes as needed for Chest pain up to max of 3 total doses. If no relief after 1 dose, call 911. 25 tablet 3    ferrous sulfate (IRON 325) 325 (65 Fe) MG tablet Take 1 tablet by mouth 2 times daily (with meals) 30 tablet 0    albuterol-ipratropium (COMBIVENT RESPIMAT)  MCG/ACT AERS inhaler Inhale 1 puff into the lungs 2 times daily 1 each 3    Cholecalciferol (VITAMIN D3) 50 MCG (2000 UT) CAPS Take by mouth daily      ascorbic acid (VITAMIN C) 250 MG tablet Take 250 mg by mouth daily      vitamin B-12 (CYANOCOBALAMIN) 1000 MCG tablet Take 1,000 mcg by mouth daily      Lancets MISC 1 each by Does not apply route 2 times daily 100 each 5    timolol (TIMOPTIC) 0.5 % ophthalmic solution INSTILL 1 DROP INTO EACH EYE TWICE DAILY  3    brimonidine (ALPHAGAN) 0.2 % ophthalmic solution INSTILL 1 DROP INTO LEFT EYE TWICE DAILY  3    Multiple Vitamin (ONE-A-DAY ESSENTIAL PO) Take 1 tablet by mouth.        calcium carbonate-vitamin D 600-200 MG-UNIT TABS Take 1 tablet by mouth daily. Misc Natural Products (OSTEO BI-FLEX ADV JOINT SHIELD PO) Take 2 tablets by mouth daily. aspirin 81 MG EC tablet Take 81 mg by mouth daily. Medications patient taking as of now reconciled against medications ordered at time of hospital discharge: Yes    Review Of Systems:    Skin: no abnormal pigmentation, rash, scaling, itching, masses, hair or nail changes  Eyes: no blurring, diplopia, or eye pain  Ears/Nose/Throat: no hearing loss, tinnitus, vertigo, nosebleed, nasal congestion, rhinorrhea, sore throat  Respiratory: no cough, pleuritic chest pain, dyspnea, or wheezing  Cardiovascular: no chest pain, angina, dyspnea on exertion, orthopnea, PND, palpitations, or claudication  Gastrointestinal: no nausea, vomiting, heartburn, diarrhea, constipation, bloating,  abdominal pain, or blood per rectum. Appetite is good  Genitourinary: no urinary urgency, frequency, dysuria, nocturia, hesitancy, or incontinence  Musculoskeletal: joint pains off and on. Morning stiffness. Ambulating well  Neurologic: no paralysis, paresis, paresthesia, seizures, tremors, or headaches  Hematologic/Lymphatic/Immunologic: no anemia, abnormal bleeding/bruising, fever, chills, night sweats, swollen glands, or unexplained weight loss  Endocrine: no heat or cold intolerance and no polyphagia, polydipsia, or polyuria        Objective:    /70   Pulse 76   Wt 136 lb (61.7 kg)   LMP  (LMP Unknown)   SpO2 95%   BMI 21.95 kg/m²     Physical Exam    General appearance: Alert, Awake, Oriented times 3, no distress  Skin: Warm and dry  Head: Normocephalic. No masses, lesions or tenderness noted  Eyes: Conjunctivae appear normal. PERLE  Ears: External ears normal  Nose/Sinuses: Nares normal. Septum midline. Mucosa normal. No drainage  Oropharynx: Oropharynx clear with no exudate seen  Neck: Neck supple. No jugular venous distension, lymphadenopathy or thyromegaly Trachea midline  Chest:  Normal. Movements are Normal and Equal.  Lungs: Lungs clear to auscultation bilaterally. No ronchi, crackles or wheezes  Heart: S1 S2  Regular rate and rhythm. No rub, murmur or gallop  Abdomen: Abdomen soft, non-tender. BS normal. No masses, organomegaly. Back: Grossly Normal and Equal. DTR are Normal. SLR is Normal.  Extremities: Arthritic changes are noted. Movements are limited. Pedal pulses are normal.  Musculoskeletal: Muscular strength appears intact. No joint effusion, tenderness, swelling or warmth  Neuro:  No focal motor or sensory deficits  An electronic signature was used to authenticate this note.   --Christopher Issa MD

## 2023-02-02 NOTE — PATIENT INSTRUCTIONS
LOW SALT FOR BLOOD PRESSURE CONTROL. LOW CARBOHYDRATE FOR BLOOD SUGAR AND WEIGHT CONTROL. LOW FAT DIET FOR CHOLESTEROL CONTROL. DRINK ENOUGH FLUIDS FOR BETTER KIDNEY FUNCTION. TAKE COZAAR 50 MG. TOPROL 50 MG AND DYAZIDE 37.5-25 MG. DAILY FOR BLOOD PRESSURE AND LEG EDEMA CONTROL. TAKE METFORMIN 500 MG. 2 TIMES A DAY  FOR BLOOD SUGAR CONTROL. STOP TAKING FARXIGA. TAKE LIPITOR 80 MG. NIGHTLY FOR CHOLESTEROL CONTROL AS PER [facility]. INHALE COMBIVENT INHALER 1 PUFF 2 TIMES A DAY FOR BREATHING CONTROL  REGULAR WALKING IN HOUSE ADVISED. GO TO CHF CLINIC FOR FOLLOW UP CARE. FASTING FOR LAB WORK PRIOR TO NEXT VISIT. KEEP NEXT APPOINTMENT IN 3 WEEKS  FOR ANNUAL PHYSICAL EXAMINATION .

## 2023-02-10 ENCOUNTER — HOSPITAL ENCOUNTER (OUTPATIENT)
Dept: OTHER | Age: 88
Setting detail: THERAPIES SERIES
Discharge: HOME | End: 2023-02-10
Payer: MEDICARE

## 2023-02-10 ENCOUNTER — TELEPHONE (OUTPATIENT)
Dept: CARDIOLOGY CLINIC | Age: 88
End: 2023-02-10

## 2023-02-10 VITALS
WEIGHT: 143.6 LBS | HEART RATE: 74 BPM | DIASTOLIC BLOOD PRESSURE: 54 MMHG | BODY MASS INDEX: 23.08 KG/M2 | SYSTOLIC BLOOD PRESSURE: 94 MMHG | RESPIRATION RATE: 14 BRPM | OXYGEN SATURATION: 100 % | HEIGHT: 66 IN

## 2023-02-10 LAB
ANION GAP SERPL CALCULATED.3IONS-SCNC: 9 MMOL/L (ref 7–16)
BUN BLDV-MCNC: 30 MG/DL (ref 6–23)
CALCIUM SERPL-MCNC: 9.7 MG/DL (ref 8.6–10.2)
CHLORIDE BLD-SCNC: 102 MMOL/L (ref 98–107)
CO2: 28 MMOL/L (ref 22–29)
CREAT SERPL-MCNC: 1 MG/DL (ref 0.5–1)
FERRITIN: 131 NG/ML
GFR SERPL CREATININE-BSD FRML MDRD: 54 ML/MIN/1.73
GLUCOSE BLD-MCNC: 117 MG/DL (ref 74–99)
IRON SATURATION: 28 % (ref 15–50)
IRON: 90 MCG/DL (ref 37–145)
POTASSIUM SERPL-SCNC: 4.2 MMOL/L (ref 3.5–5)
PRO-BNP: 8944 PG/ML (ref 0–450)
SODIUM BLD-SCNC: 139 MMOL/L (ref 132–146)
TOTAL IRON BINDING CAPACITY: 316 MCG/DL (ref 250–450)
TRANSFERRIN: 260 MG/DL (ref 200–360)

## 2023-02-10 PROCEDURE — 83880 ASSAY OF NATRIURETIC PEPTIDE: CPT

## 2023-02-10 PROCEDURE — 82728 ASSAY OF FERRITIN: CPT

## 2023-02-10 PROCEDURE — 80048 BASIC METABOLIC PNL TOTAL CA: CPT

## 2023-02-10 PROCEDURE — 83550 IRON BINDING TEST: CPT

## 2023-02-10 PROCEDURE — 99214 OFFICE O/P EST MOD 30 MIN: CPT | Performed by: NURSE PRACTITIONER

## 2023-02-10 PROCEDURE — 83540 ASSAY OF IRON: CPT

## 2023-02-10 PROCEDURE — 84466 ASSAY OF TRANSFERRIN: CPT

## 2023-02-10 PROCEDURE — 36415 COLL VENOUS BLD VENIPUNCTURE: CPT

## 2023-02-10 ASSESSMENT — PATIENT HEALTH QUESTIONNAIRE - PHQ9
1. LITTLE INTEREST OR PLEASURE IN DOING THINGS: NOT AT ALL
SUM OF ALL RESPONSES TO PHQ9 QUESTIONS 1 & 2: 0
2. FEELING DOWN, DEPRESSED OR HOPELESS: NOT AT ALL

## 2023-02-10 ASSESSMENT — EJECTION FRACTION
EF_VALUE: 20-25
EF_SOURCE: 2D ECHO

## 2023-02-10 ASSESSMENT — PAIN SCALES - GENERAL: PAINLEVEL_OUTOF10: 0

## 2023-02-10 NOTE — TELEPHONE ENCOUNTER
----- Message from Mendel Palma, APRN - CNP sent at 2/10/2023  3:10 PM EST -----  Labs reviewed  Please let her know that Iron levels have improved  Stable renal function and down trended pro-bnp      Thank you

## 2023-02-10 NOTE — PROGRESS NOTES
[address] [facility] [place] [facility]"" [address] [address] CHF Clinic  [facility] Clinic Visit         Reason for Visit: Heart failure    Primary Cardiologist: Dr. Sabrina Camargo        History of Present Illness:     Ms. [facility] is a 81year old female with a PMHx chronic HFrEF, ischemic cardiomyopathy CAD/MVD (not a surgical candidate), moderate MR, HTN, HLD, T2DM with neuropathy and COPD. She presents today in follow-up, since discharge from the hospital she has been compliant with all of her current cardiac medications. For short period of time she was instructed to stop [place] however in now was represcribed the medication but has yet to restart it. Otherwise doing well with her current GDMT. She denies any dizziness, lightheadedness, near-syncope, syncope, strokelike symptoms, chest pain, pressure, heaviness, palpitations, BETTS, orthopnea, PND, abdominal bloating, early satiety. She does have mild lower extremity edema however was feeling well enough that she was out shopping all day yesterday with her legs in a dependent position. She has good urinary response to oral furosemide with clear yellow urine production. She is monitoring her diet closely for sodium content and stays well-hydrated. She is wearing her LifeVest and denies any discharges or alarms.       Patient Active Problem List    Diagnosis Date Noted    Type 2 diabetes mellitus with diabetic polyneuropathy, without long-term current use of insulin ([address]      Priority: High     Class: Chronic    Mixed hyperlipidemia      Priority: High     Class: Chronic    Essential hypertension      Priority: High     Class: Chronic    Moderate protein-calorie malnutrition ([address] 01/26/2023     Priority: Medium    Palliative care encounter 01/25/2023     Priority: Medium    Hypoxia 01/24/2023     Priority: Medium    NSTEMI (non-ST elevated myocardial infarction) ([address] 01/24/2023     Priority: Medium    Acute on chronic combined systolic and diastolic congestive heart failure ([address] 01/24/2023     Priority: Medium Community acquired pneumonia of right lower lobe of lung 01/24/2023     Priority: Medium    Left ventricular thrombus 01/24/2023     Priority: Medium    Acute respiratory failure with hypoxia ([facility] 01/24/2023     Priority: Medium    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 ([address] 01/24/2023     Priority: Medium    Cardiorenal syndrome 01/24/2023     Priority: Medium    Generalized weakness 01/24/2023     Priority: Medium    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[address] 01/24/2023     Priority: Medium    Acute cholecystitis 01/24/2023     Priority: Medium    DNR (do not resuscitate) 01/24/2023     Priority: Medium    Elevated LFTs 01/24/2023     Priority: Medium    Elevated troponin 01/24/2023     Priority: Medium    CAD in native artery 11/23/2022     Priority: Medium    Bilateral pleural effusion 11/15/2022     Priority: Medium    RLS (restless legs syndrome) 06/23/2015     Priority: Medium     Class: Chronic    Chronic obstructive pulmonary disease ([facility] 02/09/2016     Past Medical History:    Dilated ICMP/HFrEF  TTE 11/15/2022 Dr Kristen John: EF estimated at 20%. Left ventricle is moderately enlarged. Normal right ventricle structure and function. Left atrial volume index of 68 ml per meters squared BSA. Physiologic and/or trace aortic regurgitation is noted. Mild to moderate MR. Mild TR. No evidence for hemodynamically significant. pericardial effusion. 11/17/2022 Lexiscan MPS: EF 30% with partially reversible defect of moderate size and moderate intensity in the mid to apical anterior and anteroseptal walls. 11/23/2022 [facility] Dr Wesley Munoz: Severe calcific multivessel coronary arteries. Markedly elevated left ventricular end-diastolic pressure. Severe heavily calcified stenosis of the right innominate/subclavian artery. 11/24/2022 Life-Vest placed  11/24/2022 Transfer to [facility]   11/25/2022 TTE [facility]: left ventricle is severely dilated. Left ventricular systolic function is severely decreased.  EF = 25 ± 5% (2D biplane). The right ventricle is normal in size. Right ventricular systolic function is normal. The left atrial cavity is severely dilated. There is moderate (2+) holosystolic MR due to apical tethering of normal mitral leaflet caused by LV enlargement. Regurgitant orifice area (PISA) is 0.19 cm². Multiple MR jets visualized. [facility] Cardiology-->Patient seen by Dr. Choi Has and deemed not a surgical canidatae with heart failure with chronic total occlusion of the LAD and reduced EF to 20% along with multiple comorbidities and advanced age. Patient high risk for PCI. Team discussion with patient and family. Plan medical management with close follow up.    Chronic HFrEF  NYHA FC II  11/15/2022 p-BNP 44752  11/21/2022 p-BNP 9030  PAD  11/26/2022 bilateral carotid US: bilateral carotid disease with bilateral subclavian artery stenosis  HTN  HLD  COPD  Ex smoker  T2DM with neuropathy  RLS  11/16/2022 R thoracentesis   11/2023 Urinary retention requiring sapp placement         Past Medical History:   Diagnosis Date    Abnormal cardiovascular stress test 11/17/2022    CAD (coronary artery disease)     Chronic systolic (congestive) heart failure 1/12/2023    COPD (chronic obstructive pulmonary disease) ([facility])     COPD exacerbation ([facility] 11/17/2022    H/O cardiovascular stress test 11/17/2022    Lexiscan    Hyperlipidemia     Hypertension     Osteoarthritis     Osteoarthritis     Type II or unspecified type diabetes mellitus without mention of complication, not stated as uncontrolled     Urinary retention 11/17/2022       Past Surgical History:   Procedure Laterality Date    APPENDECTOMY  01/01/1953    CARDIAC CATHETERIZATION  11/23/2022    Dr Arely Levi ,1971    COLONOSCOPY  01/01/2003    TUMOR REMOVAL  01/01/1954    under lt arm         Allergies   Allergen Reactions    Pcn [Penicillins] Hives    Sulfa Antibiotics Hives       Current Outpatient Medications:     furosemide (LASIX) 20 MG tablet, Take 1 tablet by mouth daily, Disp: 90 tablet, Rfl: 3    metoprolol succinate (TOPROL XL) 25 MG extended release tablet, Take 1 tablet by mouth nightly, Disp: 90 tablet, Rfl: 3    spironolactone (ALDACTONE) 25 MG tablet, Take 0.5 tablets by mouth Daily with lunch, Disp: 45 tablet, Rfl: 3    dapagliflozin (FARXIGA) 10 MG tablet, Take 1 tablet by mouth every morning, Disp: 90 tablet, Rfl: 3    clopidogrel (PLAVIX) 75 MG tablet, Take 1 tablet by mouth daily, Disp: 90 tablet, Rfl: 3    atorvastatin (LIPITOR) 80 MG tablet, Take 1 tablet by mouth daily, Disp: 90 tablet, Rfl: 3    pregabalin (LYRICA) 25 MG capsule, Take 1 capsule by mouth 2 times daily for 60 days. , Disp: 120 capsule, Rfl: 0    blood glucose test strips (ASCENSIA AUTODISC VI;ONE TOUCH ULTRA TEST VI) strip, 1 each by Does not apply route 2 times daily, Disp: 100 each, Rfl: 3    nitroGLYCERIN (NITROSTAT) 0.4 MG SL tablet, Place 1 tablet under the tongue every 5 minutes as needed for Chest pain up to max of 3 total doses.  If no relief after 1 dose, call 911., Disp: 25 tablet, Rfl: 3    ferrous sulfate (IRON 325) 325 (65 Fe) MG tablet, Take 1 tablet by mouth 2 times daily (with meals), Disp: 30 tablet, Rfl: 0    albuterol-ipratropium (COMBIVENT RESPIMAT)  MCG/ACT AERS inhaler, Inhale 1 puff into the lungs 2 times daily, Disp: 1 each, Rfl: 3    Cholecalciferol (VITAMIN D3) 50 MCG (2000 UT) CAPS, Take by mouth daily, Disp: , Rfl:     ascorbic acid (VITAMIN C) 250 MG tablet, Take 250 mg by mouth daily, Disp: , Rfl:     vitamin B-12 (CYANOCOBALAMIN) 1000 MCG tablet, Take 1,000 mcg by mouth daily, Disp: , Rfl:     Lancets MISC, 1 each by Does not apply route 2 times daily, Disp: 100 each, Rfl: 5    timolol (TIMOPTIC) 0.5 % ophthalmic solution, INSTILL 1 DROP INTO EACH EYE TWICE DAILY, Disp: , Rfl: 3    latanoprost (XALATAN) 0.005 % ophthalmic solution, INSTILL 1 DROP INTO EACH EYE ONCE DAILY AT BEDTIME, Disp: , Rfl: 3    brimonidine (ALPHAGAN) 0.2 % ophthalmic solution, INSTILL 1 DROP INTO LEFT EYE TWICE DAILY, Disp: , Rfl: 3    Blood Glucose Monitoring Suppl DELTA, 1 Units by Does not apply route 2 times daily Please give whatever insurance covers, Disp: 1 Device, Rfl: 0    magnesium (MAGNESIUM-OXIDE) 250 MG TABS tablet, Take 250 mg by mouth daily, Disp: , Rfl:     Multiple Vitamin (ONE-A-DAY ESSENTIAL PO), Take 1 tablet by mouth.  , Disp: , Rfl:     calcium carbonate-vitamin D 600-200 MG-UNIT TABS, Take 1 tablet by mouth daily. , Disp: , Rfl:     Misc Natural Products (OSTEO BI-FLEX ADV JOINT SHIELD PO), Take 2 tablets by mouth daily. , Disp: , Rfl:     aspirin 81 MG EC tablet, Take 81 mg by mouth daily. , Disp: , Rfl:          Guideline directed medical/device therapy:  ARNI/ACE I/ARB: No  Beta blocker: Yes  Aldosterone antagonist:  Yes  SGLT2i: No  ICD/CRT-P/-D:   LifeVest  QRS interval on recent ECG (personally reviewed/interpreted): <120 ms  Percentage RV pacing (personally reviewed/interpreted): %/NA      Review of Systems:   Cardiac: As per HPI  General: No fever, chills, rigors  Pulmonary: As per HPI  HEENT: No visual disturbances, difficult swallowing  GI: No nausea, vomiting, abdominal pain  : No dysuria or hematuria  Endocrine: No thyroid disease or diabetes  Musculoskeletal: COLÓN x 4, no focal motor deficits  Skin: Intact, no rashes  Neuro/Psych: No headache or seizures        Weights: Wt Readings from Last 3 Encounters:   02/10/23 143 lb 9.6 oz (65.1 kg)   02/02/23 136 lb (61.7 kg)   01/25/23 143 lb 9.6 oz (65.1 kg)         Physical Examination:     BP (!) 106/54   Pulse 74   Resp 16   Ht 5' 6\" (1.676 m)   Wt 143 lb 9.6 oz (65.1 kg) Comment: 141.home weight  LMP  (LMP Unknown)   SpO2 100%   BMI 23.18 kg/m²     CONSTITUTIONAL: Alert and oriented times 3, no acute distress and cooperative to examination with proper mood and affect. SKIN: Skin color, texture, turgor normal. No rashes or lesions.   LYMPH: no cervical nodes, no inguinal nodes  HEENT: Head is normocephalic, atraumatic. EOMI, PERRLA. NECK: Supple, symmetrical, trachea midline, no adenopathy, thyroid symmetric, not enlarged and no tenderness, skin normal. No JVD/HJR  CHEST/LUNGS: chest symmetric with normal A/P diameter, normal respiratory rate and rhythm, lungs clear to auscultation without wheezes, rales or rhonchi. No accessory muscle use. Scars None   CARDIOVASCULAR: Heart sounds are normal.  Regular rate and rhythm without murmur, gallop or rub. Normal S1 and S2. Carotid and femoral pulses 2+/4 and equal bilaterally. ABDOMEN: Normal shape. No and Laparoscopic scar(s) present. Normal bowel sounds. No bruits. soft, nondistended, no masses or organomegaly. no evidence of hernia. Percussion: Normal without hepatosplenomegally. Tenderness: absent. RECTAL: deferred, not clinically indicated  NEUROLOGIC: There are no focalizing motor or sensory deficits. CN II-XII are grossly intact. Rilla Balk EXTREMITIES: no cyanosis, no clubbing. Trace bilateral lower extremity edema. Warm and well perfused. All the following diagnostics were personally reviewed and interpreted by me. LAB DATA:     1/26/23 07:19 1/27/23 08:05   Sodium 138 138   Potassium 3.7 3.7   Chloride 99 100   CO2 28 25   BUN,BUNPL 46 (H) 41 (H)   Creatinine 1.1 (H) 1.0   Anion Gap 11 13   Est, Glom Filt Rate 49 55   Glucose, Random 105 (H) 100 (H)   CALCIUM, SERUM, 908427 9.8 9.7   Total Protein 6.5    Meter Glucose     Pro-BNP  20,221 (H)   Troponin, High Sensitivity 835 (H)    Albumin 3.9    Alk Phos 100    ALT 75 (H)    AST 55 (H)    Bilirubin 0.6    Bilirubin, Direct <0.2    Bilirubin, Indirect see below    WBC 6.4 5.8   RBC 3.79 3.98   Hemoglobin Quant 11.7 12.2   Hematocrit 35.1 36.6   MCV 92.6 92.0   MCH 30.9 30.7   MCHC 33.3 33.3   MPV 11.7 11.5   RDW 13.6 13.4   Platelet Count 793 650       IMAGING:    CTA Pulmonary (1/24/2023)  FINDINGS:  Pulmonary Arteries: Pulmonary arteries are adequately opacified for  evaluation.   No evidence of intraluminal filling defect to suggest pulmonary  embolism. Main pulmonary artery is normal in caliber. Mediastinum: No evidence of mediastinal lymphadenopathy. Cardiomegaly. There is no acute abnormality of the thoracic aorta. Calcified plaque  involving the thoracic aorta. Lungs/pleura: Right lower lobe airspace opacity consistent with pneumonia. .  No focal consolidation or pulmonary edema. Small bilateral pleural  effusions. No pneumothorax. Upper Abdomen: Limited images of the upper abdomen are unremarkable. Soft Tissues/Bones: No acute bone or soft tissue abnormality. Degenerative  changes thoracic spine. Impression  No evidence of pulmonary embolism. Right lower lobe airspace opacity consistent with pneumonia. Small bilateral pleural effusions. Cardiomegaly. CARDIAC TESTING:    TTE (11/15/2022)   Summary   Ejection fraction is visually estimated at 20%. Overall ejection fraction severely decreased. Left ventricle is moderately enlarged. Normal right ventricle structure and function. Left atrial volume index of 68 ml per meters squared BSA. Physiologic and/or trace aortic regurgitation is noted. Mild to moderate mitral regurgitation is present. Mild tricuspid regurgitation. No evidence for hemodynamically significant pericardial effusion. [facility] (11/23/2022)  CORONARY ANGIOGRAPHY:  Left main:  The left main artery is severely calcified and there was 80%  eccentric ostial left main stenosis. LAD:  The left anterior descending artery appeared heavily calcified in  its proximal segment. It was subtotally occluded in its proximal and  middle segments. The distal LAD and what appeared to be a large  diagonal branch (both of which were underfilled) opacified late upon the  injection of the left main artery. Both the distal LAD and the diagonal  branch after the proximal and mid vessel subtotal occlusions did not  appear to have any discrete lesions.   LCX:  The left circumflex had luminal irregularities in its proximal  segment, but without any significant angiographic luminal narrowing. RCA:  The right coronary artery is a very large dominant vessel. There  was 99% calcified mid RCA stenosis. There was PAMELA-1 flow in the  superior subdivision of the posterolateral branch. The posterior  descending artery branch and the posterolateral branch of the right  coronary artery also received left-to-right collaterals. An acute  marginal branch of the right coronary artery also provided faint  collaterals to the LAD. CONCLUSIONS:  1. Severe calcific multivessel coronary arteries as described above. 2.  Markedly elevated left ventricular end-diastolic pressure. 3.  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 TTE (1/26/2023)  Left Ventricle   Micro-bubble contrast injected to enhance left ventricular visualization. Left ventricle is severely dilated. LVEDD 6.5 cm. LV systolic function is severely reduced. Ejection fraction is visually estimated at 20-25%. Abnormal diastolic function. Severe global hypokinesis. No definitive evidence of LV apical thrombus. Conclusions   Ejection fraction is visually estimated at 20-25%. No definitive evidence of LV apical thrombus on contrast echo.     Telemetry  Sinus Rhythm      ASSESSMENT:  Chronic HFrEF  ACC stage C / NYHA class II  Euvolemic   Ischemic cardiomyopathy  LVEF 20%, LVEDD 6.4, LVMI 156  CAD/MVD - not a surgical or PCI candidate (evaluated at 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, porcelain aorta  Carotid disease  Hypertension  Hyperlipidemia  Type 2 diabetes neuropathy  COPD  Left eye blindness       PLAN:  Get blood work today including iron studies, if remain low will set you up for iron infusions and stop iron pills (this is what is indicated for HF patients)    Change how you are taking your medications:  Restart Farxiga 10 mg every morning  Decrease Lasix to 20 mg daily - however if you gain weight or shortness of breath, abdominal bloating or swelling you can take the 40 mg daily  Take spironolactone 12.5 mg at lunch  Metoprolol succinate 25 mg nightly    Return to CHF clinic in 1 week as scheduled - we will continue to adjust your heart failure medications (anticipate starting Entresto if kidney function and blood pressure allow)    Elevate your legs as much as possible, wear compression stocking on in AM and off in PM    Continue to wear LifeVest     Follow up with Dr. Fiorella Velazquez in 2-3 months at [facility] office     Weigh yourself daily    -Stay Hydrated, 64 oz     -Diet should sodium restricted to 2 grams    -Again watch your daily weight trends and if you gain water weight please follow below instructions.    -If you gain 3-5 pounds in 2-3 days OR notice that you are retaining fluid in anyway just like you did before then take an extra dose of your water pill (furosemide/Lasix) every day until you lose the weight or feel better.     -If you notice that you have taken more than 2 extra doses in 1 week then please call and let us know. -If at any time you feel that you are retaining fluid, your medications are not working, or you feel ill in anyway, then please call us for either same day appointment or the next day, and for instructions. Our goal is to keep you out of the emergency room and the hospital and we have ways to do it. You just need to call us in a timely manner.     -If you become sick for other reasons, and notice that you are not urinating as much, the urine is very dark, you have significant diarrhea or vomiting, then please DO NOT take your water pill and CALL US immediately.      Alena DE LA FUENTE-CNP  [facility]

## 2023-02-10 NOTE — RESULT ENCOUNTER NOTE
Labs reviewed  Please let her know that Iron levels have improved  Stable renal function and down trended pro-bnp      Thank you

## 2023-02-10 NOTE — DISCHARGE INSTRUCTIONS
Get blood work today including iron studies, if remain low will set you up for iron infusions and stop iron pills (this is what is indicated for HF patients)    Change how you are taking your medications:  Restart Farxiga 10 mg every morning  Decrease Lasix to 20 mg daily - however if you gain weight or shortness of breath, abdominal bloating or swelling you can take the 40 mg daily  Take spironolactone 12.5 mg at lunch  Metoprolol succinate 25 mg nightly    Return to CHF clinic in 1 week as scheduled - we will continue to adjust your heart failure medications (anticipate starting Entresto if kidney function and blood pressure allow)    Elevate your legs as much as possible, wear compression stocking on in AM and off in PM    Continue to wear LifeVest     Follow up with Dr. Geri Harp in 2-3 months    Weigh yourself daily    -Stay Hydrated, 64 oz     -Diet should sodium restricted to 2 grams    -Again watch your daily weight trends and if you gain water weight please follow below instructions.    -If you gain 3-5 pounds in 2-3 days OR notice that you are retaining fluid in anyway just like you did before then take an extra dose of your water pill (furosemide/Lasix) every day until you lose the weight or feel better.     -If you notice that you have taken more than 2 extra doses in 1 week then please call and let us know. -If at any time you feel that you are retaining fluid, your medications are not working, or you feel ill in anyway, then please call us for either same day appointment or the next day, and for instructions. Our goal is to keep you out of the emergency room and the hospital and we have ways to do it. You just need to call us in a timely manner.     -If you become sick for other reasons, and notice that you are not urinating as much, the urine is very dark, you have significant diarrhea or vomiting, then please DO NOT take your water pill and CALL US immediately.              Learning About Saving Energy When You Have a Chronic Condition  Introduction     Everyday tasks can be tiring when you have COPD, heart failure, or another long-term (chronic) condition. You may feel at times that you've lost your ability to live your life. But learning to conserve, or save, your energy can help you be less tired. Conserving your energy means finding ways of doing daily activities with as little effort as possible. With some small changes in the way you do things, you can get your tasks done more easily. Some treatments are available that might help. Pulmonary rehabilitation can teach you ways to breathe easier. Cardiac rehabilitation can help make your heart stronger. You also may want to see an occupational or physical therapist. The therapist can give you more tips on building strength and moving with less effort. What can you do to conserve your energy? Planning  Make a list of what you have to do every day. Group the tasks by location. Do all the chores in one part of your house around the same time. Go out for errands or do chores at the time of day when you have the most energy. Plan rest periods into your day. Getting things done  Sit down as often as you can when you get dressed, do chores, or cook. Use a cart with wheels to roll items, such as laundry, from one room to another. Push or slide boxes or other large items instead of lifting them. Reaching and bending  Put things you use the most on shelves that are at the level of your waist or shoulder. Use long-handled grabbers or other tools to reach items on a high shelf or to  things off the floor. Use long-handled dusters when you clean the house. Use a raised toilet seat to avoid bending too far to sit or stand up. Eating  Try eating small, frequent meals instead of three larger meals so your stomach is never too full. A full stomach can push on the muscle that helps you breathe (your diaphragm) and make it harder to breathe.   If you get too tired to eat much, try to choose healthy foods that have more calories. Have a yogurt-and-fruit smoothie for breakfast. Put avocado on a sandwich. Or add cheese or peanut butter to snacks. If you don't feel very hungry, try to eat first and drink water or other fluids later, after a meal. This can help keep you from losing weight. Sip small amounts of fluids if you need to drink while you eat. Having sex  Choose the time of day when you have more energy. A rehv-ty-roep position for sex can be less tiring. Sometimes you may want to focus more on caressing. Watch closely for changes in your health, and be sure to contact your doctor if you have any problems. Where can you learn more? Go to http://www.woods.com/ and enter H190 to learn more about \"Learning About Saving Energy When You Have a Chronic Condition. \"  Current as of: March 9, 2022               Content Version: 13.5  © 2006-2022 [employer]. Care instructions adapted under license by [facility] ([facility]). If you have questions about a medical condition or this instruction, always ask your healthcare professional. [place] [address] any warranty or liability for your use of this information. Heart Failure: Care Instructions  Overview     Heart failure occurs when your heart does not pump as much blood as the body needs. Failure does not mean that the heart has stopped pumping but rather that it is not pumping as well as it should. Over time, this causes fluid buildup in your lungs and other parts of your body. Fluid buildup can cause shortness of breath, fatigue, swollen ankles, and other problems. Heart failure is treated with medicines, a heart-healthy lifestyle, and the steps you take to check your symptoms. Treatment can slow the disease, help you feel better, and help keep you out of the hospital. Treatment may also help you live longer. Follow-up care is a key part of your treatment and safety.  Be sure to make and go to all appointments, and call your doctor if you are having problems. It's also a good idea to know your test results and keep a list of the medicines you take. How can you care for yourself at home? Medicines    Be safe with medicines. Take your medicines exactly as prescribed. Call your doctor if you think you are having a problem with your medicine. You will get more details on the specific medicines your doctor prescribes. Medicines can help your heart work better, help you feel better, and help keep you out of the hospital. Medicines may also help you live longer. Talk with your doctor or pharmacist before you take a new prescription or over-the-counter medicine. Ask if the medicine is safe for you to take. Some medicines can affect your heart and make heart failure worse. Others may keep your heart failure medicines from working right. Over-the-counter medicines that you may need to avoid include herbal supplements, vitamins, pain relievers called NSAIDs, antacids, laxatives, and cough, cold, flu, or sinus medicine. Diet    Eat heart-healthy foods. These foods include vegetables, fruits, nuts, beans, lean meat, fish, and whole grains. Your doctor may suggest that you limit sodium. Your doctor can tell you how much sodium is right for you. An example is less than 3,000 mg a day. This includes all the salt you eat in cooking or in packaged foods. People get most of their sodium from processed foods. Fast food and restaurant meals also tend to be very high in sodium. Limit your fluid intake if your doctor tells you to. Your doctor will tell you how much fluid you can have in a day. Symptoms    Weigh yourself without clothing at the same time each day. Record your weight. Call your doctor if you have a sudden weight gain, such as more than 2 to 3 pounds in a day or 5 pounds in a week.  (Your doctor may suggest a different range of weight gain.) A sudden weight gain may mean that your heart failure is getting worse. Check your symptoms every day to watch for changes. Know what to do if your symptoms get worse. Activity    Be active. Do not start to exercise until you have talked with your doctor. Together you can make an exercise program that is enjoyable and safe for you. Regular exercise can make your heart and your body stronger. Being active can help you feel better too. With your doctor, plan how often, how long, and how hard you will be active. Don't exercise too hard because it can put stress on your heart. If your doctor has not set you up with a cardiac rehabilitation (rehab) program, ask if it's right for you. Cardiac rehab can give you education and support that help you stay as healthy as possible. When you exercise, watch for signs that your heart is working too hard. You are pushing yourself too hard if you cannot talk while you are exercising. If you become short of breath or dizzy or have chest pain, stop, sit down, and rest.   Heart-healthy lifestyle    Do not smoke. Smoking can make a heart condition worse. If you need help quitting, talk to your doctor about stop-smoking programs and medicines. These can increase your chances of quitting for good. Quitting smoking may be the most important step you can take to protect your heart. Stay at a healthy weight. Lose weight if you need to. Manage other health problems such as diabetes and high blood pressure. Limit or avoid alcohol. Ask your doctor how much alcohol, if any, is safe for you. If you think you may have a problem with alcohol or drug use, talk to your doctor. Avoid infections such as COVID-19, colds, and the flu. Get the flu vaccine every year. Get a pneumococcal vaccine shot. If you have had one before, ask your doctor whether you need another dose. Stay up to date on your COVID-19 vaccines. When should you call for help?    Call 911  if you have symptoms of sudden heart failure such as: You have severe trouble breathing. You cough up pink, foamy mucus. You have a new irregular or rapid heartbeat. Call your doctor now or seek immediate medical care if:    You have new or increased shortness of breath. You are dizzy or lightheaded, or you feel like you may faint. You have sudden weight gain, such as more than 2 to 3 pounds in a day or 5 pounds in a week. (Your doctor may suggest a different range of weight gain.)     You have increased swelling in your legs, ankles, or feet. You are suddenly so tired or weak that you cannot do your usual activities. Watch closely for changes in your health, and be sure to contact your doctor if you develop new symptoms. Where can you learn more? Go to http://www.woods.com/ and enter E597 to learn more about \"Heart Failure: Care Instructions. \"  Current as of: September 7, 2022               Content Version: 13.5  © 2006-2022 [employer]. Care instructions adapted under license by [facility] ([facility]). If you have questions about a medical condition or this instruction, always ask your healthcare professional. [place] [address] any warranty or liability for your use of this information. Advance Directives: Care Instructions  Overview  An advance directive is a legal way to state your wishes at the end of your life. It tells your family and your doctor what to do if you can't say what you want. There are two main types of advance directives. You can change them any time your wishes change. Living will. This form tells your family and your doctor your wishes about life support and other treatment. The form is also called a declaration. Medical power of . This form lets you name a person to make treatment decisions for you when you can't speak for yourself. This person is called a health care agent (health care proxy, health care surrogate).  The form is also called a durable power of  for health care. If you do not have an advance directive, decisions about your medical care may be made by a family member, or by a doctor or a  who doesn't know you. It may help to think of an advance directive as a gift to the people who care for you. If you have one, they won't have to make tough decisions by themselves. For more information, including forms for your state, see the [address] website (www.caringinfo.org/planning/advance-directives/). Follow-up care is a key part of your treatment and safety. Be sure to make and go to all appointments, and call your doctor if you are having problems. It's also a good idea to know your test results and keep a list of the medicines you take. What should you include in an advance directive? Many states have a unique advance directive form. (It may ask you to address specific issues.) Or you might use a universal form that's approved by many states. If your form doesn't tell you what to address, it may be hard to know what to include in your advance directive. Use the questions below to help you get started. Who do you want to make decisions about your medical care if you are not able to? What life-support measures do you want if you have a serious illness that gets worse over time or can't be cured? What are you most afraid of that might happen? (Maybe you're afraid of having pain, losing your independence, or being kept alive by machines.)  Where would you prefer to die? (Your home? A hospital? A nursing home?)  Do you want to donate your organs when you die? Do you want certain [employer] practices performed before you die? When should you call for help? Be sure to contact your doctor if you have any questions. Where can you learn more? Go to http://www.wise.io.com/ and enter R264 to learn more about \"Advance Directives: Care Instructions. \"  Current as of: June 16, 2022               Content Version: 13.5  © 6258-8640 [employer]. Care instructions adapted under license by [facility] ([facility]). If you have questions about a medical condition or this instruction, always ask your healthcare professional. [address] any warranty or liability for your use of this information. Learning About a Wearable Cardioverter-Defibrillator ([address])  What is it? A wearable cardioverter-defibrillator (WCD) is a vest that has a defibrillator built into it. A defibrillator is a device that fixes serious changes in your heartbeat. The device is always checking your heart rate and rhythm. If it detects a life-threatening, rapid heart rhythm, it sends an electric shock to the heart. This can restore a normal rhythm. A WCD helps control abnormal heart rhythms. You aren't likely to get a shock from the [address], just as you aren't likely to use the air bag on a car. But it can save your life if it's needed. Why is a WCD used? You might get a WCD if you can't have an ICD (implantable cardioverter-defibrillator) or while you are waiting to have one put in. A WCD may also be used if you might have a high risk of sudden cardiac death for a short time. How is it used? You may wear the WCD for about 2 to 6 weeks or longer. You will be measured so your vest will be the right size. The vest is worn under your clothes. It has electrodes and wires that lie against your skin. You wear a monitor around your waist or on a strap over your shoulder. Wear the WCD at all times except when you bathe or swim. You can do your normal activities while you wear it. Your doctor will show you how the [address] works and how to take care of it. Be sure to pay attention to alert sounds and messages on the monitor. You need to follow the monitor's instructions exactly. Ileana Schmitz also get instructions for what to do after a shock. What does it feel like? The vest may feel uncomfortable, especially at first when you're not used to it. The shock delivered by the defibrillator may be painful. Follow-up care is a key part of your treatment and safety. Be sure to make and go to all appointments, and call your doctor if you are having problems. It's also a good idea to know your test results and keep a list of the medicines you take. Current as of: September 7, 2022               Content Version: 13.5  © 2006-2022 Healthwise, [employer]. Care instructions adapted under license by [facility] ([facility]). If you have questions about a medical condition or this instruction, always ask your healthcare professional. [place] [address] any warranty or liability for your use of this information.

## 2023-02-15 ENCOUNTER — TELEPHONE (OUTPATIENT)
Dept: OTHER | Age: 88
End: 2023-02-15

## 2023-02-15 ENCOUNTER — HOSPITAL ENCOUNTER (OUTPATIENT)
Dept: OTHER | Age: 88
Setting detail: THERAPIES SERIES
Discharge: HOME | End: 2023-02-15
Payer: MEDICARE

## 2023-02-15 VITALS
WEIGHT: 145 LBS | SYSTOLIC BLOOD PRESSURE: 110 MMHG | RESPIRATION RATE: 18 BRPM | BODY MASS INDEX: 23.4 KG/M2 | DIASTOLIC BLOOD PRESSURE: 52 MMHG | HEART RATE: 74 BPM | OXYGEN SATURATION: 96 %

## 2023-02-15 LAB
ANION GAP SERPL CALCULATED.3IONS-SCNC: 11 MMOL/L (ref 7–16)
BUN BLDV-MCNC: 23 MG/DL (ref 6–23)
CALCIUM SERPL-MCNC: 9.6 MG/DL (ref 8.6–10.2)
CHLORIDE BLD-SCNC: 100 MMOL/L (ref 98–107)
CO2: 26 MMOL/L (ref 22–29)
CREAT SERPL-MCNC: 0.9 MG/DL (ref 0.5–1)
GFR SERPL CREATININE-BSD FRML MDRD: >60 ML/MIN/1.73
GLUCOSE BLD-MCNC: 153 MG/DL (ref 74–99)
POTASSIUM SERPL-SCNC: 3.3 MMOL/L (ref 3.5–5)
PRO-BNP: ABNORMAL PG/ML (ref 0–450)
SODIUM BLD-SCNC: 137 MMOL/L (ref 132–146)

## 2023-02-15 PROCEDURE — 83880 ASSAY OF NATRIURETIC PEPTIDE: CPT

## 2023-02-15 PROCEDURE — 36415 COLL VENOUS BLD VENIPUNCTURE: CPT

## 2023-02-15 PROCEDURE — 80048 BASIC METABOLIC PNL TOTAL CA: CPT

## 2023-02-15 PROCEDURE — 99214 OFFICE O/P EST MOD 30 MIN: CPT

## 2023-02-15 RX ORDER — SPIRONOLACTONE 25 MG/1
25 TABLET ORAL
Qty: 45 TABLET | Refills: 3 | Status: SHIPPED | OUTPATIENT
Start: 2023-02-15

## 2023-02-15 NOTE — PROGRESS NOTES
Congestive Heart Failure [id number] [facility]   1935      Referring Provider: Dr. Ender Petty  Primary Care Physician: Dr. Asif Capellan   Cardiologist: Dr. Ender Petty  Nephrologist:         History of Present Illness:     Juliet Grace is a 81 y.o. female with a history of HFrEF, most recent EF 20-25%. Life Vest    Patient Story:    She does not  have dyspnea with exertion, shortness of breath, or decline in overall functional capacity. She does not have orthopnea, PND, nocturnal cough or hemoptysis. She does not have abdominal distention or bloating, early satiety, anorexia/change in appetite. She does has a good urinary response to  oral diuretic. She does not have  lower extremity edema. She denies lightheadedness, dizziness. She denies palpitations, syncope or near syncope. She does not complain of chest pain, pressure, discomfort. Pt wearing life vest- denies any shocks or alarms. Allergies   Allergen Reactions    Pcn [Penicillins] Hives    Sulfa Antibiotics Hives         No outpatient medications have been marked as taking for the 2/15/23 encounter [facility] Encounter) with [facility] CHF ROOM 1. Current Outpatient Medications on File Prior to Encounter   Medication Sig Dispense Refill    furosemide (LASIX) 20 MG tablet Take 1 tablet by mouth daily 90 tablet 3    metoprolol succinate (TOPROL XL) 25 MG extended release tablet Take 1 tablet by mouth nightly 90 tablet 3    spironolactone (ALDACTONE) 25 MG tablet Take 0.5 tablets by mouth Daily with lunch 45 tablet 3    dapagliflozin (FARXIGA) 10 MG tablet Take 1 tablet by mouth every morning 90 tablet 3    clopidogrel (PLAVIX) 75 MG tablet Take 1 tablet by mouth daily 90 tablet 3    atorvastatin (LIPITOR) 80 MG tablet Take 1 tablet by mouth daily 90 tablet 3    pregabalin (LYRICA) 25 MG capsule Take 1 capsule by mouth 2 times daily for 60 days.  120 capsule 0    blood glucose test strips (ASCENSIA AUTODISC VI;ONE TOUCH ULTRA TEST VI) strip 1 each by Does not apply route 2 times daily 100 each 3    nitroGLYCERIN (NITROSTAT) 0.4 MG SL tablet Place 1 tablet under the tongue every 5 minutes as needed for Chest pain up to max of 3 total doses. If no relief after 1 dose, call 911. 25 tablet 3    ferrous sulfate (IRON 325) 325 (65 Fe) MG tablet Take 1 tablet by mouth 2 times daily (with meals) 30 tablet 0    albuterol-ipratropium (COMBIVENT RESPIMAT)  MCG/ACT AERS inhaler Inhale 1 puff into the lungs 2 times daily 1 each 3    Cholecalciferol (VITAMIN D3) 50 MCG (2000 UT) CAPS Take by mouth daily      ascorbic acid (VITAMIN C) 250 MG tablet Take 250 mg by mouth daily      vitamin B-12 (CYANOCOBALAMIN) 1000 MCG tablet Take 1,000 mcg by mouth daily      Lancets MISC 1 each by Does not apply route 2 times daily 100 each 5    timolol (TIMOPTIC) 0.5 % ophthalmic solution INSTILL 1 DROP INTO EACH EYE TWICE DAILY  3    latanoprost (XALATAN) 0.005 % ophthalmic solution INSTILL 1 DROP INTO EACH EYE ONCE DAILY AT BEDTIME  3    brimonidine (ALPHAGAN) 0.2 % ophthalmic solution INSTILL 1 DROP INTO LEFT EYE TWICE DAILY  3    Blood Glucose Monitoring Suppl DELTA 1 Units by Does not apply route 2 times daily Please give whatever insurance covers 1 Device 0    magnesium (MAGNESIUM-OXIDE) 250 MG TABS tablet Take 250 mg by mouth daily      Multiple Vitamin (ONE-A-DAY ESSENTIAL PO) Take 1 tablet by mouth.        calcium carbonate-vitamin D 600-200 MG-UNIT TABS Take 1 tablet by mouth daily. Misc Natural Products (OSTEO BI-FLEX ADV JOINT SHIELD PO) Take 2 tablets by mouth daily. aspirin 81 MG EC tablet Take 81 mg by mouth daily. No current facility-administered medications on file prior to encounter.              Guideline directed medical:  ARNI/ACE I/ARB: No  Beta blocker:  Yes Toprol Xl 25 mg at night  Aldosterone antagonist:  Yes Aldactone 12.5 mg daily at lunch  SGLT2i:  Yes Farxiga 10 mg daily    NYHA class II  Life Vest    Physical Examination:     BP (!) 110/52   Pulse 74   Resp 18   Wt 145 lb (65.8 kg)   LMP  (LMP Unknown)   SpO2 96%   BMI 23.40 kg/m²     Assessment  Charting Type: Shift assessment (Chf clinic)    Neurological  Orientation Level: Oriented X4  Cognition: Appropriate judgement, Appropriate safety awareness, Appropriate attention/concentration, Appropriate for developmental age, Follows commands         HEENT (Head, Ears, Eyes, Nose, & Throat)  HEENT (WDL): Exceptions to WDL  Right Eye: Impaired vision, Glasses  Left Eye: Blind  Teeth: Dentures upper, Dentures lower    Respiratory  Respiratory Pattern: Regular  Respiratory Depth: Normal  Respiratory Quality/Effort: Unlabored, Dyspnea with exertion  Chest Assessment: Chest expansion symmetrical, Trachea midline  L Breath Sounds: Clear  R Breath Sounds: Clear    Breath Sounds  Right Upper Lobe: Clear  Right Middle Lobe: Clear  Right Lower Lobe: Clear  Left Upper Lobe: Clear  Left Lower Lobe: Clear         Cardiac  Cardiac Regularity: Regular (Life Vest)  Heart Sounds: S1, S2  Cardiac Rhythm: Sinus rhythm with PAC    Rhythm Interpretation  Heart Rate: 74         Gastrointestinal  Abdominal (WDL): Exceptions to WDL  Abdomen Inspection: Soft               Peripheral Vascular  Peripheral Vascular (WDL): Exceptions to WDL  Edema: Right lower extremity, Left lower extremity  RLE Edema: None  LLE Edema: None              Musculoskeletal  RL Extremity: Weakness, Unsteady  LL Extremity: Weakness, Unsteady                                  Heart Rate: 74                     LAB DATA:    Last 3 BMP      Sodium (mmol/L)   Date Value   02/15/2023 137   02/10/2023 139   01/27/2023 138     Potassium (mmol/L)   Date Value   02/15/2023 3.3 (L)   02/10/2023 4.2   01/27/2023 3.7     Potassium reflex Magnesium (mmol/L)   Date Value   11/24/2022 5.2 (H)     Chloride (mmol/L)   Date Value   02/15/2023 100   02/10/2023 102   01/27/2023 100     CO2 (mmol/L)   Date Value 02/15/2023 26   02/10/2023 28   01/27/2023 25     BUN (mg/dL)   Date Value   02/15/2023 23   02/10/2023 30 (H)   01/27/2023 41 (H)     Glucose (mg/dL)   Date Value   02/15/2023 153 (H)   02/10/2023 117 (H)   01/27/2023 100 (H)   04/05/2012 125 (H)   01/09/2012 120 (H)   05/16/2011 125 (H)     Calcium (mg/dL)   Date Value   02/15/2023 9.6   02/10/2023 9.7   01/27/2023 9.7       Last 3 BNP       Pro-BNP (pg/mL)   Date Value   02/15/2023 11,072 (H)   02/10/2023 8,944 (H)   01/27/2023 20,221 (H)          CBC: No results for input(s): WBC, HGB, PLT in the last 72 hours. BMP:    Recent Labs     02/15/23  1100      K 3.3*      CO2 26   BUN 23   CREATININE 0.9   GLUCOSE 153*     Hepatic: No results for input(s): AST, ALT, ALB, BILITOT, ALKPHOS in the last 72 hours. Troponin: No results for input(s): TROPONINI in the last 72 hours. BNP: No results for input(s): BNP in the last 72 hours. Lipids: No results for input(s): CHOL, HDL in the last 72 hours. Invalid input(s): LDLCALCU  INR: No results for input(s): INR in the last 72 hours. WEIGHTS:    Wt Readings from Last 3 Encounters:   02/15/23 145 lb (65.8 kg)   02/10/23 143 lb 9.6 oz (65.1 kg)   02/02/23 136 lb (61.7 kg)         TELEMETRY:  Cardiac Regularity: Regular  Cardiac Rhythm/Interpretation: SR with PAC    First visit with CHF clinic today. Patient presented with her daughter [facility]. Discussed with patient and her daughter the purpose of CHF clinic and importance of daily weights and doing a self check every day to monitor for changes. Went over the three heart failure zones and to call cardiologist if in yellow zone immediately to prevent any further decline. Patient has a working scale. Patient is agreeable to future CHF clinic visits. ASSESSMENT:  Sary Leary is evolemic with  a baseline weight of 145 at today's visit. Upon assessment pt denies SOB or BETTS, lungs are clear, denies distention No lower extremity edema, no JVD.      Saw Jocelin Rosado APRN CNP 2/10  Pt has an appointment with PCP next week 2/23  Follow up in chf clinic in 2 weeks. Interventions completed this visit:  IV diuretics given no  Lab work obtained yes, BNP BMP   Reviewed currently prescribed medications with patient, educated on importance of compliance and answered any questions regarding their medication  Educated on signs and symptoms of HF  Educated on low sodium diet        PLAN:  Scheduled to follow up in CHF clinic on   Future Appointments   Date Time Provider Lili Contreras   2/20/2023 10:00 AM Radha De La Paz DO [address] [facility]   2/23/2023  1:45 PM Sujatha Boyd MD [facility] AND WOMEN'S [facility]   3/1/2023 10:00 AM [facility] CHF ROOM 1 [facility] [address] [facility]   4/18/2023  9:00 AM Damaris Sellers MD [facility]     Given clinic phone number and aware of signs and symptoms to call with any HF change in symptoms.

## 2023-02-15 NOTE — PROGRESS NOTES
Notified Dhaval DE LA FUENTE CNP of abnormal labs (K 3.3 and elevated BNP 06763) from today's CHF clinic visit. Orders Received. Spoke with patient's Trinynayan Flaherty regarding providers instructions. She will call and notify the patient     Increase Spironolactone to 25 mg daily   2. Follow up in CHF clinic in 1 week.      I have reviewed the providers instructions with the patient, answering all questions to their satisfaction    Future Appointments   Date Time Provider Lili Contreras   2/20/2023 10:00 AM [facility] DO Filippo Ruvalcaba [facility]   2/21/2023  1:30 PM [facility]   2/23/2023  1:45 PM [facility]   4/18/2023  9:00 AM Salas Brown MD [facility]

## 2023-02-15 NOTE — TELEPHONE ENCOUNTER
Labs and CHF clinic note reviewed  Spoke with Sunni LOWE    Potassium 3.3, slight rise in proBNP however doing well from a fluid standpoint on examination. Instructed to increase spironolactone to 25 mg daily    Return to CHF clinic in 1 week as scheduled, sooner if needed.

## 2023-02-16 ENCOUNTER — TELEPHONE (OUTPATIENT)
Dept: FAMILY MEDICINE CLINIC | Age: 88
End: 2023-02-16

## 2023-02-16 NOTE — TELEPHONE ENCOUNTER
Daughter states pcp to order chest xray prior to upcoming visit. Please advise.    Last seen 2/2/2023  Next appt 2/23/2023

## 2023-02-17 DIAGNOSIS — E78.2 MIXED HYPERLIPIDEMIA: ICD-10-CM

## 2023-02-17 LAB
ALBUMIN SERPL-MCNC: 3.7 G/DL (ref 3.5–5.2)
ALP BLD-CCNC: 135 U/L (ref 35–104)
ALT SERPL-CCNC: 57 U/L (ref 0–32)
ANION GAP SERPL CALCULATED.3IONS-SCNC: 17 MMOL/L (ref 7–16)
AST SERPL-CCNC: 31 U/L (ref 0–31)
BILIRUB SERPL-MCNC: 0.9 MG/DL (ref 0–1.2)
BUN BLDV-MCNC: 24 MG/DL (ref 6–23)
CALCIUM SERPL-MCNC: 9.7 MG/DL (ref 8.6–10.2)
CHLORIDE BLD-SCNC: 98 MMOL/L (ref 98–107)
CHOLESTEROL, TOTAL: 121 MG/DL (ref 0–199)
CO2: 24 MMOL/L (ref 22–29)
CREAT SERPL-MCNC: 1 MG/DL (ref 0.5–1)
GFR SERPL CREATININE-BSD FRML MDRD: 54 ML/MIN/1.73
GLUCOSE BLD-MCNC: 120 MG/DL (ref 74–99)
HDLC SERPL-MCNC: 40 MG/DL
LDL CHOLESTEROL CALCULATED: 62 MG/DL (ref 0–99)
POTASSIUM SERPL-SCNC: 4.6 MMOL/L (ref 3.5–5)
SODIUM BLD-SCNC: 139 MMOL/L (ref 132–146)
TOTAL PROTEIN: 6.6 G/DL (ref 6.4–8.3)
TRIGL SERPL-MCNC: 96 MG/DL (ref 0–149)
VLDLC SERPL CALC-MCNC: 19 MG/DL

## 2023-02-17 NOTE — TELEPHONE ENCOUNTER
Infusion pump and carrying bag brought in by patient double bagged into chemo therapy disposal bags and placed in Dirty utility room by ER RM 9.   Notify Daughter and she will ask for xray order at time of visit with dr. Ronald Diaz.

## 2023-02-20 ENCOUNTER — OFFICE VISIT (OUTPATIENT)
Dept: ORTHOPEDIC SURGERY | Age: 88
End: 2023-02-20
Payer: MEDICARE

## 2023-02-20 VITALS — HEIGHT: 66 IN | WEIGHT: 140 LBS | TEMPERATURE: 98 F | BODY MASS INDEX: 22.5 KG/M2

## 2023-02-20 DIAGNOSIS — M25.511 CHRONIC RIGHT SHOULDER PAIN: Primary | ICD-10-CM

## 2023-02-20 DIAGNOSIS — M75.21 TENDINITIS OF LONG HEAD OF BICEPS BRACHII OF RIGHT SHOULDER: ICD-10-CM

## 2023-02-20 DIAGNOSIS — G89.29 CHRONIC RIGHT SHOULDER PAIN: Primary | ICD-10-CM

## 2023-02-20 DIAGNOSIS — M75.31 CALCIFIC TENDINITIS OF RIGHT SHOULDER: ICD-10-CM

## 2023-02-20 PROCEDURE — 1036F TOBACCO NON-USER: CPT | Performed by: ORTHOPAEDIC SURGERY

## 2023-02-20 PROCEDURE — G8484 FLU IMMUNIZE NO ADMIN: HCPCS | Performed by: ORTHOPAEDIC SURGERY

## 2023-02-20 PROCEDURE — 1090F PRES/ABSN URINE INCON ASSESS: CPT | Performed by: ORTHOPAEDIC SURGERY

## 2023-02-20 PROCEDURE — 1123F ACP DISCUSS/DSCN MKR DOCD: CPT | Performed by: ORTHOPAEDIC SURGERY

## 2023-02-20 PROCEDURE — G8427 DOCREV CUR MEDS BY ELIG CLIN: HCPCS | Performed by: ORTHOPAEDIC SURGERY

## 2023-02-20 PROCEDURE — 99213 OFFICE O/P EST LOW 20 MIN: CPT | Performed by: ORTHOPAEDIC SURGERY

## 2023-02-20 PROCEDURE — G8420 CALC BMI NORM PARAMETERS: HCPCS | Performed by: ORTHOPAEDIC SURGERY

## 2023-02-20 PROCEDURE — 1111F DSCHRG MED/CURRENT MED MERGE: CPT | Performed by: ORTHOPAEDIC SURGERY

## 2023-02-20 NOTE — PROGRESS NOTES
Chief Complaint:   Chief Complaint   Patient presents with    Shoulder Pain     Right shoulder bicep injection f/u has helped some but still has pain with movement of the arm. Jacqui Marion follows up for her right shoulder. She had received a biceps tendon sheath injection last visit which has helped her. Her pain is improved and also function. She still has some discomfort with certain activities and exercises. Allergies; medications; past medical, surgical, family, and social history; and problem list have been reviewed today and updated as indicated in this encounter seen below. Exam: There is no swelling around the right shoulder. There is minimal tenderness anterior proximal humerus. Range of motion is a little limited in elevation and there is crepitus in the subacromial arch area. This seems to be mild at this time. There is no gross instability. Radiographs: Previous imaging was reviewed and shows a slight decrease in the subacromial space which may indicate some rotator cuff degeneration. The space is at low limits of normal.  There are no large osteophytes on acromion or clavicle. She does have a calcific density lateral near the attachment of the supraspinatus to the greater tuberosity. ASSESSMENT:    Corrie Figueroa was seen today for shoulder pain. Diagnoses and all orders for this visit:    Chronic right shoulder pain    Tendinitis of long head of biceps brachii of right shoulder    Calcific tendinitis of right shoulder        PLAN: Activity as tolerated. Based on her present improvement we will follow-up on an as-needed basis. Encouraged Mrs. Tristen Chavira to call us if her pain increases. The calcification seen on x-ray she may benefit from a subacromial injection if her shoulder flares. Return if symptoms worsen or fail to improve.        Current Outpatient Medications   Medication Sig Dispense Refill    spironolactone (ALDACTONE) 25 MG tablet Take 1 tablet by mouth Daily with lunch 45 tablet 3    furosemide (LASIX) 20 MG tablet Take 1 tablet by mouth daily 90 tablet 3    metoprolol succinate (TOPROL XL) 25 MG extended release tablet Take 1 tablet by mouth nightly 90 tablet 3    dapagliflozin (FARXIGA) 10 MG tablet Take 1 tablet by mouth every morning 90 tablet 3    clopidogrel (PLAVIX) 75 MG tablet Take 1 tablet by mouth daily 90 tablet 3    atorvastatin (LIPITOR) 80 MG tablet Take 1 tablet by mouth daily 90 tablet 3    pregabalin (LYRICA) 25 MG capsule Take 1 capsule by mouth 2 times daily for 60 days. 120 capsule 0    blood glucose test strips (ASCENSIA AUTODISC VI;ONE TOUCH ULTRA TEST VI) strip 1 each by Does not apply route 2 times daily 100 each 3    nitroGLYCERIN (NITROSTAT) 0.4 MG SL tablet Place 1 tablet under the tongue every 5 minutes as needed for Chest pain up to max of 3 total doses.  If no relief after 1 dose, call 911. 25 tablet 3    ferrous sulfate (IRON 325) 325 (65 Fe) MG tablet Take 1 tablet by mouth 2 times daily (with meals) 30 tablet 0    albuterol-ipratropium (COMBIVENT RESPIMAT)  MCG/ACT AERS inhaler Inhale 1 puff into the lungs 2 times daily 1 each 3    Cholecalciferol (VITAMIN D3) 50 MCG (2000 UT) CAPS Take by mouth daily      ascorbic acid (VITAMIN C) 250 MG tablet Take 250 mg by mouth daily      vitamin B-12 (CYANOCOBALAMIN) 1000 MCG tablet Take 1,000 mcg by mouth daily      Lancets MISC 1 each by Does not apply route 2 times daily 100 each 5    timolol (TIMOPTIC) 0.5 % ophthalmic solution INSTILL 1 DROP INTO EACH EYE TWICE DAILY  3    latanoprost (XALATAN) 0.005 % ophthalmic solution INSTILL 1 DROP INTO EACH EYE ONCE DAILY AT BEDTIME  3    brimonidine (ALPHAGAN) 0.2 % ophthalmic solution INSTILL 1 DROP INTO LEFT EYE TWICE DAILY  3    Blood Glucose Monitoring Suppl DELTA 1 Units by Does not apply route 2 times daily Please give whatever insurance covers 1 Device 0    magnesium (MAGNESIUM-OXIDE) 250 MG TABS tablet Take 250 mg by mouth daily Multiple Vitamin (ONE-A-DAY ESSENTIAL PO) Take 1 tablet by mouth.        calcium carbonate-vitamin D 600-200 MG-UNIT TABS Take 1 tablet by mouth daily. Misc Natural Products (OSTEO BI-FLEX ADV JOINT SHIELD PO) Take 2 tablets by mouth daily. aspirin 81 MG EC tablet Take 81 mg by mouth daily. No current facility-administered medications for this visit.        Patient Active Problem List   Diagnosis    Type 2 diabetes mellitus with diabetic polyneuropathy, without long-term current use of insulin ([address]    Mixed hyperlipidemia    Essential hypertension    RLS (restless legs syndrome)    Chronic obstructive pulmonary disease (HCC)    Bilateral pleural effusion    CAD in native artery    Hypoxia    NSTEMI (non-ST elevated myocardial infarction) ([address]    Acute on chronic combined systolic and diastolic congestive heart failure ([address]    Community acquired pneumonia of right lower lobe of lung    Left ventricular thrombus    Acute respiratory failure with hypoxia ([address]    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 ([address]    Cardiorenal syndrome    Generalized weakness    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HCC)    Acute cholecystitis    DNR (do not resuscitate)    Elevated LFTs    Elevated troponin    Palliative care encounter    Moderate protein-calorie malnutrition ([address]       Past Medical History:   Diagnosis Date    Abnormal cardiovascular stress test 11/17/2022    CAD (coronary artery disease)     Chronic systolic (congestive) heart failure 1/12/2023    COPD (chronic obstructive pulmonary disease) ([address]     COPD exacerbation ([address] 11/17/2022    H/O cardiovascular stress test 11/17/2022    Lexiscan    Hyperlipidemia     Hypertension     Osteoarthritis     Osteoarthritis     Type II or unspecified type diabetes mellitus without mention of complication, not stated as uncontrolled     Urinary retention 11/17/2022       Past Surgical History:   Procedure Laterality Date    APPENDECTOMY 1953    CARDIAC CATHETERIZATION  2022    Dr Sriram Bustos ,1971    COLONOSCOPY  2003    TUMOR REMOVAL  1954    under lt arm       Allergies   Allergen Reactions    Pcn [Penicillins] Hives    Sulfa Antibiotics Hives       Social History     Socioeconomic History    Marital status:      Spouse name: None    Number of children: None    Years of education: None    Highest education level: None   Tobacco Use    Smoking status: Former     Packs/day: 1.00     Years: 9.00     Pack years: 9.00     Types: Cigarettes     Quit date: 2000     Years since quittin.4    Smokeless tobacco: Never   Vaping Use    Vaping Use: Never used   Substance and Sexual Activity    Alcohol use: No     Alcohol/week: 0.0 standard drinks    Drug use: No    Sexual activity: Not Currently     Partners: Male     Social Determinants of Health     Financial Resource Strain: Low Risk     Difficulty of Paying Living Expenses: Not hard at all   Food Insecurity: No Food Insecurity    Worried About [address] [place] [address] [employer] in the Last Year: Never true    Ran Out of Food in the Last Year: Never true   Transportation Needs: Unknown    Lack of Transportation (Non-Medical): No   Housing Stability: Unknown    Unstable Housing in the Last Year: No       Review of Systems  As follows except as previously noted in HPI:  Constitutional: Negative for chills, diaphoresis, fatigue, fever and unexpected weight change. Respiratory: Negative for cough, shortness of breath and wheezing. Cardiovascular: Negative for chest pain and palpitations. Neurological: Negative for dizziness, syncope, cephalgia. GI / : negative  Musculoskeletal: see HPI       Objective:   Physical Exam   Constitutional: Oriented to person, place, and time. and appears well-developed and well-nourished. :   Head: Normocephalic and atraumatic. Eyes: EOM are normal.   Neck: Neck supple. Cardiovascular: Normal rate and regular rhythm. Pulmonary/Chest: Effort normal. No stridor. No respiratory distress, no wheezes. Abdominal:  No abnormal distension. Neurological: Alert and oriented to person, place, and time. Skin: Skin is warm and dry. Psychiatric: Normal mood and affect.  Behavior is normal. Thought content normal.    DELANO Haley DO    2/20/23  10:36 AM

## 2023-02-21 ENCOUNTER — HOSPITAL ENCOUNTER (OUTPATIENT)
Dept: OTHER | Age: 88
Setting detail: THERAPIES SERIES
Discharge: HOME | End: 2023-02-21
Payer: MEDICARE

## 2023-02-21 VITALS
DIASTOLIC BLOOD PRESSURE: 52 MMHG | BODY MASS INDEX: 23.24 KG/M2 | HEART RATE: 73 BPM | OXYGEN SATURATION: 100 % | WEIGHT: 144 LBS | SYSTOLIC BLOOD PRESSURE: 98 MMHG | RESPIRATION RATE: 16 BRPM

## 2023-02-21 LAB
ANION GAP SERPL CALCULATED.3IONS-SCNC: 9 MMOL/L (ref 7–16)
BUN BLDV-MCNC: 25 MG/DL (ref 6–23)
CALCIUM SERPL-MCNC: 10.1 MG/DL (ref 8.6–10.2)
CHLORIDE BLD-SCNC: 96 MMOL/L (ref 98–107)
CO2: 26 MMOL/L (ref 22–29)
CREAT SERPL-MCNC: 0.9 MG/DL (ref 0.5–1)
GFR SERPL CREATININE-BSD FRML MDRD: >60 ML/MIN/1.73
GLUCOSE BLD-MCNC: 96 MG/DL (ref 74–99)
POTASSIUM SERPL-SCNC: 4 MMOL/L (ref 3.5–5)
PRO-BNP: ABNORMAL PG/ML (ref 0–450)
SODIUM BLD-SCNC: 131 MMOL/L (ref 132–146)

## 2023-02-21 PROCEDURE — 36415 COLL VENOUS BLD VENIPUNCTURE: CPT

## 2023-02-21 PROCEDURE — 99214 OFFICE O/P EST MOD 30 MIN: CPT

## 2023-02-21 PROCEDURE — 80048 BASIC METABOLIC PNL TOTAL CA: CPT

## 2023-02-21 PROCEDURE — 83880 ASSAY OF NATRIURETIC PEPTIDE: CPT

## 2023-02-21 NOTE — PROGRESS NOTES
Congestive Heart Failure [id number] [facility]   1935      Referring Provider: Dr. Kathleen Velásquez  Primary Care Physician: Dr. Jessica Dill   Cardiologist: Dr. Kathleen Velásquez  Nephrologist:         History of Present Illness:     Priscilla Domínguez is a 81 y.o. female with a history of HFrEF, most recent EF 20-25%. Life Vest    Patient Story:    She does not  have dyspnea with exertion, shortness of breath, or decline in overall functional capacity. She does not have orthopnea, PND, nocturnal cough or hemoptysis. She does not have abdominal distention or bloating, early satiety, anorexia/change in appetite. She does has a good urinary response to  oral diuretic. She does not have lower extremity edema. She denies lightheadedness, dizziness. She denies palpitations, syncope or near syncope. She does not complain of chest pain, pressure, discomfort. Pt wearing life vest- denies any shocks or alarms. Allergies   Allergen Reactions    Pcn [Penicillins] Hives    Sulfa Antibiotics Hives         No outpatient medications have been marked as taking for the 2/21/23 encounter [facility] Encounter) with [facility] CHF ROOM 1. Current Outpatient Medications on File Prior to Encounter   Medication Sig Dispense Refill    spironolactone (ALDACTONE) 25 MG tablet Take 1 tablet by mouth Daily with lunch 45 tablet 3    furosemide (LASIX) 20 MG tablet Take 1 tablet by mouth daily 90 tablet 3    metoprolol succinate (TOPROL XL) 25 MG extended release tablet Take 1 tablet by mouth nightly 90 tablet 3    dapagliflozin (FARXIGA) 10 MG tablet Take 1 tablet by mouth every morning 90 tablet 3    clopidogrel (PLAVIX) 75 MG tablet Take 1 tablet by mouth daily 90 tablet 3    atorvastatin (LIPITOR) 80 MG tablet Take 1 tablet by mouth daily 90 tablet 3    pregabalin (LYRICA) 25 MG capsule Take 1 capsule by mouth 2 times daily for 60 days.  120 capsule 0    blood glucose test strips (ASCENSIA AUTODISC VI;ONE TOUCH ULTRA TEST VI) strip 1 each by Does not apply route 2 times daily 100 each 3    nitroGLYCERIN (NITROSTAT) 0.4 MG SL tablet Place 1 tablet under the tongue every 5 minutes as needed for Chest pain up to max of 3 total doses. If no relief after 1 dose, call 911. 25 tablet 3    ferrous sulfate (IRON 325) 325 (65 Fe) MG tablet Take 1 tablet by mouth 2 times daily (with meals) 30 tablet 0    albuterol-ipratropium (COMBIVENT RESPIMAT)  MCG/ACT AERS inhaler Inhale 1 puff into the lungs 2 times daily 1 each 3    Cholecalciferol (VITAMIN D3) 50 MCG (2000 UT) CAPS Take by mouth daily      ascorbic acid (VITAMIN C) 250 MG tablet Take 250 mg by mouth daily      vitamin B-12 (CYANOCOBALAMIN) 1000 MCG tablet Take 1,000 mcg by mouth daily      Lancets MISC 1 each by Does not apply route 2 times daily 100 each 5    timolol (TIMOPTIC) 0.5 % ophthalmic solution INSTILL 1 DROP INTO EACH EYE TWICE DAILY  3    latanoprost (XALATAN) 0.005 % ophthalmic solution INSTILL 1 DROP INTO EACH EYE ONCE DAILY AT BEDTIME  3    brimonidine (ALPHAGAN) 0.2 % ophthalmic solution INSTILL 1 DROP INTO LEFT EYE TWICE DAILY  3    Blood Glucose Monitoring Suppl DELTA 1 Units by Does not apply route 2 times daily Please give whatever insurance covers 1 Device 0    magnesium (MAGNESIUM-OXIDE) 250 MG TABS tablet Take 250 mg by mouth daily      Multiple Vitamin (ONE-A-DAY ESSENTIAL PO) Take 1 tablet by mouth.        calcium carbonate-vitamin D 600-200 MG-UNIT TABS Take 1 tablet by mouth daily. Misc Natural Products (OSTEO BI-FLEX ADV JOINT SHIELD PO) Take 2 tablets by mouth daily. aspirin 81 MG EC tablet Take 81 mg by mouth daily. No current facility-administered medications on file prior to encounter.              Guideline directed medical:  ARNI/ACE I/ARB: No  Beta blocker:  Yes Toprol Xl 25 mg at night  Aldosterone antagonist:  Yes Aldactone 25 mg daily at lunch  SGLT2i:  Yes Farxiga 10 mg daily    NYHA class II  Life Vest    Physical Examination:     BP (!) 98/52   Pulse 73   Resp 16   Wt 144 lb (65.3 kg)   LMP  (LMP Unknown)   SpO2 100%   BMI 23.24 kg/m²     Assessment  Charting Type: Shift assessment (Chf clinic)    Neurological  Orientation Level: Oriented X4  Cognition: Appropriate judgement, Appropriate safety awareness, Appropriate attention/concentration, Appropriate for developmental age, Follows commands         HEENT (Head, Ears, Eyes, Nose, & Throat)  HEENT (WDL): Exceptions to WDL  Right Eye: Impaired vision, Glasses  Left Eye: Blind  Teeth: Dentures upper, Dentures lower    Respiratory  Respiratory Pattern: Regular  Respiratory Depth: Normal  Respiratory Quality/Effort: Unlabored, Dyspnea with exertion  Chest Assessment: Chest expansion symmetrical, Trachea midline  L Breath Sounds: Clear  R Breath Sounds: Clear    Breath Sounds  Right Upper Lobe: Clear  Right Middle Lobe: Clear  Right Lower Lobe: Clear  Left Upper Lobe: Clear  Left Lower Lobe: Clear         Cardiac  Cardiac Regularity: Regular (Life Vest)  Heart Sounds: S1, S2  Cardiac Rhythm: Sinus rhythm with PAC    Rhythm Interpretation  Heart Rate: 73         Gastrointestinal  Abdominal (WDL): Exceptions to WDL  Abdomen Inspection: Soft               Peripheral Vascular  Peripheral Vascular (WDL): Exceptions to WDL  Edema: Right lower extremity, Left lower extremity  RLE Edema: Trace  LLE Edema: Trace              Musculoskeletal  RL Extremity: Weakness, Unsteady  LL Extremity: Weakness, Unsteady                                  Heart Rate: 73                     LAB DATA:    Last 3 BMP      Sodium (mmol/L)   Date Value   02/21/2023 131 (L)   02/17/2023 139   02/15/2023 137     Potassium (mmol/L)   Date Value   02/21/2023 4.0   02/17/2023 4.6   02/15/2023 3.3 (L)     Potassium reflex Magnesium (mmol/L)   Date Value   11/24/2022 5.2 (H)     Chloride (mmol/L)   Date Value   02/21/2023 96 (L)   02/17/2023 98   02/15/2023 100     CO2 (mmol/L)   Date Value   02/21/2023 26   02/17/2023 24   02/15/2023 26     BUN (mg/dL)   Date Value   02/21/2023 25 (H)   02/17/2023 24 (H)   02/15/2023 23     Glucose (mg/dL)   Date Value   02/21/2023 96   02/17/2023 120 (H)   02/15/2023 153 (H)   04/05/2012 125 (H)   01/09/2012 120 (H)   05/16/2011 125 (H)     Calcium (mg/dL)   Date Value   02/21/2023 10.1   02/17/2023 9.7   02/15/2023 9.6       Last 3 BNP       Pro-BNP (pg/mL)   Date Value   02/21/2023 11,902 (H)   02/15/2023 11,072 (H)   02/10/2023 8,944 (H)          CBC: No results for input(s): WBC, HGB, PLT in the last 72 hours. BMP:    Recent Labs     02/21/23  1500   *   K 4.0   CL 96*   CO2 26   BUN 25*   CREATININE 0.9   GLUCOSE 96       Hepatic: No results for input(s): AST, ALT, ALB, BILITOT, ALKPHOS in the last 72 hours. Troponin: No results for input(s): TROPONINI in the last 72 hours. BNP: No results for input(s): BNP in the last 72 hours. Lipids: No results for input(s): CHOL, HDL in the last 72 hours. Invalid input(s): LDLCALCU  INR: No results for input(s): INR in the last 72 hours. WEIGHTS:    Wt Readings from Last 3 Encounters:   02/21/23 144 lb (65.3 kg)   02/20/23 140 lb (63.5 kg)   02/15/23 145 lb (65.8 kg)       TELEMETRY:  Cardiac Regularity: Regular  Cardiac Rhythm/Interpretation: SR with PAC    First visit with CHF clinic today. Patient presented with her daughter Holland De Leon. Discussed with patient and her daughter the purpose of CHF clinic and importance of daily weights and doing a self check every day to monitor for changes. Went over the three heart failure zones and to call cardiologist if in yellow zone immediately to prevent any further decline. Patient has a working scale. Patient is agreeable to future CHF clinic visits. ASSESSMENT:  Иван Christopher is with  a stable weights of 144 at today's visit. Upon assessment pt denies SOB or BETTS, lungs are clear, denies distention. Trace lower extremity edema, positive for JVD.        Pt has an appointment with PCP next week 2/23  Has an appointment with Dr. Deshaun Moy 4/18    Follow up in chf clinic in 2 weeks. Interventions completed this visit:  IV diuretics given no  Lab work obtained yes, BNP BMP   Reviewed currently prescribed medications with patient, educated on importance of compliance and answered any questions regarding their medication  Educated on signs and symptoms of HF  Educated on low sodium diet      PLAN:  Scheduled to follow up in CHF clinic on   Future Appointments   Date Time Provider Lili Contreras   2/23/2023  1:45 PM Elmo Greene MD [facility]   3/7/2023  9:00 AM [facility] CHF ROOM 1 [place] [id number] [facility]   4/18/2023  9:00 AM Trung West MD [facility]     Given clinic phone number and aware of signs and symptoms to call with any HF change in symptoms.

## 2023-02-21 NOTE — PROGRESS NOTES
Rebecca DE LA FUENTE CNP notified of today's labs and assessment from today's CHF clinic visit. No new orders. Will consider adding a low dose ACE inhibitor after next lab draw and CHF clinic visit in 2 weeks.

## 2023-02-21 NOTE — RESULT ENCOUNTER NOTE
Labs and CHF clinic note reviewed  Vitals at CHF clinic 98/52, 68    Continue current GDMT for now  Consider low dose ACE at next appointment

## 2023-02-23 ENCOUNTER — OFFICE VISIT (OUTPATIENT)
Dept: FAMILY MEDICINE CLINIC | Age: 88
End: 2023-02-23

## 2023-02-23 VITALS
SYSTOLIC BLOOD PRESSURE: 98 MMHG | OXYGEN SATURATION: 98 % | DIASTOLIC BLOOD PRESSURE: 60 MMHG | HEIGHT: 66 IN | BODY MASS INDEX: 22.66 KG/M2 | WEIGHT: 141 LBS | HEART RATE: 70 BPM

## 2023-02-23 DIAGNOSIS — E78.2 MIXED HYPERLIPIDEMIA: ICD-10-CM

## 2023-02-23 DIAGNOSIS — E44.0 MODERATE PROTEIN-CALORIE MALNUTRITION (HCC): Chronic | ICD-10-CM

## 2023-02-23 DIAGNOSIS — I10 ESSENTIAL HYPERTENSION: ICD-10-CM

## 2023-02-23 DIAGNOSIS — G25.81 RLS (RESTLESS LEGS SYNDROME): ICD-10-CM

## 2023-02-23 DIAGNOSIS — Z00.00 MEDICARE ANNUAL WELLNESS VISIT, SUBSEQUENT: Primary | ICD-10-CM

## 2023-02-23 DIAGNOSIS — R53.1 GENERALIZED WEAKNESS: ICD-10-CM

## 2023-02-23 DIAGNOSIS — E08.42 DIABETIC POLYNEUROPATHY ASSOCIATED WITH DIABETES MELLITUS DUE TO UNDERLYING CONDITION (HCC): ICD-10-CM

## 2023-02-23 DIAGNOSIS — E11.9 TYPE 2 DIABETES MELLITUS WITHOUT COMPLICATION, WITHOUT LONG-TERM CURRENT USE OF INSULIN (HCC): ICD-10-CM

## 2023-02-23 PROBLEM — R77.8 ELEVATED TROPONIN: Status: RESOLVED | Noted: 2023-01-24 | Resolved: 2023-02-23

## 2023-02-23 PROBLEM — R79.89 ELEVATED TROPONIN: Status: RESOLVED | Noted: 2023-01-24 | Resolved: 2023-02-23

## 2023-02-23 RX ORDER — LANCETS 30 GAUGE
1 EACH MISCELLANEOUS 2 TIMES DAILY
Qty: 100 EACH | Refills: 5 | Status: SHIPPED | OUTPATIENT
Start: 2023-02-23

## 2023-02-23 RX ORDER — PREGABALIN 25 MG/1
25 CAPSULE ORAL 2 TIMES DAILY
Qty: 120 CAPSULE | Refills: 0 | Status: SHIPPED | OUTPATIENT
Start: 2023-02-23 | End: 2023-04-24

## 2023-02-23 ASSESSMENT — LIFESTYLE VARIABLES
HOW MANY STANDARD DRINKS CONTAINING ALCOHOL DO YOU HAVE ON A TYPICAL DAY: PATIENT DOES NOT DRINK
HOW OFTEN DO YOU HAVE A DRINK CONTAINING ALCOHOL: NEVER

## 2023-02-23 ASSESSMENT — PATIENT HEALTH QUESTIONNAIRE - PHQ9
SUM OF ALL RESPONSES TO PHQ9 QUESTIONS 1 & 2: 0
SUM OF ALL RESPONSES TO PHQ QUESTIONS 1-9: 0
SUM OF ALL RESPONSES TO PHQ QUESTIONS 1-9: 0
2. FEELING DOWN, DEPRESSED OR HOPELESS: 0
SUM OF ALL RESPONSES TO PHQ QUESTIONS 1-9: 0
1. LITTLE INTEREST OR PLEASURE IN DOING THINGS: 0
SUM OF ALL RESPONSES TO PHQ QUESTIONS 1-9: 0

## 2023-02-23 NOTE — PATIENT INSTRUCTIONS
LOW SALT FOR BLOOD PRESSURE CONTROL. LOW CARBOHYDRATE FOR BLOOD SUGAR AND WEIGHT CONTROL. LOW FAT DIET FOR CHOLESTEROL CONTROL. DRINK ENOUGH FLUIDS FOR BETTER KIDNEY FUNCTION. TAKE COZAAR 50 MG. TOPROL 50 MG AND DYAZIDE 37.5-25 MG. DAILY FOR BLOOD PRESSURE AND LEG EDEMA CONTROL. TAKE METFORMIN 500 MG. 2 TIMES A DAY  FOR BLOOD SUGAR CONTROL. STOP TAKING FARXIGA. TAKE LIPITOR 80 MG. NIGHTLY FOR CHOLESTEROL CONTROL AS PER [facility]. INHALE COMBIVENT INHALER 1 PUFF 2 TIMES A DAY FOR BREATHING CONTROL  REGULAR WALKING IN HOUSE ADVISED. GO TO [facility] CLINIC FOR FOLLOW UP CARE. FASTING FOR LAB WORK PRIOR TO NEXT VISIT. NEXT APPOINTMENT IN 3 MONTHS. Learning About Dental Care for Older Adults  Dental care for older adults: Overview  Dental care for older people is much the same as for younger adults. But older adults do have concerns that younger adults do not. Older adults may have problems with gum disease and decay on the roots of their teeth. They may need missing teeth replaced or broken fillings fixed. Or they may have dentures that need to be cared for. Some older adults may have trouble holding a toothbrush. You can help remind the person you are caring for to brush and floss their teeth or to clean their dentures. In some cases, you may need to do the brushing and other dental care tasks. People who have trouble using their hands or who have dementia may need this extra help. How can you help with dental care? Normal dental care  To keep the teeth and gums healthy:  Brush the teeth with fluoride toothpaste twice a day--in the morning and at night--and floss at least once a day. Plaque can quickly build up on the teeth of older adults. Watch for the signs of gum disease. These signs include gums that bleed after brushing or after eating hard foods, such as apples. See a dentist regularly. Many experts recommend checkups every 6 months.   Keep the dentist up to date on any new medications the person is taking. Encourage a balanced diet that includes whole grains, vegetables, and fruits, and that is low in saturated fat and sodium. Encourage the person you're caring for not to use tobacco products. They can affect dental and general health. Many older adults have a fixed income and feel that they can't afford dental care. But most towns and cities have programs in which dentists help older adults by lowering fees. Contact your area's public health offices or  for information about dental care in your area. Using a toothbrush  Older adults with arthritis sometimes have trouble brushing their teeth because they can't easily hold the toothbrush. Their hands and fingers may be stiff, painful, or weak. If this is the case, you can: Offer an electric toothbrush. Enlarge the handle of a non-electric toothbrush by wrapping a sponge, an elastic bandage, or adhesive tape around it. Push the toothbrush handle through a ball made of rubber or soft foam.  Make the handle longer and thicker by taping Popsicle sticks or tongue depressors to it. You may also be able to buy special toothbrushes, toothpaste dispensers, and floss holders. Your doctor may recommend a soft-bristle toothbrush if the person you care for bleeds easily. Bleeding can happen because of a health problem or from certain medicines. A toothpaste for sensitive teeth may help if the person you care for has sensitive teeth. How do you brush and floss someone's teeth? If the person you are caring for has a hard time cleaning their teeth on their own, you may need to brush and floss their teeth for them. It may be easiest to have the person sit and face away from you, and to sit or stand behind them. That way you can steady their head against your arm as you reach around to floss and brush their teeth. Choose a place that has good lighting and is comfortable for both of you. Before you begin, gather your supplies.  You will need gloves, floss, a toothbrush, and a container to hold water if you are not near a sink. Wash and dry your hands well and put on gloves. Start by flossing:  Gently work a piece of floss between each of the teeth toward the gums. A plastic flossing tool may make this easier, and they are available at most [facility]. Curve the floss around each tooth into a U-shape and gently slide it under the gum line. Move the floss firmly up and down several times to scrape off the plaque. After you've finished flossing, throw away the used floss and begin brushing:  Wet the brush and apply toothpaste. Place the brush at a 45-degree angle where the teeth meet the gums. Press firmly, and move the brush in small circles over the surface of the teeth. Be careful not to brush too hard. Vigorous brushing can make the gums pull away from the teeth and can scratch the tooth enamel. Brush all surfaces of the teeth, on the tongue side and on the cheek side. Pay special attention to the front teeth and all surfaces of the back teeth. Brush chewing surfaces with short back-and-forth strokes. After you've finished, help the person rinse the remaining toothpaste from their mouth. Where can you learn more? Go to http://www.woods.com/ and enter F944 to learn more about \"Learning About Dental Care for Older Adults. \"  Current as of: June 16, 2022               Content Version: 13.5  © 2006-2022 Healthwise, [facility]. Care instructions adapted under license by [facility] ([facility]). If you have questions about a medical condition or this instruction, always ask your healthcare professional. [place] [address] any warranty or liability for your use of this information. Learning About Vision Tests  What are vision tests? The four most common vision tests are visual acuity tests, refraction, visual field tests, and color vision tests. Visual acuity (sharpness) tests  These tests are used:   To see if you need glasses or contact lenses. To monitor an eye problem. To check an eye injury. Visual acuity tests are done as part of routine exams. You may also have this test when you get your 's license or apply for some types of jobs. Visual field tests  These tests are used: To check for vision loss in any area of your range of vision. To screen for certain eye diseases. To look for nerve damage after a stroke, head injury, or other problem that could reduce blood flow to the brain. Refraction and color tests  A refraction test is done to find the right prescription for glasses and contact lenses. A color vision test is done to check for color blindness. Color vision is often tested as part of a routine exam. You may also have this test when you apply for a job where recognizing different colors is important, such as , electronics, or the [employer]. How are vision tests done? Visual acuity test   You cover one eye at a time. You read aloud from a wall chart across the room. You read aloud from a small card that you hold in your hand. Refraction   You look into a special device. The device puts lenses of different strengths in front of each eye to see how strong your glasses or contact lenses need to be. Visual field tests   Your doctor may have you look through special machines. Or your doctor may simply have you stare straight ahead while they move a finger into and out of your field of vision. Color vision test   You look at pieces of printed test patterns in various colors. You say what number or symbol you see. Your doctor may have you trace the number or symbol using a pointer. How do these tests feel? There is very little chance of having a problem from this test. If dilating drops are used for a vision test, they may make the eyes sting and cause a medicine taste in the mouth. Follow-up care is a key part of your treatment and safety.  Be sure to make and go to all appointments, and call your doctor if you are having problems. It's also a good idea to know your test results and keep a list of the medicines you take. Where can you learn more? Go to http://www.kirby.com/ and enter G551 to learn more about \"Learning About Vision Tests. \"  Current as of: October 12, 2022               Content Version: 13.5  © 2276-8862 [employer]. Care instructions adapted under license by [facility] ([facility]). If you have questions about a medical condition or this instruction, always ask your healthcare professional. [address] any warranty or liability for your use of this information. Learning About Activities of Daily Living  What are activities of daily living? Activities of daily living (ADLs) are the basic self-care tasks you do every day. As you age, and if you have health problems, you may find that it's harder to do these things for yourself. That's when you may need some help. Your doctor uses ADLs to measure how much help you need. Knowing what you can and can't do for yourself is an important first step to getting help. And when you have the help you need, you can stay as independent as possible. Your doctor will want to know if you are able to do tasks such as: Take a bath or shower without help. Go to the bathroom by yourself. Dress and undress without help. Shave, comb your hair, and brush teeth on your own. Get in and out of bed or a chair without help. Feed yourself without help. If you are having trouble doing basic self-care tasks, talk with your doctor. You may want to bring a caregiver or family member who can help the doctor understand your needs and abilities. How will a doctor assess your ADLs? Asking about ADLs is part of a routine health checkup your doctor will likely do as you age.  Your health check might be done in a doctor's office, in your home, or at a hospital. The goal is to find out if you are having any problems that could make your health problems worse or that make it unsafe for you to be on your own. To measure your ADLs, your doctor will ask how hard it is for you to do routine tasks. He or she may also want to know if you have changed the way you do a task because of a health problem. He or she may watch how you:  Walk back and forth. Keep your balance while you stand or walk. Move from sitting to standing or from a bed to a chair. Button or unbutton a shirt or sweater. Remove and put on your shoes. It's normal to feel a little worried or anxious if you find you can't do all the things you used to be able to do. Talking with your doctor about ADLs isn't a test that you either pass or fail. It's just a way to get more information about your health and safety. Follow-up care is a key part of your treatment and safety. Be sure to make and go to all appointments, and call your doctor if you are having problems. It's also a good idea to know your test results and keep a list of the medicines you take. Current as of: October 6, 2021               Content Version: 13.5  © 8453-7884 Healthwise, [employer]. Care instructions adapted under license by [facility] ([facility]). If you have questions about a medical condition or this instruction, always ask your healthcare professional. [place] [address] any warranty or liability for your use of this information. Advance Directives: Care Instructions  Overview  An advance directive is a legal way to state your wishes at the end of your life. It tells your family and your doctor what to do if you can't say what you want. There are two main types of advance directives. You can change them any time your wishes change. Living will. This form tells your family and your doctor your wishes about life support and other treatment. The form is also called a declaration. Medical power of .   This form lets you name a person to make treatment decisions for you when you can't speak for yourself. This person is called a health care agent (health care proxy, health care surrogate). The form is also called a durable power of  for health care. If you do not have an advance directive, decisions about your medical care may be made by a family member, or by a doctor or a  who doesn't know you. It may help to think of an advance directive as a gift to the people who care for you. If you have one, they won't have to make tough decisions by themselves. For more information, including forms for your state, see the [address] website (www.caringinfo.org/planning/advance-directives/). Follow-up care is a key part of your treatment and safety. Be sure to make and go to all appointments, and call your doctor if you are having problems. It's also a good idea to know your test results and keep a list of the medicines you take. What should you include in an advance directive? Many states have a unique advance directive form. (It may ask you to address specific issues.) Or you might use a universal form that's approved by many states. If your form doesn't tell you what to address, it may be hard to know what to include in your advance directive. Use the questions below to help you get started. Who do you want to make decisions about your medical care if you are not able to? What life-support measures do you want if you have a serious illness that gets worse over time or can't be cured? What are you most afraid of that might happen? (Maybe you're afraid of having pain, losing your independence, or being kept alive by machines.)  Where would you prefer to die? (Your home? A hospital? A nursing home?)  Do you want to donate your organs when you die? Do you want certain Cheondo[employer] practices performed before you die? When should you call for help? Be sure to contact your doctor if you have any questions. Where can you learn more?   Go to http://www.woods.com/ and enter R264 to learn more about \"Advance Directives: Care Instructions. \"  Current as of: June 16, 2022               Content Version: 13.5  © 5802-7169 [employer]. Care instructions adapted under license by [facility] ([facility]). If you have questions about a medical condition or this instruction, always ask your healthcare professional. [place] [address] any warranty or liability for your use of this information. A Healthy Heart: Care Instructions  Your Care Instructions     Coronary artery disease, also called heart disease, occurs when a substance called plaque builds up in the vessels that supply oxygen-rich blood to your heart muscle. This can narrow the blood vessels and reduce blood flow. A heart attack happens when blood flow is completely blocked. A high-fat diet, smoking, and other factors increase the risk of heart disease. Your doctor has found that you have a chance of having heart disease. You can do lots of things to keep your heart healthy. It may not be easy, but you can change your diet, exercise more, and quit smoking. These steps really work to lower your chance of heart disease. Follow-up care is a key part of your treatment and safety. Be sure to make and go to all appointments, and call your doctor if you are having problems. It's also a good idea to know your test results and keep a list of the medicines you take. How can you care for yourself at home? Diet    Use less salt when you cook and eat. This helps lower your blood pressure. Taste food before salting. Add only a little salt when you think you need it. With time, your taste buds will adjust to less salt.     Eat fewer snack items, fast foods, canned soups, and other high-salt, high-fat, processed foods.     Read food labels and try to avoid saturated and trans fats.  They increase your risk of heart disease by raising cholesterol levels.     Limit the amount of solid fat-butter, margarine, and shortening-you eat. Use olive, peanut, or canola oil when you cook. Bake, broil, and steam foods instead of frying them.     Eat a variety of fruit and vegetables every day. Dark green, deep orange, red, or yellow fruits and vegetables are especially good for you. Examples include spinach, carrots, peaches, and berries.     Foods high in fiber can reduce your cholesterol and provide important vitamins and minerals. High-fiber foods include whole-grain cereals and breads, oatmeal, beans, brown rice, citrus fruits, and apples.     Eat lean proteins. Heart-healthy proteins include seafood, lean meats and poultry, eggs, beans, peas, nuts, seeds, and soy products.     Limit drinks and foods with added sugar. These include candy, desserts, and soda pop. Lifestyle changes    If your doctor recommends it, get more exercise. Walking is a good choice. Bit by bit, increase the amount you walk every day. Try for at least 30 minutes on most days of the week. You also may want to swim, bike, or do other activities.     Do not smoke. If you need help quitting, talk to your doctor about stop-smoking programs and medicines. These can increase your chances of quitting for good. Quitting smoking may be the most important step you can take to protect your heart. It is never too late to quit.     Limit alcohol to 2 drinks a day for men and 1 drink a day for women. Too much alcohol can cause health problems.     Manage other health problems such as diabetes, high blood pressure, and high cholesterol. If you think you may have a problem with alcohol or drug use, talk to your doctor. Medicines    Take your medicines exactly as prescribed. Call your doctor if you think you are having a problem with your medicine.     If your doctor recommends aspirin, take the amount directed each day. Make sure you take aspirin and not another kind of pain reliever, such as acetaminophen (Tylenol).    When should you call for help? Call 911 if you have symptoms of a heart attack. These may include:    Chest pain or pressure, or a strange feeling in the chest.     Sweating.     Shortness of breath.     Pain, pressure, or a strange feeling in the back, neck, jaw, or upper belly or in one or both shoulders or arms.     Lightheadedness or sudden weakness.     A fast or irregular heartbeat. After you call 911, the  may tell you to chew 1 adult-strength or 2 to 4 low-dose aspirin. Wait for an ambulance. Do not try to drive yourself. Watch closely for changes in your health, and be sure to contact your doctor if you have any problems. Where can you learn more? Go to http://www.kirby.com/ and enter F075 to learn more about \"A Healthy Heart: Care Instructions. \"  Current as of: September 7, 2022               Content Version: 13.5  © 0280-6447 [employer]. Care instructions adapted under license by [facility] ([facility]). If you have questions about a medical condition or this instruction, always ask your healthcare professional. [place] [address] any warranty or liability for your use of this information. Personalized Preventive Plan for [place] - 2/23/2023  Medicare offers a range of preventive health benefits. Some of the tests and screenings are paid in full while other may be subject to a deductible, co-insurance, and/or copay. Some of these benefits include a comprehensive review of your medical history including lifestyle, illnesses that may run in your family, and various assessments and screenings as appropriate. After reviewing your medical record and screening and assessments performed today your provider may have ordered immunizations, labs, imaging, and/or referrals for you. A list of these orders (if applicable) as well as your Preventive Care list are included within your After Visit Summary for your review.     Other Preventive Recommendations:    A preventive eye exam performed by an eye specialist is recommended every 1-2 years to screen for glaucoma; cataracts, macular degeneration, and other eye disorders. A preventive dental visit is recommended every 6 months. Try to get at least 150 minutes of exercise per week or 10,000 steps per day on a pedometer . Order or download the FREE \"Exercise & Physical Activity: Your Everyday Guide\" from [employer]. Call 9-220.986.3730 or search [employer] online. You need 8688-8670 mg of calcium and 8082-5592 IU of vitamin D per day. It is possible to meet your calcium requirement with diet alone, but a vitamin D supplement is usually necessary to meet this goal.  When exposed to the sun, use a sunscreen that protects against both UVA and UVB radiation with an SPF of 30 or greater. Reapply every 2 to 3 hours or after sweating, drying off with a towel, or swimming. Always wear a seat belt when traveling in a car. Always wear a helmet when riding a bicycle or motorcycle.

## 2023-02-23 NOTE — PROGRESS NOTES
Medicare Annual Wellness Visit    [address] is here for Medicare AWV    Assessment & Plan   Medicare annual wellness visit, subsequent  Diabetic polyneuropathy associated with diabetes mellitus due to underlying condition ([facility]  Comments:  PROGRESSIVE   Orders:  -     pregabalin (LYRICA) 25 MG capsule; Take 1 capsule by mouth 2 times daily for 60 days. , Disp-120 capsule, R-0Normal  Type 2 diabetes mellitus without complication, without long-term current use of insulin ([facility]  Comments:  CONTROLLED  Orders:  -     blood glucose test strips (ASCENSIA AUTODISC VI;ONE TOUCH ULTRA TEST VI) strip; 2 TIMES DAILY Starting u 2/23/2023, Disp-100 each, R-3, Normal  -     CBC with Auto Differential; Future  Essential hypertension  Generalized weakness  Mixed hyperlipidemia  -     Comprehensive Metabolic Panel; Future  -     Lipid Panel; Future  Moderate protein-calorie malnutrition (HCC)  RLS (restless legs syndrome)    Recommendations for Preventive Services Due: see orders and patient instructions/AVS.  Recommended screening schedule for the next 5-10 years is provided to the patient in written form: see Patient Instructions/AVS.     Return in 3 months (on 5/23/2023) for   FOLLOW UP VISIT AND Medicare Annual Wellness Visit in 1 year. Subjective   The following acute and/or chronic problems were also addressed today:  AS LISTED ABOVE    Patient's complete Health Risk Assessment and screening values have been reviewed and are found in Flowsheets. The following problems were reviewed today and where indicated follow up appointments were made and/or referrals ordered.     Positive Risk Factor Screenings with Interventions:                  Dentist Screen:  Have you seen the dentist within the past year?: (!) No    Intervention:  Not applicable - dentures     Vision Screen:  Do you have difficulty driving, watching TV, or doing any of your daily activities because of your eyesight?: No  Have you had an eye exam within the past year?: (!) No  No results found. Interventions:   Patient encouraged to make appointment with their eye specialist     ADL's:   Patient reports needing help with:  Select all that apply: (!) Housekeeping  Interventions:  Patient comments: DOES MOST OF THE WORK IN THE HOUSE WITH THE HELP OF  AND DAUGHTER AT TIMES. Advanced Directives:  Do you have a Living Will?: (!) No    Intervention:  has NO advanced directive - information provided                       Objective   Vitals:    02/23/23 1421   BP: 98/60   Pulse: 70   SpO2: 98%   Weight: 141 lb (64 kg)   Height: 5' 6\" (1.676 m)      Body mass index is 22.76 kg/m². General Appearance: alert and oriented to person, place and time, well developed and well- nourished, in no acute distress  Skin: warm and dry, no rash or erythema  Head: normocephalic and atraumatic  Eyes: pupils equal, round, and reactive to light, extraocular eye movements intact, conjunctivae normal  ENT: tympanic membrane, external ear and ear canal normal bilaterally, nose without deformity, nasal mucosa and turbinates normal without polyps  Neck: supple and non-tender without mass, no thyromegaly or thyroid nodules, no cervical lymphadenopathy  Pulmonary/Chest: clear to auscultation bilaterally- no wheezes, rales or rhonchi, normal air movement, no respiratory distress  Cardiovascular: normal rate, regular rhythm, normal S1 and S2, no murmurs, rubs, clicks, or gallops, distal pulses intact, no carotid bruits  Abdomen: soft, non-tender, non-distended, normal bowel sounds, no masses or organomegaly  Extremities: no cyanosis, clubbing or edema  Musculoskeletal: normal range of motion, no joint swelling, deformity or tenderness  Neurologic: reflexes normal and symmetric, no cranial nerve deficit, gait, coordination and speech normal       Allergies   Allergen Reactions    Pcn [Penicillins] Hives    Sulfa Antibiotics Hives     Prior to Visit Medications    Medication Sig Taking? Authorizing Provider   pregabalin (LYRICA) 25 MG capsule Take 1 capsule by mouth 2 times daily for 60 days. Yes Rina Tan MD   blood glucose test strips (ASCENSIA AUTODISC VI;ONE TOUCH ULTRA TEST VI) strip 1 each by Does not apply route 2 times daily Yes Rina Tan MD   Lancets MISC 1 each by Does not apply route 2 times daily Yes Rina Tan MD   spironolactone (ALDACTONE) 25 MG tablet Take 1 tablet by mouth Daily with lunch Yes Duglas FlorentinoSUDHAKAR - CNP   furosemide (LASIX) 20 MG tablet Take 1 tablet by mouth daily Yes Duglas FlorentinoSUDHAKAR marte - CNP   metoprolol succinate (TOPROL XL) 25 MG extended release tablet Take 1 tablet by mouth nightly Yes Duglas OrdonezestSUDHAKAR tejada - CNP   dapagliflozin (FARXIGA) 10 MG tablet Take 1 tablet by mouth every morning Yes Duglas Florentino, APRN - CNP   clopidogrel (PLAVIX) 75 MG tablet Take 1 tablet by mouth daily Yes Duglas Florentino, APRN - CNP   atorvastatin (LIPITOR) 80 MG tablet Take 1 tablet by mouth daily Yes Duglas Ordonezestmallory APRN - CNP   nitroGLYCERIN (NITROSTAT) 0.4 MG SL tablet Place 1 tablet under the tongue every 5 minutes as needed for Chest pain up to max of 3 total doses. If no relief after 1 dose, call 911.  Yes Albaro Panchal MD   ferrous sulfate (IRON 325) 325 (65 Fe) MG tablet Take 1 tablet by mouth 2 times daily (with meals) Yes Buelah Heimlich, MD   albuterol-ipratropium (COMBIVENT RESPIMAT)  MCG/ACT AERS inhaler Inhale 1 puff into the lungs 2 times daily Yes Rina Tan MD   Cholecalciferol (VITAMIN D3) 50 MCG (2000 UT) CAPS Take by mouth daily Yes Historical Provider, MD   ascorbic acid (VITAMIN C) 250 MG tablet Take 250 mg by mouth daily Yes Historical Provider, MD   brimonidine (ALPHAGAN) 0.2 % ophthalmic solution INSTILL 1 DROP INTO LEFT EYE TWICE DAILY Yes Historical Provider, MD   Blood Glucose Monitoring Suppl DELTA 1 Units by Does not apply route 2 times daily Please give whatever insurance covers Yes Rina Tan MD   magnesium (MAGNESIUM-OXIDE) 250 MG TABS tablet Take 250 mg by mouth daily Yes Historical Provider, MD   Multiple Vitamin (ONE-A-DAY ESSENTIAL PO) Take 1 tablet by mouth. Yes Historical Provider, MD   calcium carbonate-vitamin D 600-200 MG-UNIT TABS Take 1 tablet by mouth daily. Yes Historical Provider, MD   Misc Natural Products (OSTEO BI-FLEX ADV JOINT SHIELD PO) Take 2 tablets by mouth daily. Yes Historical Provider, MD   aspirin 81 MG EC tablet Take 81 mg by mouth daily.    Yes Historical Provider, MD   vitamin B-12 (CYANOCOBALAMIN) 1000 MCG tablet Take 1,000 mcg by mouth daily  Historical Provider, MD   timolol (TIMOPTIC) 0.5 % ophthalmic solution INSTILL 1 DROP INTO EACH EYE TWICE DAILY  Historical Provider, MD   latanoprost (XALATAN) 0.005 % ophthalmic solution INSTILL 1 DROP INTO EACH EYE ONCE DAILY AT BEDTIME  Historical Provider, MD Leyva (Including outside providers/suppliers regularly involved in providing care):   Patient Care Team:  Tenny Duane, MD as PCP - Valerie Bravo MD as PCP - [facility] Provider     Reviewed and updated this visit:  Tobacco  Allergies  Meds  Problems  Med Hx  Surg Hx  Soc Hx  Fam Hx               Tenny Duane, MD

## 2023-03-07 ENCOUNTER — HOSPITAL ENCOUNTER (OUTPATIENT)
Dept: OTHER | Age: 88
Setting detail: THERAPIES SERIES
Discharge: HOME | End: 2023-03-07
Payer: MEDICARE

## 2023-03-07 VITALS
HEART RATE: 66 BPM | DIASTOLIC BLOOD PRESSURE: 54 MMHG | OXYGEN SATURATION: 97 % | BODY MASS INDEX: 23.34 KG/M2 | RESPIRATION RATE: 16 BRPM | WEIGHT: 144.6 LBS | SYSTOLIC BLOOD PRESSURE: 96 MMHG

## 2023-03-07 LAB
ANION GAP SERPL CALCULATED.3IONS-SCNC: 11 MMOL/L (ref 7–16)
BUN BLDV-MCNC: 28 MG/DL (ref 6–23)
CALCIUM SERPL-MCNC: 9.8 MG/DL (ref 8.6–10.2)
CHLORIDE BLD-SCNC: 101 MMOL/L (ref 98–107)
CO2: 25 MMOL/L (ref 22–29)
CREAT SERPL-MCNC: 0.9 MG/DL (ref 0.5–1)
GFR SERPL CREATININE-BSD FRML MDRD: >60 ML/MIN/1.73
GLUCOSE BLD-MCNC: 102 MG/DL (ref 74–99)
POTASSIUM SERPL-SCNC: 4.5 MMOL/L (ref 3.5–5)
PRO-BNP: ABNORMAL PG/ML (ref 0–450)
SODIUM BLD-SCNC: 137 MMOL/L (ref 132–146)

## 2023-03-07 PROCEDURE — 99214 OFFICE O/P EST MOD 30 MIN: CPT

## 2023-03-07 PROCEDURE — 36415 COLL VENOUS BLD VENIPUNCTURE: CPT

## 2023-03-07 PROCEDURE — 83880 ASSAY OF NATRIURETIC PEPTIDE: CPT

## 2023-03-07 PROCEDURE — 80048 BASIC METABOLIC PNL TOTAL CA: CPT

## 2023-03-07 NOTE — PROGRESS NOTES
Congestive Heart Failure [id number] [facility]   1935      Referring Provider: Dr. Annia Crawford  Primary Care Physician: Dr. Diann Irene   Cardiologist: Dr. Annia Crawford  Nephrologist:     History of Present Illness:     Ambar Lovell is a 81 y.o. female with a history of HFrEF, most recent EF 20-25%. Life Vest    Patient Story:    She does not  have dyspnea with exertion, shortness of breath, or decline in overall functional capacity. She does not have orthopnea, PND, nocturnal cough or hemoptysis. She does not have abdominal distention or bloating, early satiety, anorexia/change in appetite. She does has a good urinary response to  oral diuretic. She does not have lower extremity edema. She denies lightheadedness, dizziness. She denies palpitations, syncope or near syncope. She does not complain of chest pain, pressure, discomfort. Pt wearing life vest- denies any shocks or alarms. Allergies   Allergen Reactions    Pcn [Penicillins] Hives    Sulfa Antibiotics Hives         No outpatient medications have been marked as taking for the 3/7/23 encounter [facility] Encounter) with [facility] CHF ROOM 1. Current Outpatient Medications on File Prior to Encounter   Medication Sig Dispense Refill    pregabalin (LYRICA) 25 MG capsule Take 1 capsule by mouth 2 times daily for 60 days.  120 capsule 0    blood glucose test strips (ASCENSIA AUTODISC VI;ONE TOUCH ULTRA TEST VI) strip 1 each by Does not apply route 2 times daily 100 each 3    Lancets MISC 1 each by Does not apply route 2 times daily 100 each 5    spironolactone (ALDACTONE) 25 MG tablet Take 1 tablet by mouth Daily with lunch 45 tablet 3    furosemide (LASIX) 20 MG tablet Take 1 tablet by mouth daily 90 tablet 3    metoprolol succinate (TOPROL XL) 25 MG extended release tablet Take 1 tablet by mouth nightly 90 tablet 3    dapagliflozin (FARXIGA) 10 MG tablet Take 1 tablet by mouth every morning 90 tablet 3 clopidogrel (PLAVIX) 75 MG tablet Take 1 tablet by mouth daily 90 tablet 3    atorvastatin (LIPITOR) 80 MG tablet Take 1 tablet by mouth daily 90 tablet 3    nitroGLYCERIN (NITROSTAT) 0.4 MG SL tablet Place 1 tablet under the tongue every 5 minutes as needed for Chest pain up to max of 3 total doses. If no relief after 1 dose, call 911. 25 tablet 3    ferrous sulfate (IRON 325) 325 (65 Fe) MG tablet Take 1 tablet by mouth 2 times daily (with meals) 30 tablet 0    albuterol-ipratropium (COMBIVENT RESPIMAT)  MCG/ACT AERS inhaler Inhale 1 puff into the lungs 2 times daily 1 each 3    Cholecalciferol (VITAMIN D3) 50 MCG (2000 UT) CAPS Take by mouth daily      ascorbic acid (VITAMIN C) 250 MG tablet Take 250 mg by mouth daily      vitamin B-12 (CYANOCOBALAMIN) 1000 MCG tablet Take 1,000 mcg by mouth daily      timolol (TIMOPTIC) 0.5 % ophthalmic solution INSTILL 1 DROP INTO EACH EYE TWICE DAILY  3    latanoprost (XALATAN) 0.005 % ophthalmic solution INSTILL 1 DROP INTO EACH EYE ONCE DAILY AT BEDTIME  3    brimonidine (ALPHAGAN) 0.2 % ophthalmic solution INSTILL 1 DROP INTO LEFT EYE TWICE DAILY  3    Blood Glucose Monitoring Suppl DELTA 1 Units by Does not apply route 2 times daily Please give whatever insurance covers 1 Device 0    magnesium (MAGNESIUM-OXIDE) 250 MG TABS tablet Take 250 mg by mouth daily      Multiple Vitamin (ONE-A-DAY ESSENTIAL PO) Take 1 tablet by mouth.        calcium carbonate-vitamin D 600-200 MG-UNIT TABS Take 1 tablet by mouth daily. Misc Natural Products (OSTEO BI-FLEX ADV JOINT SHIELD PO) Take 2 tablets by mouth daily. aspirin 81 MG EC tablet Take 81 mg by mouth daily. No current facility-administered medications on file prior to encounter.              Guideline directed medical:  ARNI/ACE I/ARB: No  Beta blocker:  Yes Toprol Xl 25 mg at night  Aldosterone antagonist:  Yes Aldactone 25 mg daily at lunch  SGLT2i:  Yes Farxiga 10 mg daily    NYHA class II  Life Vest    Physical Examination:     BP (!) 96/54   Pulse 66   Resp 16   Wt 144 lb 9.6 oz (65.6 kg)   LMP  (LMP Unknown)   SpO2 97%   BMI 23.34 kg/m²     Assessment  Charting Type: Shift assessment (Chf clinic)    Neurological  Orientation Level: Oriented X4  Cognition: Appropriate judgement, Appropriate safety awareness, Appropriate attention/concentration, Appropriate for developmental age, Follows commands         HEENT (Head, Ears, Eyes, Nose, & Throat)  HEENT (WDL): Exceptions to WDL  Right Eye: Impaired vision, Glasses  Left Eye: Blind  Teeth: Dentures upper, Dentures lower    Respiratory  Respiratory Pattern: Regular  Respiratory Depth: Normal  Respiratory Quality/Effort: Unlabored, Dyspnea with exertion  Chest Assessment: Chest expansion symmetrical, Trachea midline  L Breath Sounds: Clear, Diminished  R Breath Sounds: Clear, Diminished    Breath Sounds  Right Upper Lobe: Clear  Right Middle Lobe: Clear  Right Lower Lobe: Clear  Left Upper Lobe: Clear  Left Lower Lobe: Clear         Cardiac  Cardiac Regularity: Regular (Life Vest)  Heart Sounds: S1, S2  Cardiac Rhythm: Sinus rhythm with PAC    Rhythm Interpretation  Heart Rate: 66         Gastrointestinal  Abdominal (WDL): Exceptions to WDL  Abdomen Inspection: Soft               Peripheral Vascular  Peripheral Vascular (WDL): Exceptions to WDL  Edema: Right lower extremity, Left lower extremity  RLE Edema: Non-pitting  LLE Edema: Non-pitting              Musculoskeletal  RL Extremity: Weakness, Unsteady  LL Extremity: Weakness, Unsteady                                  Heart Rate: 66                     LAB DATA:    Last 3 BMP      Sodium (mmol/L)   Date Value   03/07/2023 137   02/21/2023 131 (L)   02/17/2023 139     Potassium (mmol/L)   Date Value   03/07/2023 4.5   02/21/2023 4.0   02/17/2023 4.6     Potassium reflex Magnesium (mmol/L)   Date Value   11/24/2022 5.2 (H)     Chloride (mmol/L)   Date Value   03/07/2023 101   02/21/2023 96 (L) 02/17/2023 98     CO2 (mmol/L)   Date Value   03/07/2023 25   02/21/2023 26   02/17/2023 24     BUN (mg/dL)   Date Value   03/07/2023 28 (H)   02/21/2023 25 (H)   02/17/2023 24 (H)     Glucose (mg/dL)   Date Value   03/07/2023 102 (H)   02/21/2023 96   02/17/2023 120 (H)   04/05/2012 125 (H)   01/09/2012 120 (H)   05/16/2011 125 (H)     Calcium (mg/dL)   Date Value   03/07/2023 9.8   02/21/2023 10.1   02/17/2023 9.7       Last 3 BNP       Pro-BNP (pg/mL)   Date Value   03/07/2023 10,482 (H)   02/21/2023 11,902 (H)   02/15/2023 11,072 (H)          CBC: No results for input(s): WBC, HGB, PLT in the last 72 hours. BMP:    Recent Labs     03/07/23  0947      K 4.5      CO2 25   BUN 28*   CREATININE 0.9   GLUCOSE 102*         Hepatic: No results for input(s): AST, ALT, ALB, BILITOT, ALKPHOS in the last 72 hours. Troponin: No results for input(s): TROPONINI in the last 72 hours. BNP: No results for input(s): BNP in the last 72 hours. Lipids: No results for input(s): CHOL, HDL in the last 72 hours. Invalid input(s): LDLCALCU  INR: No results for input(s): INR in the last 72 hours. WEIGHTS:    Wt Readings from Last 3 Encounters:   03/07/23 144 lb 9.6 oz (65.6 kg)   02/23/23 141 lb (64 kg)   02/21/23 144 lb (65.3 kg)       TELEMETRY:  Cardiac Regularity: Regular  Cardiac Rhythm/Interpretation: SR with PAC    ASSESSMENT:  Ambar Lovell is with  a stable weights of 144.6 at today's visit. Upon assessment pt denies SOB or BETTS, lungs are clear, diminished, denies distention. Nonpitting lower extremity edema, negative for JVD. She has been weighing herself every day and taking her BP. She is also drinking approx 4 16oz of water and 1- 2 cups of coffee daily. Pt had an appointment with PCP next week 2/23 - no changes   Has an appointment with Dr. Annia Crawford 4/18  Scheduled for a 2D echo 3/30/23    Reviewed GDMT medications with patient and daughter. Follow up in chf clinic in 2 weeks. Interventions completed this visit:  IV diuretics given no  Lab work obtained yes, BNP BMP   Reviewed currently prescribed medications with patient, educated on importance of compliance and answered any questions regarding their medication  Educated on signs and symptoms of HF  Educated on low sodium diet      PLAN:  Scheduled to follow up in CHF clinic on   Future Appointments   Date Time Provider Lili Contreras   3/21/2023 10:00 AM [facility] CHF ROOM 1 [facility] Trung Paget   3/30/2023 11:00 AM [facility] TATE ECHO RM [address]   4/18/2023  9:00 AM Kris Singh MD [facility]   5/25/2023 10:00 AM Maritza Hernandez MD [facility]   2/29/2024 10:00 AM Maritza Hernandez MD [facility]     Given clinic phone number and aware of signs and symptoms to call with any HF change in symptoms.

## 2023-03-08 NOTE — TELEPHONE ENCOUNTER
Care Transitions Initial Follow Up Call    Outreach made within 2 business days of discharge: Yes    Patient: Kristofer Dominguez Patient : 1935   MRN: [id number]  Reason for Admission: There are no discharge diagnoses documented for the most recent discharge. Discharge Date: 23       Spoke with: patient    Discharge department/facility: [address] [place] [address] Interactive Patient Contact:  Was patient able to fill all prescriptions: Yes  Was patient instructed to bring all medications to the follow-up visit: Yes  Is patient taking all medications as directed in the discharge summary? Yes  Does patient understand their discharge instructions: Yes  Does patient have questions or concerns that need addressed prior to 7-14 day follow up office visit: no    Scheduled appointment with PCP within 7-14 days--appt scheduled 23.     Follow Up  Future Appointments   Date Time Provider Lili Contreras   2023 10:00 AM Donis Christensen MD [facility]   2/10/2023 10:00 AM SUDHAKAR Carmona CNP [facility]   2/15/2023 10:30 AM [facility] CHF ROOM 1 [facility] Rakanas   2023 10:00 AM K Caryle Kitchen, DO [facility]   2023  1:45 PM Donis Christensen MD [facility] Render In Bullet Format When Appropriate: No Wound Care: Vaseline X Size Of Lesion In Cm: 0 Depth Of Biopsy: dermis Hemostasis: Rosa's Silver Nitrate Text: The wound bed was treated with silver nitrate after the biopsy was performed. Anesthesia Type: 1% lidocaine without epinephrine Detail Level: Simple Notification Instructions: Patient will be notified of biopsy results. However, patient instructed to call the office if not contacted within 2 weeks. Information: Selecting Yes will display possible errors in your note based on the variables you have selected. This validation is only offered as a suggestion for you. PLEASE NOTE THAT THE VALIDATION TEXT WILL BE REMOVED WHEN YOU FINALIZE YOUR NOTE. IF YOU WANT TO FAX A PRELIMINARY NOTE YOU WILL NEED TO TOGGLE THIS TO 'NO' IF YOU DO NOT WANT IT IN YOUR FAXED NOTE. Detail Level: Detailed Billing Type: Third-Party Bill Cryotherapy Text: The wound bed was treated with cryotherapy after the biopsy was performed. Curettage Text: The wound bed was treated with curettage after the biopsy was performed. Post-Care Instructions: I reviewed with the patient in detail post-care instructions. Patient is to keep the biopsy site dry overnight, and then apply bacitracin twice daily until healed. Patient may apply hydrogen peroxide soaks to remove any crusting. Biopsy Method: Dermablade Was A Bandage Applied: Yes Electrodesiccation And Curettage Text: The wound bed was treated with electrodesiccation and curettage after the biopsy was performed. Type Of Destruction Used: Curettage Biopsy Type: H and E Detail Level: Zone Electrodesiccation Text: The wound bed was treated with electrodesiccation after the biopsy was performed. Anesthesia Volume In Cc: 0.5 Consent: Verbal consent was obtained and risks were reviewed including but not limited to scarring, infection, bleeding, scabbing, incomplete removal, nerve damage and allergy to anesthesia. Dressing: bandage

## 2023-03-21 ENCOUNTER — HOSPITAL ENCOUNTER (OUTPATIENT)
Dept: OTHER | Age: 88
Setting detail: THERAPIES SERIES
Discharge: HOME | End: 2023-03-21
Payer: MEDICARE

## 2023-03-21 VITALS
BODY MASS INDEX: 23.47 KG/M2 | HEART RATE: 69 BPM | OXYGEN SATURATION: 95 % | WEIGHT: 145.4 LBS | RESPIRATION RATE: 18 BRPM | SYSTOLIC BLOOD PRESSURE: 102 MMHG | DIASTOLIC BLOOD PRESSURE: 49 MMHG

## 2023-03-21 LAB
ANION GAP SERPL CALCULATED.3IONS-SCNC: 11 MMOL/L (ref 7–16)
BNP BLD-MCNC: 7382 PG/ML (ref 0–450)
BUN SERPL-MCNC: 35 MG/DL (ref 6–23)
CALCIUM SERPL-MCNC: 9.5 MG/DL (ref 8.6–10.2)
CHLORIDE SERPL-SCNC: 99 MMOL/L (ref 98–107)
CO2 SERPL-SCNC: 28 MMOL/L (ref 22–29)
CREAT SERPL-MCNC: 1.1 MG/DL (ref 0.5–1)
GLUCOSE SERPL-MCNC: 123 MG/DL (ref 74–99)
POTASSIUM SERPL-SCNC: 4 MMOL/L (ref 3.5–5)
SODIUM SERPL-SCNC: 138 MMOL/L (ref 132–146)

## 2023-03-21 PROCEDURE — 36415 COLL VENOUS BLD VENIPUNCTURE: CPT

## 2023-03-21 PROCEDURE — 80048 BASIC METABOLIC PNL TOTAL CA: CPT

## 2023-03-21 PROCEDURE — 99214 OFFICE O/P EST MOD 30 MIN: CPT

## 2023-03-21 PROCEDURE — 83880 ASSAY OF NATRIURETIC PEPTIDE: CPT

## 2023-03-21 NOTE — PROGRESS NOTES
3    clopidogrel (PLAVIX) 75 MG tablet Take 1 tablet by mouth daily 90 tablet 3    atorvastatin (LIPITOR) 80 MG tablet Take 1 tablet by mouth daily 90 tablet 3    nitroGLYCERIN (NITROSTAT) 0.4 MG SL tablet Place 1 tablet under the tongue every 5 minutes as needed for Chest pain up to max of 3 total doses. If no relief after 1 dose, call 911. (Patient not taking: Reported on 3/21/2023) 25 tablet 3    ferrous sulfate (IRON 325) 325 (65 Fe) MG tablet Take 1 tablet by mouth 2 times daily (with meals) 30 tablet 0    albuterol-ipratropium (COMBIVENT RESPIMAT)  MCG/ACT AERS inhaler Inhale 1 puff into the lungs 2 times daily 1 each 3    Cholecalciferol (VITAMIN D3) 50 MCG (2000 UT) CAPS Take by mouth daily      ascorbic acid (VITAMIN C) 250 MG tablet Take 250 mg by mouth daily      vitamin B-12 (CYANOCOBALAMIN) 1000 MCG tablet Take 1,000 mcg by mouth daily      timolol (TIMOPTIC) 0.5 % ophthalmic solution INSTILL 1 DROP INTO EACH EYE TWICE DAILY  3    latanoprost (XALATAN) 0.005 % ophthalmic solution INSTILL 1 DROP INTO EACH EYE ONCE DAILY AT BEDTIME  3    brimonidine (ALPHAGAN) 0.2 % ophthalmic solution INSTILL 1 DROP INTO LEFT EYE TWICE DAILY  3    Blood Glucose Monitoring Suppl DELTA 1 Units by Does not apply route 2 times daily Please give whatever insurance covers 1 Device 0    magnesium (MAGNESIUM-OXIDE) 250 MG TABS tablet Take 250 mg by mouth daily      Multiple Vitamin (ONE-A-DAY ESSENTIAL PO) Take 1 tablet by mouth.        calcium carbonate-vitamin D 600-200 MG-UNIT TABS Take 1 tablet by mouth daily. Misc Natural Products (OSTEO BI-FLEX ADV JOINT SHIELD PO) Take 2 tablets by mouth daily. aspirin 81 MG EC tablet Take 81 mg by mouth daily. No current facility-administered medications on file prior to encounter.              Guideline directed medical:  ARNI/ACE I/ARB: No  Beta blocker:  Yes Toprol Xl 25 mg at night  Aldosterone antagonist:  Yes Aldactone 25 mg daily at lunch  SGLT2i:  Yes

## 2023-03-30 ENCOUNTER — HOSPITAL ENCOUNTER (OUTPATIENT)
Dept: CARDIOLOGY | Age: 88
Discharge: HOME | End: 2023-03-30
Payer: MEDICARE

## 2023-03-30 DIAGNOSIS — I25.5 ISCHEMIC CARDIOMYOPATHY: ICD-10-CM

## 2023-03-30 PROCEDURE — 93308 TTE F-UP OR LMTD: CPT

## 2023-04-04 ENCOUNTER — HOSPITAL ENCOUNTER (OUTPATIENT)
Dept: OTHER | Age: 88
Setting detail: THERAPIES SERIES
Discharge: HOME | End: 2023-04-04
Payer: MEDICARE

## 2023-04-04 VITALS — WEIGHT: 145 LBS | BODY MASS INDEX: 23.4 KG/M2

## 2023-04-04 LAB
ANION GAP SERPL CALCULATED.3IONS-SCNC: 13 MMOL/L (ref 7–16)
BNP BLD-MCNC: 9166 PG/ML (ref 0–450)
BUN SERPL-MCNC: 33 MG/DL (ref 6–23)
CALCIUM SERPL-MCNC: 10.1 MG/DL (ref 8.6–10.2)
CHLORIDE SERPL-SCNC: 95 MMOL/L (ref 98–107)
CO2 SERPL-SCNC: 26 MMOL/L (ref 22–29)
CREAT SERPL-MCNC: 1.2 MG/DL (ref 0.5–1)
GLUCOSE SERPL-MCNC: 131 MG/DL (ref 74–99)
POTASSIUM SERPL-SCNC: 4.1 MMOL/L (ref 3.5–5)
SODIUM SERPL-SCNC: 134 MMOL/L (ref 132–146)

## 2023-04-04 PROCEDURE — 80048 BASIC METABOLIC PNL TOTAL CA: CPT

## 2023-04-04 PROCEDURE — 83880 ASSAY OF NATRIURETIC PEPTIDE: CPT

## 2023-04-04 PROCEDURE — 36415 COLL VENOUS BLD VENIPUNCTURE: CPT

## 2023-04-04 PROCEDURE — 99214 OFFICE O/P EST MOD 30 MIN: CPT

## 2023-04-04 NOTE — PROGRESS NOTES
(mmol/L)   Date Value   04/04/2023 26   03/21/2023 28   03/07/2023 25     BUN (mg/dL)   Date Value   04/04/2023 33 (H)   03/21/2023 35 (H)   03/07/2023 28 (H)     Glucose (mg/dL)   Date Value   04/04/2023 131 (H)   03/21/2023 123 (H)   03/07/2023 102 (H)   04/05/2012 125 (H)   01/09/2012 120 (H)   05/16/2011 125 (H)     Calcium (mg/dL)   Date Value   04/04/2023 10.1   03/21/2023 9.5   03/07/2023 9.8       Last 3 BNP       Pro-BNP (pg/mL)   Date Value   04/04/2023 9,166 (H)   03/21/2023 7,382 (H)   03/07/2023 10,482 (H)          CBC: No results for input(s): WBC, HGB, PLT in the last 72 hours. BMP:    Recent Labs     04/04/23  1052      K 4.1   CL 95*   CO2 26   BUN 33*   CREATININE 1.2*   GLUCOSE 131*           Hepatic: No results for input(s): AST, ALT, ALB, BILITOT, ALKPHOS in the last 72 hours. Troponin: No results for input(s): TROPONINI in the last 72 hours. BNP: No results for input(s): BNP in the last 72 hours. Lipids: No results for input(s): CHOL, HDL in the last 72 hours. Invalid input(s): LDLCALCU  INR: No results for input(s): INR in the last 72 hours. WEIGHTS:    Wt Readings from Last 3 Encounters:   04/04/23 145 lb (65.8 kg)   03/21/23 145 lb 6.4 oz (66 kg)   03/07/23 144 lb 9.6 oz (65.6 kg)       TELEMETRY:  Cardiac Regularity: Regular  Cardiac Rhythm/Interpretation: SR   ASSESSMENT:  Stephanie Rizzo is with  a stable weights of 145 at today's visit. Upon assessment pt denies SOB or BETTS, lungs are clear, diminished, denies distention. Nonpitting lower extremity edema, negative for JVD. She has been weighing herself every day and taking her BP. She is also drinking approx 4 16oz of water and 1- 2 cups of coffee daily. Patient denies any alarms on Lifevest.      Pt had an appointment with PCP next week 2/23 - no changes   Has an appointment with Dr. Fredy Garcia 4/18  Went for scheduled for a 2D echo 3/30/23    Follow up in chf clinic in 2 weeks.    Interventions completed this

## 2023-04-05 ENCOUNTER — TELEPHONE (OUTPATIENT)
Dept: CARDIOLOGY CLINIC | Age: 88
End: 2023-04-05

## 2023-04-05 DIAGNOSIS — I25.10 CAD IN NATIVE ARTERY: ICD-10-CM

## 2023-04-05 DIAGNOSIS — I25.5 ISCHEMIC CARDIOMYOPATHY: Primary | ICD-10-CM

## 2023-04-05 NOTE — TELEPHONE ENCOUNTER
----- Message from Soni Galindo MD sent at 4/5/2023 10:31 AM EDT -----  Regarding: Echo  Tell her that her Echo showed EF, heart function 25%.   Need to seen EP to discuss ICD  Go to if heart racing and dizzy and passes out    Soni Galindo MD    ----- Message -----  From: Wero Stewart Incoming Cardiology Results From [facility]  Sent: 4/4/2023   3:27 AM EDT  To: Soni Galindo MD

## 2023-04-17 ENCOUNTER — HOSPITAL ENCOUNTER (OUTPATIENT)
Dept: OTHER | Age: 88
Setting detail: THERAPIES SERIES
Discharge: HOME | End: 2023-04-17
Payer: MEDICARE

## 2023-04-17 VITALS
WEIGHT: 145 LBS | OXYGEN SATURATION: 97 % | BODY MASS INDEX: 23.4 KG/M2 | RESPIRATION RATE: 18 BRPM | DIASTOLIC BLOOD PRESSURE: 48 MMHG | HEART RATE: 73 BPM | SYSTOLIC BLOOD PRESSURE: 101 MMHG

## 2023-04-17 LAB
ANION GAP SERPL CALCULATED.3IONS-SCNC: 13 MMOL/L (ref 7–16)
BNP BLD-MCNC: ABNORMAL PG/ML (ref 0–450)
BUN SERPL-MCNC: 32 MG/DL (ref 6–23)
CALCIUM SERPL-MCNC: 9.6 MG/DL (ref 8.6–10.2)
CHLORIDE SERPL-SCNC: 104 MMOL/L (ref 98–107)
CO2 SERPL-SCNC: 24 MMOL/L (ref 22–29)
CREAT SERPL-MCNC: 0.9 MG/DL (ref 0.5–1)
GLUCOSE SERPL-MCNC: 107 MG/DL (ref 74–99)
POTASSIUM SERPL-SCNC: 3.9 MMOL/L (ref 3.5–5)
SODIUM SERPL-SCNC: 141 MMOL/L (ref 132–146)

## 2023-04-17 PROCEDURE — 80048 BASIC METABOLIC PNL TOTAL CA: CPT

## 2023-04-17 PROCEDURE — 99214 OFFICE O/P EST MOD 30 MIN: CPT

## 2023-04-17 PROCEDURE — 36415 COLL VENOUS BLD VENIPUNCTURE: CPT

## 2023-04-17 PROCEDURE — 83880 ASSAY OF NATRIURETIC PEPTIDE: CPT

## 2023-04-17 NOTE — PROGRESS NOTES
Potassium reflex Magnesium (mmol/L)   Date Value   11/24/2022 5.2 (H)     Chloride (mmol/L)   Date Value   04/17/2023 104   04/04/2023 95 (L)   03/21/2023 99     CO2 (mmol/L)   Date Value   04/17/2023 24   04/04/2023 26   03/21/2023 28     BUN (mg/dL)   Date Value   04/17/2023 32 (H)   04/04/2023 33 (H)   03/21/2023 35 (H)     Glucose (mg/dL)   Date Value   04/17/2023 107 (H)   04/04/2023 131 (H)   03/21/2023 123 (H)   04/05/2012 125 (H)   01/09/2012 120 (H)   05/16/2011 125 (H)     Calcium (mg/dL)   Date Value   04/17/2023 9.6   04/04/2023 10.1   03/21/2023 9.5       Last 3 BNP       Pro-BNP (pg/mL)   Date Value   04/17/2023 10,074 (H)   04/04/2023 9,166 (H)   03/21/2023 7,382 (H)          CBC: No results for input(s): WBC, HGB, PLT in the last 72 hours. BMP:    Recent Labs     04/17/23  1055      K 3.9      CO2 24   BUN 32*   CREATININE 0.9   GLUCOSE 107*           Hepatic: No results for input(s): AST, ALT, ALB, BILITOT, ALKPHOS in the last 72 hours. Troponin: No results for input(s): TROPONINI in the last 72 hours. BNP: No results for input(s): BNP in the last 72 hours. Lipids: No results for input(s): CHOL, HDL in the last 72 hours. Invalid input(s): LDLCALCU  INR: No results for input(s): INR in the last 72 hours. WEIGHTS:    Wt Readings from Last 3 Encounters:   04/17/23 145 lb (65.8 kg)   04/04/23 145 lb (65.8 kg)   03/21/23 145 lb 6.4 oz (66 kg)       TELEMETRY:  Cardiac Regularity: Regular  Cardiac Rhythm/Interpretation: SR   ASSESSMENT:  Narinder Paulson presents for chf clinic follow up with stable weights. On assessment lungs are clear/ diminished, abdomen soft, with no pitting pedal edema. Has an appt with Dr. Migue Toro tomorrow to discuss echo. Denies SOB, BETTS, or orthopnea.      Interventions completed this visit:  IV diuretics given no  Lab work obtained yes, BNP BMP   Reviewed currently prescribed medications with patient, educated on importance of compliance and

## 2023-04-18 ENCOUNTER — OFFICE VISIT (OUTPATIENT)
Dept: CARDIOLOGY CLINIC | Age: 88
End: 2023-04-18
Payer: MEDICARE

## 2023-04-18 VITALS
HEART RATE: 71 BPM | DIASTOLIC BLOOD PRESSURE: 52 MMHG | WEIGHT: 145 LBS | BODY MASS INDEX: 23.3 KG/M2 | SYSTOLIC BLOOD PRESSURE: 98 MMHG | RESPIRATION RATE: 16 BRPM | HEIGHT: 66 IN

## 2023-04-18 DIAGNOSIS — I25.5 ISCHEMIC CARDIOMYOPATHY: ICD-10-CM

## 2023-04-18 DIAGNOSIS — I25.10 CORONARY ARTERY DISEASE INVOLVING NATIVE CORONARY ARTERY OF NATIVE HEART WITHOUT ANGINA PECTORIS: Primary | ICD-10-CM

## 2023-04-18 DIAGNOSIS — I10 ESSENTIAL HYPERTENSION: ICD-10-CM

## 2023-04-18 PROCEDURE — 1090F PRES/ABSN URINE INCON ASSESS: CPT | Performed by: INTERNAL MEDICINE

## 2023-04-18 PROCEDURE — G8427 DOCREV CUR MEDS BY ELIG CLIN: HCPCS | Performed by: INTERNAL MEDICINE

## 2023-04-18 PROCEDURE — 99214 OFFICE O/P EST MOD 30 MIN: CPT | Performed by: INTERNAL MEDICINE

## 2023-04-18 PROCEDURE — 1123F ACP DISCUSS/DSCN MKR DOCD: CPT | Performed by: INTERNAL MEDICINE

## 2023-04-18 PROCEDURE — 93000 ELECTROCARDIOGRAM COMPLETE: CPT | Performed by: INTERNAL MEDICINE

## 2023-04-18 PROCEDURE — G8420 CALC BMI NORM PARAMETERS: HCPCS | Performed by: INTERNAL MEDICINE

## 2023-04-18 PROCEDURE — 1036F TOBACCO NON-USER: CPT | Performed by: INTERNAL MEDICINE

## 2023-04-18 NOTE — PROGRESS NOTES
OFFICE VISIT     PRIMARY CARE PHYSICIAN:      Jihan Bella MD       ALLERGIES / SENSITIVITIES:        Allergies   Allergen Reactions    Pcn [Penicillins] Hives    Sulfa Antibiotics Hives          REVIEWED MEDICATIONS:        Current Outpatient Medications:     pregabalin (LYRICA) 25 MG capsule, Take 1 capsule by mouth 2 times daily for 60 days. , Disp: 120 capsule, Rfl: 0    blood glucose test strips (ASCENSIA AUTODISC VI;ONE TOUCH ULTRA TEST VI) strip, 1 each by Does not apply route 2 times daily, Disp: 100 each, Rfl: 3    Lancets MISC, 1 each by Does not apply route 2 times daily, Disp: 100 each, Rfl: 5    spironolactone (ALDACTONE) 25 MG tablet, Take 1 tablet by mouth Daily with lunch, Disp: 45 tablet, Rfl: 3    furosemide (LASIX) 20 MG tablet, Take 1 tablet by mouth daily, Disp: 90 tablet, Rfl: 3    metoprolol succinate (TOPROL XL) 25 MG extended release tablet, Take 1 tablet by mouth nightly, Disp: 90 tablet, Rfl: 3    dapagliflozin (FARXIGA) 10 MG tablet, Take 1 tablet by mouth every morning, Disp: 90 tablet, Rfl: 3    clopidogrel (PLAVIX) 75 MG tablet, Take 1 tablet by mouth daily, Disp: 90 tablet, Rfl: 3    atorvastatin (LIPITOR) 80 MG tablet, Take 1 tablet by mouth daily (Patient taking differently: Take 1 tablet by mouth nightly), Disp: 90 tablet, Rfl: 3    nitroGLYCERIN (NITROSTAT) 0.4 MG SL tablet, Place 1 tablet under the tongue every 5 minutes as needed for Chest pain up to max of 3 total doses.  If no relief after 1 dose, call 911., Disp: 25 tablet, Rfl: 3    ferrous sulfate (IRON 325) 325 (65 Fe) MG tablet, Take 1 tablet by mouth 2 times daily (with meals), Disp: 30 tablet, Rfl: 0    albuterol-ipratropium (COMBIVENT RESPIMAT)  MCG/ACT AERS inhaler, Inhale 1 puff into the lungs 2 times daily, Disp: 1 each, Rfl: 3    Cholecalciferol (VITAMIN D3) 50 MCG (2000 UT) CAPS, Take by mouth daily, Disp: , Rfl:     ascorbic acid (VITAMIN C) 250 MG tablet, Take 1 tablet by mouth daily, Disp: , Rfl:

## 2023-05-03 ENCOUNTER — OFFICE VISIT (OUTPATIENT)
Dept: NON INVASIVE DIAGNOSTICS | Age: 88
End: 2023-05-03

## 2023-05-03 VITALS
DIASTOLIC BLOOD PRESSURE: 74 MMHG | HEART RATE: 77 BPM | WEIGHT: 147 LBS | SYSTOLIC BLOOD PRESSURE: 108 MMHG | RESPIRATION RATE: 16 BRPM | HEIGHT: 66 IN | BODY MASS INDEX: 23.63 KG/M2

## 2023-05-03 DIAGNOSIS — I25.10 CAD IN NATIVE ARTERY: Primary | ICD-10-CM

## 2023-05-08 ENCOUNTER — HOSPITAL ENCOUNTER (OUTPATIENT)
Dept: OTHER | Age: 88
Setting detail: THERAPIES SERIES
Discharge: HOME | End: 2023-05-08
Payer: MEDICARE

## 2023-05-08 VITALS
BODY MASS INDEX: 23.47 KG/M2 | DIASTOLIC BLOOD PRESSURE: 51 MMHG | SYSTOLIC BLOOD PRESSURE: 113 MMHG | WEIGHT: 145.4 LBS | HEART RATE: 80 BPM | OXYGEN SATURATION: 96 % | RESPIRATION RATE: 18 BRPM

## 2023-05-08 LAB
ANION GAP SERPL CALCULATED.3IONS-SCNC: 13 MMOL/L (ref 7–16)
BNP BLD-MCNC: ABNORMAL PG/ML (ref 0–450)
BUN SERPL-MCNC: 28 MG/DL (ref 6–23)
CALCIUM SERPL-MCNC: 9.8 MG/DL (ref 8.6–10.2)
CHLORIDE SERPL-SCNC: 107 MMOL/L (ref 98–107)
CO2 SERPL-SCNC: 23 MMOL/L (ref 22–29)
CREAT SERPL-MCNC: 1 MG/DL (ref 0.5–1)
GLUCOSE SERPL-MCNC: 159 MG/DL (ref 74–99)
POTASSIUM SERPL-SCNC: 3.9 MMOL/L (ref 3.5–5)
SODIUM SERPL-SCNC: 143 MMOL/L (ref 132–146)

## 2023-05-08 PROCEDURE — 36415 COLL VENOUS BLD VENIPUNCTURE: CPT

## 2023-05-08 PROCEDURE — 83880 ASSAY OF NATRIURETIC PEPTIDE: CPT

## 2023-05-08 PROCEDURE — 99214 OFFICE O/P EST MOD 30 MIN: CPT

## 2023-05-08 PROCEDURE — 80048 BASIC METABOLIC PNL TOTAL CA: CPT

## 2023-05-08 NOTE — PROGRESS NOTES
Congestive Heart Failure [facility] [facility]   1935    Referring Provider: Dr. Trip Lyle  Primary Care Physician: Dr. Glen Marcos   Cardiologist: Dr. Trip Lyle  Nephrologist:     History of Present Illness:   Juhi Mckeon is a 81 y.o. female with a history of HFrEF, most recent EF 25% 3/30/23--Life Vest.    Patient Story:  She does not have dyspnea with exertion, shortness of breath, or decline in overall functional capacity. She does not have orthopnea, PND, nocturnal cough or hemoptysis. She does not have abdominal distention or bloating, early satiety, anorexia/change in appetite. She does have a good urinary response to oral diuretic. She does have trace lower extremity edema. She denies lightheadedness, dizziness. She denies palpitations, syncope or near syncope. She does not complain of chest pain, pressure, discomfort. Pt wearing life vest- denies any shocks or alarms. Allergies   Allergen Reactions    Pcn [Penicillins] Hives    Sulfa Antibiotics Hives         No outpatient medications have been marked as taking for the 5/8/23 encounter [facility] Encounter) with [facility] CHF ROOM 1. Current Outpatient Medications on File Prior to Encounter   Medication Sig Dispense Refill    pregabalin (LYRICA) 25 MG capsule Take 1 capsule by mouth 2 times daily for 60 days.  120 capsule 0    blood glucose test strips (ASCENSIA AUTODISC VI;ONE TOUCH ULTRA TEST VI) strip 1 each by Does not apply route 2 times daily 100 each 3    Lancets MISC 1 each by Does not apply route 2 times daily 100 each 5    spironolactone (ALDACTONE) 25 MG tablet Take 1 tablet by mouth Daily with lunch 45 tablet 3    furosemide (LASIX) 20 MG tablet Take 1 tablet by mouth daily 90 tablet 3    metoprolol succinate (TOPROL XL) 25 MG extended release tablet Take 1 tablet by mouth nightly 90 tablet 3    dapagliflozin (FARXIGA) 10 MG tablet Take 1 tablet by mouth every morning 90 tablet 3

## 2023-05-19 DIAGNOSIS — E11.9 TYPE 2 DIABETES MELLITUS WITHOUT COMPLICATION, WITHOUT LONG-TERM CURRENT USE OF INSULIN (HCC): ICD-10-CM

## 2023-05-19 DIAGNOSIS — E78.2 MIXED HYPERLIPIDEMIA: ICD-10-CM

## 2023-05-19 LAB
ALBUMIN SERPL-MCNC: 4 G/DL (ref 3.5–5.2)
ALP SERPL-CCNC: 136 U/L (ref 35–104)
ALT SERPL-CCNC: 17 U/L (ref 0–32)
ANION GAP SERPL CALCULATED.3IONS-SCNC: 14 MMOL/L (ref 7–16)
AST SERPL-CCNC: 22 U/L (ref 0–31)
BASOPHILS # BLD: 0.01 E9/L (ref 0–0.2)
BASOPHILS NFR BLD: 0.2 % (ref 0–2)
BILIRUB SERPL-MCNC: 0.7 MG/DL (ref 0–1.2)
BUN SERPL-MCNC: 33 MG/DL (ref 6–23)
CALCIUM SERPL-MCNC: 9.7 MG/DL (ref 8.6–10.2)
CHLORIDE SERPL-SCNC: 102 MMOL/L (ref 98–107)
CHOLESTEROL, TOTAL: 106 MG/DL (ref 0–199)
CO2 SERPL-SCNC: 23 MMOL/L (ref 22–29)
CREAT SERPL-MCNC: 1 MG/DL (ref 0.5–1)
EOSINOPHIL # BLD: 0.14 E9/L (ref 0.05–0.5)
EOSINOPHIL NFR BLD: 2.8 % (ref 0–6)
ERYTHROCYTE [DISTWIDTH] IN BLOOD BY AUTOMATED COUNT: 14.3 FL (ref 11.5–15)
GLUCOSE SERPL-MCNC: 115 MG/DL (ref 74–99)
HCT VFR BLD AUTO: 38.1 % (ref 34–48)
HDLC SERPL-MCNC: 33 MG/DL
HGB BLD-MCNC: 11.5 G/DL (ref 11.5–15.5)
IMM GRANULOCYTES # BLD: 0.01 E9/L
IMM GRANULOCYTES NFR BLD: 0.2 % (ref 0–5)
LDLC SERPL CALC-MCNC: 54 MG/DL (ref 0–99)
LYMPHOCYTES # BLD: 0.8 E9/L (ref 1.5–4)
LYMPHOCYTES NFR BLD: 16.2 % (ref 20–42)
MCH RBC QN AUTO: 30.3 PG (ref 26–35)
MCHC RBC AUTO-ENTMCNC: 30.2 % (ref 32–34.5)
MCV RBC AUTO: 100.3 FL (ref 80–99.9)
MONOCYTES # BLD: 0.51 E9/L (ref 0.1–0.95)
MONOCYTES NFR BLD: 10.3 % (ref 2–12)
NEUTROPHILS # BLD: 3.47 E9/L (ref 1.8–7.3)
NEUTS SEG NFR BLD: 70.3 % (ref 43–80)
PLATELET # BLD AUTO: 180 E9/L (ref 130–450)
PMV BLD AUTO: 11.2 FL (ref 7–12)
POTASSIUM SERPL-SCNC: 4.5 MMOL/L (ref 3.5–5)
PROT SERPL-MCNC: 6.7 G/DL (ref 6.4–8.3)
RBC # BLD AUTO: 3.8 E12/L (ref 3.5–5.5)
SODIUM SERPL-SCNC: 139 MMOL/L (ref 132–146)
TRIGL SERPL-MCNC: 97 MG/DL (ref 0–149)
VLDLC SERPL CALC-MCNC: 19 MG/DL
WBC # BLD: 4.9 E9/L (ref 4.5–11.5)

## 2023-05-22 ENCOUNTER — TELEPHONE (OUTPATIENT)
Dept: CARDIOLOGY CLINIC | Age: 88
End: 2023-05-22

## 2023-05-22 NOTE — TELEPHONE ENCOUNTER
----- Message from SUDHAKAR Zheng CNP sent at 5/22/2023  2:47 PM EDT -----  Rising pro-bnp over the past 2 months   How is feeling from a fluid standpoint  Not returning to CHF clniic till middle fo June call for sooner appointment if needed    Thank you

## 2023-05-25 ENCOUNTER — OFFICE VISIT (OUTPATIENT)
Dept: FAMILY MEDICINE CLINIC | Age: 88
End: 2023-05-25

## 2023-05-25 VITALS
HEART RATE: 75 BPM | SYSTOLIC BLOOD PRESSURE: 104 MMHG | OXYGEN SATURATION: 99 % | WEIGHT: 145 LBS | BODY MASS INDEX: 23.4 KG/M2 | DIASTOLIC BLOOD PRESSURE: 60 MMHG

## 2023-05-25 DIAGNOSIS — I10 ESSENTIAL HYPERTENSION: ICD-10-CM

## 2023-05-25 DIAGNOSIS — E78.2 MIXED HYPERLIPIDEMIA: ICD-10-CM

## 2023-05-25 DIAGNOSIS — R53.1 GENERALIZED WEAKNESS: ICD-10-CM

## 2023-05-25 DIAGNOSIS — I50.22 CHRONIC SYSTOLIC (CONGESTIVE) HEART FAILURE (HCC): ICD-10-CM

## 2023-05-25 DIAGNOSIS — R54 FRAIL ELDERLY: ICD-10-CM

## 2023-05-25 DIAGNOSIS — E08.42 DIABETIC POLYNEUROPATHY ASSOCIATED WITH DIABETES MELLITUS DUE TO UNDERLYING CONDITION (HCC): Primary | ICD-10-CM

## 2023-05-25 DIAGNOSIS — G25.81 RLS (RESTLESS LEGS SYNDROME): ICD-10-CM

## 2023-05-25 DIAGNOSIS — N18.31 STAGE 3A CHRONIC KIDNEY DISEASE (HCC): ICD-10-CM

## 2023-05-25 PROBLEM — I21.4 NSTEMI (NON-ST ELEVATED MYOCARDIAL INFARCTION) (HCC): Status: RESOLVED | Noted: 2023-01-24 | Resolved: 2023-05-25

## 2023-05-25 PROBLEM — R79.89 ELEVATED LFTS: Status: RESOLVED | Noted: 2023-01-24 | Resolved: 2023-05-25

## 2023-05-25 PROBLEM — Z51.5 PALLIATIVE CARE ENCOUNTER: Status: RESOLVED | Noted: 2023-01-25 | Resolved: 2023-05-25

## 2023-05-25 PROBLEM — J96.01 ACUTE RESPIRATORY FAILURE WITH HYPOXIA (HCC): Status: RESOLVED | Noted: 2023-01-24 | Resolved: 2023-05-25

## 2023-05-25 PROBLEM — N18.30 CHRONIC RENAL DISEASE, STAGE III (HCC): Status: ACTIVE | Noted: 2023-05-25

## 2023-05-25 PROBLEM — I50.43 ACUTE ON CHRONIC COMBINED SYSTOLIC AND DIASTOLIC CONGESTIVE HEART FAILURE (HCC): Status: RESOLVED | Noted: 2023-01-24 | Resolved: 2023-05-25

## 2023-05-25 PROBLEM — K81.0 ACUTE CHOLECYSTITIS: Status: RESOLVED | Noted: 2023-01-24 | Resolved: 2023-05-25

## 2023-05-25 PROBLEM — I25.119 ATHEROSCLEROTIC HEART DISEASE OF NATIVE CORONARY ARTERY WITH UNSPECIFIED ANGINA PECTORIS (HCC): Status: ACTIVE | Noted: 2023-05-25

## 2023-05-25 PROBLEM — I13.10 CARDIORENAL SYNDROME: Status: RESOLVED | Noted: 2023-01-24 | Resolved: 2023-05-25

## 2023-05-25 PROBLEM — R09.02 HYPOXIA: Status: RESOLVED | Noted: 2023-01-24 | Resolved: 2023-05-25

## 2023-05-25 PROBLEM — J18.9 COMMUNITY ACQUIRED PNEUMONIA OF RIGHT LOWER LOBE OF LUNG: Status: RESOLVED | Noted: 2023-01-24 | Resolved: 2023-05-25

## 2023-05-25 PROBLEM — J90 BILATERAL PLEURAL EFFUSION: Status: RESOLVED | Noted: 2022-11-15 | Resolved: 2023-05-25

## 2023-05-25 RX ORDER — ATORVASTATIN CALCIUM 80 MG/1
80 TABLET, FILM COATED ORAL NIGHTLY
Qty: 90 TABLET | Refills: 1 | Status: SHIPPED | OUTPATIENT
Start: 2023-05-25

## 2023-05-25 RX ORDER — PREGABALIN 25 MG/1
25 CAPSULE ORAL 2 TIMES DAILY
Qty: 120 CAPSULE | Refills: 1 | Status: SHIPPED | OUTPATIENT
Start: 2023-05-25 | End: 2023-07-24

## 2023-05-25 NOTE — PROGRESS NOTES
OFFICE PROGRESS NOTE      SUBJECTIVE:        Patient ID:   Karlie Dawson is a 81 y.o. female who presents for   Chief Complaint   Patient presents with    Hypertension    Congestive Heart Failure           HPI:     RECHECK BP, CHOLESTEROL AND HEART PROBLEM. HAD BLOOD WORK DONE RECENTLY. NO SYMPTOMS RELATED TO LOW BP TODAY. MEDICATION REFILL. FEELS GOOD. WATCHING DIET GOOD. WALKING SOME IN HOUSE FOR EXERCISE. TAKING MEDICATIONS REGULARLY. TAKING IRON TABLETS AND MULTIVITAMIN REGULARLY. Prior to Admission medications    Medication Sig Start Date End Date Taking? Authorizing Provider   pregabalin (LYRICA) 25 MG capsule Take 1 capsule by mouth 2 times daily for 60 days. 5/25/23 7/24/23 Yes Brenda Leo MD   atorvastatin (LIPITOR) 80 MG tablet Take 1 tablet by mouth nightly 5/25/23  Yes Brenda Leo MD   blood glucose test strips (ASCENSIA AUTODISC VI;ONE TOUCH ULTRA TEST VI) strip 1 each by Does not apply route 2 times daily 2/23/23  Yes Brenda Leo MD   Lancets MISC 1 each by Does not apply route 2 times daily 2/23/23  Yes Brenda Leo MD   spironolactone (ALDACTONE) 25 MG tablet Take 1 tablet by mouth Daily with lunch 2/15/23  Yes Ceferino Rayagrim APRN - CNP   furosemide (LASIX) 20 MG tablet Take 1 tablet by mouth daily 2/10/23  Yes Ceferino Southaven, APRN - CNP   dapagliflozin (FARXIGA) 10 MG tablet Take 1 tablet by mouth every morning 2/10/23  Yes Ceferino [place], APRN - CNP   clopidogrel (PLAVIX) 75 MG tablet Take 1 tablet by mouth daily 2/10/23  Yes Ceefrino Southaven, APRN - CNP   nitroGLYCERIN (NITROSTAT) 0.4 MG SL tablet Place 1 tablet under the tongue every 5 minutes as needed for Chest pain up to max of 3 total doses.  If no relief after 1 dose, call 911. 11/24/22  Yes Glen Man MD   ferrous sulfate (IRON 325) 325 (65 Fe) MG tablet Take 1 tablet by mouth 2 times daily (with meals) 11/22/22  Yes Mario Andrew MD

## 2023-05-25 NOTE — PATIENT INSTRUCTIONS
LOW SALT FOR BLOOD PRESSURE CONTROL. LOW CARBOHYDRATE FOR BLOOD SUGAR AND WEIGHT CONTROL. LOW FAT DIET FOR CHOLESTEROL CONTROL. DRINK ENOUGH FLUIDS FOR BETTER KIDNEY FUNCTION. TAKE COZAAR 50 MG. TOPROL 50 MG AND DYAZIDE 37.5-25 MG. DAILY FOR BLOOD PRESSURE AND LEG EDEMA CONTROL. TAKE FARXIGA 10 MG DAILY FOR BLOOD SUGAR CONTROL. STOP TAKING METFORMIN. TAKE LIPITOR 80 MG. NIGHTLY FOR CHOLESTEROL CONTROL AS PER [facility]. INHALE COMBIVENT INHALER 1 PUFF 2 TIMES A DAY FOR BREATHING CONTROL  REGULAR WALKING IN HOUSE ADVISED. GO TO CHF CLINIC FOR FOLLOW UP CARE. NEXT APPOINTMENT IN 3 MONTHS.

## 2023-05-26 NOTE — TELEPHONE ENCOUNTER
Has no c/o. Stays within 1lb  02 sat 99% today. Always good  Breathing is good  No swelling and no pain. Will utilize chf clinic if any symptomatic chf occurs. She is aware of s/s chf to look for.    Gustavo Cruz RN

## 2023-05-30 ENCOUNTER — TELEPHONE (OUTPATIENT)
Dept: FAMILY MEDICINE CLINIC | Age: 88
End: 2023-05-30

## 2023-05-30 NOTE — TELEPHONE ENCOUNTER
Katlyn Segovia from [facility] called. She stated she is going to re certify pt for home care she is asking if Dr Claudia Doyle will follow? Please advise.   .Last seen 5/25/2023  Next appt 8/31/2023  Dolores Perdomo [phone]

## 2023-06-20 ENCOUNTER — HOSPITAL ENCOUNTER (OUTPATIENT)
Dept: OTHER | Age: 88
Setting detail: THERAPIES SERIES
Discharge: HOME | End: 2023-06-20
Payer: MEDICARE

## 2023-06-20 VITALS
SYSTOLIC BLOOD PRESSURE: 103 MMHG | BODY MASS INDEX: 23.4 KG/M2 | OXYGEN SATURATION: 96 % | RESPIRATION RATE: 18 BRPM | WEIGHT: 145 LBS | DIASTOLIC BLOOD PRESSURE: 44 MMHG | HEART RATE: 78 BPM

## 2023-06-20 LAB
ANION GAP SERPL CALCULATED.3IONS-SCNC: 12 MMOL/L (ref 7–16)
BNP BLD-MCNC: 9189 PG/ML (ref 0–450)
BUN SERPL-MCNC: 34 MG/DL (ref 6–23)
CALCIUM SERPL-MCNC: 9.5 MG/DL (ref 8.6–10.2)
CHLORIDE SERPL-SCNC: 100 MMOL/L (ref 98–107)
CO2 SERPL-SCNC: 23 MMOL/L (ref 22–29)
CREAT SERPL-MCNC: 1 MG/DL (ref 0.5–1)
GLUCOSE SERPL-MCNC: 201 MG/DL (ref 74–99)
POTASSIUM SERPL-SCNC: 4.1 MMOL/L (ref 3.5–5)
SODIUM SERPL-SCNC: 135 MMOL/L (ref 132–146)

## 2023-06-20 PROCEDURE — 36415 COLL VENOUS BLD VENIPUNCTURE: CPT

## 2023-06-20 PROCEDURE — 80048 BASIC METABOLIC PNL TOTAL CA: CPT

## 2023-06-20 PROCEDURE — 83880 ASSAY OF NATRIURETIC PEPTIDE: CPT

## 2023-06-20 PROCEDURE — 99214 OFFICE O/P EST MOD 30 MIN: CPT

## 2023-06-20 NOTE — PROGRESS NOTES
Value   06/20/2023 100   05/19/2023 102   05/08/2023 107     CO2 (mmol/L)   Date Value   06/20/2023 23   05/19/2023 23   05/08/2023 23     BUN (mg/dL)   Date Value   06/20/2023 34 (H)   05/19/2023 33 (H)   05/08/2023 28 (H)     Glucose (mg/dL)   Date Value   06/20/2023 201 (H)   05/19/2023 115 (H)   05/08/2023 159 (H)   04/05/2012 125 (H)   01/09/2012 120 (H)   05/16/2011 125 (H)     Calcium (mg/dL)   Date Value   06/20/2023 9.5   05/19/2023 9.7   05/08/2023 9.8       Last 3 BNP       Pro-BNP (pg/mL)   Date Value   06/20/2023 9,189 (H)   05/08/2023 12,378 (H)   04/17/2023 10,074 (H)          CBC: No results for input(s): WBC, HGB, PLT in the last 72 hours. BMP:    Recent Labs     06/20/23  1110      K 4.1      CO2 23   BUN 34*   CREATININE 1.0   GLUCOSE 201*         Hepatic: No results for input(s): AST, ALT, ALB, BILITOT, ALKPHOS in the last 72 hours. Troponin: No results for input(s): TROPONINI in the last 72 hours. BNP: No results for input(s): BNP in the last 72 hours. Lipids: No results for input(s): CHOL, HDL in the last 72 hours. Invalid input(s): LDLCALCU  INR: No results for input(s): INR in the last 72 hours. WEIGHTS:    Wt Readings from Last 3 Encounters:   06/20/23 145 lb (65.8 kg)   05/25/23 145 lb (65.8 kg)   05/08/23 145 lb 6.4 oz (66 kg)       TELEMETRY:  Cardiac Regularity: Regular  Cardiac Rhythm/Interpretation: SR   ASSESSMENT:   Robert Weinstein presents for chf clinic follow up with stable weights. Currently denies SOB, BETTS, or orthopnea. Repeat echo - was referred to EP for ICD however not a candidate at this time. Lifevest continued. Has an appt next month with Dr. Consuelo Valentine. 2 mo CHF clinic follow up.      Interventions completed this visit:  IV diuretics given no  Lab work obtained yes, BNP BMP   Reviewed currently prescribed medications with patient, educated on importance of compliance and answered any questions regarding their medication  Educated on signs and

## 2023-07-13 ENCOUNTER — OFFICE VISIT (OUTPATIENT)
Dept: CARDIOLOGY CLINIC | Age: 88
End: 2023-07-13
Payer: MEDICARE

## 2023-07-13 VITALS
DIASTOLIC BLOOD PRESSURE: 52 MMHG | RESPIRATION RATE: 16 BRPM | BODY MASS INDEX: 23.14 KG/M2 | HEART RATE: 71 BPM | WEIGHT: 144 LBS | SYSTOLIC BLOOD PRESSURE: 112 MMHG | HEIGHT: 66 IN

## 2023-07-13 DIAGNOSIS — I10 ESSENTIAL HYPERTENSION: ICD-10-CM

## 2023-07-13 DIAGNOSIS — I25.5 ISCHEMIC CARDIOMYOPATHY: ICD-10-CM

## 2023-07-13 DIAGNOSIS — I25.10 CORONARY ARTERY DISEASE INVOLVING NATIVE CORONARY ARTERY OF NATIVE HEART WITHOUT ANGINA PECTORIS: Primary | ICD-10-CM

## 2023-07-13 PROCEDURE — 1036F TOBACCO NON-USER: CPT | Performed by: INTERNAL MEDICINE

## 2023-07-13 PROCEDURE — 99214 OFFICE O/P EST MOD 30 MIN: CPT | Performed by: INTERNAL MEDICINE

## 2023-07-13 PROCEDURE — 1123F ACP DISCUSS/DSCN MKR DOCD: CPT | Performed by: INTERNAL MEDICINE

## 2023-07-13 PROCEDURE — 93000 ELECTROCARDIOGRAM COMPLETE: CPT | Performed by: INTERNAL MEDICINE

## 2023-07-13 PROCEDURE — G8427 DOCREV CUR MEDS BY ELIG CLIN: HCPCS | Performed by: INTERNAL MEDICINE

## 2023-07-13 PROCEDURE — 1090F PRES/ABSN URINE INCON ASSESS: CPT | Performed by: INTERNAL MEDICINE

## 2023-07-13 PROCEDURE — G8420 CALC BMI NORM PARAMETERS: HCPCS | Performed by: INTERNAL MEDICINE

## 2023-07-13 NOTE — PROGRESS NOTES
OFFICE VISIT     PRIMARY CARE PHYSICIAN:      Michelle Adkins MD       ALLERGIES / SENSITIVITIES:        Allergies   Allergen Reactions    Pcn [Penicillins] Hives    Sulfa Antibiotics Hives          REVIEWED MEDICATIONS:        Current Outpatient Medications:     pregabalin (LYRICA) 25 MG capsule, Take 1 capsule by mouth 2 times daily for 60 days. , Disp: 120 capsule, Rfl: 1    atorvastatin (LIPITOR) 80 MG tablet, Take 1 tablet by mouth nightly, Disp: 90 tablet, Rfl: 1    blood glucose test strips (ASCENSIA AUTODISC VI;ONE TOUCH ULTRA TEST VI) strip, 1 each by Does not apply route 2 times daily, Disp: 100 each, Rfl: 3    Lancets MISC, 1 each by Does not apply route 2 times daily, Disp: 100 each, Rfl: 5    spironolactone (ALDACTONE) 25 MG tablet, Take 1 tablet by mouth Daily with lunch, Disp: 45 tablet, Rfl: 3    furosemide (LASIX) 20 MG tablet, Take 1 tablet by mouth daily, Disp: 90 tablet, Rfl: 3    metoprolol succinate (TOPROL XL) 25 MG extended release tablet, Take 1 tablet by mouth nightly, Disp: 90 tablet, Rfl: 3    dapagliflozin (FARXIGA) 10 MG tablet, Take 1 tablet by mouth every morning, Disp: 90 tablet, Rfl: 3    clopidogrel (PLAVIX) 75 MG tablet, Take 1 tablet by mouth daily, Disp: 90 tablet, Rfl: 3    nitroGLYCERIN (NITROSTAT) 0.4 MG SL tablet, Place 1 tablet under the tongue every 5 minutes as needed for Chest pain up to max of 3 total doses.  If no relief after 1 dose, call 911., Disp: 25 tablet, Rfl: 3    ferrous sulfate (IRON 325) 325 (65 Fe) MG tablet, Take 1 tablet by mouth 2 times daily (with meals), Disp: 30 tablet, Rfl: 0    albuterol-ipratropium (COMBIVENT RESPIMAT)  MCG/ACT AERS inhaler, Inhale 1 puff into the lungs 2 times daily, Disp: 1 each, Rfl: 3    Cholecalciferol (VITAMIN D3) 50 MCG (2000 UT) CAPS, Take by mouth daily, Disp: , Rfl:     ascorbic acid (VITAMIN C) 250 MG tablet, Take 1 tablet by mouth daily, Disp: , Rfl:     vitamin B-12 (CYANOCOBALAMIN) 1000 MCG tablet, Take 1 tablet by

## 2023-08-15 ENCOUNTER — HOSPITAL ENCOUNTER (OUTPATIENT)
Dept: OTHER | Age: 88
Setting detail: THERAPIES SERIES
Discharge: HOME | End: 2023-08-15
Payer: MEDICARE

## 2023-08-15 VITALS
OXYGEN SATURATION: 97 % | BODY MASS INDEX: 23.4 KG/M2 | WEIGHT: 145 LBS | HEART RATE: 68 BPM | DIASTOLIC BLOOD PRESSURE: 48 MMHG | SYSTOLIC BLOOD PRESSURE: 95 MMHG

## 2023-08-15 LAB
ANION GAP SERPL CALCULATED.3IONS-SCNC: 11 MMOL/L (ref 7–16)
BNP SERPL-MCNC: 7533 PG/ML (ref 0–450)
BUN SERPL-MCNC: 47 MG/DL (ref 6–23)
CALCIUM SERPL-MCNC: 9.8 MG/DL (ref 8.6–10.2)
CHLORIDE SERPL-SCNC: 99 MMOL/L (ref 98–107)
CO2 SERPL-SCNC: 23 MMOL/L (ref 22–29)
CREAT SERPL-MCNC: 1.1 MG/DL (ref 0.5–1)
GFR SERPL CREATININE-BSD FRML MDRD: 49 ML/MIN/1.73M2
GLUCOSE SERPL-MCNC: 152 MG/DL (ref 74–99)
POTASSIUM SERPL-SCNC: 4.1 MMOL/L (ref 3.5–5)
SODIUM SERPL-SCNC: 133 MMOL/L (ref 132–146)

## 2023-08-15 PROCEDURE — 80048 BASIC METABOLIC PNL TOTAL CA: CPT

## 2023-08-15 PROCEDURE — 83880 ASSAY OF NATRIURETIC PEPTIDE: CPT

## 2023-08-15 PROCEDURE — 36415 COLL VENOUS BLD VENIPUNCTURE: CPT

## 2023-08-15 PROCEDURE — 99214 OFFICE O/P EST MOD 30 MIN: CPT

## 2023-08-15 NOTE — PROGRESS NOTES
Congestive Heart Failure [address] [place] [address] Dr Reynoso   1935    Referring Provider: Dr. Melodie Reyes  Primary Care Physician: Dr. Johanny Villareal   Cardiologist: Dr. Melodie Reyes  Nephrologist:     History of Present Illness:   Thuy Licona is a 81 y.o. female with a history of HFrEF, most recent EF 25% 3/30/23--Life Vest.    Patient Story:  She does not have dyspnea with exertion, shortness of breath, or decline in overall functional capacity. She does not have orthopnea, PND, nocturnal cough or hemoptysis. She does not have abdominal distention or bloating, early satiety, anorexia/change in appetite. She does have a good urinary response to oral diuretic. She does have trace lower extremity edema. She denies lightheadedness, dizziness. She denies palpitations, syncope or near syncope. She does not complain of chest pain, pressure, discomfort. Pt wearing life vest- denies any shocks or alarms. Allergies   Allergen Reactions    Pcn [Penicillins] Hives    Sulfa Antibiotics Hives         No outpatient medications have been marked as taking for the 8/15/23 encounter [facility] Encounter) with [facility] CHF ROOM 1. Current Outpatient Medications on File Prior to Encounter   Medication Sig Dispense Refill    pregabalin (LYRICA) 25 MG capsule Take 1 capsule by mouth 2 times daily for 60 days.  120 capsule 1    atorvastatin (LIPITOR) 80 MG tablet Take 1 tablet by mouth nightly 90 tablet 1    blood glucose test strips (ASCENSIA AUTODISC VI;ONE TOUCH ULTRA TEST VI) strip 1 each by Does not apply route 2 times daily 100 each 3    Lancets MISC 1 each by Does not apply route 2 times daily 100 each 5    spironolactone (ALDACTONE) 25 MG tablet Take 1 tablet by mouth Daily with lunch 45 tablet 3    furosemide (LASIX) 20 MG tablet Take 1 tablet by mouth daily 90 tablet 3    metoprolol succinate (TOPROL XL) 25 MG extended release tablet Take 1 tablet by mouth nightly 90 tablet 3

## 2023-08-31 ENCOUNTER — OFFICE VISIT (OUTPATIENT)
Dept: FAMILY MEDICINE CLINIC | Age: 88
End: 2023-08-31
Payer: MEDICARE

## 2023-08-31 VITALS
SYSTOLIC BLOOD PRESSURE: 114 MMHG | DIASTOLIC BLOOD PRESSURE: 70 MMHG | OXYGEN SATURATION: 98 % | BODY MASS INDEX: 23.73 KG/M2 | HEART RATE: 69 BPM | WEIGHT: 147 LBS

## 2023-08-31 DIAGNOSIS — G25.81 RLS (RESTLESS LEGS SYNDROME): ICD-10-CM

## 2023-08-31 DIAGNOSIS — I10 ESSENTIAL HYPERTENSION: ICD-10-CM

## 2023-08-31 DIAGNOSIS — R54 FRAIL ELDERLY: ICD-10-CM

## 2023-08-31 DIAGNOSIS — N18.31 STAGE 3A CHRONIC KIDNEY DISEASE (HCC): ICD-10-CM

## 2023-08-31 DIAGNOSIS — R53.1 GENERALIZED WEAKNESS: ICD-10-CM

## 2023-08-31 DIAGNOSIS — E78.2 MIXED HYPERLIPIDEMIA: ICD-10-CM

## 2023-08-31 DIAGNOSIS — E08.42 DIABETIC POLYNEUROPATHY ASSOCIATED WITH DIABETES MELLITUS DUE TO UNDERLYING CONDITION (HCC): Primary | ICD-10-CM

## 2023-08-31 DIAGNOSIS — I50.22 CHRONIC SYSTOLIC (CONGESTIVE) HEART FAILURE (HCC): ICD-10-CM

## 2023-08-31 PROCEDURE — 1090F PRES/ABSN URINE INCON ASSESS: CPT | Performed by: FAMILY MEDICINE

## 2023-08-31 PROCEDURE — G8427 DOCREV CUR MEDS BY ELIG CLIN: HCPCS | Performed by: FAMILY MEDICINE

## 2023-08-31 PROCEDURE — 1123F ACP DISCUSS/DSCN MKR DOCD: CPT | Performed by: FAMILY MEDICINE

## 2023-08-31 PROCEDURE — 99214 OFFICE O/P EST MOD 30 MIN: CPT | Performed by: FAMILY MEDICINE

## 2023-08-31 PROCEDURE — G8420 CALC BMI NORM PARAMETERS: HCPCS | Performed by: FAMILY MEDICINE

## 2023-08-31 PROCEDURE — 1036F TOBACCO NON-USER: CPT | Performed by: FAMILY MEDICINE

## 2023-08-31 RX ORDER — TIMOLOL MALEATE 5 MG/ML
SOLUTION/ DROPS OPHTHALMIC
Qty: 5 ML | Refills: 3 | Status: SHIPPED | OUTPATIENT
Start: 2023-08-31

## 2023-08-31 RX ORDER — PREGABALIN 25 MG/1
25 CAPSULE ORAL 2 TIMES DAILY
Qty: 120 CAPSULE | Refills: 1 | Status: SHIPPED | OUTPATIENT
Start: 2023-08-31 | End: 2023-12-29

## 2023-08-31 NOTE — PROGRESS NOTES
Atherosclerotic heart disease of native coronary artery with unspecified angina pectoris 05/25/2023    Chronic systolic (congestive) heart failure 05/25/2023    Chronic obstructive pulmonary disease ([facility]) 02/09/2016        Diagnosis:     ICD-10-CM    1. Diabetic polyneuropathy associated with diabetes mellitus due to underlying condition ([facility])  E08.42 pregabalin (LYRICA) 25 MG capsule     Ambulatory Referral to Care Management with Device (Remote Patient Monitoring)    PROGRESSIVE       2. Mixed hyperlipidemia  E78.2 Comprehensive Metabolic Panel     Lipid Panel    CONTROLLED      3. Essential hypertension  I10 CBC with Auto Differential    CONTROLLED      4. Chronic systolic (congestive) heart failure  I50.22 Ambulatory Referral to Care Management with Device (Remote Patient Monitoring)    STABLE      5. Stage 3a chronic kidney disease (HCC)  N18.31     STABLE      6. RLS (restless legs syndrome)  G25.81 Ambulatory Referral to Care Management with Device (Remote Patient Monitoring)    STABLE      7. Generalized weakness  R53.1 Ambulatory Referral to Care Management with Device (Remote Patient Monitoring)      8. Frail elderly  R54 Ambulatory Referral to Care Management with Device (Remote Patient Monitoring)          PLAN:           Patient Instructions   LOW SALT FOR BLOOD PRESSURE CONTROL. LOW CARBOHYDRATE FOR BLOOD SUGAR AND WEIGHT CONTROL. LOW FAT DIET FOR CHOLESTEROL CONTROL. DRINK ENOUGH FLUIDS FOR BETTER KIDNEY FUNCTION. TAKE COZAAR 50 MG. TOPROL 50 MG AND DYAZIDE 37.5-25 MG. DAILY FOR BLOOD PRESSURE AND LEG EDEMA CONTROL. TAKE FARXIGA 10 MG DAILY FOR BLOOD SUGAR CONTROL. STOP TAKING METFORMIN. TAKE LIPITOR 80 MG. NIGHTLY FOR CHOLESTEROL CONTROL AS PER [facility]. INHALE COMBIVENT INHALER 1 PUFF 2 TIMES A DAY FOR BREATHING CONTROL  REGULAR WALKING IN HOUSE ADVISED. GO TO CHF CLINIC FOR FOLLOW UP CARE. FASTING FOR LAB WORK ONE MORNING. NEXT APPOINTMENT IN 3 MONTHS.        Return in about 3 months (around

## 2023-08-31 NOTE — PATIENT INSTRUCTIONS
LOW SALT FOR BLOOD PRESSURE CONTROL. LOW CARBOHYDRATE FOR BLOOD SUGAR AND WEIGHT CONTROL. LOW FAT DIET FOR CHOLESTEROL CONTROL. DRINK ENOUGH FLUIDS FOR BETTER KIDNEY FUNCTION. TAKE COZAAR 50 MG. TOPROL 50 MG AND DYAZIDE 37.5-25 MG. DAILY FOR BLOOD PRESSURE AND LEG EDEMA CONTROL. TAKE FARXIGA 10 MG DAILY FOR BLOOD SUGAR CONTROL. STOP TAKING METFORMIN. TAKE LIPITOR 80 MG. NIGHTLY FOR CHOLESTEROL CONTROL AS PER [facility]. INHALE COMBIVENT INHALER 1 PUFF 2 TIMES A DAY FOR BREATHING CONTROL  REGULAR WALKING IN HOUSE ADVISED. GO TO CHF CLINIC FOR FOLLOW UP CARE. FASTING FOR LAB WORK ONE MORNING. NEXT APPOINTMENT IN 3 MONTHS.

## 2023-09-01 ENCOUNTER — CARE COORDINATION (OUTPATIENT)
Dept: CARE COORDINATION | Age: 88
End: 2023-09-01

## 2023-09-01 NOTE — CARE COORDINATION
ACM contacted patient to offer Care Coordination. Patient declined at this time but would like to discuss at a later outreach. Patient wants to discuss with daughter first. ACM encouraged patient to contact ACM if she feels she needs any further explanation about Care Coordination and encouraged her daughter to reach out at any time. PLAN:  AC to perform outreach as planned.   Letter mailed

## 2023-09-06 ENCOUNTER — CARE COORDINATION (OUTPATIENT)
Dept: CARE COORDINATION | Age: 88
End: 2023-09-06

## 2023-09-07 DIAGNOSIS — I10 ESSENTIAL HYPERTENSION: ICD-10-CM

## 2023-09-07 DIAGNOSIS — E78.2 MIXED HYPERLIPIDEMIA: ICD-10-CM

## 2023-09-07 LAB
ABSOLUTE IMMATURE GRANULOCYTE: <0.03 K/UL (ref 0–0.58)
ALBUMIN SERPL-MCNC: 4.6 G/DL (ref 3.5–5.2)
ALP BLD-CCNC: 94 U/L (ref 35–104)
ALT SERPL-CCNC: 15 U/L (ref 0–32)
ANION GAP SERPL CALCULATED.3IONS-SCNC: 13 MMOL/L (ref 7–16)
AST SERPL-CCNC: 24 U/L (ref 0–31)
BASOPHILS ABSOLUTE: 0.01 K/UL (ref 0–0.2)
BASOPHILS RELATIVE PERCENT: 0 % (ref 0–2)
BILIRUB SERPL-MCNC: 0.9 MG/DL (ref 0–1.2)
BUN BLDV-MCNC: 39 MG/DL (ref 6–23)
CALCIUM SERPL-MCNC: 10.1 MG/DL (ref 8.6–10.2)
CHLORIDE BLD-SCNC: 97 MMOL/L (ref 98–107)
CHOLESTEROL: 136 MG/DL
CO2: 24 MMOL/L (ref 22–29)
CREAT SERPL-MCNC: 1.2 MG/DL (ref 0.5–1)
EOSINOPHILS ABSOLUTE: 0.13 K/UL (ref 0.05–0.5)
EOSINOPHILS RELATIVE PERCENT: 3 % (ref 0–6)
GFR SERPL CREATININE-BSD FRML MDRD: 46 ML/MIN/1.73M2
GLUCOSE BLD-MCNC: 105 MG/DL (ref 74–99)
HCT VFR BLD CALC: 39.6 % (ref 34–48)
HDLC SERPL-MCNC: 37 MG/DL
HEMOGLOBIN: 12.7 G/DL (ref 11.5–15.5)
IMMATURE GRANULOCYTES: 0 % (ref 0–5)
LDL CHOLESTEROL: 73 MG/DL
LYMPHOCYTES ABSOLUTE: 0.92 K/UL (ref 1.5–4)
LYMPHOCYTES RELATIVE PERCENT: 19 % (ref 20–42)
MCH RBC QN AUTO: 30.8 PG (ref 26–35)
MCHC RBC AUTO-ENTMCNC: 32.1 G/DL (ref 32–34.5)
MCV RBC AUTO: 96.1 FL (ref 80–99.9)
MONOCYTES ABSOLUTE: 0.51 K/UL (ref 0.1–0.95)
MONOCYTES RELATIVE PERCENT: 11 % (ref 2–12)
NEUTROPHILS ABSOLUTE: 3.25 K/UL (ref 1.8–7.3)
NEUTROPHILS RELATIVE PERCENT: 67 % (ref 43–80)
PDW BLD-RTO: 14 % (ref 11.5–15)
PLATELET # BLD: 172 K/UL (ref 130–450)
PMV BLD AUTO: 11.4 FL (ref 7–12)
POTASSIUM SERPL-SCNC: 5.2 MMOL/L (ref 3.5–5)
RBC # BLD: 4.12 M/UL (ref 3.5–5.5)
SODIUM BLD-SCNC: 134 MMOL/L (ref 132–146)
TOTAL PROTEIN: 7.2 G/DL (ref 6.4–8.3)
TRIGL SERPL-MCNC: 130 MG/DL
VLDLC SERPL CALC-MCNC: 26 MG/DL
WBC # BLD: 4.8 K/UL (ref 4.5–11.5)

## 2023-09-11 ENCOUNTER — CARE COORDINATION (OUTPATIENT)
Dept: CARE COORDINATION | Age: 88
End: 2023-09-11

## 2023-09-12 RX ORDER — LATANOPROST 50 UG/ML
SOLUTION/ DROPS OPHTHALMIC
Qty: 7.5 ML | Refills: 3 | Status: SHIPPED | OUTPATIENT
Start: 2023-09-12

## 2023-09-12 RX ORDER — BRIMONIDINE TARTRATE 2 MG/ML
SOLUTION/ DROPS OPHTHALMIC
Qty: 3 EACH | Refills: 3 | Status: SHIPPED | OUTPATIENT
Start: 2023-09-12

## 2023-09-14 DIAGNOSIS — R53.1 GENERALIZED WEAKNESS: Primary | ICD-10-CM

## 2023-09-19 RX ORDER — SPIRONOLACTONE 25 MG/1
25 TABLET ORAL
Qty: 30 TABLET | Refills: 1 | Status: SHIPPED | OUTPATIENT
Start: 2023-09-19

## 2023-10-30 ENCOUNTER — HOSPITAL ENCOUNTER (OUTPATIENT)
Dept: OTHER | Age: 88
Setting detail: THERAPIES SERIES
Discharge: HOME | End: 2023-10-30
Payer: MEDICARE

## 2023-10-30 VITALS
RESPIRATION RATE: 18 BRPM | SYSTOLIC BLOOD PRESSURE: 105 MMHG | OXYGEN SATURATION: 98 % | BODY MASS INDEX: 23.41 KG/M2 | DIASTOLIC BLOOD PRESSURE: 44 MMHG | HEART RATE: 65 BPM | WEIGHT: 145.01 LBS

## 2023-10-30 LAB
ANION GAP SERPL CALCULATED.3IONS-SCNC: 10 MMOL/L (ref 7–16)
BNP SERPL-MCNC: 7857 PG/ML (ref 0–450)
BUN SERPL-MCNC: 41 MG/DL (ref 6–23)
CALCIUM SERPL-MCNC: 9.8 MG/DL (ref 8.6–10.2)
CHLORIDE SERPL-SCNC: 99 MMOL/L (ref 98–107)
CO2 SERPL-SCNC: 26 MMOL/L (ref 22–29)
CREAT SERPL-MCNC: 1.1 MG/DL (ref 0.5–1)
GFR SERPL CREATININE-BSD FRML MDRD: 51 ML/MIN/1.73M2
GLUCOSE SERPL-MCNC: 126 MG/DL (ref 74–99)
POTASSIUM SERPL-SCNC: 4.3 MMOL/L (ref 3.5–5)
SODIUM SERPL-SCNC: 135 MMOL/L (ref 132–146)

## 2023-10-30 PROCEDURE — 83880 ASSAY OF NATRIURETIC PEPTIDE: CPT

## 2023-10-30 PROCEDURE — 99214 OFFICE O/P EST MOD 30 MIN: CPT

## 2023-10-30 PROCEDURE — 36415 COLL VENOUS BLD VENIPUNCTURE: CPT

## 2023-10-30 PROCEDURE — 80048 BASIC METABOLIC PNL TOTAL CA: CPT

## 2023-10-30 RX ORDER — METOPROLOL SUCCINATE 25 MG/1
25 TABLET, EXTENDED RELEASE ORAL NIGHTLY
Qty: 90 TABLET | Refills: 3 | Status: SHIPPED | OUTPATIENT
Start: 2023-10-30

## 2023-10-30 RX ORDER — FUROSEMIDE 20 MG/1
20 TABLET ORAL DAILY
Qty: 90 TABLET | Refills: 3 | Status: SHIPPED | OUTPATIENT
Start: 2023-10-30

## 2023-10-30 RX ORDER — ATORVASTATIN CALCIUM 80 MG/1
80 TABLET, FILM COATED ORAL NIGHTLY
Qty: 90 TABLET | Refills: 3 | Status: SHIPPED | OUTPATIENT
Start: 2023-10-30

## 2023-10-30 RX ORDER — SPIRONOLACTONE 25 MG/1
25 TABLET ORAL
Qty: 90 TABLET | Refills: 3 | Status: SHIPPED | OUTPATIENT
Start: 2023-10-30

## 2023-10-30 RX ORDER — CLOPIDOGREL BISULFATE 75 MG/1
75 TABLET ORAL DAILY
Qty: 90 TABLET | Refills: 3 | Status: SHIPPED | OUTPATIENT
Start: 2023-10-30

## 2023-10-30 NOTE — PROGRESS NOTES
Yoselin Helm MD   latanoprost (XALATAN) 0.005 % ophthalmic solution INSTILL 1 DROP INTO EACH EYE ONCE DAILY AT BEDTIME  Yoselin Helm MD   pregabalin (LYRICA) 25 MG capsule Take 1 capsule by mouth 2 times daily for 120 days. Max Daily Amount: 50 mg  Yoselin Helm MD   timolol (TIMOPTIC) 0.5 % ophthalmic solution INSTILL 1 DROP INTO EACH EYE TWICE DAILY  Yoselin Helm MD   atorvastatin (LIPITOR) 80 MG tablet Take 1 tablet by mouth nightly  Yoselin Helm MD   blood glucose test strips (ASCENSIA AUTODISC VI;ONE TOUCH ULTRA TEST VI) strip 1 each by Does not apply route 2 times daily  Yoselin Helm MD   Lancets MISC 1 each by Does not apply route 2 times daily  Yoselin Helm MD   furosemide (LASIX) 20 MG tablet Take 1 tablet by mouth daily  SUDHAKAR Cole CNP   metoprolol succinate (TOPROL XL) 25 MG extended release tablet Take 1 tablet by mouth nightly  SUDHAKAR Cole CNP   dapagliflozin (FARXIGA) 10 MG tablet Take 1 tablet by mouth every morning  SUDHAKAR Cole CNP   clopidogrel (PLAVIX) 75 MG tablet Take 1 tablet by mouth daily  SUDHAKAR Cole CNP   nitroGLYCERIN (NITROSTAT) 0.4 MG SL tablet Place 1 tablet under the tongue every 5 minutes as needed for Chest pain up to max of 3 total doses. If no relief after 1 dose, call 911.   Patient not taking: Reported on 10/30/2023  Collette Hazard, MD   ferrous sulfate (IRON 325) 325 (65 Fe) MG tablet Take 1 tablet by mouth 2 times daily (with meals)  Cayetano Pugh MD   albuterol-ipratropium (COMBIVENT RESPIMAT)  MCG/ACT AERS inhaler Inhale 1 puff into the lungs 2 times daily  Yoselin Helm MD   Cholecalciferol (VITAMIN D3) 50 MCG (2000 UT) CAPS Take by mouth daily  Marilynn Rodriguez MD   ascorbic acid (VITAMIN C) 250 MG tablet Take 1 tablet by mouth daily  Marilynn Rodriguez MD   vitamin B-12 (CYANOCOBALAMIN) 1000 MCG tablet Take 1 tablet by mouth daily  Marilynn Rodriguez MD   Blood Glucose

## 2023-12-07 ENCOUNTER — OFFICE VISIT (OUTPATIENT)
Dept: FAMILY MEDICINE CLINIC | Age: 88
End: 2023-12-07
Payer: MEDICARE

## 2023-12-07 VITALS
BODY MASS INDEX: 23.57 KG/M2 | OXYGEN SATURATION: 98 % | SYSTOLIC BLOOD PRESSURE: 112 MMHG | HEART RATE: 78 BPM | DIASTOLIC BLOOD PRESSURE: 68 MMHG | WEIGHT: 146 LBS

## 2023-12-07 DIAGNOSIS — E78.2 MIXED HYPERLIPIDEMIA: Primary | ICD-10-CM

## 2023-12-07 DIAGNOSIS — I50.22 CHRONIC SYSTOLIC (CONGESTIVE) HEART FAILURE (HCC): ICD-10-CM

## 2023-12-07 DIAGNOSIS — N18.31 STAGE 3A CHRONIC KIDNEY DISEASE (HCC): ICD-10-CM

## 2023-12-07 DIAGNOSIS — R53.1 GENERALIZED WEAKNESS: ICD-10-CM

## 2023-12-07 DIAGNOSIS — E08.42 DIABETIC POLYNEUROPATHY ASSOCIATED WITH DIABETES MELLITUS DUE TO UNDERLYING CONDITION (HCC): ICD-10-CM

## 2023-12-07 DIAGNOSIS — I10 ESSENTIAL HYPERTENSION: ICD-10-CM

## 2023-12-07 DIAGNOSIS — J44.9 CHRONIC OBSTRUCTIVE PULMONARY DISEASE, UNSPECIFIED COPD TYPE (HCC): ICD-10-CM

## 2023-12-07 PROCEDURE — 1090F PRES/ABSN URINE INCON ASSESS: CPT | Performed by: FAMILY MEDICINE

## 2023-12-07 PROCEDURE — G8427 DOCREV CUR MEDS BY ELIG CLIN: HCPCS | Performed by: FAMILY MEDICINE

## 2023-12-07 PROCEDURE — 1123F ACP DISCUSS/DSCN MKR DOCD: CPT | Performed by: FAMILY MEDICINE

## 2023-12-07 PROCEDURE — 1036F TOBACCO NON-USER: CPT | Performed by: FAMILY MEDICINE

## 2023-12-07 PROCEDURE — 3023F SPIROM DOC REV: CPT | Performed by: FAMILY MEDICINE

## 2023-12-07 PROCEDURE — G8484 FLU IMMUNIZE NO ADMIN: HCPCS | Performed by: FAMILY MEDICINE

## 2023-12-07 PROCEDURE — G8420 CALC BMI NORM PARAMETERS: HCPCS | Performed by: FAMILY MEDICINE

## 2023-12-07 PROCEDURE — 99214 OFFICE O/P EST MOD 30 MIN: CPT | Performed by: FAMILY MEDICINE

## 2023-12-07 RX ORDER — PREGABALIN 25 MG/1
25 CAPSULE ORAL 2 TIMES DAILY
Qty: 120 CAPSULE | Refills: 1 | Status: SHIPPED | OUTPATIENT
Start: 2023-12-07 | End: 2024-04-05

## 2023-12-07 NOTE — PROGRESS NOTES
reviewed my findings and recommendations with Nicolas Pals.     Electronically signed by Dalton Montes MD on 12/7/23 at 10:37 AM EST

## 2023-12-13 DIAGNOSIS — E11.9 TYPE 2 DIABETES MELLITUS WITHOUT COMPLICATION, WITHOUT LONG-TERM CURRENT USE OF INSULIN (HCC): ICD-10-CM

## 2024-01-01 ENCOUNTER — APPOINTMENT (OUTPATIENT)
Dept: ULTRASOUND IMAGING | Age: 89
DRG: 292 | End: 2024-01-01
Payer: MEDICARE

## 2024-01-01 ENCOUNTER — HOSPITAL ENCOUNTER (OUTPATIENT)
Dept: WOUND CARE | Age: 89
Discharge: HOME | End: 2024-08-27
Attending: PODIATRIST

## 2024-01-01 ENCOUNTER — HOSPITAL ENCOUNTER (INPATIENT)
Age: 89
LOS: 5 days | Discharge: HOSPICE | DRG: 292 | End: 2024-08-28
Attending: EMERGENCY MEDICINE | Admitting: INTERNAL MEDICINE
Payer: MEDICARE

## 2024-01-01 ENCOUNTER — APPOINTMENT (OUTPATIENT)
Age: 89
DRG: 292 | End: 2024-01-01
Payer: MEDICARE

## 2024-01-01 ENCOUNTER — HOSPITAL ENCOUNTER (OUTPATIENT)
Dept: OTHER | Age: 89
Discharge: HOME | End: 2024-08-23

## 2024-01-01 ENCOUNTER — APPOINTMENT (OUTPATIENT)
Dept: GENERAL RADIOLOGY | Age: 89
DRG: 292 | End: 2024-01-01
Payer: MEDICARE

## 2024-01-01 VITALS
SYSTOLIC BLOOD PRESSURE: 80 MMHG | RESPIRATION RATE: 16 BRPM | HEIGHT: 67 IN | OXYGEN SATURATION: 92 % | DIASTOLIC BLOOD PRESSURE: 51 MMHG | WEIGHT: 147 LBS | TEMPERATURE: 98.3 F | BODY MASS INDEX: 23.07 KG/M2 | HEART RATE: 80 BPM

## 2024-01-01 DIAGNOSIS — J90 PLEURAL EFFUSION, BILATERAL: ICD-10-CM

## 2024-01-01 DIAGNOSIS — Z51.5 PALLIATIVE CARE ENCOUNTER: Primary | ICD-10-CM

## 2024-01-01 DIAGNOSIS — R74.01 TRANSAMINITIS: ICD-10-CM

## 2024-01-01 DIAGNOSIS — I50.9 ACUTE ON CHRONIC CONGESTIVE HEART FAILURE, UNSPECIFIED HEART FAILURE TYPE (HCC): ICD-10-CM

## 2024-01-01 DIAGNOSIS — I25.5 ISCHEMIC CARDIOMYOPATHY: ICD-10-CM

## 2024-01-01 DIAGNOSIS — I50.43 ACUTE ON CHRONIC COMBINED SYSTOLIC AND DIASTOLIC CHF (CONGESTIVE HEART FAILURE) (HCC): ICD-10-CM

## 2024-01-01 DIAGNOSIS — M86.9 OSTEOMYELITIS OF LEFT FOOT, UNSPECIFIED TYPE (HCC): ICD-10-CM

## 2024-01-01 DIAGNOSIS — N17.9 AKI (ACUTE KIDNEY INJURY) (HCC): ICD-10-CM

## 2024-01-01 LAB
ALBUMIN SERPL-MCNC: 2.8 G/DL (ref 3.5–5.2)
ALBUMIN SERPL-MCNC: 3 G/DL (ref 3.5–5.2)
ALBUMIN SERPL-MCNC: 3.1 G/DL (ref 3.5–5.2)
ALBUMIN SERPL-MCNC: 3.1 G/DL (ref 3.5–5.2)
ALBUMIN SERPL-MCNC: 3.2 G/DL (ref 3.5–5.2)
ALBUMIN SERPL-MCNC: 3.9 G/DL (ref 3.5–5.2)
ALP SERPL-CCNC: 220 U/L (ref 35–104)
ALP SERPL-CCNC: 259 U/L (ref 35–104)
ALP SERPL-CCNC: 277 U/L (ref 35–104)
ALP SERPL-CCNC: 310 U/L (ref 35–104)
ALP SERPL-CCNC: 378 U/L (ref 35–104)
ALP SERPL-CCNC: 381 U/L (ref 35–104)
ALT SERPL-CCNC: 103 U/L (ref 0–32)
ALT SERPL-CCNC: 141 U/L (ref 0–32)
ALT SERPL-CCNC: 175 U/L (ref 0–32)
ALT SERPL-CCNC: 1779 U/L (ref 0–32)
ALT SERPL-CCNC: 180 U/L (ref 0–32)
ALT SERPL-CCNC: 87 U/L (ref 0–32)
ANION GAP SERPL CALCULATED.3IONS-SCNC: 11 MMOL/L (ref 7–16)
ANION GAP SERPL CALCULATED.3IONS-SCNC: 12 MMOL/L (ref 7–16)
ANION GAP SERPL CALCULATED.3IONS-SCNC: 13 MMOL/L (ref 7–16)
ANION GAP SERPL CALCULATED.3IONS-SCNC: 14 MMOL/L (ref 7–16)
ANION GAP SERPL CALCULATED.3IONS-SCNC: 15 MMOL/L (ref 7–16)
ANION GAP SERPL CALCULATED.3IONS-SCNC: 17 MMOL/L (ref 7–16)
AST SERPL-CCNC: 120 U/L (ref 0–31)
AST SERPL-CCNC: 215 U/L (ref 0–31)
AST SERPL-CCNC: 239 U/L (ref 0–31)
AST SERPL-CCNC: 3909 U/L (ref 0–31)
AST SERPL-CCNC: 55 U/L (ref 0–31)
AST SERPL-CCNC: 74 U/L (ref 0–31)
BASOPHILS # BLD: 0.01 K/UL (ref 0–0.2)
BASOPHILS NFR BLD: 0 % (ref 0–2)
BILIRUB SERPL-MCNC: 0.7 MG/DL (ref 0–1.2)
BILIRUB SERPL-MCNC: 0.9 MG/DL (ref 0–1.2)
BILIRUB SERPL-MCNC: 0.9 MG/DL (ref 0–1.2)
BILIRUB SERPL-MCNC: 1 MG/DL (ref 0–1.2)
BILIRUB SERPL-MCNC: 1.1 MG/DL (ref 0–1.2)
BILIRUB SERPL-MCNC: 2.5 MG/DL (ref 0–1.2)
BILIRUB UR QL STRIP: NEGATIVE
BNP SERPL-MCNC: ABNORMAL PG/ML (ref 0–450)
BNP SERPL-MCNC: ABNORMAL PG/ML (ref 0–450)
BUN SERPL-MCNC: 43 MG/DL (ref 6–23)
BUN SERPL-MCNC: 46 MG/DL (ref 6–23)
BUN SERPL-MCNC: 47 MG/DL (ref 6–23)
BUN SERPL-MCNC: 48 MG/DL (ref 6–23)
BUN SERPL-MCNC: 48 MG/DL (ref 6–23)
BUN SERPL-MCNC: 58 MG/DL (ref 6–23)
CALCIUM SERPL-MCNC: 8.8 MG/DL (ref 8.6–10.2)
CALCIUM SERPL-MCNC: 9 MG/DL (ref 8.6–10.2)
CALCIUM SERPL-MCNC: 9 MG/DL (ref 8.6–10.2)
CALCIUM SERPL-MCNC: 9.1 MG/DL (ref 8.6–10.2)
CALCIUM SERPL-MCNC: 9.4 MG/DL (ref 8.6–10.2)
CALCIUM SERPL-MCNC: 9.8 MG/DL (ref 8.6–10.2)
CHLORIDE SERPL-SCNC: 101 MMOL/L (ref 98–107)
CHLORIDE SERPL-SCNC: 102 MMOL/L (ref 98–107)
CHLORIDE SERPL-SCNC: 102 MMOL/L (ref 98–107)
CHLORIDE SERPL-SCNC: 103 MMOL/L (ref 98–107)
CHLORIDE SERPL-SCNC: 104 MMOL/L (ref 98–107)
CHLORIDE SERPL-SCNC: 99 MMOL/L (ref 98–107)
CK SERPL-CCNC: 84 U/L (ref 20–180)
CLARITY UR: CLEAR
CO2 SERPL-SCNC: 19 MMOL/L (ref 22–29)
CO2 SERPL-SCNC: 20 MMOL/L (ref 22–29)
CO2 SERPL-SCNC: 22 MMOL/L (ref 22–29)
CO2 SERPL-SCNC: 23 MMOL/L (ref 22–29)
CO2 SERPL-SCNC: 23 MMOL/L (ref 22–29)
CO2 SERPL-SCNC: 24 MMOL/L (ref 22–29)
COLOR UR: YELLOW
CREAT SERPL-MCNC: 1.4 MG/DL (ref 0.5–1)
CREAT SERPL-MCNC: 1.5 MG/DL (ref 0.5–1)
CREAT SERPL-MCNC: 1.7 MG/DL (ref 0.5–1)
CREAT SERPL-MCNC: 1.8 MG/DL (ref 0.5–1)
CREAT SERPL-MCNC: 1.8 MG/DL (ref 0.5–1)
CREAT SERPL-MCNC: 2.1 MG/DL (ref 0.5–1)
ECHO AO ASC DIAM: 3.2 CM
ECHO AO ASCENDING AORTA INDEX: 1.81 CM/M2
ECHO AV AREA PEAK VELOCITY: 2 CM2
ECHO AV AREA VTI: 2.4 CM2
ECHO AV AREA/BSA PEAK VELOCITY: 1.1 CM2/M2
ECHO AV AREA/BSA VTI: 1.4 CM2/M2
ECHO AV CUSP MM: 1.5 CM
ECHO AV MEAN GRADIENT: 3 MMHG
ECHO AV MEAN VELOCITY: 0.9 M/S
ECHO AV PEAK GRADIENT: 7 MMHG
ECHO AV PEAK VELOCITY: 1.4 M/S
ECHO AV VELOCITY RATIO: 0.64
ECHO AV VTI: 21.2 CM
ECHO BSA: 1.78 M2
ECHO EST RA PRESSURE: 8 MMHG
ECHO LA DIAMETER INDEX: 2.82 CM/M2
ECHO LA DIAMETER: 5 CM
ECHO LA VOL A-L A2C: 169 ML (ref 22–52)
ECHO LA VOL A-L A4C: 73 ML (ref 22–52)
ECHO LA VOL BP: 118 ML (ref 22–52)
ECHO LA VOL MOD A2C: 161 ML (ref 22–52)
ECHO LA VOL MOD A4C: 67 ML (ref 22–52)
ECHO LA VOL/BSA BIPLANE: 67 ML/M2 (ref 16–34)
ECHO LA VOLUME AREA LENGTH: 127 ML
ECHO LA VOLUME INDEX A-L A2C: 95 ML/M2 (ref 16–34)
ECHO LA VOLUME INDEX A-L A4C: 41 ML/M2 (ref 16–34)
ECHO LA VOLUME INDEX AREA LENGTH: 72 ML/M2 (ref 16–34)
ECHO LA VOLUME INDEX MOD A2C: 91 ML/M2 (ref 16–34)
ECHO LA VOLUME INDEX MOD A4C: 38 ML/M2 (ref 16–34)
ECHO LV DP/DT: 602.4 MMHG/S
ECHO LV EDV A2C: 250 ML
ECHO LV EDV A4C: 218 ML
ECHO LV EDV BP: 234 ML (ref 56–104)
ECHO LV EDV INDEX A4C: 123 ML/M2
ECHO LV EDV INDEX BP: 132 ML/M2
ECHO LV EDV NDEX A2C: 141 ML/M2
ECHO LV EJECTION FRACTION A2C: 16 %
ECHO LV EJECTION FRACTION A4C: 3 %
ECHO LV EJECTION FRACTION BIPLANE: 9 % (ref 55–100)
ECHO LV ESV A2C: 211 ML
ECHO LV ESV A4C: 212 ML
ECHO LV ESV BP: 213 ML (ref 19–49)
ECHO LV ESV INDEX A2C: 119 ML/M2
ECHO LV ESV INDEX A4C: 120 ML/M2
ECHO LV ESV INDEX BP: 120 ML/M2
ECHO LV FRACTIONAL SHORTENING: 6 % (ref 28–44)
ECHO LV INTERNAL DIMENSION DIASTOLE INDEX: 3.62 CM/M2
ECHO LV INTERNAL DIMENSION DIASTOLIC: 6.4 CM (ref 3.9–5.3)
ECHO LV INTERNAL DIMENSION SYSTOLIC INDEX: 3.39 CM/M2
ECHO LV INTERNAL DIMENSION SYSTOLIC: 6 CM
ECHO LV ISOVOLUMETRIC RELAXATION TIME (IVRT): 72.3 MS
ECHO LV IVSD: 0.9 CM (ref 0.6–0.9)
ECHO LV IVSS: 0.9 CM
ECHO LV MASS 2D: 241.2 G (ref 67–162)
ECHO LV MASS INDEX 2D: 136.3 G/M2 (ref 43–95)
ECHO LV POSTERIOR WALL DIASTOLIC: 0.9 CM (ref 0.6–0.9)
ECHO LV POSTERIOR WALL SYSTOLIC: 0.9 CM
ECHO LV RELATIVE WALL THICKNESS RATIO: 0.28
ECHO LVOT AREA: 3.1 CM2
ECHO LVOT AV VTI INDEX: 0.77
ECHO LVOT DIAM: 2 CM
ECHO LVOT MEAN GRADIENT: 1 MMHG
ECHO LVOT PEAK GRADIENT: 3 MMHG
ECHO LVOT PEAK VELOCITY: 0.9 M/S
ECHO LVOT STROKE VOLUME INDEX: 28.9 ML/M2
ECHO LVOT SV: 51.2 ML
ECHO LVOT VTI: 16.3 CM
ECHO MV A VELOCITY: 0.5 M/S
ECHO MV AREA PHT: 6.5 CM2
ECHO MV AREA VTI: 3 CM2
ECHO MV E DECELERATION TIME (DT): 134.3 MS
ECHO MV E VELOCITY: 1.1 M/S
ECHO MV E/A RATIO: 2.2
ECHO MV EROA PISA: 0.1 CM2
ECHO MV LVOT VTI INDEX: 1.06
ECHO MV MAX VELOCITY: 1 M/S
ECHO MV MEAN GRADIENT: 1 MMHG
ECHO MV MEAN VELOCITY: 0.4 M/S
ECHO MV PEAK GRADIENT: 4 MMHG
ECHO MV PRESSURE HALF TIME (PHT): 33.8 MS
ECHO MV REGURGITANT ALIASING (NYQUIST) VELOCITY: 30 CM/S
ECHO MV REGURGITANT RADIUS PISA: 0.44 CM
ECHO MV REGURGITANT VELOCITY PISA: 5 M/S
ECHO MV REGURGITANT VOLUME PISA: 10.62 ML
ECHO MV REGURGITANT VTIA: 145.6 CM
ECHO MV VTI: 17.3 CM
ECHO PV MAX VELOCITY: 0.6 M/S
ECHO PV MEAN GRADIENT: 1 MMHG
ECHO PV MEAN VELOCITY: 0.4 M/S
ECHO PV PEAK GRADIENT: 2 MMHG
ECHO PV VTI: 9 CM
ECHO RIGHT VENTRICULAR SYSTOLIC PRESSURE (RVSP): 56 MMHG
ECHO RV INTERNAL DIMENSION: 2.1 CM
ECHO RV LONGITUDINAL DIMENSION: 7 CM
ECHO RV MID DIMENSION: 2.2 CM
ECHO RV TAPSE: 1.5 CM (ref 1.7–?)
ECHO TV REGURGITANT MAX VELOCITY: 3.45 M/S
ECHO TV REGURGITANT PEAK GRADIENT: 48 MMHG
EKG ATRIAL RATE: 110 BPM
EKG ATRIAL RATE: 93 BPM
EKG P AXIS: 81 DEGREES
EKG P AXIS: 94 DEGREES
EKG P-R INTERVAL: 160 MS
EKG P-R INTERVAL: 160 MS
EKG Q-T INTERVAL: 348 MS
EKG Q-T INTERVAL: 396 MS
EKG QRS DURATION: 102 MS
EKG QRS DURATION: 104 MS
EKG QTC CALCULATION (BAZETT): 470 MS
EKG QTC CALCULATION (BAZETT): 492 MS
EKG R AXIS: -6 DEGREES
EKG R AXIS: 12 DEGREES
EKG T AXIS: 156 DEGREES
EKG T AXIS: 160 DEGREES
EKG VENTRICULAR RATE: 110 BPM
EKG VENTRICULAR RATE: 93 BPM
EOSINOPHIL # BLD: 0 K/UL (ref 0.05–0.5)
EOSINOPHIL # BLD: 0.03 K/UL (ref 0.05–0.5)
EOSINOPHIL # BLD: 0.03 K/UL (ref 0.05–0.5)
EOSINOPHIL # BLD: 0.04 K/UL (ref 0.05–0.5)
EOSINOPHILS RELATIVE PERCENT: 0 % (ref 0–6)
EOSINOPHILS RELATIVE PERCENT: 1 % (ref 0–6)
EPI CELLS #/AREA URNS HPF: NORMAL /HPF
ERYTHROCYTE [DISTWIDTH] IN BLOOD BY AUTOMATED COUNT: 17.2 % (ref 11.5–15)
ERYTHROCYTE [DISTWIDTH] IN BLOOD BY AUTOMATED COUNT: 17.4 % (ref 11.5–15)
ERYTHROCYTE [DISTWIDTH] IN BLOOD BY AUTOMATED COUNT: 17.5 % (ref 11.5–15)
ERYTHROCYTE [DISTWIDTH] IN BLOOD BY AUTOMATED COUNT: 17.8 % (ref 11.5–15)
ERYTHROCYTE [DISTWIDTH] IN BLOOD BY AUTOMATED COUNT: 17.8 % (ref 11.5–15)
GFR, ESTIMATED: 22 ML/MIN/1.73M2
GFR, ESTIMATED: 27 ML/MIN/1.73M2
GFR, ESTIMATED: 27 ML/MIN/1.73M2
GFR, ESTIMATED: 29 ML/MIN/1.73M2
GFR, ESTIMATED: 33 ML/MIN/1.73M2
GFR, ESTIMATED: 36 ML/MIN/1.73M2
GLUCOSE BLD-MCNC: 103 MG/DL (ref 74–99)
GLUCOSE BLD-MCNC: 107 MG/DL (ref 74–99)
GLUCOSE BLD-MCNC: 112 MG/DL (ref 74–99)
GLUCOSE BLD-MCNC: 113 MG/DL (ref 74–99)
GLUCOSE BLD-MCNC: 128 MG/DL (ref 74–99)
GLUCOSE BLD-MCNC: 139 MG/DL (ref 74–99)
GLUCOSE BLD-MCNC: 144 MG/DL (ref 74–99)
GLUCOSE BLD-MCNC: 148 MG/DL (ref 74–99)
GLUCOSE BLD-MCNC: 148 MG/DL (ref 74–99)
GLUCOSE BLD-MCNC: 152 MG/DL (ref 74–99)
GLUCOSE BLD-MCNC: 153 MG/DL (ref 74–99)
GLUCOSE BLD-MCNC: 162 MG/DL (ref 74–99)
GLUCOSE BLD-MCNC: 164 MG/DL (ref 74–99)
GLUCOSE BLD-MCNC: 165 MG/DL (ref 74–99)
GLUCOSE BLD-MCNC: 178 MG/DL (ref 74–99)
GLUCOSE BLD-MCNC: 179 MG/DL (ref 74–99)
GLUCOSE BLD-MCNC: 219 MG/DL (ref 74–99)
GLUCOSE BLD-MCNC: 82 MG/DL (ref 74–99)
GLUCOSE SERPL-MCNC: 109 MG/DL (ref 74–99)
GLUCOSE SERPL-MCNC: 120 MG/DL (ref 74–99)
GLUCOSE SERPL-MCNC: 126 MG/DL (ref 74–99)
GLUCOSE SERPL-MCNC: 135 MG/DL (ref 74–99)
GLUCOSE SERPL-MCNC: 172 MG/DL (ref 74–99)
GLUCOSE SERPL-MCNC: 99 MG/DL (ref 74–99)
GLUCOSE UR STRIP-MCNC: >=1000 MG/DL
HCT VFR BLD AUTO: 35.8 % (ref 34–48)
HCT VFR BLD AUTO: 36.4 % (ref 34–48)
HCT VFR BLD AUTO: 37 % (ref 34–48)
HCT VFR BLD AUTO: 38.4 % (ref 34–48)
HCT VFR BLD AUTO: 39.2 % (ref 34–48)
HGB BLD-MCNC: 11.1 G/DL (ref 11.5–15.5)
HGB BLD-MCNC: 11.5 G/DL (ref 11.5–15.5)
HGB BLD-MCNC: 11.7 G/DL (ref 11.5–15.5)
HGB BLD-MCNC: 11.9 G/DL (ref 11.5–15.5)
HGB BLD-MCNC: 11.9 G/DL (ref 11.5–15.5)
HGB UR QL STRIP.AUTO: ABNORMAL
IMM GRANULOCYTES # BLD AUTO: 0.03 K/UL (ref 0–0.58)
IMM GRANULOCYTES # BLD AUTO: 0.09 K/UL (ref 0–0.58)
IMM GRANULOCYTES # BLD AUTO: <0.03 K/UL (ref 0–0.58)
IMM GRANULOCYTES # BLD AUTO: <0.03 K/UL (ref 0–0.58)
IMM GRANULOCYTES NFR BLD: 0 % (ref 0–5)
IMM GRANULOCYTES NFR BLD: 1 % (ref 0–5)
KETONES UR STRIP-MCNC: NEGATIVE MG/DL
LACTATE BLDV-SCNC: 2.4 MMOL/L (ref 0.5–2.2)
LACTATE BLDV-SCNC: 2.8 MMOL/L (ref 0.5–2.2)
LACTATE BLDV-SCNC: 2.9 MMOL/L (ref 0.5–1.9)
LACTATE BLDV-SCNC: 3.5 MMOL/L (ref 0.5–1.9)
LEUKOCYTE ESTERASE UR QL STRIP: NEGATIVE
LYMPHOCYTES NFR BLD: 0.65 K/UL (ref 1.5–4)
LYMPHOCYTES NFR BLD: 0.72 K/UL (ref 1.5–4)
LYMPHOCYTES NFR BLD: 0.88 K/UL (ref 1.5–4)
LYMPHOCYTES NFR BLD: 0.95 K/UL (ref 1.5–4)
LYMPHOCYTES RELATIVE PERCENT: 12 % (ref 20–42)
LYMPHOCYTES RELATIVE PERCENT: 14 % (ref 20–42)
LYMPHOCYTES RELATIVE PERCENT: 7 % (ref 20–42)
LYMPHOCYTES RELATIVE PERCENT: 8 % (ref 20–42)
MCH RBC QN AUTO: 30.1 PG (ref 26–35)
MCH RBC QN AUTO: 30.3 PG (ref 26–35)
MCH RBC QN AUTO: 30.6 PG (ref 26–35)
MCH RBC QN AUTO: 30.7 PG (ref 26–35)
MCH RBC QN AUTO: 30.7 PG (ref 26–35)
MCHC RBC AUTO-ENTMCNC: 30.4 G/DL (ref 32–34.5)
MCHC RBC AUTO-ENTMCNC: 31 G/DL (ref 32–34.5)
MCHC RBC AUTO-ENTMCNC: 31 G/DL (ref 32–34.5)
MCHC RBC AUTO-ENTMCNC: 31.1 G/DL (ref 32–34.5)
MCHC RBC AUTO-ENTMCNC: 32.1 G/DL (ref 32–34.5)
MCV RBC AUTO: 95.3 FL (ref 80–99.9)
MCV RBC AUTO: 97.8 FL (ref 80–99.9)
MCV RBC AUTO: 98.9 FL (ref 80–99.9)
MCV RBC AUTO: 99.2 FL (ref 80–99.9)
MCV RBC AUTO: 99.2 FL (ref 80–99.9)
MICROORGANISM SPEC CULT: NORMAL
MICROORGANISM SPEC CULT: NORMAL
MONOCYTES NFR BLD: 0.58 K/UL (ref 0.1–0.95)
MONOCYTES NFR BLD: 0.59 K/UL (ref 0.1–0.95)
MONOCYTES NFR BLD: 0.64 K/UL (ref 0.1–0.95)
MONOCYTES NFR BLD: 0.73 K/UL (ref 0.1–0.95)
MONOCYTES NFR BLD: 7 % (ref 2–12)
MONOCYTES NFR BLD: 7 % (ref 2–12)
MONOCYTES NFR BLD: 9 % (ref 2–12)
MONOCYTES NFR BLD: 9 % (ref 2–12)
NEUTROPHILS NFR BLD: 76 % (ref 43–80)
NEUTROPHILS NFR BLD: 79 % (ref 43–80)
NEUTROPHILS NFR BLD: 84 % (ref 43–80)
NEUTROPHILS NFR BLD: 85 % (ref 43–80)
NEUTS SEG NFR BLD: 5.01 K/UL (ref 1.8–7.3)
NEUTS SEG NFR BLD: 5.84 K/UL (ref 1.8–7.3)
NEUTS SEG NFR BLD: 6.74 K/UL (ref 1.8–7.3)
NEUTS SEG NFR BLD: 8.7 K/UL (ref 1.8–7.3)
NITRITE UR QL STRIP: NEGATIVE
PH UR STRIP: 5.5 [PH] (ref 5–9)
PLATELET # BLD AUTO: 115 K/UL (ref 130–450)
PLATELET # BLD AUTO: 117 K/UL (ref 130–450)
PLATELET # BLD AUTO: 121 K/UL (ref 130–450)
PLATELET # BLD AUTO: 125 K/UL (ref 130–450)
PLATELET # BLD AUTO: 126 K/UL (ref 130–450)
PMV BLD AUTO: 11.1 FL (ref 7–12)
PMV BLD AUTO: 11.1 FL (ref 7–12)
PMV BLD AUTO: 11.2 FL (ref 7–12)
PMV BLD AUTO: 11.5 FL (ref 7–12)
PMV BLD AUTO: 11.6 FL (ref 7–12)
POTASSIUM SERPL-SCNC: 3.7 MMOL/L (ref 3.5–5)
POTASSIUM SERPL-SCNC: 3.8 MMOL/L (ref 3.5–5)
POTASSIUM SERPL-SCNC: 4.2 MMOL/L (ref 3.5–5)
POTASSIUM SERPL-SCNC: 5 MMOL/L (ref 3.5–5)
POTASSIUM SERPL-SCNC: 5 MMOL/L (ref 3.5–5)
POTASSIUM SERPL-SCNC: 5.7 MMOL/L (ref 3.5–5)
PROT SERPL-MCNC: 5.2 G/DL (ref 6.4–8.3)
PROT SERPL-MCNC: 5.6 G/DL (ref 6.4–8.3)
PROT SERPL-MCNC: 5.7 G/DL (ref 6.4–8.3)
PROT SERPL-MCNC: 6 G/DL (ref 6.4–8.3)
PROT SERPL-MCNC: 6.1 G/DL (ref 6.4–8.3)
PROT SERPL-MCNC: 6.2 G/DL (ref 6.4–8.3)
PROT UR STRIP-MCNC: 30 MG/DL
RBC # BLD AUTO: 3.66 M/UL (ref 3.5–5.5)
RBC # BLD AUTO: 3.74 M/UL (ref 3.5–5.5)
RBC # BLD AUTO: 3.82 M/UL (ref 3.5–5.5)
RBC # BLD AUTO: 3.87 M/UL (ref 3.5–5.5)
RBC # BLD AUTO: 3.95 M/UL (ref 3.5–5.5)
RBC #/AREA URNS HPF: NORMAL /HPF
SERVICE CMNT-IMP: NORMAL
SERVICE CMNT-IMP: NORMAL
SODIUM SERPL-SCNC: 135 MMOL/L (ref 132–146)
SODIUM SERPL-SCNC: 137 MMOL/L (ref 132–146)
SODIUM SERPL-SCNC: 137 MMOL/L (ref 132–146)
SODIUM SERPL-SCNC: 138 MMOL/L (ref 132–146)
SODIUM SERPL-SCNC: 138 MMOL/L (ref 132–146)
SODIUM SERPL-SCNC: 139 MMOL/L (ref 132–146)
SP GR UR STRIP: 1.02 (ref 1–1.03)
SPECIMEN DESCRIPTION: NORMAL
SPECIMEN DESCRIPTION: NORMAL
TROPONIN I SERPL HS-MCNC: 106 NG/L (ref 0–9)
TROPONIN I SERPL HS-MCNC: 111 NG/L (ref 0–9)
UROBILINOGEN UR STRIP-ACNC: 0.2 EU/DL (ref 0–1)
WBC #/AREA URNS HPF: NORMAL /HPF
WBC OTHER # BLD: 10.3 K/UL (ref 4.5–11.5)
WBC OTHER # BLD: 10.9 K/UL (ref 4.5–11.5)
WBC OTHER # BLD: 6.6 K/UL (ref 4.5–11.5)
WBC OTHER # BLD: 7.4 K/UL (ref 4.5–11.5)
WBC OTHER # BLD: 8.1 K/UL (ref 4.5–11.5)

## 2024-01-01 PROCEDURE — 6370000000 HC RX 637 (ALT 250 FOR IP): Performed by: INTERNAL MEDICINE

## 2024-01-01 PROCEDURE — 6370000000 HC RX 637 (ALT 250 FOR IP): Performed by: NURSE PRACTITIONER

## 2024-01-01 PROCEDURE — 94640 AIRWAY INHALATION TREATMENT: CPT

## 2024-01-01 PROCEDURE — 93010 ELECTROCARDIOGRAM REPORT: CPT | Performed by: INTERNAL MEDICINE

## 2024-01-01 PROCEDURE — 93306 TTE W/DOPPLER COMPLETE: CPT

## 2024-01-01 PROCEDURE — 2060000000 HC ICU INTERMEDIATE R&B

## 2024-01-01 PROCEDURE — P9047 ALBUMIN (HUMAN), 25%, 50ML: HCPCS | Performed by: INTERNAL MEDICINE

## 2024-01-01 PROCEDURE — 71045 X-RAY EXAM CHEST 1 VIEW: CPT

## 2024-01-01 PROCEDURE — 85025 COMPLETE CBC W/AUTO DIFF WBC: CPT

## 2024-01-01 PROCEDURE — 82962 GLUCOSE BLOOD TEST: CPT

## 2024-01-01 PROCEDURE — 76705 ECHO EXAM OF ABDOMEN: CPT

## 2024-01-01 PROCEDURE — 82550 ASSAY OF CK (CPK): CPT

## 2024-01-01 PROCEDURE — 96361 HYDRATE IV INFUSION ADD-ON: CPT

## 2024-01-01 PROCEDURE — 99233 SBSQ HOSP IP/OBS HIGH 50: CPT | Performed by: INTERNAL MEDICINE

## 2024-01-01 PROCEDURE — 99239 HOSP IP/OBS DSCHRG MGMT >30: CPT | Performed by: INTERNAL MEDICINE

## 2024-01-01 PROCEDURE — 36415 COLL VENOUS BLD VENIPUNCTURE: CPT

## 2024-01-01 PROCEDURE — 99285 EMERGENCY DEPT VISIT HI MDM: CPT

## 2024-01-01 PROCEDURE — 97161 PT EVAL LOW COMPLEX 20 MIN: CPT | Performed by: PHYSICAL THERAPIST

## 2024-01-01 PROCEDURE — 87040 BLOOD CULTURE FOR BACTERIA: CPT

## 2024-01-01 PROCEDURE — APPSS45 APP SPLIT SHARED TIME 31-45 MINUTES: Performed by: NURSE PRACTITIONER

## 2024-01-01 PROCEDURE — 93005 ELECTROCARDIOGRAM TRACING: CPT

## 2024-01-01 PROCEDURE — 96360 HYDRATION IV INFUSION INIT: CPT

## 2024-01-01 PROCEDURE — 99223 1ST HOSP IP/OBS HIGH 75: CPT | Performed by: INTERNAL MEDICINE

## 2024-01-01 PROCEDURE — 6360000002 HC RX W HCPCS: Performed by: INTERNAL MEDICINE

## 2024-01-01 PROCEDURE — 83880 ASSAY OF NATRIURETIC PEPTIDE: CPT

## 2024-01-01 PROCEDURE — 93970 EXTREMITY STUDY: CPT

## 2024-01-01 PROCEDURE — 97116 GAIT TRAINING THERAPY: CPT | Performed by: PHYSICAL THERAPIST

## 2024-01-01 PROCEDURE — 85027 COMPLETE CBC AUTOMATED: CPT

## 2024-01-01 PROCEDURE — 99222 1ST HOSP IP/OBS MODERATE 55: CPT | Performed by: INTERNAL MEDICINE

## 2024-01-01 PROCEDURE — 2700000000 HC OXYGEN THERAPY PER DAY

## 2024-01-01 PROCEDURE — 6360000002 HC RX W HCPCS

## 2024-01-01 PROCEDURE — 84484 ASSAY OF TROPONIN QUANT: CPT

## 2024-01-01 PROCEDURE — 73630 X-RAY EXAM OF FOOT: CPT

## 2024-01-01 PROCEDURE — 80053 COMPREHEN METABOLIC PANEL: CPT

## 2024-01-01 PROCEDURE — 6360000002 HC RX W HCPCS: Performed by: NURSE PRACTITIONER

## 2024-01-01 PROCEDURE — 97530 THERAPEUTIC ACTIVITIES: CPT

## 2024-01-01 PROCEDURE — 93306 TTE W/DOPPLER COMPLETE: CPT | Performed by: INTERNAL MEDICINE

## 2024-01-01 PROCEDURE — 97110 THERAPEUTIC EXERCISES: CPT

## 2024-01-01 PROCEDURE — 73560 X-RAY EXAM OF KNEE 1 OR 2: CPT

## 2024-01-01 PROCEDURE — 99222 1ST HOSP IP/OBS MODERATE 55: CPT | Performed by: NURSE PRACTITIONER

## 2024-01-01 PROCEDURE — 2580000003 HC RX 258

## 2024-01-01 PROCEDURE — 99232 SBSQ HOSP IP/OBS MODERATE 35: CPT | Performed by: INTERNAL MEDICINE

## 2024-01-01 PROCEDURE — 83605 ASSAY OF LACTIC ACID: CPT

## 2024-01-01 PROCEDURE — 97530 THERAPEUTIC ACTIVITIES: CPT | Performed by: PHYSICAL THERAPIST

## 2024-01-01 PROCEDURE — 97165 OT EVAL LOW COMPLEX 30 MIN: CPT

## 2024-01-01 PROCEDURE — 99231 SBSQ HOSP IP/OBS SF/LOW 25: CPT | Performed by: NURSE PRACTITIONER

## 2024-01-01 PROCEDURE — 81001 URINALYSIS AUTO W/SCOPE: CPT

## 2024-01-01 PROCEDURE — 93005 ELECTROCARDIOGRAM TRACING: CPT | Performed by: INTERNAL MEDICINE

## 2024-01-01 RX ORDER — ALBUMIN (HUMAN) 12.5 G/50ML
25 SOLUTION INTRAVENOUS ONCE
Status: COMPLETED | OUTPATIENT
Start: 2024-01-01 | End: 2024-01-01

## 2024-01-01 RX ORDER — 0.9 % SODIUM CHLORIDE 0.9 %
250 INTRAVENOUS SOLUTION INTRAVENOUS ONCE
Status: COMPLETED | OUTPATIENT
Start: 2024-01-01 | End: 2024-01-01

## 2024-01-01 RX ORDER — DEXTROSE MONOHYDRATE 100 MG/ML
INJECTION, SOLUTION INTRAVENOUS CONTINUOUS PRN
Status: DISCONTINUED | OUTPATIENT
Start: 2024-01-01 | End: 2024-01-01 | Stop reason: HOSPADM

## 2024-01-01 RX ORDER — TIMOLOL MALEATE 5 MG/ML
1 SOLUTION/ DROPS OPHTHALMIC 2 TIMES DAILY
Status: DISCONTINUED | OUTPATIENT
Start: 2024-01-01 | End: 2024-01-01 | Stop reason: HOSPADM

## 2024-01-01 RX ORDER — ALBUMIN (HUMAN) 12.5 G/50ML
25 SOLUTION INTRAVENOUS 2 TIMES DAILY
Status: DISCONTINUED | OUTPATIENT
Start: 2024-01-01 | End: 2024-01-01 | Stop reason: HOSPADM

## 2024-01-01 RX ORDER — ONDANSETRON 2 MG/ML
4 INJECTION INTRAMUSCULAR; INTRAVENOUS EVERY 6 HOURS PRN
Status: DISCONTINUED | OUTPATIENT
Start: 2024-01-01 | End: 2024-01-01 | Stop reason: HOSPADM

## 2024-01-01 RX ORDER — PREGABALIN 25 MG/1
25 CAPSULE ORAL 2 TIMES DAILY
Status: DISCONTINUED | OUTPATIENT
Start: 2024-01-01 | End: 2024-01-01 | Stop reason: HOSPADM

## 2024-01-01 RX ORDER — LORAZEPAM 2 MG/ML
1 CONCENTRATE ORAL
Status: SHIPPED | DISCHARGE
Start: 2024-01-01 | End: 2024-08-31

## 2024-01-01 RX ORDER — INSULIN LISPRO 100 [IU]/ML
0-4 INJECTION, SOLUTION INTRAVENOUS; SUBCUTANEOUS NIGHTLY
Status: DISCONTINUED | OUTPATIENT
Start: 2024-01-01 | End: 2024-01-01 | Stop reason: HOSPADM

## 2024-01-01 RX ORDER — ATORVASTATIN CALCIUM 40 MG/1
40 TABLET, FILM COATED ORAL NIGHTLY
Status: DISCONTINUED | OUTPATIENT
Start: 2024-01-01 | End: 2024-01-01 | Stop reason: HOSPADM

## 2024-01-01 RX ORDER — ENOXAPARIN SODIUM 100 MG/ML
30 INJECTION SUBCUTANEOUS DAILY
Status: DISCONTINUED | OUTPATIENT
Start: 2024-01-01 | End: 2024-01-01 | Stop reason: HOSPADM

## 2024-01-01 RX ORDER — ACETAMINOPHEN 325 MG/1
650 TABLET ORAL EVERY 6 HOURS PRN
Status: DISCONTINUED | OUTPATIENT
Start: 2024-01-01 | End: 2024-01-01 | Stop reason: HOSPADM

## 2024-01-01 RX ORDER — OXYCODONE HCL 20 MG/ML
5 CONCENTRATE, ORAL ORAL
Status: DISCONTINUED | OUTPATIENT
Start: 2024-01-01 | End: 2024-01-01 | Stop reason: HOSPADM

## 2024-01-01 RX ORDER — LATANOPROST 50 UG/ML
1 SOLUTION/ DROPS OPHTHALMIC NIGHTLY
Status: DISCONTINUED | OUTPATIENT
Start: 2024-01-01 | End: 2024-01-01 | Stop reason: HOSPADM

## 2024-01-01 RX ORDER — BISACODYL 10 MG
10 SUPPOSITORY, RECTAL RECTAL DAILY PRN
Status: DISCONTINUED | OUTPATIENT
Start: 2024-01-01 | End: 2024-01-01 | Stop reason: HOSPADM

## 2024-01-01 RX ORDER — GLUCAGON 1 MG/ML
1 KIT INJECTION PRN
Status: DISCONTINUED | OUTPATIENT
Start: 2024-01-01 | End: 2024-01-01 | Stop reason: HOSPADM

## 2024-01-01 RX ORDER — DOBUTAMINE HYDROCHLORIDE 400 MG/100ML
2.5 INJECTION INTRAVENOUS CONTINUOUS
Status: DISCONTINUED | OUTPATIENT
Start: 2024-01-01 | End: 2024-01-01

## 2024-01-01 RX ORDER — FUROSEMIDE 10 MG/ML
40 INJECTION INTRAMUSCULAR; INTRAVENOUS 2 TIMES DAILY
Status: DISCONTINUED | OUTPATIENT
Start: 2024-01-01 | End: 2024-01-01

## 2024-01-01 RX ORDER — ATROPINE SULFATE 10 MG/ML
2 SOLUTION/ DROPS OPHTHALMIC EVERY 4 HOURS PRN
Status: DISCONTINUED | OUTPATIENT
Start: 2024-01-01 | End: 2024-01-01 | Stop reason: HOSPADM

## 2024-01-01 RX ORDER — BUMETANIDE 0.25 MG/ML
1 INJECTION INTRAMUSCULAR; INTRAVENOUS 2 TIMES DAILY
Status: DISCONTINUED | OUTPATIENT
Start: 2024-01-01 | End: 2024-01-01

## 2024-01-01 RX ORDER — IPRATROPIUM BROMIDE AND ALBUTEROL SULFATE 2.5; .5 MG/3ML; MG/3ML
1 SOLUTION RESPIRATORY (INHALATION) EVERY 6 HOURS
Status: DISCONTINUED | OUTPATIENT
Start: 2024-01-01 | End: 2024-01-01 | Stop reason: HOSPADM

## 2024-01-01 RX ORDER — OXYCODONE HCL 20 MG/ML
5 CONCENTRATE, ORAL ORAL
Status: SHIPPED | DISCHARGE
Start: 2024-01-01 | End: 2024-08-31

## 2024-01-01 RX ORDER — SODIUM CHLORIDE 9 MG/ML
INJECTION, SOLUTION INTRAVENOUS ONCE
Status: DISCONTINUED | OUTPATIENT
Start: 2024-01-01 | End: 2024-01-01

## 2024-01-01 RX ORDER — CLOPIDOGREL BISULFATE 75 MG/1
75 TABLET ORAL DAILY
Status: DISCONTINUED | OUTPATIENT
Start: 2024-01-01 | End: 2024-01-01 | Stop reason: HOSPADM

## 2024-01-01 RX ORDER — INSULIN LISPRO 100 [IU]/ML
0-4 INJECTION, SOLUTION INTRAVENOUS; SUBCUTANEOUS
Status: DISCONTINUED | OUTPATIENT
Start: 2024-01-01 | End: 2024-01-01 | Stop reason: HOSPADM

## 2024-01-01 RX ORDER — MIDODRINE HYDROCHLORIDE 5 MG/1
10 TABLET ORAL
Status: DISCONTINUED | OUTPATIENT
Start: 2024-01-01 | End: 2024-01-01 | Stop reason: HOSPADM

## 2024-01-01 RX ORDER — ATROPINE SULFATE 10 MG/ML
2 SOLUTION/ DROPS OPHTHALMIC EVERY 4 HOURS PRN
DISCHARGE
Start: 2024-01-01 | End: 2024-08-31

## 2024-01-01 RX ORDER — SODIUM CHLORIDE 9 MG/ML
INJECTION, SOLUTION INTRAVENOUS ONCE
Status: COMPLETED | OUTPATIENT
Start: 2024-01-01 | End: 2024-01-01

## 2024-01-01 RX ORDER — FUROSEMIDE 10 MG/ML
20 INJECTION INTRAMUSCULAR; INTRAVENOUS DAILY
Status: DISCONTINUED | OUTPATIENT
Start: 2024-01-01 | End: 2024-01-01

## 2024-01-01 RX ORDER — LORAZEPAM 2 MG/ML
1 CONCENTRATE ORAL
Status: DISCONTINUED | OUTPATIENT
Start: 2024-01-01 | End: 2024-01-01 | Stop reason: HOSPADM

## 2024-01-01 RX ADMIN — SODIUM CHLORIDE 250 ML: 9 INJECTION, SOLUTION INTRAVENOUS at 14:40

## 2024-01-01 RX ADMIN — PREGABALIN 25 MG: 25 CAPSULE ORAL at 09:22

## 2024-01-01 RX ADMIN — ATORVASTATIN CALCIUM 40 MG: 40 TABLET, FILM COATED ORAL at 21:13

## 2024-01-01 RX ADMIN — PREGABALIN 25 MG: 25 CAPSULE ORAL at 21:13

## 2024-01-01 RX ADMIN — PREGABALIN 25 MG: 25 CAPSULE ORAL at 20:40

## 2024-01-01 RX ADMIN — ENOXAPARIN SODIUM 30 MG: 100 INJECTION SUBCUTANEOUS at 09:41

## 2024-01-01 RX ADMIN — ACETAMINOPHEN 650 MG: 325 TABLET ORAL at 23:11

## 2024-01-01 RX ADMIN — IPRATROPIUM BROMIDE AND ALBUTEROL SULFATE 1 DOSE: .5; 2.5 SOLUTION RESPIRATORY (INHALATION) at 18:43

## 2024-01-01 RX ADMIN — PREGABALIN 25 MG: 25 CAPSULE ORAL at 00:38

## 2024-01-01 RX ADMIN — PREGABALIN 25 MG: 25 CAPSULE ORAL at 12:18

## 2024-01-01 RX ADMIN — SODIUM CHLORIDE: 9 INJECTION, SOLUTION INTRAVENOUS at 17:09

## 2024-01-01 RX ADMIN — PREGABALIN 25 MG: 25 CAPSULE ORAL at 09:42

## 2024-01-01 RX ADMIN — PREGABALIN 25 MG: 25 CAPSULE ORAL at 11:12

## 2024-01-01 RX ADMIN — ATORVASTATIN CALCIUM 40 MG: 40 TABLET, FILM COATED ORAL at 20:40

## 2024-01-01 RX ADMIN — IPRATROPIUM BROMIDE AND ALBUTEROL SULFATE 1 DOSE: .5; 2.5 SOLUTION RESPIRATORY (INHALATION) at 05:17

## 2024-01-01 RX ADMIN — INSULIN LISPRO 1 UNITS: 100 INJECTION, SOLUTION INTRAVENOUS; SUBCUTANEOUS at 18:27

## 2024-01-01 RX ADMIN — MIDODRINE HYDROCHLORIDE 10 MG: 5 TABLET ORAL at 11:57

## 2024-01-01 RX ADMIN — CEFEPIME 2000 MG: 2 INJECTION, POWDER, FOR SOLUTION INTRAVENOUS at 19:05

## 2024-01-01 RX ADMIN — MIDODRINE HYDROCHLORIDE 10 MG: 5 TABLET ORAL at 09:13

## 2024-01-01 RX ADMIN — ALBUMIN (HUMAN) 25 G: 0.25 INJECTION, SOLUTION INTRAVENOUS at 21:13

## 2024-01-01 RX ADMIN — ATORVASTATIN CALCIUM 40 MG: 40 TABLET, FILM COATED ORAL at 20:28

## 2024-01-01 RX ADMIN — IPRATROPIUM BROMIDE AND ALBUTEROL SULFATE 1 DOSE: .5; 2.5 SOLUTION RESPIRATORY (INHALATION) at 07:03

## 2024-01-01 RX ADMIN — TIMOLOL MALEATE 1 DROP: 5 SOLUTION OPHTHALMIC at 11:11

## 2024-01-01 RX ADMIN — SODIUM CHLORIDE 250 ML: 9 INJECTION, SOLUTION INTRAVENOUS at 12:11

## 2024-01-01 RX ADMIN — TIMOLOL MALEATE 1 DROP: 5 SOLUTION OPHTHALMIC at 21:13

## 2024-01-01 RX ADMIN — TIMOLOL MALEATE 1 DROP: 5 SOLUTION OPHTHALMIC at 20:40

## 2024-01-01 RX ADMIN — ACETAMINOPHEN 650 MG: 325 TABLET ORAL at 12:18

## 2024-01-01 RX ADMIN — LATANOPROST 1 DROP: 50 SOLUTION OPHTHALMIC at 21:14

## 2024-01-01 RX ADMIN — TIMOLOL MALEATE 1 DROP: 5 SOLUTION OPHTHALMIC at 12:42

## 2024-01-01 RX ADMIN — ALBUMIN (HUMAN) 25 G: 0.25 INJECTION, SOLUTION INTRAVENOUS at 09:22

## 2024-01-01 RX ADMIN — LATANOPROST 1 DROP: 50 SOLUTION OPHTHALMIC at 20:38

## 2024-01-01 RX ADMIN — ALBUMIN (HUMAN) 25 G: 0.25 INJECTION, SOLUTION INTRAVENOUS at 11:19

## 2024-01-01 RX ADMIN — LATANOPROST 1 DROP: 50 SOLUTION OPHTHALMIC at 20:40

## 2024-01-01 RX ADMIN — IPRATROPIUM BROMIDE AND ALBUTEROL SULFATE 1 DOSE: .5; 2.5 SOLUTION RESPIRATORY (INHALATION) at 05:30

## 2024-01-01 RX ADMIN — PREGABALIN 25 MG: 25 CAPSULE ORAL at 20:28

## 2024-01-01 RX ADMIN — CLOPIDOGREL BISULFATE 75 MG: 75 TABLET ORAL at 12:18

## 2024-01-01 RX ADMIN — IPRATROPIUM BROMIDE AND ALBUTEROL SULFATE 1 DOSE: .5; 2.5 SOLUTION RESPIRATORY (INHALATION) at 10:25

## 2024-01-01 RX ADMIN — IPRATROPIUM BROMIDE AND ALBUTEROL SULFATE 1 DOSE: .5; 2.5 SOLUTION RESPIRATORY (INHALATION) at 21:21

## 2024-01-01 RX ADMIN — LATANOPROST 1 DROP: 50 SOLUTION OPHTHALMIC at 00:38

## 2024-01-01 RX ADMIN — ALBUMIN (HUMAN) 25 G: 0.25 INJECTION, SOLUTION INTRAVENOUS at 13:20

## 2024-01-01 RX ADMIN — PREGABALIN 25 MG: 25 CAPSULE ORAL at 21:14

## 2024-01-01 RX ADMIN — CLOPIDOGREL BISULFATE 75 MG: 75 TABLET ORAL at 09:41

## 2024-01-01 RX ADMIN — MIDODRINE HYDROCHLORIDE 10 MG: 5 TABLET ORAL at 18:22

## 2024-01-01 RX ADMIN — MIDODRINE HYDROCHLORIDE 10 MG: 5 TABLET ORAL at 16:29

## 2024-01-01 RX ADMIN — TIMOLOL MALEATE 1 DROP: 5 SOLUTION OPHTHALMIC at 21:14

## 2024-01-01 RX ADMIN — PREGABALIN 25 MG: 25 CAPSULE ORAL at 09:13

## 2024-01-01 RX ADMIN — TIMOLOL MALEATE 1 DROP: 5 SOLUTION OPHTHALMIC at 20:38

## 2024-01-01 RX ADMIN — IPRATROPIUM BROMIDE AND ALBUTEROL SULFATE 1 DOSE: .5; 2.5 SOLUTION RESPIRATORY (INHALATION) at 18:32

## 2024-01-01 RX ADMIN — IPRATROPIUM BROMIDE AND ALBUTEROL SULFATE 1 DOSE: .5; 2.5 SOLUTION RESPIRATORY (INHALATION) at 18:03

## 2024-01-01 RX ADMIN — CLOPIDOGREL BISULFATE 75 MG: 75 TABLET ORAL at 11:57

## 2024-01-01 RX ADMIN — LATANOPROST 1 DROP: 50 SOLUTION OPHTHALMIC at 21:13

## 2024-01-01 RX ADMIN — DOBUTAMINE HYDROCHLORIDE 2.5 MCG/KG/MIN: 400 INJECTION INTRAVENOUS at 21:49

## 2024-01-01 RX ADMIN — CLOPIDOGREL BISULFATE 75 MG: 75 TABLET ORAL at 09:13

## 2024-01-01 RX ADMIN — TIMOLOL MALEATE 1 DROP: 5 SOLUTION OPHTHALMIC at 00:38

## 2024-01-01 RX ADMIN — MIDODRINE HYDROCHLORIDE 10 MG: 5 TABLET ORAL at 11:38

## 2024-01-01 RX ADMIN — ENOXAPARIN SODIUM 30 MG: 100 INJECTION SUBCUTANEOUS at 12:22

## 2024-01-01 RX ADMIN — ENOXAPARIN SODIUM 30 MG: 100 INJECTION SUBCUTANEOUS at 16:28

## 2024-01-01 RX ADMIN — TIMOLOL MALEATE 1 DROP: 5 SOLUTION OPHTHALMIC at 12:14

## 2024-01-01 RX ADMIN — MIDODRINE HYDROCHLORIDE 10 MG: 5 TABLET ORAL at 12:24

## 2024-01-01 RX ADMIN — MIDODRINE HYDROCHLORIDE 10 MG: 5 TABLET ORAL at 09:41

## 2024-01-01 RX ADMIN — MIDODRINE HYDROCHLORIDE 10 MG: 5 TABLET ORAL at 04:48

## 2024-01-01 RX ADMIN — ONDANSETRON 4 MG: 2 INJECTION INTRAMUSCULAR; INTRAVENOUS at 22:51

## 2024-01-01 RX ADMIN — ALBUMIN (HUMAN) 25 G: 0.25 INJECTION, SOLUTION INTRAVENOUS at 21:51

## 2024-01-01 RX ADMIN — IPRATROPIUM BROMIDE AND ALBUTEROL SULFATE 1 DOSE: .5; 2.5 SOLUTION RESPIRATORY (INHALATION) at 22:58

## 2024-01-01 RX ADMIN — CLOPIDOGREL BISULFATE 75 MG: 75 TABLET ORAL at 09:22

## 2024-01-01 RX ADMIN — MIDODRINE HYDROCHLORIDE 10 MG: 5 TABLET ORAL at 17:13

## 2024-01-01 RX ADMIN — IPRATROPIUM BROMIDE AND ALBUTEROL SULFATE 1 DOSE: .5; 2.5 SOLUTION RESPIRATORY (INHALATION) at 04:34

## 2024-01-01 RX ADMIN — TIMOLOL MALEATE 1 DROP: 5 SOLUTION OPHTHALMIC at 09:42

## 2024-01-01 RX ADMIN — MIDODRINE HYDROCHLORIDE 10 MG: 5 TABLET ORAL at 12:17

## 2024-01-01 RX ADMIN — MIDODRINE HYDROCHLORIDE 10 MG: 5 TABLET ORAL at 14:18

## 2024-01-01 RX ADMIN — IPRATROPIUM BROMIDE AND ALBUTEROL SULFATE 1 DOSE: .5; 2.5 SOLUTION RESPIRATORY (INHALATION) at 10:16

## 2024-01-01 RX ADMIN — ATORVASTATIN CALCIUM 40 MG: 40 TABLET, FILM COATED ORAL at 21:14

## 2024-01-01 RX ADMIN — ENOXAPARIN SODIUM 30 MG: 100 INJECTION SUBCUTANEOUS at 09:22

## 2024-01-01 RX ADMIN — MIDODRINE HYDROCHLORIDE 10 MG: 5 TABLET ORAL at 16:14

## 2024-01-01 ASSESSMENT — PAIN DESCRIPTION - LOCATION
LOCATION: LEG

## 2024-01-01 ASSESSMENT — PAIN SCALES - GENERAL
PAINLEVEL_OUTOF10: 0
PAINLEVEL_OUTOF10: 8
PAINLEVEL_OUTOF10: 5
PAINLEVEL_OUTOF10: 5
PAINLEVEL_OUTOF10: 3
PAINLEVEL_OUTOF10: 3
PAINLEVEL_OUTOF10: 0

## 2024-01-01 ASSESSMENT — PAIN DESCRIPTION - DESCRIPTORS
DESCRIPTORS: ACHING;THROBBING
DESCRIPTORS: THROBBING
DESCRIPTORS: ACHING;SORE;THROBBING

## 2024-01-01 ASSESSMENT — PAIN DESCRIPTION - ORIENTATION
ORIENTATION: RIGHT;LEFT
ORIENTATION: RIGHT
ORIENTATION: RIGHT;LEFT

## 2024-01-01 ASSESSMENT — PAIN - FUNCTIONAL ASSESSMENT: PAIN_FUNCTIONAL_ASSESSMENT: PREVENTS OR INTERFERES SOME ACTIVE ACTIVITIES AND ADLS

## 2024-01-30 ENCOUNTER — HOSPITAL ENCOUNTER (OUTPATIENT)
Dept: OTHER | Age: 89
Setting detail: THERAPIES SERIES
Discharge: HOME | End: 2024-01-30
Payer: MEDICARE

## 2024-01-30 ENCOUNTER — TELEPHONE (OUTPATIENT)
Dept: OTHER | Age: 89
End: 2024-01-30

## 2024-01-30 VITALS
OXYGEN SATURATION: 99 % | WEIGHT: 144 LBS | SYSTOLIC BLOOD PRESSURE: 84 MMHG | BODY MASS INDEX: 23.24 KG/M2 | DIASTOLIC BLOOD PRESSURE: 52 MMHG | HEART RATE: 73 BPM | RESPIRATION RATE: 18 BRPM

## 2024-01-30 LAB
ANION GAP SERPL CALCULATED.3IONS-SCNC: 13 MMOL/L (ref 7–16)
BNP SERPL-MCNC: 7773 PG/ML (ref 0–450)
BUN SERPL-MCNC: 35 MG/DL (ref 6–23)
CALCIUM SERPL-MCNC: 9.8 MG/DL (ref 8.6–10.2)
CHLORIDE SERPL-SCNC: 101 MMOL/L (ref 98–107)
CO2 SERPL-SCNC: 23 MMOL/L (ref 22–29)
CREAT SERPL-MCNC: 1.2 MG/DL (ref 0.5–1)
GFR SERPL CREATININE-BSD FRML MDRD: 45 ML/MIN/1.73M2
GLUCOSE SERPL-MCNC: 158 MG/DL (ref 74–99)
POTASSIUM SERPL-SCNC: 4 MMOL/L (ref 3.5–5)
SODIUM SERPL-SCNC: 137 MMOL/L (ref 132–146)

## 2024-01-30 PROCEDURE — 36415 COLL VENOUS BLD VENIPUNCTURE: CPT

## 2024-01-30 PROCEDURE — 99214 OFFICE O/P EST MOD 30 MIN: CPT

## 2024-01-30 PROCEDURE — 83880 ASSAY OF NATRIURETIC PEPTIDE: CPT

## 2024-01-30 PROCEDURE — 80048 BASIC METABOLIC PNL TOTAL CA: CPT

## 2024-01-30 RX ORDER — FUROSEMIDE 20 MG/1
20 TABLET ORAL
Qty: 90 TABLET | Refills: 3 | Status: SHIPPED | OUTPATIENT
Start: 2024-01-31

## 2024-01-30 ASSESSMENT — PATIENT HEALTH QUESTIONNAIRE - PHQ9
SUM OF ALL RESPONSES TO PHQ9 QUESTIONS 1 & 2: 1
1. LITTLE INTEREST OR PLEASURE IN DOING THINGS: 1
SUM OF ALL RESPONSES TO PHQ QUESTIONS 1-9: 1
2. FEELING DOWN, DEPRESSED OR HOPELESS: 0
SUM OF ALL RESPONSES TO PHQ QUESTIONS 1-9: 1

## 2024-01-30 NOTE — TELEPHONE ENCOUNTER
Shanae DE LA FUENTE CNS reviewed labs, assessment and vitals from today's CHF clinic visit  Orders Received    Spoke with patient regarding providers instructions.    Apply compression stockings  Decrease lasix 3 x weekly (can use PRN for worsening HF symptoms)  Follow up in CHF clinic in 1 week   Patient advised to report to the ED with any changes in symptoms    I have reviewed the providers instructions with the patient, answering all questions to their satisfaction    Future Appointments   Date Time Provider Department Center   2/5/2024 11:30 AM [facility]   2/7/2024 12:45 PM [facility] CHF ROOM 1 [facility]   2/29/2024 10:00 AM [facility]   3/12/2024 10:15 AM Zeb Lundy MD [facility]   3/26/2024 10:30 AM [facility] ROOM 1 [facility]

## 2024-01-30 NOTE — TELEPHONE ENCOUNTER
----- Message from SUDHAKAR Quevedo - CNP sent at 1/30/2024  4:10 PM EST -----  Labs and CHF clinic note reviewed  Spoke with Avril LOWE in CHF clinic     Vitals: 84/52, 73 (orthos negative)  Asymptomatic  No recent illness, fever, chills, diarrhea, vomiting  Eating and drinking well    Apply compression stockings  Decrease lasix 3 x weekly (can use PRN for worsening HF symptoms)  Follow up in CHF clinic in 1 week   Patient advised to report to the ED with any changes in symptoms    Thank you

## 2024-01-30 NOTE — PROGRESS NOTES
Congestive Heart Failure Clinic   [facility]     Referring Provider: Dr. Li  Primary Care Physician: Dr. Patton   Cardiologist: Dr. Li  Nephrologist: N/A        HISTORY OF PRESENT ILLNESS:     Mira Weir is a 89 y.o. (1935) female with a history of HFrEF (EF < 40%), most recent EF:  Lab Results   Component Value Date    LVEF 25 03/30/2023         He presents to the CHF clinic for ongoing evaluation and monitoring of heart failure.    In the CHF clinic today she denies any adverse symptoms except:  [] Dizziness or lightheadedness   [] Syncope or near syncope  [] Recent Fall  [] Shortness of breath at rest   [] Dyspnea with exertion  [] Decline in functional capacity (unable to perform activities they had previously been able to do)  [] Fatigue   [] Orthopnea  [] PND  [] Nocturnal cough  [] Hemoptysis  [] Chest pain, pressure, or discomfort  [] Palpitations  [] Abdominal distention  [] Abdominal bloating  [] Early satiety  [] Blood in stool   [] Diarrhea  [] Constipation  [] Nausea/Vomiting  [] Decreased urinary response to oral diuretic   [] Scrotal swelling   [] Lower extremity edema  [] Used PRN doses of oral diuretic   [] Weight gain    Wt Readings from Last 3 Encounters:   01/30/24 65.3 kg (144 lb)   12/07/23 66.2 kg (146 lb)   10/30/23 65.8 kg (145 lb 0.2 oz)       SOCIAL HISTORY:  [x] Denies tobacco, alcohol or illicit drug abuse  [] Tobacco use:  [] ETOH use:  [] Illicit drug use:        MEDICATIONS:    Allergies   Allergen Reactions    Pcn [Penicillins] Hives    Sulfa Antibiotics Hives     Prior to Visit Medications    Medication Sig Taking? Authorizing Provider   blood glucose test strips (ASCENSIA AUTODISC VI;ONE TOUCH ULTRA TEST VI) strip 1 each by Does not apply route 2 times daily  Zeb Lundy MD   pregabalin (LYRICA) 25 MG capsule Take 1 capsule by mouth 2 times daily for 120 days. Max Daily Amount: 50 mg  Zeb Lundy MD

## 2024-01-30 NOTE — RESULT ENCOUNTER NOTE
Labs and CHF clinic note reviewed  Spoke with Avril LOWE in CHF clinic     Vitals: 84/52, 73 (orthos negative)  Asymptomatic  No recent illness, fever, chills, diarrhea, vomiting  Eating and drinking well    Apply compression stockings  Decrease lasix 3 x weekly (can use PRN for worsening HF symptoms)  Follow up in CHF clinic in 1 week   Patient advised to report to the ED with any changes in symptoms    Thank you

## 2024-02-05 ENCOUNTER — OFFICE VISIT (OUTPATIENT)
Dept: ORTHOPEDIC SURGERY | Age: 89
End: 2024-02-05
Payer: MEDICARE

## 2024-02-05 VITALS — BODY MASS INDEX: 22.66 KG/M2 | HEIGHT: 66 IN | TEMPERATURE: 98 F | WEIGHT: 141 LBS

## 2024-02-05 DIAGNOSIS — M25.512 ACUTE PAIN OF LEFT SHOULDER: Primary | ICD-10-CM

## 2024-02-05 PROCEDURE — 99213 OFFICE O/P EST LOW 20 MIN: CPT | Performed by: ORTHOPAEDIC SURGERY

## 2024-02-05 PROCEDURE — 1090F PRES/ABSN URINE INCON ASSESS: CPT | Performed by: ORTHOPAEDIC SURGERY

## 2024-02-05 PROCEDURE — G8427 DOCREV CUR MEDS BY ELIG CLIN: HCPCS | Performed by: ORTHOPAEDIC SURGERY

## 2024-02-05 PROCEDURE — G8420 CALC BMI NORM PARAMETERS: HCPCS | Performed by: ORTHOPAEDIC SURGERY

## 2024-02-05 PROCEDURE — 1036F TOBACCO NON-USER: CPT | Performed by: ORTHOPAEDIC SURGERY

## 2024-02-05 PROCEDURE — G8484 FLU IMMUNIZE NO ADMIN: HCPCS | Performed by: ORTHOPAEDIC SURGERY

## 2024-02-05 PROCEDURE — 1123F ACP DISCUSS/DSCN MKR DOCD: CPT | Performed by: ORTHOPAEDIC SURGERY

## 2024-02-05 NOTE — PROGRESS NOTES
Mira Weir is a 89 y.o. female, who presents   Chief Complaint   Patient presents with    Shoulder Pain     Left shoulder pain for the past month.        HPI:: Left shoulder pains been present for a relatively short period of time with no history of injury.  There is been a little crepitus on occasion and feeling of weakness.  There is been no swelling or discoloration.  Treatments been with Tylenol.  She has had no x-rays.  Most of the discomfort is indicated in the top and the back of the shoulder around the scapular area.  Mrs. Weir's daughter was present with her throughout the visit.    Allergies; medications; past medical, surgical, family, and social history; and problem list have been reviewed today and updated as indicated in this encounter - see below following Ortho specifics.    Musculoskeletal: Skin condition gross neurovascular functions good in the left upper extremity.  Elbow wrist and hand motion and stability are good without pain.    Left shoulder range of motion is good in flexion.  There is some discomfort at full flexion and with adduction across body.  There is some decreased relative basis and internal and external rotation.  There is no scapular winging.  There is no instability of AC or glenohumeral joints.  There is some discomfort along the medial border of the scapula.  Rotator cuff elements seem grossly intact.    Radiologic Studies: Imaging of the left shoulder and 2 views shows slight anterior inferior acromial prominence.  There is also small calcification laterally near the greater tuberosity and there is also suggestion that there may have been an ancient fracture of the distal clavicle which has healed in generally good alignment.  Humeral head alignment in the glenoid is good and the subacromial space is good.    ASSESSMENT:  iMra was seen today for shoulder pain.    Diagnoses and all orders for this visit:    Acute pain of left shoulder  -     XR SHOULDER LEFT (MIN 2

## 2024-02-07 ENCOUNTER — HOSPITAL ENCOUNTER (OUTPATIENT)
Dept: OTHER | Age: 89
Setting detail: THERAPIES SERIES
Discharge: HOME | End: 2024-02-07
Payer: MEDICARE

## 2024-02-07 VITALS
BODY MASS INDEX: 23.25 KG/M2 | OXYGEN SATURATION: 96 % | WEIGHT: 144 LBS | SYSTOLIC BLOOD PRESSURE: 96 MMHG | HEART RATE: 72 BPM | RESPIRATION RATE: 18 BRPM | DIASTOLIC BLOOD PRESSURE: 56 MMHG

## 2024-02-07 LAB
ANION GAP SERPL CALCULATED.3IONS-SCNC: 11 MMOL/L (ref 7–16)
BNP SERPL-MCNC: 8994 PG/ML (ref 0–450)
BUN SERPL-MCNC: 34 MG/DL (ref 6–23)
CALCIUM SERPL-MCNC: 9.5 MG/DL (ref 8.6–10.2)
CHLORIDE SERPL-SCNC: 96 MMOL/L (ref 98–107)
CO2 SERPL-SCNC: 25 MMOL/L (ref 22–29)
CREAT SERPL-MCNC: 1 MG/DL (ref 0.5–1)
GFR SERPL CREATININE-BSD FRML MDRD: 54 ML/MIN/1.73M2
GLUCOSE SERPL-MCNC: 129 MG/DL (ref 74–99)
POTASSIUM SERPL-SCNC: 4.5 MMOL/L (ref 3.5–5)
SODIUM SERPL-SCNC: 132 MMOL/L (ref 132–146)

## 2024-02-07 PROCEDURE — 80048 BASIC METABOLIC PNL TOTAL CA: CPT

## 2024-02-07 PROCEDURE — 83880 ASSAY OF NATRIURETIC PEPTIDE: CPT

## 2024-02-07 PROCEDURE — 99214 OFFICE O/P EST MOD 30 MIN: CPT

## 2024-02-07 PROCEDURE — 36415 COLL VENOUS BLD VENIPUNCTURE: CPT

## 2024-02-07 NOTE — PROGRESS NOTES
[x] Low sodium diet (2000 mg)  Barriers to compliance  [] Refuses to monitor diet  [] Socioeconomic difficulties  [] Unable to cook for self (use of frozen meals, can goods, etc)  [] CHF CHW consulted  [] Low sodium meal delivery options given to patient  [] Dietician consulted   [] Low sodium recipes provided  [] Sodium free seasoning provided   [x] Fluid intake 6-8 cups (around 64 oz)  [x] Reviewed currently prescribed medications with patient, educated on importance of compliance and answered any questions regarding their medication  [] Pill box provided to patient  [] Patient using pill packing pharmacy   [] CPAP/BiPAP use  [] Low impact exercise / cardiac rehab   [] LifeVest use  [x] Patient aware of signs and symptoms of worsening HF, CHF clinic phone number provided and made aware to call clinic for sooner if evaluation if needed     [] Difficulty affording medications  [] CHF CHW consulted  [] Prescription assistance information given   [] [facility] medication assistance program information given   [] Sample medications provided to patient to help bridge gap until affordability N/A    [x] PHQ assessment completed while in CHF clinic (1st visit, every 3 months and PRN)      1/30/2024    10:36 AM 10/30/2023    11:00 AM 2/23/2023     2:22 PM 1/12/2023    10:37 AM 1/18/2022     9:18 AM 2/25/2021     8:37 AM 9/21/2020     9:21 AM   PHQ Scores   PHQ2 Score 1 0 0 0 0 0 0   PHQ9 Score 1 0 0 0 0 0 0     Interpretation of Total Score Depression Severity:   1-4 = Minimal depression  5-9 = Mild depression  10-14 = Moderate depression  15-19 = Moderately severe depression  20-27 = Severe depression   [] Patient provided community resources for counseling services  [] PCP called and PHQ result faxed   [] Behavorial health consultant called        Scheduled to follow up in CHF clinic on:   Future Appointments   Date Time Provider Department Center   2/29/2024 10:00 AM Zeb Lundy MD [facility]

## 2024-02-07 NOTE — RESULT ENCOUNTER NOTE
CHF cinic visit and labs reviewed.     VS:   BP:  96/56   Pulse: 72  Weights stable     BNP 7773>>8994    BUN 35>>34  Cr 1.2>>1.0  Potassium 4.5     Felt to be euvolemic     Hypotensive on last visit, Lasix decreased to three times weekly on last visit     Can we schedule to see Camila or me on our next Life Vest day there please?     Thank you!

## 2024-02-28 RX ORDER — DAPAGLIFLOZIN 10 MG/1
10 TABLET, FILM COATED ORAL EVERY MORNING
Qty: 90 TABLET | Refills: 3 | Status: SHIPPED
Start: 2024-02-28 | End: 2024-02-29 | Stop reason: ALTCHOICE

## 2024-02-29 ENCOUNTER — OFFICE VISIT (OUTPATIENT)
Dept: FAMILY MEDICINE CLINIC | Age: 89
End: 2024-02-29

## 2024-02-29 VITALS
WEIGHT: 148 LBS | HEIGHT: 68 IN | SYSTOLIC BLOOD PRESSURE: 110 MMHG | DIASTOLIC BLOOD PRESSURE: 70 MMHG | OXYGEN SATURATION: 98 % | HEART RATE: 74 BPM | BODY MASS INDEX: 22.43 KG/M2

## 2024-02-29 DIAGNOSIS — J44.9 CHRONIC OBSTRUCTIVE PULMONARY DISEASE, UNSPECIFIED COPD TYPE (HCC): ICD-10-CM

## 2024-02-29 DIAGNOSIS — E08.42 DIABETIC POLYNEUROPATHY ASSOCIATED WITH DIABETES MELLITUS DUE TO UNDERLYING CONDITION (HCC): ICD-10-CM

## 2024-02-29 DIAGNOSIS — E44.0 MODERATE PROTEIN-CALORIE MALNUTRITION (HCC): ICD-10-CM

## 2024-02-29 DIAGNOSIS — I10 ESSENTIAL HYPERTENSION: ICD-10-CM

## 2024-02-29 DIAGNOSIS — N18.31 STAGE 3A CHRONIC KIDNEY DISEASE (HCC): ICD-10-CM

## 2024-02-29 DIAGNOSIS — E11.9 TYPE 2 DIABETES MELLITUS WITHOUT COMPLICATION, WITHOUT LONG-TERM CURRENT USE OF INSULIN (HCC): ICD-10-CM

## 2024-02-29 DIAGNOSIS — Z00.00 MEDICARE ANNUAL WELLNESS VISIT, SUBSEQUENT: Primary | ICD-10-CM

## 2024-02-29 DIAGNOSIS — R53.1 GENERALIZED WEAKNESS: ICD-10-CM

## 2024-02-29 DIAGNOSIS — I50.22 CHRONIC SYSTOLIC (CONGESTIVE) HEART FAILURE (HCC): ICD-10-CM

## 2024-02-29 DIAGNOSIS — I74.09 ABDOMINAL AORTA THROMBOSIS (HCC): ICD-10-CM

## 2024-02-29 DIAGNOSIS — E78.2 MIXED HYPERLIPIDEMIA: ICD-10-CM

## 2024-02-29 LAB — HBA1C MFR BLD: 5.7 %

## 2024-02-29 SDOH — ECONOMIC STABILITY: FOOD INSECURITY: WITHIN THE PAST 12 MONTHS, YOU WORRIED THAT YOUR FOOD WOULD RUN OUT BEFORE YOU GOT MONEY TO BUY MORE.: NEVER TRUE

## 2024-02-29 SDOH — ECONOMIC STABILITY: FOOD INSECURITY: WITHIN THE PAST 12 MONTHS, THE FOOD YOU BOUGHT JUST DIDN'T LAST AND YOU DIDN'T HAVE MONEY TO GET MORE.: NEVER TRUE

## 2024-02-29 SDOH — ECONOMIC STABILITY: INCOME INSECURITY: HOW HARD IS IT FOR YOU TO PAY FOR THE VERY BASICS LIKE FOOD, HOUSING, MEDICAL CARE, AND HEATING?: NOT HARD AT ALL

## 2024-02-29 ASSESSMENT — PATIENT HEALTH QUESTIONNAIRE - PHQ9
SUM OF ALL RESPONSES TO PHQ QUESTIONS 1-9: 0
SUM OF ALL RESPONSES TO PHQ9 QUESTIONS 1 & 2: 0
1. LITTLE INTEREST OR PLEASURE IN DOING THINGS: 0
2. FEELING DOWN, DEPRESSED OR HOPELESS: 0

## 2024-02-29 NOTE — PROGRESS NOTES
Medicare Annual Wellness Visit    Mira Weir is here for Medicare AWV    Assessment & Plan   Medicare annual wellness visit, subsequent  Type 2 diabetes mellitus without complication, without long-term current use of insulin (HCC)  -     POCT glycosylated hemoglobin (Hb A1C)  Mixed hyperlipidemia  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HCC)  Moderate protein-calorie malnutrition (HCC)  Chronic systolic (congestive) heart failure (HCC)  Chronic obstructive pulmonary disease, unspecified COPD type (HCC)  Diabetic polyneuropathy associated with diabetes mellitus due to underlying condition (HCC)  Stage 3a chronic kidney disease (HCC)  Essential hypertension  Generalized weakness    Recommendations for Preventive Services Due: see orders and patient instructions/AVS.  Recommended screening schedule for the next 5-10 years is provided to the patient in written form: see Patient Instructions/AVS.     Return in 3 months (on 5/29/2024) for FOLLOW UP VISIT AND Medicare Annual Wellness Visit in 1 year.     Subjective   The following acute and/or chronic problems were also addressed today:  AS LISTED ABOVE    Patient's complete Health Risk Assessment and screening values have been reviewed and are found in Flowsheets. The following problems were reviewed today and where indicated follow up appointments were made and/or referrals ordered.    Positive Risk Factor Screenings with Interventions:                Activity, Diet, and Weight:  On average, how many days per week do you engage in moderate to strenuous exercise (like a brisk walk)?: 0 days  On average, how many minutes do you engage in exercise at this level?: 0 min    Do you eat balanced/healthy meals regularly?: Yes    Body mass index is 22.5 kg/m².      Inactivity Interventions:  Patient comments: PHYSICALLY AS ACTIVE AS SHE CAN BE IN HOUSE        Dentist Screen:  Have you seen the dentist within the past year?: (!) No    Intervention:  Not applicable - dentures     Vision

## 2024-02-29 NOTE — PATIENT INSTRUCTIONS
LOW SALT FOR BLOOD PRESSURE CONTROL.  LOW CARBOHYDRATE FOR BLOOD SUGAR   LOW FAT DIET FOR CHOLESTEROL CONTROL.  DRINK ENOUGH FLUIDS FOR BETTER KIDNEY FUNCTION.  TAKE COZAAR 50 MG. TOPROL 50 MG AND DYAZIDE 37.5-25 MG.  DAILY FOR BLOOD PRESSURE AND LEG EDEMA CONTROL.  TAKE  FARXIGA 10 MG DAILY FOR BLOOD SUGAR CONTROL.STOP TAKING METFORMIN.  TAKE LIPITOR 80 MG. NIGHTLY FOR CHOLESTEROL CONTROL AS PER [facility].  INHALE COMBIVENT INHALER 1 PUFF 2 TIMES A DAY FOR BREATHING CONTROL  REGULAR WALKING IN HOUSE ADVISED.  GO TO CHF CLINIC FOR FOLLOW UP CARE.   NEXT APPOINTMENT IN 3 MONTHS.       Learning About Being Active as an Older Adult  Why is being active important as you get older?     Being active is one of the best things you can do for your health. And it's never too late to start. Being active--or getting active, if you aren't already--has definite benefits. It can:  Give you more energy,  Keep your mind sharp.  Improve balance to reduce your risk of falls.  Help you manage chronic illness with fewer medicines.  No matter how old you are, how fit you are, or what health problems you have, there is a form of activity that will work for you. And the more physical activity you can do, the better your overall health will be.  What kinds of activity can help you stay healthy?  Being more active will make your daily activities easier. Physical activity includes planned exercise and things you do in daily life. There are four types of activity:  Aerobic.  Doing aerobic activity makes your heart and lungs strong.  Includes walking, dancing, and gardening.  Aim for at least 2½ hours spread throughout the week.  It improves your energy and can help you sleep better.  Muscle-strengthening.  This type of activity can help maintain muscle and strengthen bones.  Includes climbing stairs, using resistance bands, and lifting or carrying heavy loads.  Aim for at least twice a week.  It can help protect the knees and other

## 2024-03-04 ENCOUNTER — HOSPITAL ENCOUNTER (OUTPATIENT)
Dept: OTHER | Age: 89
Setting detail: THERAPIES SERIES
Discharge: HOME | End: 2024-03-04
Payer: MEDICARE

## 2024-03-04 VITALS
WEIGHT: 144 LBS | OXYGEN SATURATION: 96 % | SYSTOLIC BLOOD PRESSURE: 104 MMHG | BODY MASS INDEX: 21.9 KG/M2 | DIASTOLIC BLOOD PRESSURE: 51 MMHG | HEART RATE: 63 BPM

## 2024-03-04 LAB
ANION GAP SERPL CALCULATED.3IONS-SCNC: 14 MMOL/L (ref 7–16)
BNP SERPL-MCNC: 8560 PG/ML (ref 0–450)
BUN SERPL-MCNC: 39 MG/DL (ref 6–23)
CALCIUM SERPL-MCNC: 10.2 MG/DL (ref 8.6–10.2)
CHLORIDE SERPL-SCNC: 95 MMOL/L (ref 98–107)
CO2 SERPL-SCNC: 26 MMOL/L (ref 22–29)
CREAT SERPL-MCNC: 1.2 MG/DL (ref 0.5–1)
GFR SERPL CREATININE-BSD FRML MDRD: 44 ML/MIN/1.73M2
GLUCOSE SERPL-MCNC: 110 MG/DL (ref 74–99)
POTASSIUM SERPL-SCNC: 4.4 MMOL/L (ref 3.5–5)
SODIUM SERPL-SCNC: 135 MMOL/L (ref 132–146)

## 2024-03-04 PROCEDURE — 80048 BASIC METABOLIC PNL TOTAL CA: CPT

## 2024-03-04 PROCEDURE — 93292 WCD DEVICE INTERROGATE: CPT | Performed by: NURSE PRACTITIONER

## 2024-03-04 PROCEDURE — 36415 COLL VENOUS BLD VENIPUNCTURE: CPT

## 2024-03-04 PROCEDURE — 99214 OFFICE O/P EST MOD 30 MIN: CPT | Performed by: NURSE PRACTITIONER

## 2024-03-04 PROCEDURE — 99214 OFFICE O/P EST MOD 30 MIN: CPT

## 2024-03-04 PROCEDURE — 83880 ASSAY OF NATRIURETIC PEPTIDE: CPT

## 2024-03-04 RX ORDER — DAPAGLIFLOZIN 10 MG/1
10 TABLET, FILM COATED ORAL EVERY MORNING
COMMUNITY

## 2024-03-04 ASSESSMENT — PATIENT HEALTH QUESTIONNAIRE - PHQ9
SUM OF ALL RESPONSES TO PHQ9 QUESTIONS 1 & 2: 0
2. FEELING DOWN, DEPRESSED OR HOPELESS: NOT AT ALL
1. LITTLE INTEREST OR PLEASURE IN DOING THINGS: NOT AT ALL

## 2024-03-04 NOTE — DISCHARGE INSTRUCTIONS
Get blood work in CHF clinic today   Continue current cardiac medications   Follow up in CHF clinic and Dr. Li as scheduled  Weigh yourself daily    -Stay Hydrated, 64 oz     -Diet should sodium restricted to 2 grams    -Again watch your daily weight trends and if you gain water weight please follow below instructions.    -If you gain 3-5 pounds in 2-3 days OR notice that you are retaining fluid in anyway just like you did before then take an extra dose of your water pill (furosemide/Lasix) every day until you lose the weight or feel better.     -If you notice that you have taken more than 2 extra doses in 1 week then please call and let us know.    -If at any time you feel that you are retaining fluid, your medications are not working, or you feel ill in anyway, then please call us for either same day appointment or the next day, and for instructions. Our goal is to keep you out of the emergency room and the hospital and we have ways to do it. You just need to call us in a timely manner.     -If you become sick for other reasons, and notice that you are not urinating as much, the urine is very dark, you have significant diarrhea or vomiting, then please DO NOT take your water pill and CALL US immediately.

## 2024-03-04 NOTE — PROGRESS NOTES
irregularities in its proximal  segment, but without any significant angiographic luminal narrowing.  RCA:  The right coronary artery is a very large dominant vessel.  There  was 99% calcified mid RCA stenosis.  There was PAMELA-1 flow in the  superior subdivision of the posterolateral branch.  The posterior  descending artery branch and the posterolateral branch of the right  coronary artery also received left-to-right collaterals.  An acute  marginal branch of the right coronary artery also provided faint  collaterals to the LAD.  CONCLUSIONS:  1.  Severe calcific multivessel coronary arteries as described above.  2.  Markedly elevated left ventricular end-diastolic pressure.  3.  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    TTE (1/26/2023)  Left Ventricle   Micro-bubble contrast injected to enhance left ventricular visualization.   Left ventricle is severely dilated. LVEDD 6.5 cm.   LV systolic function is severely reduced.   Ejection fraction is visually estimated at 20-25%.   Abnormal diastolic function.   Severe global hypokinesis.   No definitive evidence of LV apical thrombus.   Conclusions   Ejection fraction is visually estimated at 20-25%.   No definitive evidence of LV apical thrombus on contrast echo.    TTE (4/4/2023)   Summary   Limited study for LV function.   Left ventricular chamber mildly dialted.   Apcial akinesis, inferior moderate hypokinesis, mild global hypokinesis,   abnormal septal motion, LV EF 25%.   Indeterminate diastolic function.   Left atrium is enlarged.   Increased left atrial volume.   Interatrial septum appears intact.   Mild to moderate mitral regurgitation - ERO 0.2cm2, PISA 0.8cm, RV 34cc.   No mitral valve prolapse.   No pericardial effusion.   No intra cardiac mass or thrombus.   No previous echo in local system for comparison.    Telemetry  Sinus Rhythm      LifeVest Interrogation:  Patient wearing device in CHF clinic today  LifeVest

## 2024-03-12 ENCOUNTER — OFFICE VISIT (OUTPATIENT)
Dept: FAMILY MEDICINE CLINIC | Age: 89
End: 2024-03-12

## 2024-03-12 VITALS
HEART RATE: 70 BPM | OXYGEN SATURATION: 100 % | SYSTOLIC BLOOD PRESSURE: 108 MMHG | DIASTOLIC BLOOD PRESSURE: 60 MMHG | WEIGHT: 142 LBS | BODY MASS INDEX: 21.59 KG/M2

## 2024-03-12 DIAGNOSIS — J44.9 CHRONIC OBSTRUCTIVE PULMONARY DISEASE, UNSPECIFIED COPD TYPE (HCC): ICD-10-CM

## 2024-03-12 DIAGNOSIS — E11.9 TYPE 2 DIABETES MELLITUS WITHOUT COMPLICATION, WITHOUT LONG-TERM CURRENT USE OF INSULIN (HCC): Primary | ICD-10-CM

## 2024-03-12 DIAGNOSIS — I50.22 CHRONIC SYSTOLIC (CONGESTIVE) HEART FAILURE (HCC): ICD-10-CM

## 2024-03-12 DIAGNOSIS — E44.0 MODERATE PROTEIN-CALORIE MALNUTRITION (HCC): ICD-10-CM

## 2024-03-12 DIAGNOSIS — E78.2 MIXED HYPERLIPIDEMIA: ICD-10-CM

## 2024-03-12 DIAGNOSIS — I25.10 ATHEROSCLEROSIS OF NATIVE CORONARY ARTERY OF NATIVE HEART WITHOUT ANGINA PECTORIS: ICD-10-CM

## 2024-03-12 DIAGNOSIS — I74.09 ABDOMINAL AORTA THROMBOSIS (HCC): ICD-10-CM

## 2024-03-12 NOTE — PATIENT INSTRUCTIONS
LOW SALT FOR BLOOD PRESSURE CONTROL.  LOW CARBOHYDRATE FOR BLOOD SUGAR   LOW FAT DIET FOR CHOLESTEROL CONTROL.  DRINK ENOUGH FLUIDS FOR BETTER KIDNEY FUNCTION.  TAKE COZAAR 50 MG. TOPROL 50 MG AND DYAZIDE 37.5-25 MG.  DAILY FOR BLOOD PRESSURE AND LEG EDEMA CONTROL.  TAKE  FARXIGA 10 MG DAILY FOR BLOOD SUGAR CONTROL.STOP TAKING METFORMIN.  TAKE LIPITOR 80 MG. NIGHTLY FOR CHOLESTEROL CONTROL AS PER [facility].  INHALE COMBIVENT INHALER 1 PUFF 2 TIMES A DAY FOR BREATHING CONTROL  REGULAR WALKING IN HOUSE ADVISED.  GO TO CHF CLINIC FOR FOLLOW UP CARE.   NEXT APPOINTMENT IN 3 MONTHS.

## 2024-03-12 NOTE — PROGRESS NOTES
recommendations with Mira Weir.    Electronically signed by Zeb Lundy MD on 3/12/24 at 10:27 AM EDT

## 2024-03-14 NOTE — PATIENT INSTRUCTIONS
REST  FORCE FLUID ORALLY. TYLENOL AS NEEDED. TAKE DOXYCYCLINE 100 MG. 2 TIMES A DAY. LOW SALT  AND LOW FAT DIET. CONTINUE CURRENT MEDICATIONS TAKING REGULARLY. REGULAR WALKING ADVISED. NEXT APPOINTMENT IN  1 MONTH. Personalized Preventive Plan for Miryam Johnston - 9/21/2020  Medicare offers a range of preventive health benefits. Some of the tests and screenings are paid in full while other may be subject to a deductible, co-insurance, and/or copay. Some of these benefits include a comprehensive review of your medical history including lifestyle, illnesses that may run in your family, and various assessments and screenings as appropriate. After reviewing your medical record and screening and assessments performed today your provider may have ordered immunizations, labs, imaging, and/or referrals for you. A list of these orders (if applicable) as well as your Preventive Care list are included within your After Visit Summary for your review. Other Preventive Recommendations:    · A preventive eye exam performed by an eye specialist is recommended every 1-2 years to screen for glaucoma; cataracts, macular degeneration, and other eye disorders. · A preventive dental visit is recommended every 6 months. · Try to get at least 150 minutes of exercise per week or 10,000 steps per day on a pedometer . · Order or download the FREE \"Exercise & Physical Activity: Your Everyday Guide\" from [employer]. Call 1-502.946.4779 or search [employer] online. · You need 5751-7837 mg of calcium and 9288-5917 IU of vitamin D per day. It is possible to meet your calcium requirement with diet alone, but a vitamin D supplement is usually necessary to meet this goal.  · When exposed to the sun, use a sunscreen that protects against both UVA and UVB radiation with an SPF of 30 or greater. Reapply every 2 to 3 hours or after sweating, drying off with a towel, or swimming.   · Always wear a seat belt when traveling in a car. Always wear a helmet when riding a bicycle or motorcycle. never used

## 2024-03-26 ENCOUNTER — HOSPITAL ENCOUNTER (OUTPATIENT)
Dept: OTHER | Age: 89
Setting detail: THERAPIES SERIES
Discharge: HOME | End: 2024-03-26
Payer: MEDICARE

## 2024-03-26 VITALS
SYSTOLIC BLOOD PRESSURE: 117 MMHG | BODY MASS INDEX: 21.59 KG/M2 | WEIGHT: 142 LBS | OXYGEN SATURATION: 100 % | DIASTOLIC BLOOD PRESSURE: 49 MMHG | HEART RATE: 72 BPM

## 2024-03-26 LAB
ANION GAP SERPL CALCULATED.3IONS-SCNC: 11 MMOL/L (ref 7–16)
BNP SERPL-MCNC: 7590 PG/ML (ref 0–450)
BUN SERPL-MCNC: 44 MG/DL (ref 6–23)
CALCIUM SERPL-MCNC: 9.6 MG/DL (ref 8.6–10.2)
CHLORIDE SERPL-SCNC: 103 MMOL/L (ref 98–107)
CO2 SERPL-SCNC: 22 MMOL/L (ref 22–29)
CREAT SERPL-MCNC: 1.1 MG/DL (ref 0.5–1)
GFR SERPL CREATININE-BSD FRML MDRD: 48 ML/MIN/1.73M2
GLUCOSE SERPL-MCNC: 160 MG/DL (ref 74–99)
POTASSIUM SERPL-SCNC: 4.4 MMOL/L (ref 3.5–5)
SODIUM SERPL-SCNC: 136 MMOL/L (ref 132–146)

## 2024-03-26 PROCEDURE — 36415 COLL VENOUS BLD VENIPUNCTURE: CPT

## 2024-03-26 PROCEDURE — 99214 OFFICE O/P EST MOD 30 MIN: CPT

## 2024-03-26 PROCEDURE — 83880 ASSAY OF NATRIURETIC PEPTIDE: CPT

## 2024-03-26 PROCEDURE — 80048 BASIC METABOLIC PNL TOTAL CA: CPT

## 2024-03-26 NOTE — PROGRESS NOTES
Congestive Heart Failure Clinic   [facility]     Referring Provider: Dr. Li  Primary Care Physician: Dr. Patton   Cardiologist: Dr. Li  Nephrologist: N/A        HISTORY OF PRESENT ILLNESS:     Mira Weir is a 89 y.o. (1935) female with a history of HFrEF (EF < 40%), most recent EF:  Lab Results   Component Value Date    LVEF 25 03/30/2023         He presents to the CHF clinic for ongoing evaluation and monitoring of heart failure.    In the CHF clinic today she denies any adverse symptoms except:  [] Dizziness or lightheadedness   [] Syncope or near syncope  [] Recent Fall  [] Shortness of breath at rest   [] Dyspnea with exertion  [] Decline in functional capacity (unable to perform activities they had previously been able to do)  [] Fatigue   [] Orthopnea  [] PND  [] Nocturnal cough  [] Hemoptysis  [] Chest pain, pressure, or discomfort  [] Palpitations  [] Abdominal distention  [] Abdominal bloating  [] Early satiety  [] Blood in stool   [] Diarrhea  [] Constipation  [] Nausea/Vomiting  [] Decreased urinary response to oral diuretic   [] Scrotal swelling   [x] Lower extremity edema- nonpitting  [] Used PRN doses of oral diuretic   [] Weight gain    Wt Readings from Last 3 Encounters:   03/26/24 64.4 kg (142 lb)   03/12/24 64.4 kg (142 lb)   03/04/24 65.3 kg (144 lb)       SOCIAL HISTORY:  [x] Denies tobacco, alcohol or illicit drug abuse  [] Tobacco use:  [] ETOH use:  [] Illicit drug use:        MEDICATIONS:    Allergies   Allergen Reactions    Pcn [Penicillins] Hives    Sulfa Antibiotics Hives     Prior to Visit Medications    Medication Sig Taking? Authorizing Provider   dapagliflozin (FARXIGA) 10 MG tablet Take 1 tablet by mouth every morning  Provider, MD Marilynn   furosemide (LASIX) 20 MG tablet Take 1 tablet by mouth three times a week  Camila Espana, APRN - CNP   blood glucose test strips (ASCENSIA AUTODISC VI;ONE TOUCH ULTRA TEST

## 2024-05-14 ENCOUNTER — OFFICE VISIT (OUTPATIENT)
Dept: CARDIOLOGY CLINIC | Age: 89
End: 2024-05-14
Payer: MEDICARE

## 2024-05-14 VITALS
RESPIRATION RATE: 18 BRPM | SYSTOLIC BLOOD PRESSURE: 92 MMHG | WEIGHT: 146 LBS | HEIGHT: 68 IN | HEART RATE: 72 BPM | DIASTOLIC BLOOD PRESSURE: 54 MMHG | BODY MASS INDEX: 22.13 KG/M2

## 2024-05-14 DIAGNOSIS — I25.5 ISCHEMIC CARDIOMYOPATHY: ICD-10-CM

## 2024-05-14 DIAGNOSIS — I10 ESSENTIAL HYPERTENSION: ICD-10-CM

## 2024-05-14 DIAGNOSIS — I25.10 CORONARY ARTERY DISEASE INVOLVING NATIVE CORONARY ARTERY OF NATIVE HEART WITHOUT ANGINA PECTORIS: Primary | ICD-10-CM

## 2024-05-14 PROCEDURE — 1123F ACP DISCUSS/DSCN MKR DOCD: CPT | Performed by: INTERNAL MEDICINE

## 2024-05-14 PROCEDURE — 1036F TOBACCO NON-USER: CPT | Performed by: INTERNAL MEDICINE

## 2024-05-14 PROCEDURE — 99213 OFFICE O/P EST LOW 20 MIN: CPT | Performed by: INTERNAL MEDICINE

## 2024-05-14 PROCEDURE — G8427 DOCREV CUR MEDS BY ELIG CLIN: HCPCS | Performed by: INTERNAL MEDICINE

## 2024-05-14 PROCEDURE — G8420 CALC BMI NORM PARAMETERS: HCPCS | Performed by: INTERNAL MEDICINE

## 2024-05-14 PROCEDURE — 1090F PRES/ABSN URINE INCON ASSESS: CPT | Performed by: INTERNAL MEDICINE

## 2024-05-14 PROCEDURE — 93000 ELECTROCARDIOGRAM COMPLETE: CPT | Performed by: INTERNAL MEDICINE

## 2024-05-28 ENCOUNTER — HOSPITAL ENCOUNTER (OUTPATIENT)
Dept: OTHER | Age: 89
Discharge: HOME | End: 2024-05-28

## 2024-05-28 DIAGNOSIS — E08.42 DIABETIC POLYNEUROPATHY ASSOCIATED WITH DIABETES MELLITUS DUE TO UNDERLYING CONDITION (HCC): ICD-10-CM

## 2024-05-28 RX ORDER — PREGABALIN 25 MG/1
CAPSULE ORAL
Qty: 120 CAPSULE | Refills: 0 | Status: SHIPPED | OUTPATIENT
Start: 2024-05-28 | End: 2024-07-27

## 2024-06-10 DIAGNOSIS — E78.2 MIXED HYPERLIPIDEMIA: ICD-10-CM

## 2024-06-10 DIAGNOSIS — E11.9 TYPE 2 DIABETES MELLITUS WITHOUT COMPLICATION, WITHOUT LONG-TERM CURRENT USE OF INSULIN (HCC): ICD-10-CM

## 2024-06-10 DIAGNOSIS — J44.9 CHRONIC OBSTRUCTIVE PULMONARY DISEASE, UNSPECIFIED COPD TYPE (HCC): ICD-10-CM

## 2024-06-10 LAB
ALBUMIN: 4.1 G/DL (ref 3.5–5.2)
ALP BLD-CCNC: 116 U/L (ref 35–104)
ALT SERPL-CCNC: 10 U/L (ref 0–32)
ANION GAP SERPL CALCULATED.3IONS-SCNC: 12 MMOL/L (ref 7–16)
AST SERPL-CCNC: 24 U/L (ref 0–31)
BASOPHILS ABSOLUTE: 0.01 K/UL (ref 0–0.2)
BASOPHILS RELATIVE PERCENT: 0 % (ref 0–2)
BILIRUB SERPL-MCNC: 0.6 MG/DL (ref 0–1.2)
BUN BLDV-MCNC: 34 MG/DL (ref 6–23)
CALCIUM SERPL-MCNC: 9.8 MG/DL (ref 8.6–10.2)
CHLORIDE BLD-SCNC: 101 MMOL/L (ref 98–107)
CHOLESTEROL, TOTAL: 116 MG/DL
CO2: 24 MMOL/L (ref 22–29)
CREAT SERPL-MCNC: 1.1 MG/DL (ref 0.5–1)
EOSINOPHILS ABSOLUTE: 0.24 K/UL (ref 0.05–0.5)
EOSINOPHILS RELATIVE PERCENT: 4 % (ref 0–6)
GFR, ESTIMATED: 48 ML/MIN/1.73M2
GLUCOSE BLD-MCNC: 104 MG/DL (ref 74–99)
HCT VFR BLD CALC: 37.7 % (ref 34–48)
HDLC SERPL-MCNC: 38 MG/DL
HEMOGLOBIN: 11.7 G/DL (ref 11.5–15.5)
IMMATURE GRANULOCYTES %: 0 % (ref 0–5)
IMMATURE GRANULOCYTES ABSOLUTE: <0.03 K/UL (ref 0–0.58)
LDL CHOLESTEROL: 58 MG/DL
LYMPHOCYTES ABSOLUTE: 1.13 K/UL (ref 1.5–4)
LYMPHOCYTES RELATIVE PERCENT: 21 % (ref 20–42)
MCH RBC QN AUTO: 30.4 PG (ref 26–35)
MCHC RBC AUTO-ENTMCNC: 31 G/DL (ref 32–34.5)
MCV RBC AUTO: 97.9 FL (ref 80–99.9)
MONOCYTES ABSOLUTE: 0.63 K/UL (ref 0.1–0.95)
MONOCYTES RELATIVE PERCENT: 12 % (ref 2–12)
NEUTROPHILS ABSOLUTE: 3.39 K/UL (ref 1.8–7.3)
NEUTROPHILS RELATIVE PERCENT: 63 % (ref 43–80)
PDW BLD-RTO: 13.8 % (ref 11.5–15)
PLATELET # BLD: 184 K/UL (ref 130–450)
PMV BLD AUTO: 11.3 FL (ref 7–12)
POTASSIUM SERPL-SCNC: 5.5 MMOL/L (ref 3.5–5)
RBC # BLD: 3.85 M/UL (ref 3.5–5.5)
SODIUM BLD-SCNC: 137 MMOL/L (ref 132–146)
TOTAL PROTEIN: 7 G/DL (ref 6.4–8.3)
TRIGL SERPL-MCNC: 101 MG/DL
VLDLC SERPL CALC-MCNC: 20 MG/DL
WBC # BLD: 5.4 K/UL (ref 4.5–11.5)

## 2024-06-12 NOTE — RESULT ENCOUNTER NOTE
KIDNEY FUNCTION BORDERLINE LOW. WILL MONITOR.  DISCUSS NEXT VISIT..   Opt out Bilobed Flap Text: The defect edges were debeveled with a #15 scalpel blade.  Given the location of the defect and the proximity to free margins a bilobe flap was deemed most appropriate.  Using a sterile surgical marker, an appropriate bilobe flap drawn around the defect.    The area thus outlined was incised deep to adipose tissue with a #15 scalpel blade.  The skin margins were undermined to an appropriate distance in all directions utilizing iris scissors.

## 2024-06-13 ENCOUNTER — OFFICE VISIT (OUTPATIENT)
Dept: FAMILY MEDICINE CLINIC | Age: 89
End: 2024-06-13

## 2024-06-13 VITALS
OXYGEN SATURATION: 98 % | DIASTOLIC BLOOD PRESSURE: 60 MMHG | HEART RATE: 78 BPM | BODY MASS INDEX: 22.2 KG/M2 | WEIGHT: 146 LBS | SYSTOLIC BLOOD PRESSURE: 108 MMHG

## 2024-06-13 DIAGNOSIS — I25.10 ATHEROSCLEROSIS OF NATIVE CORONARY ARTERY OF NATIVE HEART WITHOUT ANGINA PECTORIS: ICD-10-CM

## 2024-06-13 DIAGNOSIS — J44.9 CHRONIC OBSTRUCTIVE PULMONARY DISEASE, UNSPECIFIED COPD TYPE (HCC): ICD-10-CM

## 2024-06-13 DIAGNOSIS — E78.2 MIXED HYPERLIPIDEMIA: ICD-10-CM

## 2024-06-13 DIAGNOSIS — E11.9 TYPE 2 DIABETES MELLITUS WITHOUT COMPLICATION, WITHOUT LONG-TERM CURRENT USE OF INSULIN (HCC): Primary | ICD-10-CM

## 2024-06-13 DIAGNOSIS — L97.521 ULCER OF GREAT TOE, LEFT, LIMITED TO BREAKDOWN OF SKIN (HCC): ICD-10-CM

## 2024-06-13 DIAGNOSIS — N18.31 TYPE 2 DIABETES MELLITUS WITH STAGE 3A CHRONIC KIDNEY DISEASE, WITHOUT LONG-TERM CURRENT USE OF INSULIN (HCC): ICD-10-CM

## 2024-06-13 DIAGNOSIS — I50.22 CHRONIC SYSTOLIC (CONGESTIVE) HEART FAILURE (HCC): ICD-10-CM

## 2024-06-13 DIAGNOSIS — E11.22 TYPE 2 DIABETES MELLITUS WITH STAGE 3A CHRONIC KIDNEY DISEASE, WITHOUT LONG-TERM CURRENT USE OF INSULIN (HCC): ICD-10-CM

## 2024-06-13 RX ORDER — DOXYCYCLINE HYCLATE 100 MG
100 TABLET ORAL 2 TIMES DAILY
Qty: 20 TABLET | Refills: 0 | Status: SHIPPED | OUTPATIENT
Start: 2024-06-13 | End: 2024-06-23

## 2024-06-13 NOTE — PATIENT INSTRUCTIONS
LOW SALT FOR BLOOD PRESSURE CONTROL.  LOW CARBOHYDRATE FOR BLOOD SUGAR   LOW FAT DIET FOR CHOLESTEROL CONTROL.  DRINK ENOUGH FLUIDS FOR BETTER KIDNEY FUNCTION.  TAKE COZAAR 50 MG. TOPROL 50 MG AND DYAZIDE 37.5-25 MG.  DAILY FOR BLOOD PRESSURE AND LEG EDEMA CONTROL.  TAKE  FARXIGA 10 MG DAILY FOR BLOOD SUGAR CONTROL.  TAKE LIPITOR 80 MG. NIGHTLY FOR CHOLESTEROL CONTROL AS PER [facility].  INHALE COMBIVENT INHALER 1 PUFF 2 TIMES A DAY FOR BREATHING CONTROL  BACTROBAN DRESSING LEFT BIG TOE WOUND.  SEE PODIATRIST AS SCHEDULED.  GO TO CHF CLINIC FOR FOLLOW UP CARE.   NEXT APPOINTMENT IN 2 MONTHS.

## 2024-06-13 NOTE — PROGRESS NOTES
OFFICE PROGRESS NOTE      SUBJECTIVE:        Patient ID:   Mira Weir is a 89 y.o. female who presents for   Chief Complaint   Patient presents with    Toe Pain     Left foot           HPI:     RECHECK CHRONIC CHF, COPD, BP, CHOLESTEROL AND DIABETES.  HAS DEVELOPED ULCER ON THE LEFT BIG TOE FOR WEEKS. PUTTING ON BAND AID DRESSING. NOT GETTING ANY BETTER.  NO CHEST PAIN OR SHORTNESS OF BREATH.   MEDICATION REFILL.  WATCHING DIET GOOD.  WALKING SOME IN HOUSE FOR EXERCISE.  TAKING MEDICATIONS REGULARLY.         Prior to Admission medications    Medication Sig Start Date End Date Taking? Authorizing Provider   doxycycline hyclate (VIBRA-TABS) 100 MG tablet Take 1 tablet by mouth 2 times daily for 10 days 6/13/24 6/23/24 Yes Zeb Lundy MD   pregabalin (LYRICA) 25 MG capsule TAKE 1 CAPSULE BY MOUTH TWICE DAILY  DAYS. MAX DAILY AMOUNT: 50 MG 5/28/24 7/27/24 Yes Zeb Lundy MD   furosemide (LASIX) 20 MG tablet Take 1 tablet by mouth three times a week 1/31/24  Yes Camila Espana, APRN - CNP   blood glucose test strips (ASCENSIA AUTODISC VI;ONE TOUCH ULTRA TEST VI) strip 1 each by Does not apply route 2 times daily 12/13/23  Yes Zeb Lundy MD   albuterol-ipratropium (COMBIVENT RESPIMAT)  MCG/ACT AERS inhaler Inhale 1 puff into the lungs 2 times daily 12/7/23  Yes Zeb Lundy MD   spironolactone (ALDACTONE) 25 MG tablet Take 1 tablet by mouth Daily with lunch 10/30/23  Yes Nguyễn Li MD   clopidogrel (PLAVIX) 75 MG tablet Take 1 tablet by mouth daily 10/30/23  Yes Nguyễn Li MD   atorvastatin (LIPITOR) 80 MG tablet Take 1 tablet by mouth nightly 10/30/23  Yes Nguyễn Li MD   metoprolol succinate (TOPROL XL) 25 MG extended release tablet Take 1 tablet by mouth nightly 10/30/23  Yes Nguyễn Li MD   Handicap Placard MISC by Does not apply route Until 9/14/28 9/14/23  Yes Zeb Lundy MD   brimonidine (ALPHAGAN)

## 2024-06-18 ENCOUNTER — HOSPITAL ENCOUNTER (OUTPATIENT)
Dept: OTHER | Age: 89
Setting detail: THERAPIES SERIES
Discharge: HOME | End: 2024-06-18
Payer: MEDICARE

## 2024-06-18 VITALS
OXYGEN SATURATION: 98 % | SYSTOLIC BLOOD PRESSURE: 98 MMHG | BODY MASS INDEX: 22.35 KG/M2 | DIASTOLIC BLOOD PRESSURE: 43 MMHG | WEIGHT: 147 LBS | HEART RATE: 72 BPM

## 2024-06-18 LAB
ANION GAP SERPL CALCULATED.3IONS-SCNC: 13 MMOL/L (ref 7–16)
BNP SERPL-MCNC: 7721 PG/ML (ref 0–450)
BUN SERPL-MCNC: 38 MG/DL (ref 6–23)
CALCIUM SERPL-MCNC: 10.1 MG/DL (ref 8.6–10.2)
CHLORIDE SERPL-SCNC: 102 MMOL/L (ref 98–107)
CO2 SERPL-SCNC: 22 MMOL/L (ref 22–29)
CREAT SERPL-MCNC: 1 MG/DL (ref 0.5–1)
GFR, ESTIMATED: 52 ML/MIN/1.73M2
GLUCOSE SERPL-MCNC: 154 MG/DL (ref 74–99)
POTASSIUM SERPL-SCNC: 4.5 MMOL/L (ref 3.5–5)
SODIUM SERPL-SCNC: 137 MMOL/L (ref 132–146)

## 2024-06-18 PROCEDURE — 99214 OFFICE O/P EST MOD 30 MIN: CPT

## 2024-06-18 PROCEDURE — 80048 BASIC METABOLIC PNL TOTAL CA: CPT

## 2024-06-18 PROCEDURE — 36415 COLL VENOUS BLD VENIPUNCTURE: CPT

## 2024-06-18 PROCEDURE — 83880 ASSAY OF NATRIURETIC PEPTIDE: CPT

## 2024-06-18 ASSESSMENT — PATIENT HEALTH QUESTIONNAIRE - PHQ9
SUM OF ALL RESPONSES TO PHQ QUESTIONS 1-9: 0
2. FEELING DOWN, DEPRESSED OR HOPELESS: NOT AT ALL
SUM OF ALL RESPONSES TO PHQ9 QUESTIONS 1 & 2: 0
SUM OF ALL RESPONSES TO PHQ QUESTIONS 1-9: 0
1. LITTLE INTEREST OR PLEASURE IN DOING THINGS: NOT AT ALL
SUM OF ALL RESPONSES TO PHQ QUESTIONS 1-9: 0
SUM OF ALL RESPONSES TO PHQ QUESTIONS 1-9: 0

## 2024-06-18 NOTE — PROGRESS NOTES
Take 1 tablet by mouth daily  Marilynn Rodriguez MD   Blood Glucose Monitoring Suppl DELTA 1 Units by Does not apply route 2 times daily Please give whatever insurance covers  Zeb Lundy MD   magnesium (MAGNESIUM-OXIDE) 250 MG TABS tablet Take 1 tablet by mouth daily  Marilynn Rodriguez MD   Multiple Vitamin (ONE-A-DAY ESSENTIAL PO) Take 1 tablet by mouth.    Marilynn Rodriguez MD   Misc Natural Products (OSTEO BI-FLEX ADV JOINT SHIELD PO) Take 2 tablets by mouth daily.    Marilynn Rodriguez MD   aspirin 81 MG EC tablet Take 1 tablet by mouth daily  Marilynn Rodriguez MD          GUIDELINE DIRECTED MEDICAL THERAPY for HFrEF/HFmrEF:  ARNI/ACE I/ARB: (1a indication)  No  Hydralazine/Nitrates (1a in symptomatic AA population, 2b if unable to tolerate ACE/ARB/ARNI)  No  Beta blocker: (1a indication)  Metoprolol Succinate (Toprol)  Aldosterone antagonist: (1a indication)  Spironolactone 25 mg daily  SGLT2i: (1a indication)  Farxiga 10 mg daily    If Channel inhibitor (2a indication if HR >70 on maximally tolerated beta blocker)  No  Cardiac glycoside (2b indication for symptomatic HFrEF)  No  Soluble Guanylate Cyclase (sGC) Stimulator (2b indication LVEF <45% with recent HFH, IV diuretics or elevated BNP)  No  Potassium binders (2b indication for hyperkalemia while taking RAASi)  No          HEART FAILURE FOCUSED PHYSICAL EXAMINATION:    Vitals:    06/18/24 1015   BP: (!) 98/43   Pulse: 72   SpO2: 98%                            Respiratory  Respiratory Pattern: Regular  Respiratory Depth: Normal  L Breath Sounds: Clear, Diminished  R Breath Sounds: Clear, Diminished  Breath Sounds  Respiratory Pattern: Regular     Cardiac  Cardiac Regularity: Regular  Cardiac Rhythm: Sinus rhythm  Rhythm Interpretation  Cardiac Rhythm: Sinus rhythm  Pulse: 72     Gastrointestinal  Abdominal (WDL): Within Defined Limits  RUQ Bowel Sounds: Active  LUQ Bowel Sounds: Active  RLQ Bowel Sounds: Active  LLQ Bowel Sounds:

## 2024-06-19 ENCOUNTER — OFFICE VISIT (OUTPATIENT)
Dept: PODIATRY | Age: 89
End: 2024-06-19
Payer: MEDICARE

## 2024-06-19 VITALS — WEIGHT: 147 LBS | BODY MASS INDEX: 22.28 KG/M2 | HEIGHT: 68 IN

## 2024-06-19 DIAGNOSIS — I73.9 PVD (PERIPHERAL VASCULAR DISEASE) (HCC): ICD-10-CM

## 2024-06-19 DIAGNOSIS — R26.2 DIFFICULTY WALKING: ICD-10-CM

## 2024-06-19 DIAGNOSIS — E11.51 TYPE II DIABETES MELLITUS WITH PERIPHERAL CIRCULATORY DISORDER (HCC): ICD-10-CM

## 2024-06-19 DIAGNOSIS — L03.032 CELLULITIS OF GREAT TOE, LEFT: ICD-10-CM

## 2024-06-19 DIAGNOSIS — L97.529 DIABETIC ULCER OF LEFT GREAT TOE (HCC): Primary | ICD-10-CM

## 2024-06-19 DIAGNOSIS — E11.621 DIABETIC ULCER OF LEFT GREAT TOE (HCC): Primary | ICD-10-CM

## 2024-06-19 PROCEDURE — 1090F PRES/ABSN URINE INCON ASSESS: CPT | Performed by: PODIATRIST

## 2024-06-19 PROCEDURE — 3044F HG A1C LEVEL LT 7.0%: CPT | Performed by: PODIATRIST

## 2024-06-19 PROCEDURE — G8420 CALC BMI NORM PARAMETERS: HCPCS | Performed by: PODIATRIST

## 2024-06-19 PROCEDURE — G8427 DOCREV CUR MEDS BY ELIG CLIN: HCPCS | Performed by: PODIATRIST

## 2024-06-19 PROCEDURE — 1036F TOBACCO NON-USER: CPT | Performed by: PODIATRIST

## 2024-06-19 PROCEDURE — 99204 OFFICE O/P NEW MOD 45 MIN: CPT | Performed by: PODIATRIST

## 2024-06-19 PROCEDURE — 1123F ACP DISCUSS/DSCN MKR DOCD: CPT | Performed by: PODIATRIST

## 2024-06-19 RX ORDER — DOXYCYCLINE HYCLATE 100 MG
100 TABLET ORAL 2 TIMES DAILY
Qty: 20 TABLET | Refills: 0 | Status: SHIPPED | OUTPATIENT
Start: 2024-06-19 | End: 2024-06-29

## 2024-06-19 NOTE — PROGRESS NOTES
New patient here for diabetic foot exam, diabetic ulcer on left great toe. Zeb Lundy MD last visit 6/13/2024  Electronically signed by Carmen Garza LPN on 6/19/2024 at 10:31 AM    
Epicritic sensations intact left lower extremity  DERMATOLOGICAL: Ulceration noted medial IPJ region left great toe measuring approximately 2.0 x 1.1 x 0.2 cm.  No undermining of the wound edges noted.  Mild maceration noted in wound periphery.  No exposed bone, tendon, ligamentous structures noted left great toe.  Mild erythema noted left great toe and first MTPJ region left.  No skin crepitation, abscess, or any ascending cellulitic issues noted left foot.  MUSCULOSKELETAL: Noncontributory    Plan Per Assessment  Mira was seen today for foot ulcer and diabetes.    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)  -     doxycycline hyclate (VIBRA-TABS) 100 MG tablet; Take 1 tablet by mouth 2 times daily for 10 days  -     XR FOOT LEFT (MIN 3 VIEWS); Future    Cellulitis of great toe, left  -     doxycycline hyclate (VIBRA-TABS) 100 MG tablet; Take 1 tablet by mouth 2 times daily for 10 days  -     XR FOOT LEFT (MIN 3 VIEWS); Future    PVD (peripheral vascular disease) (HCC)  -     XR FOOT LEFT (MIN 3 VIEWS); Future    Type II diabetes mellitus with peripheral circulatory disorder (HCC)  -     XR FOOT LEFT (MIN 3 VIEWS); Future    Difficulty walking  -     XR FOOT LEFT (MIN 3 VIEWS); Future        New patient evaluation and management  We did discuss continued use of the oral antibiotic.  Prescription was renewed at this time to be taken as directed.  We did discuss appropriate wound care treatment options with patient and her daughter in detail today.  Dressing materials were dispensed for patient to utilize for daily dressing changes.  We did recommend discontinuing soaks and topical antibiotic, and alcohol for cleansing.  We did order x-rays 3 views left foot which we will review once performed.  Patient was also dispensed an offloading shoe on today's visit to properly offload the ulcerative area of left great toe.  Patient will be followed up at a later date for continued

## 2024-06-26 DIAGNOSIS — L97.529 DIABETIC ULCER OF LEFT GREAT TOE (HCC): Primary | ICD-10-CM

## 2024-06-26 DIAGNOSIS — L03.032 CELLULITIS OF GREAT TOE, LEFT: ICD-10-CM

## 2024-06-26 DIAGNOSIS — I73.9 PVD (PERIPHERAL VASCULAR DISEASE) (HCC): ICD-10-CM

## 2024-06-26 DIAGNOSIS — E11.621 DIABETIC ULCER OF LEFT GREAT TOE (HCC): Primary | ICD-10-CM

## 2024-06-26 DIAGNOSIS — E11.51 TYPE II DIABETES MELLITUS WITH PERIPHERAL CIRCULATORY DISORDER (HCC): ICD-10-CM

## 2024-07-02 ENCOUNTER — HOSPITAL ENCOUNTER (OUTPATIENT)
Dept: WOUND CARE | Age: 89
Discharge: HOME | End: 2024-07-02
Payer: MEDICARE

## 2024-07-02 DIAGNOSIS — L97.524 ULCER OF TOE OF LEFT FOOT, WITH NECROSIS OF BONE (HCC): Primary | ICD-10-CM

## 2024-07-02 DIAGNOSIS — I73.9 PVD (PERIPHERAL VASCULAR DISEASE) (HCC): ICD-10-CM

## 2024-07-02 DIAGNOSIS — M86.9 OSTEOMYELITIS OF GREAT TOE OF LEFT FOOT (HCC): ICD-10-CM

## 2024-07-02 PROCEDURE — 11042 DBRDMT SUBQ TIS 1ST 20SQCM/<: CPT

## 2024-07-02 PROCEDURE — 99213 OFFICE O/P EST LOW 20 MIN: CPT

## 2024-07-02 RX ORDER — TRIAMCINOLONE ACETONIDE 1 MG/G
OINTMENT TOPICAL ONCE
OUTPATIENT
Start: 2024-07-02 | End: 2024-07-02

## 2024-07-02 RX ORDER — GINSENG 100 MG
CAPSULE ORAL ONCE
OUTPATIENT
Start: 2024-07-02 | End: 2024-07-02

## 2024-07-02 RX ORDER — CLOBETASOL PROPIONATE 0.5 MG/G
OINTMENT TOPICAL ONCE
OUTPATIENT
Start: 2024-07-02 | End: 2024-07-02

## 2024-07-02 RX ORDER — LIDOCAINE HYDROCHLORIDE 20 MG/ML
JELLY TOPICAL ONCE
OUTPATIENT
Start: 2024-07-02 | End: 2024-07-02

## 2024-07-02 RX ORDER — DOXYCYCLINE HYCLATE 100 MG
100 TABLET ORAL 2 TIMES DAILY
Qty: 20 TABLET | Refills: 0 | Status: SHIPPED | OUTPATIENT
Start: 2024-07-02 | End: 2024-07-12

## 2024-07-02 RX ORDER — LIDOCAINE HYDROCHLORIDE 40 MG/ML
SOLUTION TOPICAL ONCE
OUTPATIENT
Start: 2024-07-02 | End: 2024-07-02

## 2024-07-02 RX ORDER — LIDOCAINE HYDROCHLORIDE 40 MG/ML
SOLUTION TOPICAL ONCE
Status: COMPLETED | OUTPATIENT
Start: 2024-07-02 | End: 2024-07-02

## 2024-07-02 RX ORDER — GENTAMICIN SULFATE 1 MG/G
OINTMENT TOPICAL ONCE
OUTPATIENT
Start: 2024-07-02 | End: 2024-07-02

## 2024-07-02 RX ORDER — BETAMETHASONE DIPROPIONATE 0.5 MG/G
CREAM TOPICAL ONCE
OUTPATIENT
Start: 2024-07-02 | End: 2024-07-02

## 2024-07-02 RX ORDER — DOXYCYCLINE HYCLATE 100 MG
100 TABLET ORAL 2 TIMES DAILY
COMMUNITY
End: 2024-07-02

## 2024-07-02 RX ORDER — LIDOCAINE 50 MG/G
OINTMENT TOPICAL ONCE
OUTPATIENT
Start: 2024-07-02 | End: 2024-07-02

## 2024-07-02 RX ORDER — BACITRACIN ZINC AND POLYMYXIN B SULFATE 500; 1000 [USP'U]/G; [USP'U]/G
OINTMENT TOPICAL ONCE
OUTPATIENT
Start: 2024-07-02 | End: 2024-07-02

## 2024-07-02 RX ORDER — LIDOCAINE 40 MG/G
CREAM TOPICAL ONCE
OUTPATIENT
Start: 2024-07-02 | End: 2024-07-02

## 2024-07-02 RX ORDER — IBUPROFEN 200 MG
TABLET ORAL ONCE
OUTPATIENT
Start: 2024-07-02 | End: 2024-07-02

## 2024-07-02 RX ORDER — SODIUM CHLOR/HYPOCHLOROUS ACID 0.033 %
SOLUTION, IRRIGATION IRRIGATION ONCE
OUTPATIENT
Start: 2024-07-02 | End: 2024-07-02

## 2024-07-02 RX ADMIN — LIDOCAINE HYDROCHLORIDE 5 ML: 40 SOLUTION TOPICAL at 10:28

## 2024-07-02 NOTE — DISCHARGE INSTRUCTIONS
Visit Discharge/Physician Orders     Discharge condition: Stable     Assessment of pain at discharge:     Anesthetic used:      Discharge to: Home     Left via:Private automobile     Accompanied by: accompanied by family     ECF/HHA: [facility]      Dressing Orders: Cleanse wound to left great toe with normal saline, apply ALGINATE AG to wound bed and cover with dry dressing- adhere with tape, change daily.     Treatment Orders:   7/2 Vasculars scheduled for 8/20 7/2 doxy sent to pharmacy, continue to take for another 10 days as ordered.   EAT DIET WITH PROTEINS AND VITAMIN C  TAKE A MULTIVITAMIN DAILY IF NOT CONTRAINDICATED       [facility] followup visit _____________1 week with Dr. Katherine lucio 7/11________________  (Please note your next appointment above and if you are unable to keep, kindly give a 24 hour notice. Thank you.)     Physician signature:__________________________        If you experience any of the following, please call the Wound Care Center during business hours:     * Increase in Pain  * Temperature over 101  * Increase in drainage from your wound  * Drainage with a foul odor  * Bleeding  * Increase in swelling  * Need for compression bandage changes due to slippage, breakthrough drainage.     If you need medical attention outside of the business hours of the Wound Care Centers please contact your PCP or go to the nearest emergency room.

## 2024-07-02 NOTE — DISCHARGE INSTRUCTIONS
Visit Discharge/Physician Orders    Discharge condition: Stable    Assessment of pain at discharge:    Anesthetic used:     Discharge to: Home    Left via:Private automobile    Accompanied by: accompanied by family    ECF/HHA: [place]     Dressing Orders: Cleanse wound to left great toe with normal saline, apply ALGINATE AG to wound bed and cover with dry dressing- adhere with tape, change daily.    Treatment Orders:   7/2 Vasculars to be scheduled  7/2 doxy sent to pharmacy, continue to take for another 10 days as ordered.   EAT DIET WITH PROTEINS AND VITAMIN C  TAKE A MULTIVITAMIN DAILY IF NOT CONTRAINDICATED      [facility] followup visit _____________1 week with Dr. Katherine lucio 7/11________________  (Please note your next appointment above and if you are unable to keep, kindly give a 24 hour notice. Thank you.)    Physician signature:__________________________      If you experience any of the following, please call the Wound Care Center during business hours:    * Increase in Pain  * Temperature over 101  * Increase in drainage from your wound  * Drainage with a foul odor  * Bleeding  * Increase in swelling  * Need for compression bandage changes due to slippage, breakthrough drainage.    If you need medical attention outside of the business hours of the Wound Care Centers please contact your PCP or go to the nearest emergency room.

## 2024-07-08 NOTE — DISCHARGE INSTRUCTIONS
Visit Discharge/Physician Orders     Discharge condition: Stable     Assessment of pain at discharge:     Anesthetic used:      Discharge to: Home     Left via:Private automobile     Accompanied by: accompanied by family     ECF/HHA: [facility]      Dressing Orders: Cleanse wound to left great toe with normal saline, apply ALGINATE AG to wound bed and cover with dry dressing- adhere with tape, change daily.     Treatment Orders:   7/2 Vasculars to be scheduled  7/2 doxy sent to pharmacy, continue to take for another 10 days as ordered.   EAT DIET WITH PROTEINS AND VITAMIN C  TAKE A MULTIVITAMIN DAILY IF NOT CONTRAINDICATED      Take doxycycline until seeing Dr. Rich     7/11 wound culture taken- will review and call with results if additional antibiotics are needed.   Dr. Murphy recommends bone biopsy and  IV antibiotics based on results. Referral sent to [employer] for outpatient IV medication pricing. HOME CHOICE home health care patient preference for home care.      [facility] followup visit _____________1 week with khadijah/ Dr. Murphy 3 weeks at [facility]________________  (Please note your next appointment above and if you are unable to keep, kindly give a 24 hour notice. Thank you.)     Physician signature:__________________________        If you experience any of the following, please call the Wound Care Center during business hours:     * Increase in Pain  * Temperature over 101  * Increase in drainage from your wound  * Drainage with a foul odor  * Bleeding  * Increase in swelling  * Need for compression bandage changes due to slippage, breakthrough drainage.     If you need medical attention outside of the business hours of the Wound Care Centers please contact your PCP or go to the nearest emergency room.

## 2024-07-09 ENCOUNTER — HOSPITAL ENCOUNTER (OUTPATIENT)
Dept: WOUND CARE | Age: 89
Discharge: HOME | End: 2024-07-09
Attending: PODIATRIST
Payer: MEDICARE

## 2024-07-09 VITALS
DIASTOLIC BLOOD PRESSURE: 50 MMHG | TEMPERATURE: 97.7 F | SYSTOLIC BLOOD PRESSURE: 104 MMHG | RESPIRATION RATE: 16 BRPM | HEART RATE: 85 BPM

## 2024-07-09 DIAGNOSIS — L97.524 ULCER OF TOE OF LEFT FOOT, WITH NECROSIS OF BONE (HCC): Primary | ICD-10-CM

## 2024-07-09 PROCEDURE — 11042 DBRDMT SUBQ TIS 1ST 20SQCM/<: CPT

## 2024-07-09 RX ORDER — LIDOCAINE 40 MG/G
CREAM TOPICAL ONCE
Status: CANCELLED | OUTPATIENT
Start: 2024-07-09 | End: 2024-07-09

## 2024-07-09 RX ORDER — LIDOCAINE HYDROCHLORIDE 40 MG/ML
SOLUTION TOPICAL ONCE
Status: CANCELLED | OUTPATIENT
Start: 2024-07-09 | End: 2024-07-09

## 2024-07-09 RX ORDER — GENTAMICIN SULFATE 1 MG/G
OINTMENT TOPICAL ONCE
Status: CANCELLED | OUTPATIENT
Start: 2024-07-09 | End: 2024-07-09

## 2024-07-09 RX ORDER — LIDOCAINE HYDROCHLORIDE 20 MG/ML
JELLY TOPICAL ONCE
Status: CANCELLED | OUTPATIENT
Start: 2024-07-09 | End: 2024-07-09

## 2024-07-09 RX ORDER — GINSENG 100 MG
CAPSULE ORAL ONCE
Status: CANCELLED | OUTPATIENT
Start: 2024-07-09 | End: 2024-07-09

## 2024-07-09 RX ORDER — LIDOCAINE 50 MG/G
OINTMENT TOPICAL ONCE
Status: CANCELLED | OUTPATIENT
Start: 2024-07-09 | End: 2024-07-09

## 2024-07-09 RX ORDER — TRIAMCINOLONE ACETONIDE 1 MG/G
OINTMENT TOPICAL ONCE
Status: CANCELLED | OUTPATIENT
Start: 2024-07-09 | End: 2024-07-09

## 2024-07-09 RX ORDER — SODIUM CHLOR/HYPOCHLOROUS ACID 0.033 %
SOLUTION, IRRIGATION IRRIGATION ONCE
Status: CANCELLED | OUTPATIENT
Start: 2024-07-09 | End: 2024-07-09

## 2024-07-09 RX ORDER — CLOBETASOL PROPIONATE 0.5 MG/G
OINTMENT TOPICAL ONCE
Status: CANCELLED | OUTPATIENT
Start: 2024-07-09 | End: 2024-07-09

## 2024-07-09 RX ORDER — BACITRACIN ZINC AND POLYMYXIN B SULFATE 500; 1000 [USP'U]/G; [USP'U]/G
OINTMENT TOPICAL ONCE
Status: CANCELLED | OUTPATIENT
Start: 2024-07-09 | End: 2024-07-09

## 2024-07-09 RX ORDER — LIDOCAINE HYDROCHLORIDE 40 MG/ML
SOLUTION TOPICAL ONCE
Status: COMPLETED | OUTPATIENT
Start: 2024-07-09 | End: 2024-07-09

## 2024-07-09 RX ORDER — BETAMETHASONE DIPROPIONATE 0.5 MG/G
CREAM TOPICAL ONCE
Status: CANCELLED | OUTPATIENT
Start: 2024-07-09 | End: 2024-07-09

## 2024-07-09 RX ORDER — IBUPROFEN 200 MG
TABLET ORAL ONCE
Status: CANCELLED | OUTPATIENT
Start: 2024-07-09 | End: 2024-07-09

## 2024-07-09 RX ADMIN — LIDOCAINE HYDROCHLORIDE: 40 SOLUTION TOPICAL at 10:22

## 2024-07-09 NOTE — PROGRESS NOTES
Wound Care Supplies      Supply Company:     [facility] [address] [place] [address]  p: [phone] f: [phone]     Ordering Center:     [place] [address]  Telephone: [phone]       FAX [phone]    Patient Information:      Kb Weir  [address] [facility] [address]   [phone]   : 1935  AGE: 89 y.o.     GENDER: female   EPISODE DATE: 2024    Insurance:      PRIMARY INSURANCE:  Plan: MEDICARE PART A AND B  Coverage: MEDICARE  Effective Date: 2001  Group Number: [unfilled]  Subscriber Number: [id number] - (Medicare)    Payer/Plan Subscr  Sex Relation Sub. Ins. ID Effective Group Num   1. MEDICARE - ME* KB WEIR 1935 Female Self [id number]                                    [address]. LOYAL [place]AVEN* KB WEIR 1935 Female Self [id number] MEDICARE SUPPLEMENT STANDARD PLAN N                                   [address] [address]       Patient Wound Information:      Problem List Items Addressed This Visit          Other    Ulcer of toe of left foot, with necrosis of bone (HCC) - Primary    Relevant Orders    Initiate Outpatient Wound Care Protocol       WOUNDS REQUIRING DRESSING SUPPLIES:     Wound 24 Toe (Comment  which one) Left # Great Toe (Active)   Wound Image   24 1023   Dressing Status New dressing applied 24 1105   Wound Cleansed Cleansed with saline 24 1105   Dressing/Treatment ABD;Roll gauze 24 1105   Wound Length (cm) 0.9 cm 24 1017   Wound Width (cm) 0.5 cm 24 1017   Wound Depth (cm) 0.4 cm 24 1017   Wound Surface Area (cm^2) 0.45 cm^2 24 1017   Change in Wound Size % (l*w) 18.18 24 1017   Wound Volume (cm^3) 0.18 cm^3 24 1017   Wound Healing % -64 24 1017   Post-Procedure Length (cm) 0.6 cm 24 1041   Post-Procedure Width (cm) 0.3 cm

## 2024-07-11 ENCOUNTER — HOSPITAL ENCOUNTER (OUTPATIENT)
Dept: WOUND CARE | Age: 89
Discharge: HOME | End: 2024-07-11
Attending: SPECIALIST
Payer: MEDICARE

## 2024-07-11 VITALS
HEIGHT: 68 IN | TEMPERATURE: 96.9 F | HEART RATE: 68 BPM | RESPIRATION RATE: 16 BRPM | SYSTOLIC BLOOD PRESSURE: 100 MMHG | DIASTOLIC BLOOD PRESSURE: 68 MMHG | BODY MASS INDEX: 22.28 KG/M2 | WEIGHT: 147 LBS

## 2024-07-11 DIAGNOSIS — L97.524 ULCER OF TOE OF LEFT FOOT, WITH NECROSIS OF BONE (HCC): Primary | ICD-10-CM

## 2024-07-11 PROCEDURE — 87205 SMEAR GRAM STAIN: CPT

## 2024-07-11 PROCEDURE — 99213 OFFICE O/P EST LOW 20 MIN: CPT

## 2024-07-11 PROCEDURE — 87070 CULTURE OTHR SPECIMN AEROBIC: CPT

## 2024-07-11 PROCEDURE — 87077 CULTURE AEROBIC IDENTIFY: CPT

## 2024-07-11 RX ORDER — LIDOCAINE 40 MG/G
CREAM TOPICAL ONCE
OUTPATIENT
Start: 2024-07-11 | End: 2024-07-11

## 2024-07-11 RX ORDER — LIDOCAINE 50 MG/G
OINTMENT TOPICAL ONCE
OUTPATIENT
Start: 2024-07-11 | End: 2024-07-11

## 2024-07-11 RX ORDER — SODIUM CHLOR/HYPOCHLOROUS ACID 0.033 %
SOLUTION, IRRIGATION IRRIGATION ONCE
OUTPATIENT
Start: 2024-07-11 | End: 2024-07-11

## 2024-07-11 RX ORDER — LIDOCAINE HYDROCHLORIDE 20 MG/ML
JELLY TOPICAL ONCE
OUTPATIENT
Start: 2024-07-11 | End: 2024-07-11

## 2024-07-11 RX ORDER — BACITRACIN ZINC 500 [USP'U]/G
OINTMENT TOPICAL ONCE
OUTPATIENT
Start: 2024-07-11 | End: 2024-07-11

## 2024-07-11 RX ORDER — BACITRACIN ZINC AND POLYMYXIN B SULFATE 500; 1000 [USP'U]/G; [USP'U]/G
OINTMENT TOPICAL ONCE
OUTPATIENT
Start: 2024-07-11 | End: 2024-07-11

## 2024-07-11 RX ORDER — TRIAMCINOLONE ACETONIDE 1 MG/G
OINTMENT TOPICAL ONCE
OUTPATIENT
Start: 2024-07-11 | End: 2024-07-11

## 2024-07-11 RX ORDER — IBUPROFEN 200 MG
TABLET ORAL ONCE
OUTPATIENT
Start: 2024-07-11 | End: 2024-07-11

## 2024-07-11 RX ORDER — CLOBETASOL PROPIONATE 0.5 MG/G
OINTMENT TOPICAL ONCE
OUTPATIENT
Start: 2024-07-11 | End: 2024-07-11

## 2024-07-11 RX ORDER — GENTAMICIN SULFATE 1 MG/G
OINTMENT TOPICAL ONCE
OUTPATIENT
Start: 2024-07-11 | End: 2024-07-11

## 2024-07-11 RX ORDER — LIDOCAINE HYDROCHLORIDE 40 MG/ML
SOLUTION TOPICAL ONCE
OUTPATIENT
Start: 2024-07-11 | End: 2024-07-11

## 2024-07-11 RX ORDER — BETAMETHASONE DIPROPIONATE 0.5 MG/G
CREAM TOPICAL ONCE
OUTPATIENT
Start: 2024-07-11 | End: 2024-07-11

## 2024-07-11 NOTE — PLAN OF CARE
Problem: Cognitive:  Goal: Knowledge of wound care  Description: Knowledge of wound care  Outcome: Progressing     Problem: Wound:  Goal: Will show signs of wound healing; wound closure and no evidence of infection  Description: Will show signs of wound healing; wound closure and no evidence of infection  Outcome: Progressing     Problem: Falls - Risk of:  Goal: Will remain free from falls  Description: Will remain free from falls  Outcome: Progressing

## 2024-07-11 NOTE — PROGRESS NOTES
WOUND CARE NOTE          Mira Weir  AGE:  89 y.o.     GENDER: female    : 1935  TODAY'S DATE:  2024  Zeb Lundy MD    Subjective:    Has chief complaint: toe ulcer   Mira Weir is a 89 y.o. female who presents today for wound check. Patient has  has a past medical history of Abnormal cardiovascular stress test, CAD (coronary artery disease), Chronic systolic (congestive) heart failure, COPD (chronic obstructive pulmonary disease) (HCC), COPD exacerbation (HCC), H/O cardiovascular stress test, Hyperlipidemia, Hypertension, Osteoarthritis, Osteoarthritis, Type II or unspecified type diabetes mellitus without mention of complication, not stated as uncontrolled, and Urinary retention.. '    Pt has  a toe wound   Pt is here with daughter   Pt is followed by Dr Anthony   Pt developed a toe ulcer >2 weeks from    Xry Small avulsion type fracture involving the medial base of the distal phalanx  of the great toe. There are associated osseous erosive changes and relative  osteopenia involving the donor site. Given the patient's history of diabetic  ulcer of the left great toe, the findings probably represent osteomyelitis.  Clinical correlation is recommended.  If clinically warranted, MRI of the  foot with and without intravenous gadolinium can be considered.  Significant  narrowing of the 1st tarsometatarsal joint, with practically bone-on-bone  contact.  Pt has been on doxycycline   Pt has been seen by Dr Rich  on    [facility] study pending     They feel the toe is less swollen/ red/tender  The patient is tolerating antibiotics.      DOS   afebrile awake and alert nad     ROS:   Pt denies  fevers/chills  Pt denies nausea/vomiting/diarrhea  Pt denies chest pain  Pt denies sob/cough  Pt denies rash/itch  Pt denies joint aches or pain  Pt denies urinary complaints  Pt denies leg or calf pain  Pt denies easy bruising or bleeding     Medications:   Current Outpatient Medications:

## 2024-07-12 RX ORDER — DOXYCYCLINE HYCLATE 100 MG
100 TABLET ORAL 2 TIMES DAILY
Qty: 20 TABLET | Refills: 0 | Status: SHIPPED | OUTPATIENT
Start: 2024-07-12 | End: 2024-08-11

## 2024-07-14 LAB
MICROORGANISM SPEC CULT: ABNORMAL
MICROORGANISM/AGENT SPEC: ABNORMAL
SERVICE CMNT-IMP: ABNORMAL
SPECIMEN DESCRIPTION: ABNORMAL

## 2024-07-15 NOTE — DISCHARGE INSTRUCTIONS
Visit Discharge/Physician Orders     Discharge condition: Stable     Assessment of pain at discharge:     Anesthetic used:      Discharge to: Home     Left via:Private automobile     Accompanied by: accompanied by family     ECF/HHA: [facility]      Dressing Orders: Cleanse wound to left great toe with normal saline, apply ALGINATE AG to wound bed and cover with dry dressing- adhere with tape, change daily.     Treatment Orders:   Vascular studies scheduled 8/20 @ 9:30 am. Go through [employer] to register.  7/2 doxy sent to pharmacy, continue to take for another 10 days as ordered.   EAT DIET WITH PROTEINS AND VITAMIN C  TAKE A MULTIVITAMIN DAILY IF NOT CONTRAINDICATED       Take doxycycline until seeing Dr. Rich      7/11 wound culture taken- will review and call with results if additional antibiotics are needed.   Dr. Murphy recommends bone biopsy and  IV antibiotics based on results. Referral sent to [employer] for outpatient IV medication pricing. HOME CHOICE home health care patient preference for home care.   Bone biopsy taken 7/16     [facility] followup visit _____________1 week with khadijah/ Dr. Murphy 3 weeks at ________________  (Please note your next appointment above and if you are unable to keep, kindly give a 24 hour notice. Thank you.)     Physician signature:__________________________        If you experience any of the following, please call the Wound Care Center during business hours:     * Increase in Pain  * Temperature over 101  * Increase in drainage from your wound  * Drainage with a foul odor  * Bleeding  * Increase in swelling  * Need for compression bandage changes due to slippage, breakthrough drainage.     If you need medical attention outside of the business hours of the Wound Care Centers please contact your PCP or go to the nearest emergency room.

## 2024-07-16 ENCOUNTER — HOSPITAL ENCOUNTER (OUTPATIENT)
Dept: WOUND CARE | Age: 89
Discharge: HOME | End: 2024-07-16
Attending: PODIATRIST
Payer: MEDICARE

## 2024-07-16 VITALS
WEIGHT: 147 LBS | HEIGHT: 68 IN | TEMPERATURE: 97.7 F | RESPIRATION RATE: 16 BRPM | BODY MASS INDEX: 22.28 KG/M2 | DIASTOLIC BLOOD PRESSURE: 44 MMHG | SYSTOLIC BLOOD PRESSURE: 95 MMHG | HEART RATE: 75 BPM

## 2024-07-16 DIAGNOSIS — M86.9 OSTEOMYELITIS OF GREAT TOE OF LEFT FOOT (HCC): ICD-10-CM

## 2024-07-16 DIAGNOSIS — L97.524 ULCER OF TOE OF LEFT FOOT, WITH NECROSIS OF BONE (HCC): Primary | ICD-10-CM

## 2024-07-16 PROCEDURE — 11042 DBRDMT SUBQ TIS 1ST 20SQCM/<: CPT

## 2024-07-16 PROCEDURE — 11044 DBRDMT BONE 1ST 20 SQ CM/<: CPT

## 2024-07-16 PROCEDURE — 87077 CULTURE AEROBIC IDENTIFY: CPT

## 2024-07-16 PROCEDURE — 87070 CULTURE OTHR SPECIMN AEROBIC: CPT

## 2024-07-16 PROCEDURE — 87205 SMEAR GRAM STAIN: CPT

## 2024-07-16 RX ORDER — CLOBETASOL PROPIONATE 0.5 MG/G
OINTMENT TOPICAL ONCE
OUTPATIENT
Start: 2024-07-16 | End: 2024-07-16

## 2024-07-16 RX ORDER — LIDOCAINE HYDROCHLORIDE 40 MG/ML
SOLUTION TOPICAL ONCE
OUTPATIENT
Start: 2024-07-16 | End: 2024-07-16

## 2024-07-16 RX ORDER — GENTAMICIN SULFATE 1 MG/G
OINTMENT TOPICAL ONCE
OUTPATIENT
Start: 2024-07-16 | End: 2024-07-16

## 2024-07-16 RX ORDER — LIDOCAINE HYDROCHLORIDE 20 MG/ML
JELLY TOPICAL ONCE
OUTPATIENT
Start: 2024-07-16 | End: 2024-07-16

## 2024-07-16 RX ORDER — BACITRACIN ZINC AND POLYMYXIN B SULFATE 500; 1000 [USP'U]/G; [USP'U]/G
OINTMENT TOPICAL ONCE
OUTPATIENT
Start: 2024-07-16 | End: 2024-07-16

## 2024-07-16 RX ORDER — GINSENG 100 MG
CAPSULE ORAL ONCE
OUTPATIENT
Start: 2024-07-16 | End: 2024-07-16

## 2024-07-16 RX ORDER — IBUPROFEN 200 MG
TABLET ORAL ONCE
OUTPATIENT
Start: 2024-07-16 | End: 2024-07-16

## 2024-07-16 RX ORDER — TRIAMCINOLONE ACETONIDE 1 MG/G
OINTMENT TOPICAL ONCE
OUTPATIENT
Start: 2024-07-16 | End: 2024-07-16

## 2024-07-16 RX ORDER — LIDOCAINE 40 MG/G
CREAM TOPICAL ONCE
OUTPATIENT
Start: 2024-07-16 | End: 2024-07-16

## 2024-07-16 RX ORDER — LIDOCAINE HYDROCHLORIDE 40 MG/ML
SOLUTION TOPICAL ONCE
Status: DISCONTINUED | OUTPATIENT
Start: 2024-07-16 | End: 2024-07-17 | Stop reason: HOSPADM

## 2024-07-16 RX ORDER — BETAMETHASONE DIPROPIONATE 0.5 MG/G
CREAM TOPICAL ONCE
OUTPATIENT
Start: 2024-07-16 | End: 2024-07-16

## 2024-07-16 RX ORDER — LIDOCAINE 50 MG/G
OINTMENT TOPICAL ONCE
OUTPATIENT
Start: 2024-07-16 | End: 2024-07-16

## 2024-07-16 RX ORDER — SODIUM CHLOR/HYPOCHLOROUS ACID 0.033 %
SOLUTION, IRRIGATION IRRIGATION ONCE
OUTPATIENT
Start: 2024-07-16 | End: 2024-07-16

## 2024-07-16 RX ORDER — LEVOFLOXACIN 750 MG/1
750 TABLET, FILM COATED ORAL EVERY OTHER DAY
Qty: 21 TABLET | Refills: 0 | Status: SHIPPED | OUTPATIENT
Start: 2024-07-16 | End: 2024-08-27

## 2024-07-16 NOTE — PROGRESS NOTES
75   Temp 97.7 °F (36.5 °C) (Temporal)   Resp 16   Ht 1.727 m (5' 8\")   Wt 66.7 kg (147 lb)   LMP  (LMP Unknown)   BMI 22.35 kg/m²   Wt Readings from Last 3 Encounters:   07/16/24 66.7 kg (147 lb)   07/11/24 66.7 kg (147 lb)   06/19/24 66.7 kg (147 lb)     PHYSICAL EXAM  CONSTITUTIONAL:   Awake, alert, cooperative   EYES:  lids and lashes normal   ENT: external ears and nose without lesions   NECK:  supple, symmetrical, trachea midline   SKIN:  Open wound Present    Assessment:     Problem List Items Addressed This Visit       Ulcer of toe of left foot, with necrosis of bone (HCC) - Primary    Relevant Medications    lidocaine (XYLOCAINE) 4 % external solution    Other Relevant Orders    Initiate Outpatient Wound Care Protocol    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)       Pre Debridement Measurements:  Are located in the Wound/Ulcer Documentation Flow Sheet  Post Debridement Measurements:  Wound/Ulcer Descriptions are Pre Debridement except measurements:    Wound 07/02/24 Toe (Comment  which one) Left # Great Toe (Active)   Wound Image   07/02/24 1023   Wound Etiology Diabetic 07/16/24 1039   Dressing Status New dressing applied;Clean;Dry;Intact 07/16/24 1039   Wound Cleansed Cleansed with saline 07/16/24 1039   Dressing/Treatment Roll gauze;Alginate with Ag 07/16/24 1039   Offloading for Diabetic Foot Ulcers Offloading ordered 07/16/24 1039   Wound Length (cm) 0.9 cm 07/16/24 1013   Wound Width (cm) 0.5 cm 07/16/24 1013   Wound Depth (cm) 0.3 cm 07/16/24 1013   Wound Surface Area (cm^2) 0.45 cm^2 07/16/24 1013   Change in Wound Size % (l*w) 18.18 07/16/24 1013   Wound Volume (cm^3) 0.135 cm^3 07/16/24 1013   Wound Healing % -23 07/16/24 1013   Post-Procedure Length (cm) 1 cm 07/16/24 1023   Post-Procedure Width (cm) 0.6 cm 07/16/24 1023   Post-Procedure Depth (cm) 0.4 cm 07/16/24 1023   Post-Procedure Surface Area (cm^2) 0.6 cm^2 07/16/24 1023   Post-Procedure Volume (cm^3) 0.24 cm^3 07/16/24 1023

## 2024-07-16 NOTE — PROGRESS NOTES
[facility]      Results       Procedure Component Value Units Date/Time    Culture, Surgical [[id number]] Collected: 07/16/24 1045    Order Status: Completed Specimen: Bone Updated: 07/16/24 1539     Specimen Description .BONE     Direct Exam Gram stain performed by tissue touch prep      RARE EPITHELIAL CELLS      MODERATE Mononuclear leukocytes      NO ORGANISMS SEEN     Culture PENDING    Culture, Wound [[id number]]     Order Status: No result Specimen: Ulcer     Culture, Wound [[id number]]  (Abnormal)  (Susceptibility) Collected: 07/11/24 1400    Order Status: Completed Specimen: Ulcer Updated: 07/14/24 1114     Specimen Description .ULCER     Special Requests LT GREAT TOE     Direct Exam Gram stain performed by tissue touch prep      NO EPITHELIAL CELLS      FEW Polymorphonuclear leukocytes      NO ORGANISMS SEEN     Culture PSEUDOMONAS AERUGINOSA MODERATE GROWTH    Susceptibility        Pseudomonas aeruginosa      BACTERIAL SUSCEPTIBILITY PANEL KATHERINE      cefepime 2  Sensitive      Ceftolozane/Tazobactam (Zerbaxa) 1  Sensitive      gentamicin <=1  Sensitive      levofloxacin 0.25  Sensitive      meropenem 0.5  Sensitive      piperacillin-tazobactam 8  Sensitive      tobramycin <=1  Sensitive                               SPOKE WITH DR BAEZ   TOE IS RED  A BONE BIOPSY WAS DONE     CALLED DAUGHTER WITH ABOVE MICRO RESULTS   TO START LEVAQUIN  SIDE EFFECTS INFORMED AND DISCUSSED  F/U IN WOUND CLINIC    Future Appointments         Provider Specialty Dept Phone    7/23/2024 9:45 AM Miki Baez DPM Wound Ostomy [phone]    8/1/2024 1:30 PM Darcie Murphy MD Wound Ostomy [phone]    8/8/2024 9:45 AM Zeb Lundy MD Family Medicine [phone]    8/20/2024 10:00 AM (Arrive by 9:30 AM) [facility] CARDIOVASCULAR RM 2 Vascular Surgery [phone]    9/16/2024 10:15 AM [facility] CHF ROOM 2 Cardiology [phone]    11/29/2024 11:30 AM Nguyễn Li MD Cardiology [phone]          Darcie Murphy

## 2024-07-18 LAB
MICROORGANISM SPEC CULT: ABNORMAL
MICROORGANISM/AGENT SPEC: ABNORMAL
SPECIMEN DESCRIPTION: ABNORMAL

## 2024-07-22 NOTE — DISCHARGE INSTRUCTIONS
Visit Discharge/Physician Orders     Discharge condition: Stable     Assessment of pain at discharge:     Anesthetic used:      Discharge to: Home     Left via:Private automobile     Accompanied by: accompanied by family     ECF/HHA: [place]      Dressing Orders: Cleanse wound to left great toe with normal saline, apply ALGINATE AG to wound bed and cover with dry dressing- adhere with tape, change daily.     Treatment Orders:   Vascular studies scheduled 8/20 @ 9:30 am. Go through [employer] to register.  MRI scheduled 7/28 @ 9:45 AM. Go through [employer] to register  EAT DIET WITH PROTEINS AND VITAMIN C  TAKE A MULTIVITAMIN DAILY IF NOT CONTRAINDICATED    Bone biopsy  - reviewed. Continue Levaquin as prescribed.       Dr. Murphy recommends bone biopsy and  IV antibiotics based on results. Referral sent to [employer] for outpatient IV medication pricing. HOME CHOICE home health care patient preference for home care.        [facility] followup visit _____________1 week with Dr. Rich/ Dr. Murphy  8/1 @ 1:30pm________________  (Please note your next appointment above and if you are unable to keep, kindly give a 24 hour notice. Thank you.)     Physician signature:__________________________        If you experience any of the following, please call the Wound Care Center during business hours:     * Increase in Pain  * Temperature over 101  * Increase in drainage from your wound  * Drainage with a foul odor  * Bleeding  * Increase in swelling  * Need for compression bandage changes due to slippage, breakthrough drainage.     If you need medical attention outside of the business hours of the Wound Care Centers please contact your PCP or go to the nearest emergency room.

## 2024-07-23 ENCOUNTER — HOSPITAL ENCOUNTER (OUTPATIENT)
Dept: WOUND CARE | Age: 89
Discharge: HOME | End: 2024-07-23
Attending: PODIATRIST
Payer: MEDICARE

## 2024-07-23 VITALS
RESPIRATION RATE: 16 BRPM | BODY MASS INDEX: 22.28 KG/M2 | HEART RATE: 70 BPM | SYSTOLIC BLOOD PRESSURE: 90 MMHG | WEIGHT: 147 LBS | HEIGHT: 68 IN | DIASTOLIC BLOOD PRESSURE: 58 MMHG | TEMPERATURE: 96.7 F

## 2024-07-23 DIAGNOSIS — L97.524 ULCER OF TOE OF LEFT FOOT, WITH NECROSIS OF BONE (HCC): Primary | ICD-10-CM

## 2024-07-23 PROCEDURE — 11042 DBRDMT SUBQ TIS 1ST 20SQCM/<: CPT | Performed by: NURSE PRACTITIONER

## 2024-07-23 PROCEDURE — 11042 DBRDMT SUBQ TIS 1ST 20SQCM/<: CPT

## 2024-07-23 RX ORDER — LIDOCAINE 40 MG/G
CREAM TOPICAL ONCE
OUTPATIENT
Start: 2024-07-23 | End: 2024-07-23

## 2024-07-23 RX ORDER — GINSENG 100 MG
CAPSULE ORAL ONCE
OUTPATIENT
Start: 2024-07-23 | End: 2024-07-23

## 2024-07-23 RX ORDER — LIDOCAINE HYDROCHLORIDE 40 MG/ML
SOLUTION TOPICAL ONCE
OUTPATIENT
Start: 2024-07-23 | End: 2024-07-23

## 2024-07-23 RX ORDER — SODIUM CHLOR/HYPOCHLOROUS ACID 0.033 %
SOLUTION, IRRIGATION IRRIGATION ONCE
OUTPATIENT
Start: 2024-07-23 | End: 2024-07-23

## 2024-07-23 RX ORDER — LIDOCAINE HYDROCHLORIDE 20 MG/ML
JELLY TOPICAL ONCE
OUTPATIENT
Start: 2024-07-23 | End: 2024-07-23

## 2024-07-23 RX ORDER — CLOBETASOL PROPIONATE 0.5 MG/G
OINTMENT TOPICAL ONCE
OUTPATIENT
Start: 2024-07-23 | End: 2024-07-23

## 2024-07-23 RX ORDER — GENTAMICIN SULFATE 1 MG/G
OINTMENT TOPICAL ONCE
OUTPATIENT
Start: 2024-07-23 | End: 2024-07-23

## 2024-07-23 RX ORDER — LIDOCAINE 50 MG/G
OINTMENT TOPICAL ONCE
OUTPATIENT
Start: 2024-07-23 | End: 2024-07-23

## 2024-07-23 RX ORDER — BACITRACIN ZINC AND POLYMYXIN B SULFATE 500; 1000 [USP'U]/G; [USP'U]/G
OINTMENT TOPICAL ONCE
OUTPATIENT
Start: 2024-07-23 | End: 2024-07-23

## 2024-07-23 RX ORDER — LIDOCAINE HYDROCHLORIDE 40 MG/ML
SOLUTION TOPICAL ONCE
Status: COMPLETED | OUTPATIENT
Start: 2024-07-23 | End: 2024-07-23

## 2024-07-23 RX ORDER — IBUPROFEN 200 MG
TABLET ORAL ONCE
OUTPATIENT
Start: 2024-07-23 | End: 2024-07-23

## 2024-07-23 RX ORDER — TRIAMCINOLONE ACETONIDE 1 MG/G
OINTMENT TOPICAL ONCE
OUTPATIENT
Start: 2024-07-23 | End: 2024-07-23

## 2024-07-23 RX ORDER — BETAMETHASONE DIPROPIONATE 0.5 MG/G
CREAM TOPICAL ONCE
OUTPATIENT
Start: 2024-07-23 | End: 2024-07-23

## 2024-07-23 RX ADMIN — LIDOCAINE HYDROCHLORIDE 10 ML: 40 SOLUTION TOPICAL at 10:04

## 2024-07-23 NOTE — PROGRESS NOTES
examination of this patient's wound(s)/ulcer(s) today, debridement is required to promote healing and evaluate the wound base.    Performed by: SUDHAKAR Soriano CNP    Consent obtained:  Yes    Time out taken:  Yes    Pain Control: Anesthetic  Anesthetic: 4% Lidocaine Liquid Topical     Debridement:Excisional Debridement    Using curette the wound(s)/ulcer(s) was/were sharply debrided down through and including the removal of subcutaneous tissue.        Devitalized Tissue Debrided:  fibrin, biofilm, slough, and exudate to stimulate bleeding to promote healing, post debridement good bleeding base and wound edges noted    Wound/Ulcer #: 1    Percent of Wound/Ulcer Debrided: 100%    Total Surface Area Debrided:  0.15 sq cm     Estimated Blood Loss:  Minimal  Hemostasis Achieved:  by pressure    Procedural Pain:  0  / 10   Post Procedural Pain:  0 / 10     Response to treatment:  Well tolerated by patient.     Plan:   Treatment Note please see attached Discharge Instructions    Written patient dismissal instructions given to patient and signed by patient or POA.         Discharge Instructions         Visit Discharge/Physician Orders     Discharge condition: Stable     Assessment of pain at discharge:     Anesthetic used:      Discharge to: Home     Left via:Private automobile     Accompanied by: accompanied by family     ECF/HHA: [facility]      Dressing Orders: Cleanse wound to left great toe with normal saline, apply ALGINATE AG to wound bed and cover with dry dressing- adhere with tape, change daily.     Treatment Orders:   Vascular studies scheduled 8/20 @ 9:30 am. Go through [employer] to register.  MRI scheduled 7/28 @ 9:45 AM. Go through [employer] to register  EAT DIET WITH PROTEINS AND VITAMIN C  TAKE A MULTIVITAMIN DAILY IF NOT CONTRAINDICATED    Bone biopsy  - reviewed. Continue Levaquin as prescribed.       Dr. Murphy recommends bone biopsy and  IV antibiotics based on results. Referral sent to [employer] for

## 2024-07-29 DIAGNOSIS — E08.42 DIABETIC POLYNEUROPATHY ASSOCIATED WITH DIABETES MELLITUS DUE TO UNDERLYING CONDITION (HCC): ICD-10-CM

## 2024-07-29 RX ORDER — PREGABALIN 25 MG/1
CAPSULE ORAL
Qty: 120 CAPSULE | Refills: 0 | OUTPATIENT
Start: 2024-07-29

## 2024-07-29 NOTE — DISCHARGE INSTRUCTIONS
Visit Discharge/Physician Orders     Discharge condition: Stable     Assessment of pain at discharge:     Anesthetic used:      Discharge to: Home     Left via:Private automobile     Accompanied by: accompanied by family     ECF/HHA: [facility]      Dressing Orders: Cleanse wound to left great toe with normal saline, apply ALGINATE AG to wound bed and cover with dry dressing- adhere with tape, change daily.     Treatment Orders:   Vascular studies scheduled 8/20 @ 9:30 am. Go through [employer] to register.  EAT DIET WITH PROTEINS AND VITAMIN C  TAKE A MULTIVITAMIN DAILY IF NOT CONTRAINDICATED    Bone biopsy  - reviewed. Continue Levaquin as prescribed.        Dr. Murphy recommends bone biopsy and  IV antibiotics based on results. Referral sent to [employer] for outpatient IV medication pricing. HOME CHOICE home health care patient preference for home care.         [facility] followup visit _____________1 week with Dr. Rich/ Dr. Katherine IRVIN 8/1 @ 1:30pm________________  (Please note your next appointment above and if you are unable to keep, kindly give a 24 hour notice. Thank you.)     Physician signature:__________________________        If you experience any of the following, please call the Wound Care Center during business hours:     * Increase in Pain  * Temperature over 101  * Increase in drainage from your wound  * Drainage with a foul odor  * Bleeding  * Increase in swelling  * Need for compression bandage changes due to slippage, breakthrough drainage.     If you need medical attention outside of the business hours of the Wound Care Centers please contact your PCP or go to the nearest emergency room.

## 2024-07-29 NOTE — TELEPHONE ENCOUNTER
Last seen 6/13/2024  Next appt 8/8/2024    Requested Prescriptions     Pending Prescriptions Disp Refills    pregabalin (LYRICA) 25 MG capsule 120 capsule 0     Sig: TAKE 1 CAPSULE BY MOUTH TWICE DAILY  DAYS. MAX DAILY AMOUNT: 50 MG      [facility]/ [place]

## 2024-07-29 NOTE — TELEPHONE ENCOUNTER
Last seen 6/13/2024  Next appt 8/8/2024    Requested Prescriptions     Pending Prescriptions Disp Refills    pregabalin (LYRICA) 25 MG capsule [Pharmacy Med Name: Pregabalin 25 MG Oral Capsule] 120 capsule 0     Sig: TAKE 1 CAPSULE BY MOUTH TWICE DAILY. MAX DAILY AMOUNT: 50 MG

## 2024-07-30 ENCOUNTER — HOSPITAL ENCOUNTER (OUTPATIENT)
Dept: WOUND CARE | Age: 89
Discharge: HOME | End: 2024-07-30
Attending: PODIATRIST
Payer: MEDICARE

## 2024-07-30 VITALS
WEIGHT: 147 LBS | DIASTOLIC BLOOD PRESSURE: 46 MMHG | TEMPERATURE: 97.8 F | SYSTOLIC BLOOD PRESSURE: 96 MMHG | RESPIRATION RATE: 16 BRPM | BODY MASS INDEX: 22.28 KG/M2 | HEIGHT: 68 IN

## 2024-07-30 DIAGNOSIS — L97.524 ULCER OF TOE OF LEFT FOOT, WITH NECROSIS OF BONE (HCC): Primary | ICD-10-CM

## 2024-07-30 PROCEDURE — 11042 DBRDMT SUBQ TIS 1ST 20SQCM/<: CPT

## 2024-07-30 PROCEDURE — 11042 DBRDMT SUBQ TIS 1ST 20SQCM/<: CPT | Performed by: NURSE PRACTITIONER

## 2024-07-30 RX ORDER — LIDOCAINE HYDROCHLORIDE 40 MG/ML
SOLUTION TOPICAL ONCE
Status: CANCELLED | OUTPATIENT
Start: 2024-07-30 | End: 2024-07-30

## 2024-07-30 RX ORDER — SODIUM CHLOR/HYPOCHLOROUS ACID 0.033 %
SOLUTION, IRRIGATION IRRIGATION ONCE
Status: CANCELLED | OUTPATIENT
Start: 2024-07-30 | End: 2024-07-30

## 2024-07-30 RX ORDER — BACITRACIN ZINC AND POLYMYXIN B SULFATE 500; 1000 [USP'U]/G; [USP'U]/G
OINTMENT TOPICAL ONCE
Status: CANCELLED | OUTPATIENT
Start: 2024-07-30 | End: 2024-07-30

## 2024-07-30 RX ORDER — CLOBETASOL PROPIONATE 0.5 MG/G
OINTMENT TOPICAL ONCE
Status: CANCELLED | OUTPATIENT
Start: 2024-07-30 | End: 2024-07-30

## 2024-07-30 RX ORDER — LIDOCAINE HYDROCHLORIDE 40 MG/ML
SOLUTION TOPICAL ONCE
Status: COMPLETED | OUTPATIENT
Start: 2024-07-30 | End: 2024-07-30

## 2024-07-30 RX ORDER — IBUPROFEN 200 MG
TABLET ORAL ONCE
Status: CANCELLED | OUTPATIENT
Start: 2024-07-30 | End: 2024-07-30

## 2024-07-30 RX ORDER — LIDOCAINE 50 MG/G
OINTMENT TOPICAL ONCE
Status: CANCELLED | OUTPATIENT
Start: 2024-07-30 | End: 2024-07-30

## 2024-07-30 RX ORDER — GENTAMICIN SULFATE 1 MG/G
OINTMENT TOPICAL ONCE
Status: CANCELLED | OUTPATIENT
Start: 2024-07-30 | End: 2024-07-30

## 2024-07-30 RX ORDER — LIDOCAINE 40 MG/G
CREAM TOPICAL ONCE
Status: CANCELLED | OUTPATIENT
Start: 2024-07-30 | End: 2024-07-30

## 2024-07-30 RX ORDER — TRIAMCINOLONE ACETONIDE 1 MG/G
OINTMENT TOPICAL ONCE
Status: CANCELLED | OUTPATIENT
Start: 2024-07-30 | End: 2024-07-30

## 2024-07-30 RX ORDER — GINSENG 100 MG
CAPSULE ORAL ONCE
Status: CANCELLED | OUTPATIENT
Start: 2024-07-30 | End: 2024-07-30

## 2024-07-30 RX ORDER — BETAMETHASONE DIPROPIONATE 0.5 MG/G
CREAM TOPICAL ONCE
Status: CANCELLED | OUTPATIENT
Start: 2024-07-30 | End: 2024-07-30

## 2024-07-30 RX ORDER — LIDOCAINE HYDROCHLORIDE 20 MG/ML
JELLY TOPICAL ONCE
Status: CANCELLED | OUTPATIENT
Start: 2024-07-30 | End: 2024-07-30

## 2024-07-30 RX ADMIN — LIDOCAINE HYDROCHLORIDE 10 ML: 40 SOLUTION TOPICAL at 09:25

## 2024-07-30 NOTE — PROGRESS NOTES
[facility] Center Followup Visit Note    Referring Physician : Zeb Lundy MD  Mira Weir  MEDICAL RECORD NUMBER:  [id number]  AGE: 89 y.o.   GENDER: female  : 1935  EPISODE DATE:  2024    Subjective:     Chief Complaint   Patient presents with    Wound Check     Left foot        HISTORY of PRESENT ILLNESS HPI   Mira Weir is a 89 y.o. female who presents today in regards to follow up evaluation and treatment of wound/ulcer.  That patient's past medical, family and social hx were reviewed and changes were made if present.    History of Wound Context:  Patient with left hallux ulceration present for greater than 2 months. Patients daughter has been applying dressing. Denies pain.    24: Hallux ulceration probes to bone. PVD. Will order non invasives. Discussed treatment options including amputation. Patient and daughter wish to proceed with aggressive wound care.     24: Hallux ulceration probes to bone. PVD. Will order non invasives. Discussed treatment options including amputation. Order for MRI left foot. Biopsy performed with rongeurs through open tissue. Minimal bleeding controlled with pressure. Patient tolerated well. Patient and daughter wish to proceed with aggressive wound care. Continue dressings as ordered. F/u 1 week.     24  Wound measuring smaller   Wound debrided   Continue aquacel ag and dry dressing   Patient scheduled for MRI 24   Patient states insurance has denied coverage for MRI - Dr. Rich notified     24  Wound measuring slightly larger    Wound debrided   Continue aquacel ag and dry dressing     Wound/Ulcer Pain Timing/Severity: none  Quality of pain: N/A  Severity:  0 / 10   Modifying Factors: None  Associated Signs/Symptoms: none    Ulcer Identification:  Ulcer Type: diabetic  Contributing Factors: arterial insufficiency    Diabetic/Pressure/Non Pressure Ulcers only:  Ulcer: Diabetic ulcer, bone necrosis    Wound: N/A        PAST MEDICAL

## 2024-07-31 DIAGNOSIS — E08.42 DIABETIC POLYNEUROPATHY ASSOCIATED WITH DIABETES MELLITUS DUE TO UNDERLYING CONDITION (HCC): ICD-10-CM

## 2024-07-31 RX ORDER — PREGABALIN 25 MG/1
CAPSULE ORAL
Qty: 120 CAPSULE | Refills: 0 | Status: SHIPPED | OUTPATIENT
Start: 2024-07-31 | End: 2024-08-30

## 2024-07-31 RX ORDER — PREGABALIN 25 MG/1
CAPSULE ORAL
Qty: 120 CAPSULE | Refills: 0 | OUTPATIENT
Start: 2024-07-31

## 2024-08-01 ENCOUNTER — HOSPITAL ENCOUNTER (OUTPATIENT)
Dept: WOUND CARE | Age: 89
Discharge: HOME | End: 2024-08-01
Attending: PODIATRIST
Payer: MEDICARE

## 2024-08-01 VITALS — TEMPERATURE: 96.1 F | DIASTOLIC BLOOD PRESSURE: 39 MMHG | RESPIRATION RATE: 16 BRPM | SYSTOLIC BLOOD PRESSURE: 72 MMHG

## 2024-08-01 DIAGNOSIS — L97.524 ULCER OF TOE OF LEFT FOOT, WITH NECROSIS OF BONE (HCC): Primary | ICD-10-CM

## 2024-08-01 PROCEDURE — 99213 OFFICE O/P EST LOW 20 MIN: CPT

## 2024-08-01 RX ORDER — LIDOCAINE HYDROCHLORIDE 40 MG/ML
SOLUTION TOPICAL ONCE
OUTPATIENT
Start: 2024-08-01 | End: 2024-08-01

## 2024-08-01 RX ORDER — LIDOCAINE HYDROCHLORIDE 20 MG/ML
JELLY TOPICAL ONCE
OUTPATIENT
Start: 2024-08-01 | End: 2024-08-01

## 2024-08-01 RX ORDER — GENTAMICIN SULFATE 1 MG/G
OINTMENT TOPICAL ONCE
OUTPATIENT
Start: 2024-08-01 | End: 2024-08-01

## 2024-08-01 RX ORDER — GINSENG 100 MG
CAPSULE ORAL ONCE
OUTPATIENT
Start: 2024-08-01 | End: 2024-08-01

## 2024-08-01 RX ORDER — TRIAMCINOLONE ACETONIDE 1 MG/G
OINTMENT TOPICAL ONCE
OUTPATIENT
Start: 2024-08-01 | End: 2024-08-01

## 2024-08-01 RX ORDER — SODIUM CHLOR/HYPOCHLOROUS ACID 0.033 %
SOLUTION, IRRIGATION IRRIGATION ONCE
OUTPATIENT
Start: 2024-08-01 | End: 2024-08-01

## 2024-08-01 RX ORDER — LIDOCAINE 50 MG/G
OINTMENT TOPICAL ONCE
OUTPATIENT
Start: 2024-08-01 | End: 2024-08-01

## 2024-08-01 RX ORDER — CLOBETASOL PROPIONATE 0.5 MG/G
OINTMENT TOPICAL ONCE
OUTPATIENT
Start: 2024-08-01 | End: 2024-08-01

## 2024-08-01 RX ORDER — IBUPROFEN 200 MG
TABLET ORAL ONCE
OUTPATIENT
Start: 2024-08-01 | End: 2024-08-01

## 2024-08-01 RX ORDER — BETAMETHASONE DIPROPIONATE 0.5 MG/G
CREAM TOPICAL ONCE
OUTPATIENT
Start: 2024-08-01 | End: 2024-08-01

## 2024-08-01 RX ORDER — LIDOCAINE 40 MG/G
CREAM TOPICAL ONCE
OUTPATIENT
Start: 2024-08-01 | End: 2024-08-01

## 2024-08-01 RX ORDER — BACITRACIN ZINC AND POLYMYXIN B SULFATE 500; 1000 [USP'U]/G; [USP'U]/G
OINTMENT TOPICAL ONCE
OUTPATIENT
Start: 2024-08-01 | End: 2024-08-01

## 2024-08-01 NOTE — PLAN OF CARE
Problem: Cognitive:  Goal: Knowledge of wound care  Description: Knowledge of wound care  Outcome: Progressing     Problem: Wound:  Goal: Will show signs of wound healing; wound closure and no evidence of infection  Description: Will show signs of wound healing; wound closure and no evidence of infection  Outcome: Progressing

## 2024-08-01 NOTE — DISCHARGE INSTRUCTIONS
Visit Discharge/Physician Orders     Discharge condition: Stable     Assessment of pain at discharge:     Anesthetic used:      Discharge to: Home     Left via:Private automobile     Accompanied by: accompanied by family     ECF/HHA: [place]      Dressing Orders: Cleanse wound to left great toe with normal saline, apply ALGINATE AG to wound bed and cover with dry dressing- adhere with tape, change daily.     Treatment Orders:   Vascular studies scheduled 8/20 @ 9:30 am. Go through [employer] to register.  EAT DIET WITH PROTEINS AND VITAMIN C  TAKE A MULTIVITAMIN DAILY IF NOT CONTRAINDICATED    Continue Levaquin as prescribed.- watch for diarrhea, nausea, vomiting. Take probiotic as able to         [facility] followup visit _____________1 week with Dr. Rich/ Dr. Murphy 4 weeks ________________  (Please note your next appointment above and if you are unable to keep, kindly give a 24 hour notice. Thank you.)     Physician signature:__________________________        If you experience any of the following, please call the Wound Care Center during business hours:     * Increase in Pain  * Temperature over 101  * Increase in drainage from your wound  * Drainage with a foul odor  * Bleeding  * Increase in swelling  * Need for compression bandage changes due to slippage, breakthrough drainage.     If you need medical attention outside of the business hours of the Wound Care Centers please contact your PCP or go to the nearest emergency room.

## 2024-08-02 NOTE — PROGRESS NOTES
WOUND CARE NOTE          Mira Weir  AGE:  89 y.o.     GENDER: female    : 1935  TODAY'S DATE:  2024  Zeb Lundy MD    Subjective:    Has chief complaint: toe ulcer   Mira Weir is a 89 y.o. female who presents today for wound check. Patient has  has a past medical history of Abnormal cardiovascular stress test, CAD (coronary artery disease), Chronic systolic (congestive) heart failure, COPD (chronic obstructive pulmonary disease) (HCC), COPD exacerbation (HCC), H/O cardiovascular stress test, Hyperlipidemia, Hypertension, Osteoarthritis, Osteoarthritis, Type II or unspecified type diabetes mellitus without mention of complication, not stated as uncontrolled, and Urinary retention.. '    Pt has a toe wound   Pt is here with daughter   Pt is followed by Dr Anthony   Pt developed a toe ulcer >2 weeks from    Xry Small avulsion type fracture involving the medial base of the distal phalanx  of the great toe. There are associated osseous erosive changes and relative  osteopenia involving the donor site. Given the patient's history of diabetic  ulcer of the left great toe, the findings probably represent osteomyelitis.  Clinical correlation is recommended.  If clinically warranted, MRI of the  foot with and without intravenous gadolinium can be considered.  Significant  narrowing of the 1st tarsometatarsal joint, with practically bone-on-bone  contact.  Pt has been on doxycycline   Pt has been seen by Dr Rich  on    [facility] study pending     They feel the toe is less swollen/ red/tender  The patient is tolerating antibiotics.      DOS  2024 nakia gamboaent has no c/o f/c/n/v/d/   afebrile awake and alert nad     ROS:   Pt denies  fevers/chills  Pt denies nausea/vomiting/diarrhea  Pt denies chest pain  Pt denies sob/cough  Pt denies rash/itch  Pt denies joint aches or pain  Pt denies urinary complaints  Pt denies leg or calf pain  Pt denies easy bruising or bleeding

## 2024-08-05 NOTE — DISCHARGE INSTRUCTIONS
Visit Discharge/Physician Orders     Discharge condition: Stable     Assessment of pain at discharge:     Anesthetic used:      Discharge to: Home     Left via:Private automobile     Accompanied by: accompanied by family     ECF/HHA: [place]      Dressing Orders: Cleanse wound to left great toe with normal saline, apply ALGINATE AG to wound bed and cover with dry dressing- adhere with tape, change daily.     Treatment Orders:   Vascular studies scheduled 8/20 @ 9:30 am. Go through [employer] to register.  EAT DIET WITH PROTEINS AND VITAMIN C  TAKE A MULTIVITAMIN DAILY IF NOT CONTRAINDICATED    Continue Levaquin as prescribed.- watch for diarrhea, nausea, vomiting. Take probiotic as able to         [facility] followup visit _____________1 week with Dr. Rich/ Dr. Murphy 9/5 @ 1:30 ________________  (Please note your next appointment above and if you are unable to keep, kindly give a 24 hour notice. Thank you.)     Physician signature:__________________________        If you experience any of the following, please call the Wound Care Center during business hours:     * Increase in Pain  * Temperature over 101  * Increase in drainage from your wound  * Drainage with a foul odor  * Bleeding  * Increase in swelling  * Need for compression bandage changes due to slippage, breakthrough drainage.     If you need medical attention outside of the business hours of the Wound Care Centers please contact your PCP or go to the nearest emergency room.

## 2024-08-06 ENCOUNTER — HOSPITAL ENCOUNTER (OUTPATIENT)
Dept: WOUND CARE | Age: 89
Discharge: HOME | End: 2024-08-06
Attending: PODIATRIST
Payer: MEDICARE

## 2024-08-06 VITALS
BODY MASS INDEX: 21.82 KG/M2 | HEART RATE: 68 BPM | SYSTOLIC BLOOD PRESSURE: 100 MMHG | TEMPERATURE: 97.1 F | DIASTOLIC BLOOD PRESSURE: 49 MMHG | HEIGHT: 68 IN | RESPIRATION RATE: 18 BRPM | WEIGHT: 144 LBS

## 2024-08-06 DIAGNOSIS — M86.9 OSTEOMYELITIS OF GREAT TOE OF LEFT FOOT (HCC): Primary | ICD-10-CM

## 2024-08-06 DIAGNOSIS — L97.524 ULCER OF TOE OF LEFT FOOT, WITH NECROSIS OF BONE (HCC): ICD-10-CM

## 2024-08-06 PROCEDURE — 11042 DBRDMT SUBQ TIS 1ST 20SQCM/<: CPT

## 2024-08-06 NOTE — DISCHARGE INSTRUCTIONS
Visit Discharge/Physician Orders     Discharge condition: Stable     Assessment of pain at discharge:     Anesthetic used:      Discharge to: Home     Left via:Private automobile     Accompanied by: accompanied by family     ECF/HHA: [place]      Dressing Orders: Cleanse wound to left great toe with normal saline, apply ALGINATE AG to wound bed and cover with dry dressing- adhere with tape, change daily.     Treatment Orders:   Vascular studies scheduled 8/20 @ 9:30 am. Go through [employer] to register.  EAT DIET WITH PROTEINS AND VITAMIN C  TAKE A MULTIVITAMIN DAILY IF NOT CONTRAINDICATED    Continue Levaquin as prescribed.- watch for diarrhea, nausea, vomiting. Take probiotic as able to         [facility] followup visit _____________2 weeks with Dr. Rich/ Dr. Murphy 9/5 @ 1:30 ________________  (Please note your next appointment above and if you are unable to keep, kindly give a 24 hour notice. Thank you.)     Physician signature:__________________________        If you experience any of the following, please call the Wound Care Center during business hours:     * Increase in Pain  * Temperature over 101  * Increase in drainage from your wound  * Drainage with a foul odor  * Bleeding  * Increase in swelling  * Need for compression bandage changes due to slippage, breakthrough drainage.     If you need medical attention outside of the business hours of the Wound Care Centers please contact your PCP or go to the nearest emergency room.

## 2024-08-06 NOTE — PROGRESS NOTES
by mouth every other day 21 tablet 0    dapagliflozin (FARXIGA) 10 MG tablet Take 1 tablet by mouth every morning      furosemide (LASIX) 20 MG tablet Take 1 tablet by mouth three times a week 90 tablet 3    blood glucose test strips (ASCENSIA AUTODISC VI;ONE TOUCH ULTRA TEST VI) strip 1 each by Does not apply route 2 times daily 100 each 3    albuterol-ipratropium (COMBIVENT RESPIMAT)  MCG/ACT AERS inhaler Inhale 1 puff into the lungs 2 times daily 1 each 3    spironolactone (ALDACTONE) 25 MG tablet Take 1 tablet by mouth Daily with lunch 90 tablet 3    clopidogrel (PLAVIX) 75 MG tablet Take 1 tablet by mouth daily 90 tablet 3    atorvastatin (LIPITOR) 80 MG tablet Take 1 tablet by mouth nightly 90 tablet 3    metoprolol succinate (TOPROL XL) 25 MG extended release tablet Take 1 tablet by mouth nightly 90 tablet 3    Handicap Placard MISC by Does not apply route Until 9/14/28 1 each 0    brimonidine (ALPHAGAN) 0.2 % ophthalmic solution INSTILL 1 DROP INTO LEFT EYE TWICE DAILY 3 each 3    latanoprost (XALATAN) 0.005 % ophthalmic solution INSTILL 1 DROP INTO EACH EYE ONCE DAILY AT BEDTIME 7.5 mL 3    timolol (TIMOPTIC) 0.5 % ophthalmic solution INSTILL 1 DROP INTO EACH EYE TWICE DAILY 5 mL 3    Lancets MISC 1 each by Does not apply route 2 times daily 100 each 5    nitroGLYCERIN (NITROSTAT) 0.4 MG SL tablet Place 1 tablet under the tongue every 5 minutes as needed for Chest pain up to max of 3 total doses. If no relief after 1 dose, call 911. 25 tablet 3    vitamin B-12 (CYANOCOBALAMIN) 1000 MCG tablet Take 1 tablet by mouth daily      Blood Glucose Monitoring Suppl DELTA 1 Units by Does not apply route 2 times daily Please give whatever insurance covers 1 Device 0    aspirin 81 MG EC tablet Take 1 tablet by mouth daily       No current facility-administered medications on file prior to encounter.       REVIEW OF SYSTEMS See HPI    Objective:    BP (!) 100/49   Pulse 68   Temp 97.1 °F (36.2 °C) (Temporal)

## 2024-08-08 ENCOUNTER — OFFICE VISIT (OUTPATIENT)
Dept: FAMILY MEDICINE CLINIC | Age: 89
End: 2024-08-08

## 2024-08-08 VITALS
HEART RATE: 75 BPM | DIASTOLIC BLOOD PRESSURE: 60 MMHG | SYSTOLIC BLOOD PRESSURE: 108 MMHG | WEIGHT: 147 LBS | OXYGEN SATURATION: 95 % | BODY MASS INDEX: 22.36 KG/M2

## 2024-08-08 DIAGNOSIS — I25.10 ATHEROSCLEROSIS OF NATIVE CORONARY ARTERY OF NATIVE HEART WITHOUT ANGINA PECTORIS: ICD-10-CM

## 2024-08-08 DIAGNOSIS — J44.9 CHRONIC OBSTRUCTIVE PULMONARY DISEASE, UNSPECIFIED COPD TYPE (HCC): ICD-10-CM

## 2024-08-08 DIAGNOSIS — L97.529 TYPE 2 DIABETES MELLITUS WITH LEFT DIABETIC FOOT ULCER (HCC): ICD-10-CM

## 2024-08-08 DIAGNOSIS — E08.42 DIABETIC POLYNEUROPATHY ASSOCIATED WITH DIABETES MELLITUS DUE TO UNDERLYING CONDITION (HCC): ICD-10-CM

## 2024-08-08 DIAGNOSIS — E11.9 TYPE 2 DIABETES MELLITUS WITHOUT COMPLICATION, WITHOUT LONG-TERM CURRENT USE OF INSULIN (HCC): ICD-10-CM

## 2024-08-08 DIAGNOSIS — E08.42 DIABETIC POLYNEUROPATHY ASSOCIATED WITH DIABETES MELLITUS DUE TO UNDERLYING CONDITION (HCC): Primary | ICD-10-CM

## 2024-08-08 DIAGNOSIS — E78.2 MIXED HYPERLIPIDEMIA: ICD-10-CM

## 2024-08-08 DIAGNOSIS — E11.621 TYPE 2 DIABETES MELLITUS WITH LEFT DIABETIC FOOT ULCER (HCC): ICD-10-CM

## 2024-08-08 DIAGNOSIS — I50.22 CHRONIC SYSTOLIC (CONGESTIVE) HEART FAILURE (HCC): ICD-10-CM

## 2024-08-08 RX ORDER — LATANOPROST 50 UG/ML
SOLUTION/ DROPS OPHTHALMIC
Qty: 7.5 ML | Refills: 3 | Status: SHIPPED | OUTPATIENT
Start: 2024-08-08

## 2024-08-08 RX ORDER — TIMOLOL MALEATE 5 MG/ML
SOLUTION/ DROPS OPHTHALMIC
Qty: 5 ML | Refills: 3 | Status: SHIPPED | OUTPATIENT
Start: 2024-08-08

## 2024-08-08 RX ORDER — SPIRONOLACTONE 25 MG/1
25 TABLET ORAL
Qty: 90 TABLET | Refills: 3 | Status: SHIPPED | OUTPATIENT
Start: 2024-08-08

## 2024-08-08 RX ORDER — BRIMONIDINE TARTRATE 2 MG/ML
SOLUTION/ DROPS OPHTHALMIC
Qty: 3 EACH | Refills: 3 | Status: SHIPPED | OUTPATIENT
Start: 2024-08-08

## 2024-08-08 NOTE — PATIENT INSTRUCTIONS
LOW SALT FOR BLOOD PRESSURE CONTROL.  LOW CARBOHYDRATE FOR BLOOD SUGAR   LOW FAT DIET FOR CHOLESTEROL CONTROL.  DRINK ENOUGH FLUIDS FOR BETTER KIDNEY FUNCTION.  TAKE COZAAR 50 MG. TOPROL 50 MG AND DYAZIDE 37.5-25 MG.  DAILY FOR BLOOD PRESSURE AND LEG EDEMA CONTROL.  TAKE  FARXIGA 10 MG DAILY FOR BLOOD SUGAR CONTROL.  TAKE LIPITOR 80 MG. NIGHTLY FOR CHOLESTEROL CONTROL AS PER [facility].  INHALE COMBIVENT INHALER 1 PUFF 2 TIMES A DAY FOR BREATHING CONTROL  LEFT BIG TOE WOUND CARE AS PER PODIATRY.  SEE PODIATRIST AS SCHEDULED.  GO TO CHF CLINIC FOR FOLLOW UP CARE.   FASTING FOR LAB WORK PRIOR TO NEXT VISIT.  NEXT APPOINTMENT IN 2 MONTHS.

## 2024-08-08 NOTE — PROGRESS NOTES
PRIOR TO NEXT VISIT.  NEXT APPOINTMENT IN 2 MONTHS.       Return in about 2 months (around 10/8/2024) for FOLLOW UP VISIT.         I have reviewed my findings and recommendations with Mira Weir.    Electronically signed by Zeb Lundy MD on 8/8/24 at 10:46 AM EDT

## 2024-08-09 ENCOUNTER — CARE COORDINATION (OUTPATIENT)
Dept: CARE COORDINATION | Age: 89
End: 2024-08-09

## 2024-08-09 NOTE — CARE COORDINATION
ACM attempted to contact patient to offer enrollment into Care Coordination. ACM left a voice message with contact information requesting a return call.     PLAN  -If no return call, continue to attempt to contact patient.   -Introduction letter mailed

## 2024-08-13 ENCOUNTER — HOSPITAL ENCOUNTER (OUTPATIENT)
Dept: WOUND CARE | Age: 89
Discharge: HOME | End: 2024-08-13
Attending: PODIATRIST
Payer: MEDICARE

## 2024-08-13 VITALS
HEART RATE: 91 BPM | WEIGHT: 147 LBS | SYSTOLIC BLOOD PRESSURE: 70 MMHG | HEIGHT: 67 IN | TEMPERATURE: 96.7 F | DIASTOLIC BLOOD PRESSURE: 44 MMHG | BODY MASS INDEX: 23.07 KG/M2 | RESPIRATION RATE: 18 BRPM

## 2024-08-13 DIAGNOSIS — L97.524 ULCER OF TOE OF LEFT FOOT, WITH NECROSIS OF BONE (HCC): Primary | ICD-10-CM

## 2024-08-13 PROCEDURE — 11042 DBRDMT SUBQ TIS 1ST 20SQCM/<: CPT

## 2024-08-13 RX ORDER — CLOBETASOL PROPIONATE 0.5 MG/G
OINTMENT TOPICAL ONCE
OUTPATIENT
Start: 2024-08-13 | End: 2024-08-13

## 2024-08-13 RX ORDER — LIDOCAINE 50 MG/G
OINTMENT TOPICAL ONCE
OUTPATIENT
Start: 2024-08-13 | End: 2024-08-13

## 2024-08-13 RX ORDER — IBUPROFEN 200 MG
TABLET ORAL ONCE
OUTPATIENT
Start: 2024-08-13 | End: 2024-08-13

## 2024-08-13 RX ORDER — LIDOCAINE HYDROCHLORIDE 40 MG/ML
SOLUTION TOPICAL ONCE
OUTPATIENT
Start: 2024-08-13 | End: 2024-08-13

## 2024-08-13 RX ORDER — LIDOCAINE 40 MG/G
CREAM TOPICAL ONCE
OUTPATIENT
Start: 2024-08-13 | End: 2024-08-13

## 2024-08-13 RX ORDER — GENTAMICIN SULFATE 1 MG/G
OINTMENT TOPICAL ONCE
OUTPATIENT
Start: 2024-08-13 | End: 2024-08-13

## 2024-08-13 RX ORDER — TRIAMCINOLONE ACETONIDE 1 MG/G
OINTMENT TOPICAL ONCE
OUTPATIENT
Start: 2024-08-13 | End: 2024-08-13

## 2024-08-13 RX ORDER — SODIUM CHLOR/HYPOCHLOROUS ACID 0.033 %
SOLUTION, IRRIGATION IRRIGATION ONCE
OUTPATIENT
Start: 2024-08-13 | End: 2024-08-13

## 2024-08-13 RX ORDER — BETAMETHASONE DIPROPIONATE 0.5 MG/G
CREAM TOPICAL ONCE
OUTPATIENT
Start: 2024-08-13 | End: 2024-08-13

## 2024-08-13 RX ORDER — LIDOCAINE HYDROCHLORIDE 20 MG/ML
JELLY TOPICAL ONCE
OUTPATIENT
Start: 2024-08-13 | End: 2024-08-13

## 2024-08-13 RX ORDER — BACITRACIN ZINC AND POLYMYXIN B SULFATE 500; 1000 [USP'U]/G; [USP'U]/G
OINTMENT TOPICAL ONCE
OUTPATIENT
Start: 2024-08-13 | End: 2024-08-13

## 2024-08-13 RX ORDER — LIDOCAINE HYDROCHLORIDE 40 MG/ML
SOLUTION TOPICAL ONCE
Status: COMPLETED | OUTPATIENT
Start: 2024-08-13 | End: 2024-08-13

## 2024-08-13 RX ORDER — GINSENG 100 MG
CAPSULE ORAL ONCE
OUTPATIENT
Start: 2024-08-13 | End: 2024-08-13

## 2024-08-13 RX ADMIN — LIDOCAINE HYDROCHLORIDE: 40 SOLUTION TOPICAL at 09:30

## 2024-08-19 ENCOUNTER — CARE COORDINATION (OUTPATIENT)
Dept: CARE COORDINATION | Age: 89
End: 2024-08-19

## 2024-08-19 NOTE — CARE COORDINATION
Ambulatory Care Coordination Note     2024 1:01 PM     Patient Current Location:  Home: [address] [place] [address]     ACM contacted the family by telephone. Verified name and  with family as identifiers.     Patient closed (patient declined) from the High Risk Care Management program on 2024.  Family verbalizes confidence in the ability to self-manage at this time. Patient daughter Jacklyn (HIPAA) declined services at this time .  Care management goals have been completed. No further Ambulatory Care Manager follow up scheduled.      ACM: Sydney Rivera RN     Challenges to be reviewed by the provider   Additional needs identified to be addressed with provider Yes  Patient and daughter declined CC services enrollment.               Method of communication with provider: chart routing.    Care Summary Note:     ACM contacted patient to offer Care Coordination. Patient declined at this time. ACM encouraged patient to contact ACM if she feels she needs assistance managing her health care needs.       :   Future Appointments         Provider Specialty Dept Phone    2024 10:00 AM (Arrive by 9:30 AM) [facility] CARDIOVASCULAR RM 2 Vascular Surgery [phone]    2024 9:15 AM Miki Rich DPM Wound Ostomy [phone]    2024 1:30 PM Darcie Murphy MD Wound Ostomy [phone]    2024 10:15 AM [facility] CHF ROOM 2 Cardiology [phone]    10/8/2024 10:30 AM Zeb Lundy MD Family Medicine [phone]    2024 11:30 AM Nguyễn Li MD Cardiology [phone]                PLAN:  Patient will be temporarily excluded from Care Coordination d/t declined enrollment.

## 2024-08-23 PROBLEM — I50.23 ACUTE ON CHRONIC HFREF (HEART FAILURE WITH REDUCED EJECTION FRACTION) (HCC): Status: ACTIVE | Noted: 2024-01-01

## 2024-08-23 NOTE — H&P
[facility]   History and Physical      CHIEF COMPLAINT:  Knee pain/Fatigue    History of Present Illness:  89 y.o. female with a history of CAD, COPD, Chronic Systolic HF, COPD, HLD, HTN, DM II, Urinary Retention presents with right knee pain. Pt states she has been weak and fatigue. Notes right knee pain. States she has fallen over the last month a couple of times , but no apparent injury. States this morning she was walking from the kitchen to the family room when she felt like her knee was going to\"give out\". States she has noted some SOB with ambulation. Daughter called CHF clinic and was able to make an appointment for 1030 today, however when she arrived to pick mom up she noted her to have some SOB. She presented to the ED. States she follows with Dr. Li and the CHF Clinic. She believes the last time seen was in April . He next appointment is scheduled in September. States she has had issues with her Left great toe. Just completed Levaquin. She has been seen by Dr. Murphy. She denies fevers, chills, N/V/D, CP. Pt received NSS 500cc and was placed on IVF infusion. Decision to admit pt for further evaluation and treatment.     Informant(s) for H&P:Pt and EMR    REVIEW OF SYSTEMS:  Admits to right knee pain, BETTS. Denies fevers, chills, cp, n/v, ha, vision/hearing changes, wt changes, hot/cold flashes, other open skin lesions, diarrhea, constipation, dysuria/hematuria unless noted in HPI. Complete ROS performed with the patient and is otherwise negative.      PMH:  Past Medical History:   Diagnosis Date    Abnormal cardiovascular stress test 11/17/2022    CAD (coronary artery disease)     Chronic systolic (congestive) heart failure 1/12/2023    COPD (chronic obstructive pulmonary disease) (HCC)     COPD exacerbation (HCC) 11/17/2022    H/O cardiovascular stress test 11/17/2022    Lexiscan    Hyperlipidemia     Hypertension     Osteoarthritis     Osteoarthritis      in ED course. Consider Nephrology consult with worsening renal function.   Acute on Chronic CHF- ECHO LVEF 25%. Mild to moderate mitral regurgitation Follows with the CHF Clinic. BETTS. Compliant with Lasix. Follows with Dr. Li. BNP 05543 CXR Small to moderate bilateral pleural effusions  I/O Weights Daily. Dobutrex. Cardiology input appreciated.   Bilateral Pleural Effusions- CXR Small to moderate bilateral pleural effusions. Currently ORA. Consider Thoracentesis. Diuretics on hold. Await specialty input.   Transaminitis- Alk Phos 378   TB 0.9. Monitor.   COPD- Currently not in exacerbation. Continue home regimen. Follow  CAD- Follows with Dr. Li.   Controlled DM with Hyperglycemia-  2/2024 A1c 5.7. Glucose 120. Holding Farxiga. SSI/Hypoglycemic protocol/carb Restricted Diet. Follow adjust as needed  HTN- Currently Hypotensive.  Holding antihypertensives.   HLD  Left Toe Ulcer- Seen by Dr. Murphy/Dr. Rich. Completed 6 week course Levaquin. XR Left Foot revealing Erosive changes in the medial base large distal phalanx. New traumatic distal aspect of the large toe proximal phalanx.Consider ID input     Code Status: Full  DVT prophylaxis: Lovenox    NOTE: This report was transcribed using voice recognition software. Every effort was made to ensure accuracy; however, inadvertent computerized transcription errors may be present.     Electronically signed by SUDHAKAR Cavazos CNP on 8/23/2024 at 5:09 PM

## 2024-08-23 NOTE — ED NOTES
Repeat green top sent to lab now, provider okay with pt having some PO water. Pt tolerating well.

## 2024-08-23 NOTE — ED NOTES
ED provider okay with pt eating, pt given dinner tray with fish, rice, green beans, pudding, and water.

## 2024-08-23 NOTE — ED PROVIDER NOTES
[facility] EMERGENCY DEPARTMENT  EMERGENCY DEPARTMENT ENCOUNTER        Pt Name: Mira Weir  MRN: [id number]  Birthdate 1935  Date of evaluation: 2024  Provider: Cedric Hills DO  PCP: Zeb Lundy MD  Note Started: 12:02 PM EDT 24    CHIEF COMPLAINT       Chief Complaint   Patient presents with    Knee Pain     Right no injury, just starting to act up       HISTORY OF PRESENT ILLNESS: 1 or more Elements   History From: patient    Limitations to history : None    Mira Weir is a 89 y.o. female who presents to the emergency department for right knee pain that started several days ago.  Denies any fall but thinks she might of bumped it at some point.  Patient also complains of some lower leg swelling is worse than her usual with a history of CHF and shortness of breath.  She is usually able to walk from the bedroom to the bathroom but was not able to ambulate more than a few steps today before she started to experience shortness of breath.  She takes her Lasix 20 mg 3 times a week as prescribed.  She also has a history of osteo of the first toe, just finished a 6-week course of Levaquin. Patient denies fever, chills, headache, chest pain, abdominal pain, nausea, vomiting, diarrhea, lightheadedness, dysuria, hematuria, hematochezia, and melena.    Nursing Notes were all reviewed and agreed with or any disagreements were addressed in the HPI.          REVIEW OF SYSTEMS :           Positives and Pertinent negatives as per HPI.     SURGICAL HISTORY     Past Surgical History:   Procedure Laterality Date    APPENDECTOMY  1953    CARDIAC CATHETERIZATION  2022    Dr Kerns     SECTION   ,    COLONOSCOPY  2003    TUMOR REMOVAL  1954    under lt arm       CURRENTMEDICATIONS       Previous Medications    ALBUTEROL-IPRATROPIUM (COMBIVENT RESPIMAT)  MCG/ACT AERS INHALER    Inhale 1 puff into the lungs 2 times daily    ASPIRIN 81    Psychiatric: Normal Affect            DIAGNOSTIC RESULTS   LABS:    Labs Reviewed   CBC WITH AUTO DIFFERENTIAL - Abnormal; Notable for the following components:       Result Value    MCHC 31.0 (*)     RDW 17.4 (*)     Platelets 121 (*)     Neutrophils % 84 (*)     Lymphocytes % 8 (*)     Lymphocytes Absolute 0.65 (*)     Eosinophils Absolute 0.03 (*)     All other components within normal limits   COMPREHENSIVE METABOLIC PANEL W/ REFLEX TO MG FOR LOW K - Abnormal; Notable for the following components:    Potassium 5.7 (*)     CO2 19 (*)     Anion Gap 17 (*)     Glucose 135 (*)     BUN 48 (*)     Creatinine 1.7 (*)     Est, Glom Filt Rate 29 (*)     Total Protein 6.2 (*)     Albumin 3.0 (*)     Alkaline Phosphatase 381 (*)      (*)      (*)     All other components within normal limits   LACTATE, SEPSIS - Abnormal; Notable for the following components:    Lactic Acid, Sepsis 3.5 (*)     All other components within normal limits   LACTATE, SEPSIS - Abnormal; Notable for the following components:    Lactic Acid, Sepsis 2.9 (*)     All other components within normal limits   URINALYSIS - Abnormal; Notable for the following components:    Glucose, Ur >=1000 (*)     Urine Hgb TRACE (*)     Protein, UA 30 (*)     All other components within normal limits   BRAIN NATRIURETIC PEPTIDE - Abnormal; Notable for the following components:    Pro-BNP 44,401 (*)     All other components within normal limits   LACTIC ACID - Abnormal; Notable for the following components:    Lactic Acid 2.8 (*)     All other components within normal limits   TROPONIN - Abnormal; Notable for the following components:    Troponin, High Sensitivity 111 (*)     All other components within normal limits   COMPREHENSIVE METABOLIC PANEL W/ REFLEX TO MG FOR LOW K - Abnormal; Notable for the following components:    Glucose 120 (*)     BUN 48 (*)     Creatinine 1.8 (*)     Est, Glom Filt Rate 27 (*)     Total Protein 6.0 (*)     Albumin 3.2 (*)

## 2024-08-23 NOTE — ED NOTES
Attempted to get urine sample from bed pan, pt unable to void on bedpan. Straight cath for urine sample and sample sent to lab now.

## 2024-08-24 NOTE — PROGRESS NOTES
4 Eyes Skin Assessment     NAME:  Mira Weir  YOB: 1935  MEDICAL RECORD NUMBER:  [id number]    The patient is being assessed for  Admission    I agree that at least one RN has performed a thorough Head to Toe Skin Assessment on the patient. ALL assessment sites listed below have been assessed.      Areas assessed by both nurses:    Head, Face, Ears, Shoulders, Back, Chest, Arms, Elbows, Hands, Sacrum. Buttock, Coccyx, Ischium, and Legs. Feet and Heels        Does the Patient have a Wound? Yes wound(s) were present on assessment. LDA wound assessment was Initiated and completed by RN       Christo Prevention initiated by RN: Yes  Wound Care Orders initiated by RN: No    Pressure Injury (Stage 3,4, Unstageable, DTI, NWPT, and Complex wounds) if present, place Wound referral order by RN under : No    New Ostomies, if present place, Ostomy referral order under : No     Nurse 1 eSignature: Electronically signed by Veronique Pelaez RN on 8/24/24 at 1:45 AM EDT    **SHARE this note so that the co-signing nurse can place an eSignature**    Nurse 2 eSignature: {Esignature:[id number]}

## 2024-08-24 NOTE — CONSULTS
CHIEF COMPLAINT: Acute CHF    HISTORY OF PRESENT ILLNESS: Patient is a 89 y.o. female seen at the request of Zeb Lundy MD and followed at our office by Dr. Li.      Patient with a longstanding history of systolic CHF.     Presents with SOB and increased volume. No CP or angina.     Past Medical History:   Diagnosis Date    Abnormal cardiovascular stress test 11/17/2022    CAD (coronary artery disease)     Chronic systolic (congestive) heart failure 1/12/2023    COPD (chronic obstructive pulmonary disease) ([facility])     COPD exacerbation ([facility]) 11/17/2022    H/O cardiovascular stress test 11/17/2022    Lexiscan    Hyperlipidemia     Hypertension     Osteoarthritis     Osteoarthritis     Type II or unspecified type diabetes mellitus without mention of complication, not stated as uncontrolled     Urinary retention 11/17/2022       Patient Active Problem List   Diagnosis    Type 2 diabetes mellitus without complication, without long-term current use of insulin (HCC)    Mixed hyperlipidemia    Essential hypertension    RLS (restless legs syndrome)    Chronic obstructive pulmonary disease ([facility])    Acute on chronic congestive heart failure (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)    DNR (do not resuscitate)    Elevated LFTs    Moderate protein-calorie malnutrition ([facility])    Chronic renal disease, stage III ([facility]) [251784]    Atherosclerotic heart disease of native coronary artery with unspecified angina pectoris    Chronic systolic (congestive) heart failure    Type 2 diabetes mellitus with chronic kidney disease    Ulcer of toe of left foot, with necrosis of bone ([facility])    PVD (peripheral vascular disease) ([facility])    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)    Acute on chronic HFrEF (heart failure with reduced ejection fraction) ([facility])       Allergies   Allergen  not resuscitate)    Elevated LFTs    Moderate protein-calorie malnutrition (HCC)    Chronic renal disease, stage III ([facility]) [155090]    Atherosclerotic heart disease of native coronary artery with unspecified angina pectoris    Chronic systolic (congestive) heart failure    Type 2 diabetes mellitus with chronic kidney disease    Ulcer of toe of left foot, with necrosis of bone (HCC)    PVD (peripheral vascular disease) ([facility])    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)    Acute on chronic HFrEF (heart failure with reduced ejection fraction) ([facility])     1. Acute on chronic systolic CHF/ICMP:    Chart/labs/EKG/monitor reviewed.     BNP markedly elevated.    Echo 4/4/2023 with LVEF 25%, mild-mod MR.    Echo ordered.     Ordered dobutamine gtt and IV lasix daily, continue same for now and follow labs.     On significant dose of midodrine.      2. CAD/Chronic troponin elevation: Multivessel CAD by [facility] 11/2022. Medical therapy only recommended at [facility] due to heavily calcified vessels and calcified aorta.     Typically on Plavix/ASA/statin/BB. Restart plavix and statin for now. No BB due to hypotension.      3. VHD: Echo ordered.     4. Hx of HTN: Currently hypotension issues.     5. Lipids: Statin.     6. DM: Per primary service.     7. COPD: Per primary service.     8. CKD: Follow labs.     9. Elevated LFT's: Follow labs.    10. PVD: Plavix/statin.     Available external charts reviewed.   Available imaging and evaluations independently reviewed.   Interviewed and discussed patient with available family.  Discussed case with referring service and non-cardiology consultants.     Elsy Walden D.O.  Cardiologist  Cardiology, [facility]

## 2024-08-24 NOTE — PROGRESS NOTES
Progress Note  Date:2024       Room:33/0633-01  Patient Name:Mira Weir     YOB: 1935     Age:89 y.o.        Subjective    Subjective:  Symptoms:  Stable.  She reports shortness of breath.  No cough, chest pain, weakness or diarrhea.    Diet:  Poor intake.  No nausea.       Review of Systems   Constitutional:  Positive for fatigue. Negative for appetite change, chills, fever and unexpected weight change.   HENT:  Negative for congestion, facial swelling, mouth sores, rhinorrhea and sinus pain.    Eyes:  Negative for visual disturbance.   Respiratory:  Positive for shortness of breath. Negative for cough, chest tightness and wheezing.    Cardiovascular:  Positive for leg swelling. Negative for chest pain.   Gastrointestinal:  Negative for abdominal distention, abdominal pain, blood in stool, constipation, diarrhea and nausea.   Genitourinary:  Negative for difficulty urinating, dysuria, frequency and urgency.   Musculoskeletal:  Negative for joint swelling.   Skin:  Negative for rash.   Neurological:  Negative for dizziness, syncope, weakness, light-headedness and headaches.   Psychiatric/Behavioral:  Negative for behavioral problems, confusion and hallucinations.      Objective         Vitals Last 24 Hours:  TEMPERATURE:  Temp  Av.8 °F (36.6 °C)  Min: 97.3 °F (36.3 °C)  Max: 98.7 °F (37.1 °C)  RESPIRATIONS RANGE: Resp  Av.2  Min: 18  Max: 23  PULSE OXIMETRY RANGE: SpO2  Av.1 %  Min: 93 %  Max: 100 %  PULSE RANGE: Pulse  Av.1  Min: 83  Max: 132  BLOOD PRESSURE RANGE: Systolic (24hrs), Av , Min:84 , Max:104   ; Diastolic (24hrs), Av, Min:55, Max:79    I/O (24Hr):    Intake/Output Summary (Last 24 hours) at 2024 1433  Last data filed at 2024 0113  Gross per 24 hour   Intake 1000.18 ml   Output --   Net 1000.18 ml     Objective:  Vital signs: (most recent): Blood pressure (!) 85/61, pulse (!) 122, temperature 97.4 °F (36.3 °C), temperature source Oral,  8/24/24 at 2:33 PM EDT

## 2024-08-25 NOTE — PLAN OF CARE
Problem: ABCDS Injury Assessment  Goal: Absence of physical injury  8/25/2024 0506 by Dorys Colunga RN  Outcome: Progressing  8/24/2024 1516 by Mariann Galarza RN  Outcome: Progressing     Problem: Safety - Adult  Goal: Free from fall injury  8/25/2024 0506 by Dorys Colunga RN  Outcome: Progressing  8/24/2024 1516 by Mariann Galarza RN  Outcome: Progressing     Problem: Chronic Conditions and Co-morbidities  Goal: Patient's chronic conditions and co-morbidity symptoms are monitored and maintained or improved  8/25/2024 0506 by Dorys Colunga RN  Outcome: Progressing  8/24/2024 1516 by Mariann Galarza RN  Outcome: Progressing

## 2024-08-25 NOTE — PROGRESS NOTES
Progress Note  Date:2024       Room:0633/0633-01  Patient Name:Mira Weir     YOB: 1935     Age:89 y.o.        Subjective    Subjective:  Symptoms:  Stable.  She reports shortness of breath.  No cough, chest pain, weakness or diarrhea.  (Shortness of breath better).    Diet:  Poor intake.  No nausea.    Activity level: Activity impairment: Takes few steps with assistance.       Review of Systems   Constitutional:  Negative for appetite change, chills, fever and unexpected weight change.   HENT:  Negative for congestion, facial swelling, mouth sores, rhinorrhea and sinus pain.    Eyes:  Negative for visual disturbance.   Respiratory:  Positive for shortness of breath. Negative for cough, chest tightness and wheezing.    Cardiovascular:  Positive for leg swelling. Negative for chest pain.   Gastrointestinal:  Negative for abdominal distention, abdominal pain, blood in stool, constipation, diarrhea and nausea.   Genitourinary:  Negative for difficulty urinating, dysuria, frequency and urgency.   Musculoskeletal:  Negative for joint swelling.   Skin:  Negative for rash.   Neurological:  Negative for dizziness, syncope, weakness, light-headedness and headaches.   Psychiatric/Behavioral:  Negative for behavioral problems, confusion and hallucinations.      Objective         Vitals Last 24 Hours:  TEMPERATURE:  Temp  Av.8 °F (36.6 °C)  Min: 97.4 °F (36.3 °C)  Max: 98.6 °F (37 °C)  RESPIRATIONS RANGE: Resp  Av  Min: 18  Max: 22  PULSE OXIMETRY RANGE: SpO2  Av.2 %  Min: 92 %  Max: 97 %  PULSE RANGE: Pulse  Av.1  Min: 85  Max: 105  BLOOD PRESSURE RANGE: Systolic (24hrs), Av , Min:78 , Max:96   ; Diastolic (24hrs), Av, Min:50, Max:59    I/O (24Hr):  No intake or output data in the 24 hours ending 24    Objective:  General Appearance:  In no acute distress.    Vital signs: (most recent): Blood pressure (!) 85/50, pulse 85, temperature 98.6 °F (37 °C), temperature  source Axillary, resp. rate 18, height 1.702 m (5' 7\"), weight 66.7 kg (147 lb), SpO2 94%, not currently breastfeeding.  No fever.    HEENT: Normal HEENT exam.    Lungs:  There are decreased breath sounds.    Heart: Normal rate.  Regular rhythm.    Abdomen: Abdomen is soft.  Bowel sounds are normal.   There is no abdominal tenderness.     Extremities: There is dependent edema.  (+2 both LL )  Neurological: Patient is alert and oriented to person, place and time.    Pupils:  Pupils are equal, round, and reactive to light.      Labs/Imaging/Diagnostics    Labs:  CBC:  Recent Labs     08/23/24  1210 08/25/24  0539   WBC 8.1 7.4   RBC 3.87 3.66   HGB 11.9 11.1*   HCT 38.4 35.8   MCV 99.2 97.8   RDW 17.4* 17.2*   * 115*     CHEMISTRIES:  Recent Labs     08/23/24  1547 08/24/24  0607 08/25/24  0539    139 138   K 5.0 3.7 3.8    103 101   CO2 24 23 23   BUN 48* 47* 46*   CREATININE 1.8* 1.8* 1.5*   GLUCOSE 120* 109* 99     PT/INR:No results for input(s): \"PROTIME\", \"INR\" in the last 72 hours.  APTT:No results for input(s): \"APTT\" in the last 72 hours.  LIVER PROFILE:  Recent Labs     08/23/24  1547 08/24/24  0607 08/25/24  0539   * 120* 74*   * 141* 103*   BILITOT 0.9 0.7 1.0   ALKPHOS 378* 310* 277*       Imaging Last 24 Hours:  US LIVER    Result Date: 8/23/2024  EXAMINATION: ULTRASOUND OF THE LIVER8/23/2024 8:45 pm Limited ultrasound of the abdomen attention right upper quadrant COMPARISON: CT 01/24/2023 TECHNIQUE: This report is based on interpretation of permanently recorded ultrasound images. HISTORY: ORDERING SYSTEM PROVIDED HISTORY: elevated LFT TECHNOLOGIST PROVIDED HISTORY: Reason for exam:->elevated LFT What reading provider will be dictating this exam?->CRC, FINDINGS: The gallbladder is moderately distended with striated diffuse bladder wall thickening.  There is minimal sludge likely from prolonged fasting.  There are some echogenic foci in the dependent gallbladder with

## 2024-08-25 NOTE — PROGRESS NOTES
Physical Therapy /Plan of Care    Room #:  0633/0633-01  Patient Name: Mira Weir  YOB: 1935  MRN: [id number]    Date of Service: 8/25/2024     Tentative placement recommendation: Subacute Rehab  Equipment recommendation: Patient has needed equipment       Evaluating Physical Therapist: Toby Martin, PT  #[id number]      Specific Provider Orders/Date/Referring Provider :       PT eval and treat  Start:  08/23/24 1815,   End:  08/23/24 1815,   ONE TIME,   Standing Count:  1 Occurrences,   R       Deion, Ramona DONALDSON, APRN - CNP  Admitting Diagnosis:   Transaminitis [R74.01]  Pleural effusion, bilateral [J90]  JAI (acute kidney injury) ([facility]) [N17.9]  Acute on chronic combined systolic and diastolic CHF (congestive heart failure) ([facility]) [I50.43]  Osteomyelitis of left foot, unspecified type ([facility]) [M86.9]  Acute on chronic HFrEF (heart failure with reduced ejection fraction) ([facility]) [I50.23]  Acute on chronic congestive heart failure, unspecified heart failure type ([facility]) [I50.9]    Admitted with    right knee pain , leg swelling, shortness of breath   Surgery: none  Visit Diagnoses         Codes    Acute on chronic combined systolic and diastolic CHF (congestive heart failure) ([facility])     I50.43    JAI (acute kidney injury) ([facility])     N17.9    Osteomyelitis of left foot, unspecified type ([facility])     M86.9    Pleural effusion, bilateral     J90    Transaminitis     R74.01            Patient Active Problem List   Diagnosis    Type 2 diabetes mellitus without complication, without long-term current use of insulin (HCC)    Mixed hyperlipidemia    Essential hypertension    RLS (restless legs syndrome)    Chronic obstructive pulmonary disease ([facility])    Acute on chronic congestive heart failure ([facility])    CAD in native artery    Left ventricular thrombus    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

## 2024-08-25 NOTE — PROGRESS NOTES
CHIEF COMPLAINT: Acute CHF    HISTORY OF PRESENT ILLNESS: Patient is a 89 y.o. female seen at the request of Zeb Lundy MD and followed at our office by Dr. Li.      Patient with a longstanding history of systolic CHF.     Presents with SOB and increased volume. No CP or angina.     Past Medical History:   Diagnosis Date    Abnormal cardiovascular stress test 11/17/2022    CAD (coronary artery disease)     Chronic systolic (congestive) heart failure 1/12/2023    COPD (chronic obstructive pulmonary disease) ([facility])     COPD exacerbation ([facility]) 11/17/2022    H/O cardiovascular stress test 11/17/2022    Lexiscan    Hyperlipidemia     Hypertension     Osteoarthritis     Osteoarthritis     Type II or unspecified type diabetes mellitus without mention of complication, not stated as uncontrolled     Urinary retention 11/17/2022       Patient Active Problem List   Diagnosis    Type 2 diabetes mellitus without complication, without long-term current use of insulin (HCC)    Mixed hyperlipidemia    Essential hypertension    RLS (restless legs syndrome)    Chronic obstructive pulmonary disease ([facility])    Acute on chronic congestive heart failure ([facility])    CAD in native artery    Left ventricular thrombus    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 ([facility])    Generalized weakness    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[facility])    DNR (do not resuscitate)    Elevated LFTs    Moderate protein-calorie malnutrition ([facility])    Chronic renal disease, stage III ([facility]) [305546]    Atherosclerotic heart disease of native coronary artery with unspecified angina pectoris    Chronic systolic (congestive) heart failure    Type 2 diabetes mellitus with chronic kidney disease    Ulcer of toe of left foot, with necrosis of bone ([facility])    PVD (peripheral vascular disease) ([facility])    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)    Acute on chronic HFrEF (heart failure with reduced ejection fraction) ([facility])       Allergies   Allergen

## 2024-08-26 PROBLEM — I50.43 ACUTE ON CHRONIC COMBINED SYSTOLIC AND DIASTOLIC CHF (CONGESTIVE HEART FAILURE) (HCC): Status: ACTIVE | Noted: 2024-01-01

## 2024-08-26 PROBLEM — E78.00 PURE HYPERCHOLESTEROLEMIA: Status: ACTIVE | Noted: 2024-01-01

## 2024-08-26 PROBLEM — I25.5 ISCHEMIC CARDIOMYOPATHY: Status: ACTIVE | Noted: 2024-01-01

## 2024-08-26 NOTE — PROGRESS NOTES
Progress  Note  Chief Complaint   Patient presents with    Knee Pain     Right no injury, just starting to act up     Historical Issues:  Current Facility-Administered Medications   Medication Dose Route Frequency Provider Last Rate Last Admin    acetaminophen (TYLENOL) tablet 650 mg  650 mg Oral Q6H PRN Noble Morales MD        bisacodyl (DULCOLAX) suppository 10 mg  10 mg Rectal Daily PRN Noble Morales MD        furosemide (LASIX) injection 40 mg  40 mg IntraVENous BID Elsy Walden DO        midodrine (PROAMATINE) tablet 10 mg  10 mg Oral TID WC Sedrick Carter MD   10 mg at 08/25/24 1614    clopidogrel (PLAVIX) tablet 75 mg  75 mg Oral Daily Elsy Walden DO   75 mg at 08/25/24 0941    atorvastatin (LIPITOR) tablet 40 mg  40 mg Oral Nightly Elsy Walden,    40 mg at 08/25/24 2113    ipratropium 0.5 mg-albuterol 2.5 mg (DUONEB) nebulizer solution 1 Dose  1 Dose Inhalation Q6H Sedrick Carter MD   1 Dose at 08/26/24 0517    enoxaparin Sodium (LOVENOX) injection 30 mg  30 mg SubCUTAneous Daily Sedrick Carter MD   30 mg at 08/25/24 0941    latanoprost (XALATAN) 0.005 % ophthalmic solution 1 drop  1 drop Both Eyes Nightly Ramona Albarran APRN - CNP   1 drop at 08/25/24 2113    pregabalin (LYRICA) capsule 25 mg  25 mg Oral BID Ramona Albarran APRN - CNP   25 mg at 08/25/24 2113    perflutren lipid microspheres (DEFINITY) injection 1.5 mL  1.5 mL IntraVENous ONCE PRN Ramona Albarran APRN - CNP        DOBUTamine (DOBUTREX) 1000 mg in dextrose 5 % 250 mL infusion  2.5 mcg/kg/min IntraVENous Continuous Ramona Albarran APRN - CNP 2.5 mL/hr at 08/23/24 2300 2.5 mcg/kg/min at 08/23/24 2300    timolol (TIMOPTIC) 0.5 % ophthalmic solution 1 drop  1 drop Both Eyes BID Ramona Albarran APRN - CNP   1 drop at 08/25/24 2113    insulin lispro (HUMALOG,ADMELOG) injection vial 0-4 Units  0-4 Units SubCUTAneous TID  Ramona Albarran, APRN - CNP        insulin lispro (HUMALOG,ADMELOG) injection vial  0-4 Units  0-4 Units SubCUTAneous Nightly Ramona Albarran APRN - CNP        glucose chewable tablet 16 g  4 tablet Oral PRN Ramona Albarran APRN - CNP        dextrose bolus 10% 125 mL  125 mL IntraVENous PRN Ramona Albarran APRN - CNP        Or    dextrose bolus 10% 250 mL  250 mL IntraVENous PRN Ramona Albarran APRN - CNP        glucagon injection 1 mg  1 mg SubCUTAneous PRN Ramona Albarran APRN - CNP        dextrose 10 % infusion   IntraVENous Continuous PRN Ramona Albarran APRN - CNP           Recent Complaints:  Review of Systems  Vitals:    08/26/24 1045   BP: (!) 85/71   Pulse: (!) 118   Resp: 22   Temp: 98.4 °F (36.9 °C)   SpO2: 93%     Physical Exam  Vitals reviewed.   Constitutional:       General: She is not in acute distress.  Cardiovascular:      Rate and Rhythm: Tachycardia present. Rhythm irregular.      Pulses: Normal pulses.   Pulmonary:      Effort: Accessory muscle usage present.      Breath sounds: Decreased air movement present. Rales (right) present.   Musculoskeletal:      Right lower leg: No edema.      Left lower leg: No edema.   Neurological:      Mental Status: She is alert.         Recent Labs     08/24/24  0607 08/25/24  0539 08/26/24  0608    138 137   K 3.7 3.8 4.2    101 104   CO2 23 23 22   BUN 47* 46* 43*   CREATININE 1.8* 1.5* 1.4*   GLUCOSE 109* 99 126*   CALCIUM 8.8 9.0 9.0     Recent Labs     08/25/24  0539 08/26/24  0608   WBC 7.4 6.6   RBC 3.66 3.82   HGB 11.1* 11.7   HCT 35.8 36.4   MCV 97.8 95.3   MCH 30.3 30.6   MCHC 31.0* 32.1   RDW 17.2* 17.8*   * 126*   MPV 11.2 11.1           Principal Problem:    Acute on chronic HFrEF (heart failure with reduced ejection fraction) (HCC)  Active Problems:    Type 2 diabetes mellitus without complication, without long-term current use of insulin (HCC)    Mixed hyperlipidemia    Acute on chronic congestive heart failure (HCC)    CAD in native artery    Elevated LFTs    Moderate protein-calorie

## 2024-08-26 NOTE — PROGRESS NOTES
present.    Intake/Output:    Intake/Output Summary (Last 24 hours) at 8/26/2024 1217  Last data filed at 8/26/2024 0851  Gross per 24 hour   Intake 0 ml   Output --   Net 0 ml     No intake/output data recorded.    Laboratory Tests:  Lab Results   Component Value Date    CREATININE 1.4 (H) 08/26/2024    BUN 43 (H) 08/26/2024     08/26/2024    K 4.2 08/26/2024     08/26/2024    CO2 22 08/26/2024     Recent Labs     08/23/24  1442   CKTOTAL 84     No results found for: \"BNP\"  Lab Results   Component Value Date/Time    WBC 6.6 08/26/2024 06:08 AM    RBC 3.82 08/26/2024 06:08 AM    HGB 11.7 08/26/2024 06:08 AM    HCT 36.4 08/26/2024 06:08 AM    MCV 95.3 08/26/2024 06:08 AM    MCH 30.6 08/26/2024 06:08 AM    MCHC 32.1 08/26/2024 06:08 AM    RDW 17.8 08/26/2024 06:08 AM     08/26/2024 06:08 AM    MPV 11.1 08/26/2024 06:08 AM     Recent Labs     08/24/24  0607 08/25/24  0539 08/26/24  0608   ALKPHOS 310* 277* 259*   * 103* 87*   * 74* 55*   BILITOT 0.7 1.0 1.1     Lab Results   Component Value Date/Time    MG 2.2 01/24/2023 11:03 AM     Lab Results   Component Value Date/Time    PROTIME 11.3 01/24/2023 08:54 PM    INR 1.0 01/24/2023 08:54 PM     Lab Results   Component Value Date/Time    TSH 2.830 11/11/2015 12:00 PM     No components found for: \"CHLPL\"  Lab Results   Component Value Date    TRIG 101 06/10/2024    TRIG 130 09/07/2023    TRIG 97 05/19/2023     Lab Results   Component Value Date    HDL 38 (L) 06/10/2024    HDL 37 (L) 09/07/2023    HDL 33 05/19/2023     No components found for: \"LDLCALC\"    Cardiac Tests:  Telemetry findings reviewed: sinus rhythm with an episode of accelerated idioventricular rhythm, no new tachy/bradyarrhythmias overnight      ASSESSMENT / PLAN: On a clinical basis, the patient remains volume overloaded in the face of her ischemic cardiomyopathy with limited options for management especially in light of hypotension limiting further modification of optimal  medical therapy.  At least on a short-term basis to facilitate diuresis her intravenous dobutamine will be continued over the next 24 hours prior to consider dose modification and/or discontinuation.  Ongoing aggressive risk factor modification of blood pressure, diabetes and serum lipids will remain essential to reducing risk of future atherosclerotic development.  Her prognosis appears guarded with recommended evaluation by the palliative care service to further assist advance directive decisions.  Additional management of noncardiovascular issues will be deferred to primary care      Note: This report was completed utilizing computer voice recognition software. Every effort has been made to ensure accuracy, however; inadvertent computerized transcription errors may be present.    Peyman Shipman MD  [facility]

## 2024-08-26 NOTE — DISCHARGE INSTRUCTIONS
HEART FAILURE  / CONGESTIVE HEART FAILURE  DISCHARGE INSTRUCTIONS:  GUIDELINES TO FOLLOW AT HOME    Self- Managed Care:     MEDICATIONS:  Take your medication as directed. If you are experiencing any side effects, inform your doctor, Do not stop taking any of your medications without letting your doctor know.   Check with your doctor before taking any over-the-counter medications / herbal / or dietary supplements. They may interfere with your other medications.  Do not take ibuprofen (Advil or Motrin) and naproxen (Aleve) without talking to your doctor first. They could make your heart failure worse.         WEIGHT MONITORING:   Weigh yourself everyday (with the same scale) around the same time of the day and write it down. (you can chart them on a calendar or keep track of them on paper.   Notify your doctor of a weight gain of 3 pounds or more in 1 day   OR a total of 5 pounds or more in 1 week    Take your weight record to your doctor visits  Also, the same goes if you loose more than 3# in one day, let your heart doctor know.         DIET:   Cardiac heart healthy diet- Low saturated / low trans fat, no added salt, caffeine restricted, Low sodium diet-   No more than 2,000mg (2 grams) of salt / sodium per day (which equals to a little less than  a teaspoon of salt)  If your doctor wants you on a fluid restriction...it is usually recommended a fluid limit of 2,000cc -  Fluid restriction- 2,000 ml (milliliters) = 64 ounces = you can have 8 glasses of fluid per day (each glass 8 ounces)    Follow a low salt diet - avoid using salt at the table, avoid / limit use of canned soups, processed / packaged foods, salted snacks, olives and pickles.  Do not use a salt substitute without checking with your doctor, they may contain a high amount of potassioum. (Mrs. Dash is safe to use).    Limit the use of alcohol       CALL YOUR DOCTOR THE FIRST DAY YOU NOTICE ANY OF THESE   SYMPTOMS:  You have a weight  whether you need another dose.       My Goal for Self-management of Heart Failure Includes 5 steps :    1. Notice a change in symptoms ( weight gain, short of breath, leg swelling, decreased activity level, bloating....)    2. Evaluate the change: (use the Heart Failure Zones )     3. Decide to take action: decide what your options are, such as: (call your doctor for an extra visit, take a prescribed medication, such as your water pill if your doctor has given you directions to do so, CALL YOUR DOCTOR)    4. Come up with a strategy:  (now you call the doctor for advice / appointment. This is where you take action!!!  Do not wait, catch the symptom early and treat it before it worsens.    5. Evaluate the response: The next day, check your Heart Failure Zones: are you in the GREEN ZONE (safe zone)?  Worsening symptoms of YELLOW ZONE? Or have you moved to the RED ZONE and need to call 911 or go to the Emergency Room for evaluation? Call your doctor's office to update them on your symptoms of heart failure.

## 2024-08-26 NOTE — ACP (ADVANCE CARE PLANNING)
Advance Care Planning   Healthcare Decision Maker:    Primary Decision Maker: Loren Weir - Spouse - [phone]    Secondary Decision Maker: Jacklyn Barakat - Child - [phone]    Secondary Decision Maker: LOREN WEIR JR - Child - [phone]      Today we documented Decision Maker(s) consistent with Legal Next of Kin hierarchy.

## 2024-08-26 NOTE — PROGRESS NOTES
OCCUPATIONAL THERAPY INITIAL EVALUATION    [facility]  [address] [place]        Date:2024                                                  Patient Name: Mira Weir    MRN: [id number]    : 1935    Room: [address] [place] [address]      Evaluating OT: Paxton Weeks OTR/L; #[id number]     Referring Provider and Specific Provider Orders/Date:      24  OT eval and treat  Start:  24,   End:  24,   ONE TIME,   Standing Count:  1 Occurrences,   R         Ramona Albarran, APRN - CNP      Placement Recommendation: Home with occupational therapy and family assistance vs subacute rehab if patient does not meet OT goals.        Diagnosis:   1. Acute on chronic congestive heart failure, unspecified heart failure type ([facility])    2. Acute on chronic combined systolic and diastolic CHF (congestive heart failure) ([facility])    3. JAI (acute kidney injury) ([facility])    4. Osteomyelitis of left foot, unspecified type ([facility])    5. Pleural effusion, bilateral    6. Transaminitis         Surgery: None      Pertinent Medical History:       Past Medical History:   Diagnosis Date    Abnormal cardiovascular stress test 2022    CAD (coronary artery disease)     Chronic systolic (congestive) heart failure 2023    COPD (chronic obstructive pulmonary disease) ([facility])     COPD exacerbation ([facility]) 2022    H/O cardiovascular stress test 2022    Lexiscan    Hyperlipidemia     Hypertension     Osteoarthritis     Osteoarthritis     Type II or unspecified type diabetes mellitus without mention of complication, not stated as uncontrolled     Urinary retention 2022         Past Surgical History:   Procedure Laterality Date    APPENDECTOMY  1953    CARDIAC CATHETERIZATION  2022    Dr Kerns     SECTION   ,1971    COLONOSCOPY  2003    TUMOR REMOVAL  1954    under lt arm        Precautions:  Fall Risk, NC O2, Blind L   Static: good     Dynamic: good   Standing: good  with wheeled walker   Activity Tolerance fair -  good    Visual/  Perceptual Glasses: Present; blind L eye     Reports change in vision since admission: No             Hand Dominance: R     AROM (PROM) Strength Additional Info:  Goal:   RUE  WFL 4-/5 good  and wfl FMC/dexterity noted during ADL tasks   Improve overall RUE strength  for participation in functional tasks   LUE WFL 4-/5 good  and wfl FMC/dexterity noted during ADL tasks   Improve overall LUE strength  for participation in functional tasks      Vitals:  HR at rest: 59 bpm HR with activity: 60 bpm    SpO2 at rest: 93% SpO2 with activity: 92%      Hearing: WFL   Sensation:  No c/o numbness or tingling  Tone: WFL   Edema: Bilateral LE    Comments: Nursing approved therapy session. Upon arrival the patient was supine with HOB elevated.  At end of session, patient was seated in bedside chair with call light and phone within reach, all lines and tubes intact.  Overall patient demonstrated decreased independence and safety during completion of ADL/functional transfer/mobility tasks.  Pt would benefit from continued skilled OT to increase safety and independence with completion of ADL/IADL tasks for functional independence and quality of life.    Treatment: OT treatment provided this date includes:   Instruction/training on safety and adapted techniques for completion of ADLs   Instruction/training on safe functional mobility/transfer techniques   Instruction/training on energy conservation/work simplification for completion of ADLs   Instruction/training on proper positioning/alignment to prevent contractures    Neuromuscular Reeducation to facilitate balance/righting reactions for increased function with ADLs tasks    Treatment:      Functional transfers: Facilitated transfers to/from EOB and chair with cues for body alignment, safety and hand placement.  ADL completion: Self-care retraining for the

## 2024-08-26 NOTE — PLAN OF CARE
Problem: Discharge Planning  Goal: Discharge to home or other facility with appropriate resources  Outcome: Progressing  Flowsheets (Taken 8/25/2024 1925)  Discharge to home or other facility with appropriate resources:   Identify barriers to discharge with patient and caregiver   Arrange for needed discharge resources and transportation as appropriate   Identify discharge learning needs (meds, wound care, etc)   Refer to discharge planning if patient needs post-hospital services based on physician order or complex needs related to functional status, cognitive ability or social support system     Problem: ABCDS Injury Assessment  Goal: Absence of physical injury  Outcome: Progressing     Problem: Safety - Adult  Goal: Free from fall injury  Outcome: Progressing     Problem: Chronic Conditions and Co-morbidities  Goal: Patient's chronic conditions and co-morbidity symptoms are monitored and maintained or improved  Outcome: Progressing

## 2024-08-27 PROBLEM — Z51.5 PALLIATIVE CARE ENCOUNTER: Status: ACTIVE | Noted: 2024-01-01

## 2024-08-27 PROBLEM — I50.23 ACUTE ON CHRONIC HFREF (HEART FAILURE WITH REDUCED EJECTION FRACTION) (HCC): Status: ACTIVE | Noted: 2024-01-01

## 2024-08-27 NOTE — PROGRESS NOTES
Progress  Note  Chief Complaint   Patient presents with    Knee Pain     Right no injury, just starting to act up     Historical Issues:  Current Facility-Administered Medications   Medication Dose Route Frequency Provider Last Rate Last Admin    acetaminophen (TYLENOL) tablet 650 mg  650 mg Oral Q6H PRN Noble Morales MD   650 mg at 08/26/24 1218    bisacodyl (DULCOLAX) suppository 10 mg  10 mg Rectal Daily PRN Noble Morales MD        albumin human 25% IV solution 25 g  25 g IntraVENous BID Noble Morales  mL/hr at 08/27/24 0922 25 g at 08/27/24 0922    [Held by provider] bumetanide (BUMEX) injection 1 mg  1 mg IntraVENous BID Noble Morales MD        ondansetron (ZOFRAN) injection 4 mg  4 mg IntraVENous Q6H PRN Celeste [facility] MD   4 mg at 08/26/24 2251    midodrine (PROAMATINE) tablet 10 mg  10 mg Oral TID  Sedrick Carter MD   10 mg at 08/27/24 1224    clopidogrel (PLAVIX) tablet 75 mg  75 mg Oral Daily Elsy Walden, DO   75 mg at 08/27/24 0922    atorvastatin (LIPITOR) tablet 40 mg  40 mg Oral Nightly Elsy Walden DO   40 mg at 08/26/24 2028    ipratropium 0.5 mg-albuterol 2.5 mg (DUONEB) nebulizer solution 1 Dose  1 Dose Inhalation Q6H Sedrick Carter MD   1 Dose at 08/27/24 1025    enoxaparin Sodium (LOVENOX) injection 30 mg  30 mg SubCUTAneous Daily Sedrick Carter MD   30 mg at 08/27/24 0922    latanoprost (XALATAN) 0.005 % ophthalmic solution 1 drop  1 drop Both Eyes Nightly Ramona Albarran APRN - CNP   1 drop at 08/26/24 2038    pregabalin (LYRICA) capsule 25 mg  25 mg Oral BID Ramona Albarran APRN - CNP   25 mg at 08/27/24 0922    perflutren lipid microspheres (DEFINITY) injection 1.5 mL  1.5 mL IntraVENous ONCE PRN Ramona Albarran APRN - CNP        DOBUTamine (DOBUTREX) 1000 mg in dextrose 5 % 250 mL infusion  2.5 mcg/kg/min IntraVENous Continuous Ramona Albarran APRN - CNP 2.5 mL/hr at 08/23/24 2300 2.5 mcg/kg/min at 08/23/24 2300    timolol (TIMOPTIC) 0.5 % ophthalmic  complication, without long-term current use of insulin (HCC)    Mixed hyperlipidemia    Primary hypertension    Pleural effusion, bilateral    Acute on chronic congestive heart failure (HCC)    Hypotension    Coronary artery disease involving native coronary artery of native heart without angina pectoris    Elevated LFTs    Moderate protein-calorie malnutrition (HCC)    Chronic renal disease, stage III (HCC) [894430]    Type 2 diabetes mellitus with chronic kidney disease    Ischemic cardiomyopathy    Pure hypercholesterolemia  Resolved Problems:    * No resolved hospital problems. *      Plan:  Blood pressure has dropped significantly.  Creatinine is going up.  Patient has significant dyspnea.  I explained to her we do not have enough blood pressure to be able to give her medications.  She understands this.  She is a limited code.  She is now interested in talking to palliative care and hospice potentially after talking to palliative care.  I did speak with Dr. Shipman.  We are in agreement that hospice would be the most appropriate level of care for this patient.  In the meantime I have given her some albumin to bring her pressure up.      Case Discussed with:      Electronically signed by Noble Morales MD on 8/27/2024 at 2:02 PM

## 2024-08-27 NOTE — PROGRESS NOTES
Physician Progress Note      PATIENT:               KB PANDYA  [facility] #:                  [id number]  :                       1935  ADMIT DATE:       2024 11:05 AM  DISCH DATE:  RESPONDING  PROVIDER #:        Peyman Shipman MD          QUERY TEXT:    Pt admitted with Acute on chronic systolic CHF. Pt noted to also have CAD,   hypertension, ischemic cardiomyopathy. If possible, please document in   progress notes and discharge summary the etiology of CHF, if able to be   determined.    The medical record reflects the following:  Risk Factors: CAD, ICMP, HTN, DM, JIA on CKD,  Clinical Indicators: ECHO  - Severely reduced left ventricular systolic   function with a visually estimated EF of 10 -15%. Left ventricle is severely   dilated. Normal wall thickness. Severe global hypokinesis present. - Right   ventricle size is normal. Reduced systolic function.- Left atrial volume index   is severely increased (>48 mL/m2).-  Right atrium is moderately dilated.  - Mitral valve: Mildly thickened leaflets. Moderate regurgitation.-  Tricuspid   Valve: Moderate regurgitation. Moderately elevated RVSP, consistent with   moderate pulmonary hypertension. ...  Extracardiac: Medium sized left pleural   effusion. Probnp [id number].  Treatment: Dobutrex drip, IV Lasix,    Thank you, Olga Arteaga RN CDI  [phone]  Options provided:  -- CHF due to Hypertensive Heart Disease  -- CHF due to Hypertensive Heart Disease and CAD  -- CHF not due to Hypertension but due to CAD  -- CHF due to Hypertensive Heart Disease and ICMP  -- CHF not due to Hypertension but due to ICMP  -- CHF due to Hypertensive Heart Disease and Valvular Heart Disease  -- CHF not due to Hypertension but due to valvular heart disease  -- CHF due to HTN, CAD, ICMP and valvular disease  -- Other - I will add my own diagnosis  -- Disagree - Not applicable / Not valid  -- Disagree - Clinically unable to determine / Unknown  -- Refer to Clinical

## 2024-08-27 NOTE — PROGRESS NOTES
Dr. Morales notified of patient's BP. BP's attempted in all 4 extremities as well as manual's..all reading nearly the same.

## 2024-08-27 NOTE — PROGRESS NOTES
08/27/24 1504   Encounter Summary   Encounter Overview/Reason Spiritual/Emotional Needs   Service Provided For Patient   Referral/Consult From [facility]   Support System Spouse;Children   Last Encounter  08/27/24  (L-CW)   Complexity of Encounter Moderate   Spiritual/Emotional needs   Type Spiritual Support   Assessment/Intervention/Outcome   Assessment Calm   Intervention Active listening;Discussed belief system/Mormonism practices/devora;Discussed illness injury and it’s impact;Discussed relationship with God;Explored/Affirmed feelings, thoughts, concerns;Nurtured Hope;Prayer (assurance of)/[place];Read/Provided Scripture   Outcome Comfort;Engaged in conversation;Expressed Gratitude;Peace;Receptive      visited patient. Patient was in bed. Patient appeared to be calm.  provided a listening presence and offered prayer. Patient was grateful for visit. Spiritual care is ongoing as needed.     elijah Flores [phone]

## 2024-08-27 NOTE — PROGRESS NOTES
Physical Therapy      Physical Therapy    Room #:   0633/0633-01    Date: 2024       Patient Name: Mira Weir  : 1935      MRN: [id number]     Patient unavailable for physical therapy treatment due to  patient wanting to rest today . Nursing reports there is a possible Hospice consult. Will attempt PT treatment at a later time. Thank you.      Marie Warner, PTA  #[id number]

## 2024-08-27 NOTE — CONSULTS
Palliative Care Department  [phone]  Palliative Care Initial Consult  Provider Nida Avendano, APRN - CNP     Mira Weir  [id number]  Hospital Day: 5  Date of Initial Consult: 8/27/2024  Referring Provider: Noble Morales MD  Palliative Medicine was consulted for assistance with: goals of care, end stage disease    HPI:   Mira Weir is a 89 y.o. with a medical history of CAD, COPD, Chronic Systolic HF, COPD, HLD, HTN, DM II, Urinary Retention who was admitted on 8/23/2024 from home with a CHIEF COMPLAINT of right knee pain.  States she has fallen over the last month a couple of times , but no apparent injury. States she follows with Dr. Li and the [facility].  Cardiology consulted.  Palliative medicine consulted for further assistance.    ASSESSMENT/PLAN:     Pertinent Hospital Diagnoses     Acute on chronic HFrEF  Ischemic cardiomyopathy  COPD  CKD  PAD  CAD      Palliative Care Encounter / Counseling Regarding Goals of Care  Please see detailed goals of care discussion as below  At this time, Mira Weir, Does have capacity for medical decision-making.  Capacity is time limited and situation/question specific  During encounter Te Tiwari Jr, Dawn were surrogate medical decision-maker  Outcome of goals of care meeting:   Continue current medical management  Discussed CODE STATUS options  Discussed hospice philosophy  Code status DNR-CCA  Advanced Directives: no POA or living will in epic  Surrogate/Legal NOK:  Te Weir (spouse) [phone]  Jacklyn Barakat (child) [phone]  Te Weir Jr. (Child) [phone]    Spiritual assessment: no spiritual distress identified  Bereavement and grief: to be determined  Referrals to: [facility]   SUBJECTIVE:     Current medical issues leading to Palliative Medicine involvement include   Active Hospital Problems    Diagnosis Date Noted    Type 2 diabetes mellitus without complication, without long-term current use of insulin (HCC) [E11.9]      Priority: High

## 2024-08-27 NOTE — PROGRESS NOTES
INPATIENT CARDIOLOGY FOLLOW-UP    Name: Mira Weir    Age: 89 y.o.    Date of Admission: 8/23/2024 11:05 AM    Date of Service: 8/27/2024    Chief Complaint: Follow-up for coronary atherosclerosis, ischemic cardiomyopathy, acute superimposed upon chronic systolic heart failure, chronic obstructive lung disease, hypertension, persistent hypotension, chronic kidney disease, peripheral arterial disease    Interim History: The patient remains weak and debilitated with persistent hypotension limiting therapy and spite of vasoactive support and requiring withholding of diuretics.  Persistent hypoxia is present requiring increasing supplemental oxygen dosing.  Repeat assessment of renal function electrolytes are pending with persistent elevation of proBNP levels in spite of existing management.      Review of Systems:   The remainder of a complete multisystem review including consitutional, central nervous, respiratory, circulatory, gastrointestinal, genitourinary, endocrinologic, hematologic, musculoskeletal and psychiatric are negative.    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 Lung fields are clear to ascultation with diminished breath sounds in both lung bases. Cardiac examination is notable for a regular rate and rhythm with no palpable thrill. No gallop rhythm or cardiac murmur are identified. A benign abdominal examination is present with no masses or organomegaly. Intact pulses are present throughout all extremities and mild pretibial edema is present. No focal neurologic deficits are present.    Intake/Output:    Intake/Output Summary (Last 24 hours) at 8/27/2024 1014  Last data filed at 8/27/2024 0828  Gross per 24 hour   Intake 420 ml   Output --   Net 420 ml     I/O this shift:  In: 180 [P.O.:180]  Out: -     Laboratory Tests:  Lab Results   Component Value Date    CREATININE 1.4 (H) 08/26/2024    BUN 43 (H) 08/26/2024     08/26/2024    K 4.2 08/26/2024     08/26/2024    CO2 22 08/26/2024     No results for input(s): \"CKTOTAL\", \"CKMB\" in the last 72 hours.    Invalid input(s): \"TROPONONI\"  No results found for: \"BNP\"  Lab Results   Component Value Date/Time    WBC 10.3 08/27/2024 06:29 AM    RBC 3.95 08/27/2024 06:29 AM    HGB 11.9 08/27/2024 06:29 AM    HCT 39.2 08/27/2024 06:29 AM    MCV 99.2 08/27/2024 06:29 AM    MCH 30.1 08/27/2024 06:29 AM    MCHC 30.4 08/27/2024 06:29 AM    RDW 17.5 08/27/2024 06:29 AM     08/27/2024 06:29 AM    MPV 11.5 08/27/2024 06:29 AM     Recent Labs     08/25/24  0539 08/26/24  0608   ALKPHOS 277* 259*   * 87*   AST 74* 55*   BILITOT 1.0 1.1     Lab Results   Component Value Date/Time    MG 2.2 01/24/2023 11:03 AM     Lab Results   Component Value Date/Time    PROTIME 11.3 01/24/2023 08:54 PM    INR 1.0 01/24/2023 08:54 PM     Lab Results   Component Value Date/Time    TSH 2.830 11/11/2015 12:00 PM     No components found for: \"CHLPL\"  Lab Results   Component Value Date    TRIG 101 06/10/2024    TRIG 130 09/07/2023    TRIG 97 05/19/2023     Lab Results   Component Value Date    HDL 38 (L)

## 2024-08-28 NOTE — DISCHARGE INSTR - COC
10/25/2017, 10/09/2018    Pneumococcal, PCV-13, PREVNAR 13, (age 6w+), IM, 0.5mL 02/09/2016    Pneumococcal, PPSV23, PNEUMOVAX 23, (age 2y+), SC/IM, 0.5mL 05/16/2011       Active Problems:  Patient Active Problem List   Diagnosis Code    Type 2 diabetes mellitus without complication, without long-term current use of insulin ([facility]) E11.9    Mixed hyperlipidemia E78.2    Primary hypertension I10    RLS (restless legs syndrome) G25.81    Chronic obstructive pulmonary disease ([facility]) J44.9    Pleural effusion, bilateral J90    Acute on chronic congestive heart failure ([facility]) I50.9    Hypotension I95.9    Coronary artery disease involving native coronary artery of native heart without angina pectoris I25.10    Left ventricular thrombus I51.3    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 ([facility]) I70.0    Generalized weakness R53.1    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[facility]) I74.09    DNR (do not resuscitate) Z66    Elevated LFTs R79.89    Moderate protein-calorie malnutrition ([facility]) E44.0    Chronic renal disease, stage III ([facility]) [253520] N18.30    Atherosclerotic heart disease of native coronary artery with unspecified angina pectoris I25.119    Chronic systolic (congestive) heart failure I50.22    Type 2 diabetes mellitus with chronic kidney disease E11.22    Ulcer of toe of left foot, with necrosis of bone ([facility]) L97.524    PVD (peripheral vascular disease) ([facility]) I73.9    Osteomyelitis of great toe of left foot ([facility]) M86.9    Acute on chronic combined systolic and diastolic CHF (congestive heart failure) ([facility]) I50.43    Ischemic cardiomyopathy I25.5    Pure hypercholesterolemia E78.00    Acute on chronic HFrEF (heart failure with reduced ejection fraction) ([facility]) I50.23    Palliative care encounter Z51.5       Isolation/Infection:   Isolation            No Isolation          Patient Infection Status       None to display                     Nurse Assessment:  Last Vital Signs: BP (!) 80/51   Pulse 80   Temp 98.3  °F (36.8 °C) (Axillary)   Resp 16   Ht 1.702 m (5' 7\")   Wt 66.7 kg (147 lb)   LMP  (LMP Unknown)   SpO2 92%   BMI 23.02 kg/m²     Last documented pain score (0-10 scale): Pain Level: 3  Last Weight:   Wt Readings from Last 1 Encounters:   08/25/24 66.7 kg (147 lb)     Mental Status:  oriented and alert    IV Access:  PIV-right wrist insertion date 08/24/24  Left lateral AC insertion date 08/27/24    Nursing Mobility/ADLs:  Walking   Dependent  Transfer  Assisted  Bathing  Assisted  Dressing  Assisted  Toileting  Dependent  Feeding  Assisted  Med Admin  Dependent  Med Delivery   whole    Wound Care Documentation and Therapy:  Wound 07/02/24 Toe (Comment  which one) Left #1 Great Toe (Active)   Wound Image   08/06/24 0937   Dressing Status Other (Comment) 08/26/24 2025   Wound Cleansed Not Cleansed 08/24/24 0141   Dressing/Treatment Open to air 08/28/24 0800   Offloading for Diabetic Foot Ulcers Offloading ordered 08/26/24 0000   Wound Length (cm) 0.4 cm 08/13/24 0927   Wound Width (cm) 0.2 cm 08/13/24 0927   Wound Depth (cm) 0.2 cm 08/13/24 0927   Wound Surface Area (cm^2) 0.08 cm^2 08/13/24 0927   Change in Wound Size % (l*w) 85.45 08/13/24 0927   Wound Volume (cm^3) 0.016 cm^3 08/13/24 0927   Wound Healing % 85 08/13/24 0927   Post-Procedure Length (cm) 0.5 cm 08/13/24 0956   Post-Procedure Width (cm) 0.3 cm 08/13/24 0956   Post-Procedure Depth (cm) 0.3 cm 08/13/24 0956   Post-Procedure Surface Area (cm^2) 0.15 cm^2 08/13/24 0956   Post-Procedure Volume (cm^3) 0.045 cm^3 08/13/24 0956   Wound Assessment Dry;Pink/red 08/26/24 0000   Drainage Amount None (dry) 08/26/24 0000   Drainage Description Yellow;Serous 08/13/24 0927   Odor None 08/26/24 0000   Margaret-wound Assessment Dry/flaky;Blanchable erythema 08/13/24 0927   Number of days: 57        Elimination:  Continence:   Bowel: Yes  Bladder: Yes  Urinary Catheter: None   Colostomy/Ileostomy/Ileal Conduit: No       Date of Last BM: 08/26/24    Intake/Output

## 2024-08-28 NOTE — PROGRESS NOTES
Physical Therapy treatment /Plan of Care    Room #:  0633/0633-01  Patient Name: Mira Weir  YOB: 1935  MRN: [id number]    Date of Service: 8/28/2024     Tentative placement recommendation: Subacute Rehab  Equipment recommendation: Patient has needed equipment       Evaluating Physical Therapist: Toby Martin, PT  #[id number]      Specific Provider Orders/Date/Referring Provider :       PT eval and treat  Start:  08/23/24 1815,   End:  08/23/24 1815,   ONE TIME,   Standing Count:  1 Occurrences,   R       Deion, Ramona DONALDSON, APRN - CNP  Admitting Diagnosis:   Transaminitis [R74.01]  Pleural effusion, bilateral [J90]  JAI (acute kidney injury) ([facility]) [N17.9]  Acute on chronic combined systolic and diastolic CHF (congestive heart failure) ([facility]) [I50.43]  Osteomyelitis of left foot, unspecified type ([facility]) [M86.9]  Acute on chronic HFrEF (heart failure with reduced ejection fraction) ([facility]) [I50.23]  Acute on chronic congestive heart failure, unspecified heart failure type (HCC) [I50.9]    Admitted with    right knee pain , leg swelling, shortness of breath   Surgery: none  Visit Diagnoses         Codes    JAI (acute kidney injury) ([facility])     N17.9    Osteomyelitis of left foot, unspecified type (HCC)     M86.9    Transaminitis     R74.01            Patient Active Problem List   Diagnosis    Type 2 diabetes mellitus without complication, without long-term current use of insulin (HCC)    Mixed hyperlipidemia    Primary hypertension    RLS (restless legs syndrome)    Chronic obstructive pulmonary disease (HCC)    Pleural effusion, bilateral    Acute on chronic congestive heart failure (HCC)    Hypotension    Coronary artery disease involving native coronary artery of native heart without angina pectoris    Left ventricular thrombus    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

## 2024-08-28 NOTE — PLAN OF CARE
Problem: Chronic Conditions and Co-morbidities  Goal: Patient's chronic conditions and co-morbidity symptoms are monitored and maintained or improved  Outcome: Progressing     Problem: Safety - Adult  Goal: Free from fall injury  Outcome: Progressing     Problem: ABCDS Injury Assessment  Goal: Absence of physical injury  Outcome: Progressing     Problem: Discharge Planning  Goal: Discharge to home or other facility with appropriate resources  Outcome: Progressing

## 2024-08-28 NOTE — CARE COORDINATION
8-27-Cm note: pt remains on a Dobutex gtt, HOTV (pt's/family choice) consult noted, family members at bedside, they are not ready to make decision for SNF until they meet with hospice and make a plan , IF pt requires a SNF she will NOT need PRECERT. Cm following. Electronically signed by Sabrina Waddell RN on 8/27/2024 at 2:58 PM  
8-28-Cm note: [facility] has accepted pt, [facility] had a  time of 6:30, asked them to outsource and asked if I could try as well, [facility] did not have a time until 6:30 either, ,EMT , no availability, [facility] transportation is available for 1/2 hr pick-up , cancelled PAS. Pt and family in room aware of transport time, facility aware, called [facility], spoke to Chantel with dc time. Electronically signed by Sabrina Waddell RN on 8/28/2024 at 2:45 PM    
8-28-Cm note: cm updated his am that pt wants to go to a SNF under Hospice services, chose [facility] 1st (no beds) , 2nd [facility] (no reply from facility) , [facility] 3[facility] they have a hospice room , pt's son at the bedside and agreeable to S[facility], referral called, will await response. NO PRECERT, will need a signed EN. Electronically signed by Sabrina Waddell RN on 8/28/2024 at 9:51 AM  
Management to discuss the discharge plan with any other family members/significant others, and if so, who? No  Plans to Return to Present Housing: Unknown at present  Other Identified Issues/Barriers to RETURNING to current housing: pt/ot recomm [facility]     Financial    Payor: MEDICARE / Plan: MEDICARE PART A AND B / Product Type: *No Product type* /     Does insurance require precert for SNF: No        [facility] [address] [place] [address] ([place]), [place] - [address] - P [phone] - F [phone]  [address] ([place]) [place] [address]  Phone: [phone] Fax: [phone]      Notes:    Factors facilitating achievement of predicted outcomes: Family support    Barriers to discharge: none     Additional Case Management Notes: 8-26-Cm note: met with pt and her dtr Jacklyn and  at the bedside, pt is alert an oriented, she lives with her , she has a rollator, home 02 from [facility] which she has not needed until recently, PT/OT recommend [facility], pt agreeable, list of facilities provided , asked for 3 choices. NO PRECERT required, pt will need a HENS and a signed EN. Cm following for choices. Electronically signed by Sabrina Waddell RN on 8/26/2024 at 4:43 PM      The Plan for Transition of Care is related to the following treatment goals of Transaminitis [R74.01]  Pleural effusion, bilateral [J90]  JAI (acute kidney injury) (H[facility]) [N17.9]  Acute on chronic combined systolic and diastolic CHF (congestive heart failure) (HCC) [I50.43]  Osteomyelitis of left foot, unspecified type (HCC) [M86.9]  Acute on chronic HFrEF (heart failure with reduced ejection fraction) (H[facility]) [I50.23]  Acute on chronic congestive heart failure, unspecified heart failure type (HCC) [I50.9]      Sabrina Waddell RN  Case Management Department  Ph: [phone] Fax: [phone]

## 2024-08-28 NOTE — PROGRESS NOTES
Patient is resting in bed awake, with family at bedside. Remains on oxygen at 4L via [place]. Denies pain and needs at this time. Call light is in reach, and transportation being set up for transport to [facility] for hospice services.

## 2024-08-28 NOTE — PROGRESS NOTES
latanoprost (XALATAN) 0.005 % ophthalmic solution 1 drop  1 drop Both Eyes Nightly Ramona Albarran APRN - CNP   1 drop at 08/27/24 2114    pregabalin (LYRICA) capsule 25 mg  25 mg Oral BID Ramona Albarran APRN - CNP   25 mg at 08/27/24 2114    perflutren lipid microspheres (DEFINITY) injection 1.5 mL  1.5 mL IntraVENous ONCE PRN Ramona Albarran APRN - CNP        timolol (TIMOPTIC) 0.5 % ophthalmic solution 1 drop  1 drop Both Eyes BID Ramona Albarran APRN - CNP   1 drop at 08/27/24 2114    insulin lispro (HUMALOG,ADMELOG) injection vial 0-4 Units  0-4 Units SubCUTAneous TID WC Ramona Albarran APRN - CNP   1 Units at 08/27/24 1827    insulin lispro (HUMALOG,ADMELOG) injection vial 0-4 Units  0-4 Units SubCUTAneous Nightly Ramona Albarran APRN - CNP        glucose chewable tablet 16 g  4 tablet Oral PRN Ramona Albarran APRN - CNP        dextrose bolus 10% 125 mL  125 mL IntraVENous PRN Ramona Albarran APRN - CNP        Or    dextrose bolus 10% 250 mL  250 mL IntraVENous PRN Ramona Albarran APRN - CNP        glucagon injection 1 mg  1 mg SubCUTAneous PRN Ramona Albarran APRN - CNP        dextrose 10 % infusion   IntraVENous Continuous PRN Ramona Albarran APRN - CNP          dextrose         Physical Exam:  BP (!) 80/51   Pulse 80   Temp 98.3 °F (36.8 °C) (Axillary)   Resp 16   Ht 1.702 m (5' 7\")   Wt 66.7 kg (147 lb)   LMP  (LMP Unknown)   SpO2 92%   BMI 23.02 kg/m²   Weight change:   Wt Readings from Last 3 Encounters:   08/25/24 66.7 kg (147 lb)   08/13/24 66.7 kg (147 lb)   08/08/24 66.7 kg (147 lb)     The patient is awake, alert and in no discomfort or distress. No gross musculoskeletal deformity is present. No significant skin or nail changes are present. Gross examination of head, eyes, nose and throat are negative. Jugular venous pressure is 8-9cmand no carotid bruits are present. Normal respiratory effort is noted with no accessory muscle usage present. Lung fields are  clear to ascultation with diminished breath sounds in both lung bases. Cardiac examination is notable for a regular rate and rhythm with no palpable thrill. No gallop rhythm or cardiac murmur are identified. A benign abdominal examination is present with no masses or organomegaly. Intact pulses are present throughout all extremities and no peripheral edema is present. No focal neurologic deficits are present.    Intake/Output:    Intake/Output Summary (Last 24 hours) at 8/28/2024 0817  Last data filed at 8/27/2024 1240  Gross per 24 hour   Intake 360 ml   Output 200 ml   Net 160 ml     No intake/output data recorded.    Laboratory Tests:  Lab Results   Component Value Date    CREATININE 2.1 (H) 08/27/2024    BUN 58 (H) 08/27/2024     08/27/2024    K 5.0 08/27/2024     08/27/2024    CO2 20 (L) 08/27/2024     No results for input(s): \"CKTOTAL\", \"CKMB\" in the last 72 hours.    Invalid input(s): \"TROPONONI\"  No results found for: \"BNP\"  Lab Results   Component Value Date/Time    WBC 10.9 08/28/2024 06:07 AM    RBC 3.74 08/28/2024 06:07 AM    HGB 11.5 08/28/2024 06:07 AM    HCT 37.0 08/28/2024 06:07 AM    MCV 98.9 08/28/2024 06:07 AM    MCH 30.7 08/28/2024 06:07 AM    MCHC 31.1 08/28/2024 06:07 AM    RDW 17.8 08/28/2024 06:07 AM     08/28/2024 06:07 AM    MPV 11.6 08/28/2024 06:07 AM     Recent Labs     08/26/24  0608 08/27/24  1235   ALKPHOS 259* 220*   ALT 87* 1,779*   AST 55* 3,909*   BILITOT 1.1 2.5*     Lab Results   Component Value Date/Time    MG 2.2 01/24/2023 11:03 AM     Lab Results   Component Value Date/Time    PROTIME 11.3 01/24/2023 08:54 PM    INR 1.0 01/24/2023 08:54 PM     Lab Results   Component Value Date/Time    TSH 2.830 11/11/2015 12:00 PM     No components found for: \"CHLPL\"  Lab Results   Component Value Date    TRIG 101 06/10/2024    TRIG 130 09/07/2023    TRIG 97 05/19/2023     Lab Results   Component Value Date    HDL 38 (L) 06/10/2024    HDL 37 (L) 09/07/2023    HDL 33

## 2024-08-28 NOTE — PROGRESS NOTES
**late entry**   8/27 530pm- call from Josselin. Conversation regarding hospice services.   The hospice benefit and philosophy were explained including that hospice is end of life care in which, per Medicare, a patient has a terminal diagnosis that life expectancy would be 6 months or less. Explained that once in hospice care, all aggressive treatments would be stopped and allow nature to takes its course with focus on comfort care for the patient.  Explained hospice services at home, at [facility] with room/board private pay unless patient has Medicaid and [facility] for short-term symptom management and respite.  They explained their mother and family agree a [facility] facility with hospice services would be best.  Reached out to WOODY Bennett about family choices for facility.    [facility] will follow and work with WOODY for final d/c plans     Electronically signed by Leela Smith RN on 8/28/2024 at 9:01 AM  [phone]

## 2024-08-28 NOTE — DISCHARGE SUMMARY
Performed:      Treatments:    Cardiac Medications    Beta-Blocker and Furosemide    Discharge Plan/Disposition:  To Non-[facility]    Hospital/Incidental Findings Requiring Follow Up:    Patient Instructions:    Diet: Regular Diet    Activity:No Lifting, Driving or Strenuous Excercise  For number of days (if applicable):      Other Instructions:    Provider Follow-Up:   No follow-ups on file.     Significant Diagnostic Studies:    Recent Labs:  Admission on 08/23/2024  No results displayed because visit has over 200 results.    ------------    Radiology last 7 days:  XR CHEST PORTABLE    Result Date: 8/27/2024  Appearance of the chest compared to 08/23/2024.     US LIVER    Result Date: 8/23/2024  Normal liver. Gallbladder sludge and possible small stones.  There is nonspecific striated gallbladder wall thickening but negative sonographic Florence's sign.  Findings are therefore equivocal for the possibility of acute cholecystitis.  If strongly suspected, consider HIDA scan. Increased right renal echogenicity suggesting medical renal disease. Correlate clinically.  No obstruction. Right pleural effusion.     XR FOOT LEFT (MIN 3 VIEWS)    Result Date: 8/23/2024  1. Erosive changes of the medial base of the large toe distal phalanx. Probably infectious/inflammatory. 2. New traumatic or infectious deformity of the distal aspect of the large toe proximal phalanx. 3. Stable chronic bony deformity of the terminal tuft of the large toe distal phalanx.     XR CHEST 1 VIEW    Result Date: 8/23/2024  1. Small to moderate bilateral pleural effusions with probable associated compressive atelectasis. 2. Cardiomegaly again noted when compared to the most recently available study performed 11/16/2022.     Vascular duplex lower extremity venous bilateral    Result Date: 8/23/2024  No evidence of DVT in either lower extremity.     XR KNEE RIGHT (1-2 VIEWS)    Result Date: 8/23/2024  1. No acute bony abnormalities. 2.

## 2024-08-28 NOTE — PLAN OF CARE
Problem: Discharge Planning  Goal: Discharge to home or other facility with appropriate resources  8/28/2024 1024 by Tameka Mccoy RN  Outcome: Progressing  Flowsheets (Taken 8/28/2024 0800)  Discharge to home or other facility with appropriate resources:   Identify barriers to discharge with patient and caregiver   Arrange for needed discharge resources and transportation as appropriate   Identify discharge learning needs (meds, wound care, etc)   Refer to discharge planning if patient needs post-hospital services based on physician order or complex needs related to functional status, cognitive ability or social support system  8/28/2024 0353 by Tyler Starr RN  Outcome: Progressing     Problem: ABCDS Injury Assessment  Goal: Absence of physical injury  8/28/2024 1024 by Tameka Mccoy RN  Outcome: Progressing  Flowsheets (Taken 8/28/2024 0800)  Absence of Physical Injury: Implement safety measures based on patient assessment  8/28/2024 0353 by Tyler Starr RN  Outcome: Progressing     Problem: Safety - Adult  Goal: Free from fall injury  8/28/2024 1024 by Tameka Mccoy RN  Outcome: Progressing  8/28/2024 0353 by Tyler Starr RN  Outcome: Progressing     Problem: Chronic Conditions and Co-morbidities  Goal: Patient's chronic conditions and co-morbidity symptoms are monitored and maintained or improved  8/28/2024 1024 by Tameka Mccoy RN  Outcome: Progressing  Flowsheets (Taken 8/28/2024 0800)  Care Plan - Patient's Chronic Conditions and Co-Morbidity Symptoms are Monitored and Maintained or Improved:   Monitor and assess patient's chronic conditions and comorbid symptoms for stability, deterioration, or improvement   Collaborate with multidisciplinary team to address chronic and comorbid conditions and prevent exacerbation or deterioration   Update acute care plan with appropriate goals if chronic or comorbid symptoms are exacerbated and prevent overall improvement and discharge  8/28/2024 0353 by Matty  Tyler ALLISON RN  Outcome: Progressing     Problem: Pain  Goal: Verbalizes/displays adequate comfort level or baseline comfort level  Outcome: Progressing  Flowsheets (Taken 8/28/2024 0800)  Verbalizes/displays adequate comfort level or baseline comfort level:   Encourage patient to monitor pain and request assistance   Assess pain using appropriate pain scale   Administer analgesics based on type and severity of pain and evaluate response   Implement non-pharmacological measures as appropriate and evaluate response   Notify Licensed Independent Practitioner if interventions unsuccessful or patient reports new pain

## 2024-08-28 NOTE — PROGRESS NOTES
Palliative Care Department  [phone]  Palliative Care Progress Note  Provider Nida Avendano, APRN - CNP     Mira Weir  [id number]  Hospital Day: 6  Date of Initial Consult: 8/27/2024  Referring Provider: Noble Morales MD  Palliative Medicine was consulted for assistance with: goals of care, end stage disease    HPI:   Mira Weir is a 89 y.o. with a medical history of CAD, COPD, Chronic Systolic HF, COPD, HLD, HTN, DM II, Urinary Retention who was admitted on 8/23/2024 from home with a CHIEF COMPLAINT of right knee pain.  States she has fallen over the last month a couple of times , but no apparent injury. States she follows with Dr. Li and the [facility].  Cardiology consulted.  Palliative medicine consulted for further assistance.    ASSESSMENT/PLAN:     Pertinent Hospital Diagnoses     Acute on chronic HFrEF  Ischemic cardiomyopathy  COPD  CKD  PAD  CAD      Palliative Care Encounter / Counseling Regarding Goals of Care  Please see detailed goals of care discussion as below  At this time, Mira Weir, Does have capacity for medical decision-making.  Capacity is time limited and situation/question specific  During encounter Chantel was surrogate medical decision-maker  Outcome of goals of care meeting:   Discharge to nursing facility with [facility]   Code status DNR-CC  Advanced Directives: no POA or living will in epic  Surrogate/Legal NOK:  Te Destin (spouse) [phone]  Jacklyn Barakat (child) [phone]  Te Weir Jr. (Child) [phone]    Spiritual assessment: no spiritual distress identified  Bereavement and grief: to be determined  Referrals to: HOT   SUBJECTIVE:     Current medical issues leading to Palliative Medicine involvement include   Active Hospital Problems    Diagnosis Date Noted    Type 2 diabetes mellitus without complication, without long-term current use of insulin (HCC) [E11.9]      Priority: High     Class: Chronic    Mixed hyperlipidemia [E78.2]      Priority: High      Class: Chronic    Primary hypertension [I10]      Priority: High     Class: Chronic    Moderate protein-calorie malnutrition (HCC) [E44.0] 01/26/2023     Priority: Medium    Elevated LFTs [R79.89] 01/24/2023     Priority: Medium    Coronary artery disease involving native coronary artery of native heart without angina pectoris [I25.10] 11/23/2022     Priority: Medium    Acute on chronic congestive heart failure (HCC) [I50.9] 11/17/2022     Priority: Medium    Hypotension [I95.9] 11/17/2022     Priority: Medium    Pleural effusion, bilateral [J90] 11/15/2022     Priority: Medium    Acute on chronic HFrEF (heart failure with reduced ejection fraction) (HCC) [I50.23] 08/27/2024    Palliative care encounter [Z51.5] 08/27/2024    Ischemic cardiomyopathy [I25.5] 08/26/2024    Pure hypercholesterolemia [E78.00] 08/26/2024    Acute on chronic combined systolic and diastolic CHF (congestive heart failure) ([facility]) [I50.43] 08/23/2024    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  Patient seen at the bedside, alert and oriented x 4.  Patient able to partake in meaningful conversation and able to make decisions for self.  Patient's daughter, Chantel, present at the bedside.  The patient and family have made the decision to proceed with hospice services and would like for patient to discharge to a nursing facility under hospice care. CODE STATUS changed to DNR CC per patient's request to focus on comfort.  Comfort medications ordered by attending physician.  Support provided.  Palliative medicine will continue to follow.    OBJECTIVE:   Prognosis: Poor    Physical Exam:  BP (!) 80/51   Pulse 80   Temp 98.3 °F (36.8 °C) (Axillary)   Resp 16   Ht 1.702 m (5' 7\")   Wt 66.7 kg (147 lb)   LMP  (LMP Unknown)   SpO2 92%   BMI 23.02 kg/m²   Constitutional:  Elderly, thin, awake,

## 2024-08-28 NOTE — PROGRESS NOTES
After speaking with case management, this nurse spoke with Dr Morales re: patient and family's decision for hospice care at a facility. Dr Morales is agreeable to discharge patient to nursing home with hospice services
